# Patient Record
Sex: MALE | Race: WHITE | Employment: OTHER | URBAN - METROPOLITAN AREA
[De-identification: names, ages, dates, MRNs, and addresses within clinical notes are randomized per-mention and may not be internally consistent; named-entity substitution may affect disease eponyms.]

---

## 2019-04-18 ENCOUNTER — APPOINTMENT (OUTPATIENT)
Dept: GENERAL RADIOLOGY | Age: 54
DRG: 310 | End: 2019-04-18
Payer: COMMERCIAL

## 2019-04-18 ENCOUNTER — APPOINTMENT (OUTPATIENT)
Dept: CT IMAGING | Age: 54
DRG: 310 | End: 2019-04-18
Payer: COMMERCIAL

## 2019-04-18 ENCOUNTER — HOSPITAL ENCOUNTER (INPATIENT)
Age: 54
LOS: 6 days | Discharge: HOME | DRG: 310 | End: 2019-04-24
Attending: EMERGENCY MEDICINE | Admitting: INTERNAL MEDICINE
Payer: COMMERCIAL

## 2019-04-18 ENCOUNTER — APPOINTMENT (OUTPATIENT)
Dept: MRI IMAGING | Age: 54
DRG: 310 | End: 2019-04-18
Payer: COMMERCIAL

## 2019-04-18 ENCOUNTER — APPOINTMENT (OUTPATIENT)
Dept: ULTRASOUND IMAGING | Age: 54
DRG: 310 | End: 2019-04-18
Payer: COMMERCIAL

## 2019-04-18 DIAGNOSIS — N17.9 ACUTE KIDNEY INJURY (HCC): ICD-10-CM

## 2019-04-18 DIAGNOSIS — I67.9 CEREBRAL VASCULAR DISEASE: ICD-10-CM

## 2019-04-18 DIAGNOSIS — R26.81 UNSTEADY GAIT: Primary | ICD-10-CM

## 2019-04-18 DIAGNOSIS — M53.9 MULTILEVEL DEGENERATIVE DISC DISEASE: ICD-10-CM

## 2019-04-18 PROBLEM — M51.36 DEGENERATIVE DISC DISEASE, LUMBAR: Status: ACTIVE | Noted: 2019-04-18

## 2019-04-18 LAB
A/G RATIO: 1.1 (ref 1.1–2.2)
ALBUMIN SERPL-MCNC: 4 G/DL (ref 3.4–5)
ALP BLD-CCNC: 113 U/L (ref 40–129)
ALT SERPL-CCNC: 131 U/L (ref 10–40)
AMPHETAMINE SCREEN, URINE: ABNORMAL
ANION GAP SERPL CALCULATED.3IONS-SCNC: 17 MMOL/L (ref 3–16)
APTT: 32.7 SEC (ref 26–36)
AST SERPL-CCNC: 239 U/L (ref 15–37)
BARBITURATE SCREEN URINE: ABNORMAL
BASOPHILS ABSOLUTE: 0.1 K/UL (ref 0–0.2)
BASOPHILS RELATIVE PERCENT: 0.6 %
BENZODIAZEPINE SCREEN, URINE: ABNORMAL
BILIRUB SERPL-MCNC: 0.7 MG/DL (ref 0–1)
BILIRUBIN URINE: NEGATIVE
BLOOD, URINE: ABNORMAL
BUN BLDV-MCNC: 26 MG/DL (ref 7–20)
CALCIUM SERPL-MCNC: 9.3 MG/DL (ref 8.3–10.6)
CANNABINOID SCREEN URINE: ABNORMAL
CASTS: ABNORMAL /LPF
CHLORIDE BLD-SCNC: 98 MMOL/L (ref 99–110)
CLARITY: ABNORMAL
CO2: 23 MMOL/L (ref 21–32)
COCAINE METABOLITE SCREEN URINE: ABNORMAL
COLOR: YELLOW
CREAT SERPL-MCNC: 2.8 MG/DL (ref 0.9–1.3)
EOSINOPHILS ABSOLUTE: 0 K/UL (ref 0–0.6)
EOSINOPHILS RELATIVE PERCENT: 0.2 %
EPITHELIAL CELLS, UA: 4 /HPF (ref 0–5)
GFR AFRICAN AMERICAN: 29
GFR NON-AFRICAN AMERICAN: 24
GLOBULIN: 3.6 G/DL
GLUCOSE BLD-MCNC: 115 MG/DL (ref 70–99)
GLUCOSE BLD-MCNC: 91 MG/DL (ref 70–99)
GLUCOSE URINE: NEGATIVE MG/DL
HCT VFR BLD CALC: 34.3 % (ref 40.5–52.5)
HEMOGLOBIN: 12.1 G/DL (ref 13.5–17.5)
INR BLD: 0.97 (ref 0.86–1.14)
KETONES, URINE: ABNORMAL MG/DL
LEUKOCYTE ESTERASE, URINE: NEGATIVE
LYMPHOCYTES ABSOLUTE: 1.4 K/UL (ref 1–5.1)
LYMPHOCYTES RELATIVE PERCENT: 13.4 %
Lab: ABNORMAL
MCH RBC QN AUTO: 33.9 PG (ref 26–34)
MCHC RBC AUTO-ENTMCNC: 35.2 G/DL (ref 31–36)
MCV RBC AUTO: 96.4 FL (ref 80–100)
METHADONE SCREEN, URINE: ABNORMAL
MICROSCOPIC EXAMINATION: YES
MONOCYTES ABSOLUTE: 0.8 K/UL (ref 0–1.3)
MONOCYTES RELATIVE PERCENT: 7.8 %
NEUTROPHILS ABSOLUTE: 8.3 K/UL (ref 1.7–7.7)
NEUTROPHILS RELATIVE PERCENT: 78 %
NITRITE, URINE: NEGATIVE
OPIATE SCREEN URINE: POSITIVE
OXYCODONE URINE: ABNORMAL
PDW BLD-RTO: 16.5 % (ref 12.4–15.4)
PERFORMED ON: ABNORMAL
PH UA: 7
PH UA: 7 (ref 5–8)
PHENCYCLIDINE SCREEN URINE: ABNORMAL
PLATELET # BLD: 217 K/UL (ref 135–450)
PMV BLD AUTO: 6.6 FL (ref 5–10.5)
POTASSIUM REFLEX MAGNESIUM: 3.9 MMOL/L (ref 3.5–5.1)
PROPOXYPHENE SCREEN: ABNORMAL
PROTEIN UA: 100 MG/DL
PROTHROMBIN TIME: 11.1 SEC (ref 9.8–13)
RBC # BLD: 3.56 M/UL (ref 4.2–5.9)
RBC UA: 3 /HPF (ref 0–4)
SODIUM BLD-SCNC: 138 MMOL/L (ref 136–145)
SPECIFIC GRAVITY UA: >1.03 (ref 1–1.03)
TOTAL CK: 1386 U/L (ref 39–308)
TOTAL PROTEIN: 7.6 G/DL (ref 6.4–8.2)
TROPONIN: 0.04 NG/ML
URIC ACID, SERUM: <0.2 MG/DL (ref 3.5–7.2)
URINE TYPE: ABNORMAL
UROBILINOGEN, URINE: 1 E.U./DL
WBC # BLD: 10.6 K/UL (ref 4–11)
WBC UA: 11 /HPF (ref 0–5)

## 2019-04-18 PROCEDURE — 84540 ASSAY OF URINE/UREA-N: CPT

## 2019-04-18 PROCEDURE — 96361 HYDRATE IV INFUSION ADD-ON: CPT

## 2019-04-18 PROCEDURE — 85730 THROMBOPLASTIN TIME PARTIAL: CPT

## 2019-04-18 PROCEDURE — 80307 DRUG TEST PRSMV CHEM ANLYZR: CPT

## 2019-04-18 PROCEDURE — 96375 TX/PRO/DX INJ NEW DRUG ADDON: CPT

## 2019-04-18 PROCEDURE — 84300 ASSAY OF URINE SODIUM: CPT

## 2019-04-18 PROCEDURE — 96374 THER/PROPH/DIAG INJ IV PUSH: CPT

## 2019-04-18 PROCEDURE — 6360000004 HC RX CONTRAST MEDICATION: Performed by: EMERGENCY MEDICINE

## 2019-04-18 PROCEDURE — 84484 ASSAY OF TROPONIN QUANT: CPT

## 2019-04-18 PROCEDURE — 83935 ASSAY OF URINE OSMOLALITY: CPT

## 2019-04-18 PROCEDURE — 71045 X-RAY EXAM CHEST 1 VIEW: CPT

## 2019-04-18 PROCEDURE — 72141 MRI NECK SPINE W/O DYE: CPT

## 2019-04-18 PROCEDURE — 82550 ASSAY OF CK (CPK): CPT

## 2019-04-18 PROCEDURE — 6360000002 HC RX W HCPCS: Performed by: EMERGENCY MEDICINE

## 2019-04-18 PROCEDURE — 93005 ELECTROCARDIOGRAM TRACING: CPT | Performed by: EMERGENCY MEDICINE

## 2019-04-18 PROCEDURE — 84443 ASSAY THYROID STIM HORMONE: CPT

## 2019-04-18 PROCEDURE — 70496 CT ANGIOGRAPHY HEAD: CPT

## 2019-04-18 PROCEDURE — 81001 URINALYSIS AUTO W/SCOPE: CPT

## 2019-04-18 PROCEDURE — 36415 COLL VENOUS BLD VENIPUNCTURE: CPT

## 2019-04-18 PROCEDURE — 84133 ASSAY OF URINE POTASSIUM: CPT

## 2019-04-18 PROCEDURE — 85610 PROTHROMBIN TIME: CPT

## 2019-04-18 PROCEDURE — 84550 ASSAY OF BLOOD/URIC ACID: CPT

## 2019-04-18 PROCEDURE — 85025 COMPLETE CBC W/AUTO DIFF WBC: CPT

## 2019-04-18 PROCEDURE — 72148 MRI LUMBAR SPINE W/O DYE: CPT

## 2019-04-18 PROCEDURE — 70498 CT ANGIOGRAPHY NECK: CPT

## 2019-04-18 PROCEDURE — 86160 COMPLEMENT ANTIGEN: CPT

## 2019-04-18 PROCEDURE — 2580000003 HC RX 258: Performed by: INTERNAL MEDICINE

## 2019-04-18 PROCEDURE — 82436 ASSAY OF URINE CHLORIDE: CPT

## 2019-04-18 PROCEDURE — 6370000000 HC RX 637 (ALT 250 FOR IP): Performed by: EMERGENCY MEDICINE

## 2019-04-18 PROCEDURE — 80053 COMPREHEN METABOLIC PANEL: CPT

## 2019-04-18 PROCEDURE — 82570 ASSAY OF URINE CREATININE: CPT

## 2019-04-18 PROCEDURE — 99285 EMERGENCY DEPT VISIT HI MDM: CPT

## 2019-04-18 PROCEDURE — 76770 US EXAM ABDO BACK WALL COMP: CPT

## 2019-04-18 PROCEDURE — 1200000000 HC SEMI PRIVATE

## 2019-04-18 PROCEDURE — 2580000003 HC RX 258: Performed by: EMERGENCY MEDICINE

## 2019-04-18 PROCEDURE — 70450 CT HEAD/BRAIN W/O DYE: CPT

## 2019-04-18 RX ORDER — GABAPENTIN 300 MG/1
300 CAPSULE ORAL 3 TIMES DAILY
COMMUNITY
Start: 2019-04-01 | End: 2019-05-28 | Stop reason: ALTCHOICE

## 2019-04-18 RX ORDER — HEPARIN SODIUM 5000 [USP'U]/ML
5000 INJECTION, SOLUTION INTRAVENOUS; SUBCUTANEOUS EVERY 8 HOURS SCHEDULED
Status: DISPENSED | OUTPATIENT
Start: 2019-04-18 | End: 2019-04-22

## 2019-04-18 RX ORDER — ONDANSETRON 2 MG/ML
4 INJECTION INTRAMUSCULAR; INTRAVENOUS ONCE
Status: COMPLETED | OUTPATIENT
Start: 2019-04-18 | End: 2019-04-18

## 2019-04-18 RX ORDER — OXYCODONE HYDROCHLORIDE AND ACETAMINOPHEN 5; 325 MG/1; MG/1
2 TABLET ORAL EVERY 4 HOURS PRN
Status: DISCONTINUED | OUTPATIENT
Start: 2019-04-18 | End: 2019-04-18

## 2019-04-18 RX ORDER — SODIUM CHLORIDE 9 MG/ML
INJECTION, SOLUTION INTRAVENOUS CONTINUOUS
Status: DISCONTINUED | OUTPATIENT
Start: 2019-04-18 | End: 2019-04-19

## 2019-04-18 RX ORDER — SODIUM CHLORIDE 0.9 % (FLUSH) 0.9 %
10 SYRINGE (ML) INJECTION PRN
Status: DISCONTINUED | OUTPATIENT
Start: 2019-04-18 | End: 2019-04-24 | Stop reason: HOSPADM

## 2019-04-18 RX ORDER — OXYCODONE HYDROCHLORIDE AND ACETAMINOPHEN 5; 325 MG/1; MG/1
2 TABLET ORAL ONCE
Status: COMPLETED | OUTPATIENT
Start: 2019-04-18 | End: 2019-04-18

## 2019-04-18 RX ORDER — SODIUM CHLORIDE, SODIUM LACTATE, POTASSIUM CHLORIDE, AND CALCIUM CHLORIDE .6; .31; .03; .02 G/100ML; G/100ML; G/100ML; G/100ML
2000 INJECTION, SOLUTION INTRAVENOUS ONCE
Status: COMPLETED | OUTPATIENT
Start: 2019-04-18 | End: 2019-04-18

## 2019-04-18 RX ORDER — ACETAMINOPHEN 325 MG/1
650 TABLET ORAL EVERY 4 HOURS PRN
Status: DISCONTINUED | OUTPATIENT
Start: 2019-04-18 | End: 2019-04-24 | Stop reason: HOSPADM

## 2019-04-18 RX ORDER — OXYCODONE HYDROCHLORIDE AND ACETAMINOPHEN 5; 325 MG/1; MG/1
1 TABLET ORAL EVERY 4 HOURS PRN
Status: DISCONTINUED | OUTPATIENT
Start: 2019-04-18 | End: 2019-04-18

## 2019-04-18 RX ORDER — SODIUM CHLORIDE 9 MG/ML
INJECTION, SOLUTION INTRAVENOUS CONTINUOUS
Status: DISCONTINUED | OUTPATIENT
Start: 2019-04-18 | End: 2019-04-20

## 2019-04-18 RX ORDER — 0.9 % SODIUM CHLORIDE 0.9 %
1000 INTRAVENOUS SOLUTION INTRAVENOUS ONCE
Status: COMPLETED | OUTPATIENT
Start: 2019-04-18 | End: 2019-04-18

## 2019-04-18 RX ORDER — DOCUSATE SODIUM 100 MG/1
100 CAPSULE, LIQUID FILLED ORAL 2 TIMES DAILY
Status: DISCONTINUED | OUTPATIENT
Start: 2019-04-18 | End: 2019-04-24 | Stop reason: HOSPADM

## 2019-04-18 RX ORDER — SILDENAFIL 100 MG/1
TABLET, FILM COATED ORAL
Refills: 0 | COMMUNITY
Start: 2019-04-11 | End: 2019-05-28 | Stop reason: ALTCHOICE

## 2019-04-18 RX ORDER — ONDANSETRON 2 MG/ML
4 INJECTION INTRAMUSCULAR; INTRAVENOUS EVERY 6 HOURS PRN
Status: DISCONTINUED | OUTPATIENT
Start: 2019-04-18 | End: 2019-04-24 | Stop reason: HOSPADM

## 2019-04-18 RX ORDER — SODIUM CHLORIDE 0.9 % (FLUSH) 0.9 %
10 SYRINGE (ML) INJECTION EVERY 12 HOURS SCHEDULED
Status: DISCONTINUED | OUTPATIENT
Start: 2019-04-18 | End: 2019-04-24 | Stop reason: HOSPADM

## 2019-04-18 RX ORDER — GABAPENTIN 100 MG/1
200 CAPSULE ORAL 3 TIMES DAILY
Status: DISCONTINUED | OUTPATIENT
Start: 2019-04-18 | End: 2019-04-18

## 2019-04-18 RX ORDER — MORPHINE SULFATE 4 MG/ML
4 INJECTION, SOLUTION INTRAMUSCULAR; INTRAVENOUS ONCE
Status: COMPLETED | OUTPATIENT
Start: 2019-04-18 | End: 2019-04-18

## 2019-04-18 RX ORDER — LISINOPRIL 20 MG/1
TABLET ORAL
Refills: 5 | COMMUNITY
Start: 2019-04-11 | End: 2019-05-28 | Stop reason: ALTCHOICE

## 2019-04-18 RX ADMIN — MORPHINE SULFATE 4 MG: 4 INJECTION INTRAVENOUS at 16:50

## 2019-04-18 RX ADMIN — SODIUM CHLORIDE 1000 ML: 9 INJECTION, SOLUTION INTRAVENOUS at 16:30

## 2019-04-18 RX ADMIN — SODIUM CHLORIDE: 9 INJECTION, SOLUTION INTRAVENOUS at 19:00

## 2019-04-18 RX ADMIN — OXYCODONE AND ACETAMINOPHEN 2 TABLET: 5; 325 TABLET ORAL at 18:53

## 2019-04-18 RX ADMIN — SODIUM CHLORIDE, POTASSIUM CHLORIDE, SODIUM LACTATE AND CALCIUM CHLORIDE 2000 ML: 600; 310; 30; 20 INJECTION, SOLUTION INTRAVENOUS at 22:21

## 2019-04-18 RX ADMIN — IOPAMIDOL 75 ML: 755 INJECTION, SOLUTION INTRAVENOUS at 15:03

## 2019-04-18 RX ADMIN — ONDANSETRON 4 MG: 2 INJECTION INTRAMUSCULAR; INTRAVENOUS at 16:50

## 2019-04-18 ASSESSMENT — PAIN SCALES - GENERAL
PAINLEVEL_OUTOF10: 10
PAINLEVEL_OUTOF10: 9

## 2019-04-18 ASSESSMENT — PAIN DESCRIPTION - FREQUENCY: FREQUENCY: CONTINUOUS

## 2019-04-18 ASSESSMENT — PAIN DESCRIPTION - PAIN TYPE: TYPE: CHRONIC PAIN

## 2019-04-18 ASSESSMENT — PAIN DESCRIPTION - ONSET: ONSET: ON-GOING

## 2019-04-18 ASSESSMENT — PAIN DESCRIPTION - ORIENTATION: ORIENTATION: RIGHT;LEFT

## 2019-04-18 ASSESSMENT — PAIN DESCRIPTION - LOCATION: LOCATION: BACK;LEG

## 2019-04-18 NOTE — CONSULTS
Nilsa Riedel, MD Samella Riis, MD Judith Nottingham, MD               Office: [phone]                      Fax: [phone]            NEPHROLOGY INITIAL CONSULT NOTE:       PATIENT NAME: Shantanu Alford  : 1965  MRN: [id number]  SERVICE DATE: 2019   SERVICE TIME: 4:42 PM    REASON FOR CONSULT: I am asked to see this patient in consultation for my opinion  regarding management of Acute Kidney Injury. My recommendations will be communicated by way of shared medical record. REQUESTING PHYSICIAN: Dr Bangura He: Lana Sandoval DO    CHIEF COMPLAINT:   Chief Complaint   Patient presents with    Other     Pt here for multiple complaints, pt c/o pain in his back and lower legs, difficulty walking, dizziness, blurred vision, Dr Lisa Thomas present during triage. History Obtained From:  patient, electronic medical record, treatment team    HPI: Mr. Mondragon is a 48 y.o. male with significant past medical history of  BL SCR ~ 0.8 (till 3/2019) , HTN   Past Medical History:   Diagnosis Date    [facility]     Chronic bronchitis ([facility]     COPD (chronic obstructive pulmonary disease) ([facility]      presents with multiple complaints, pt c/o pain in his back and lower legs, difficulty walking, dizziness, blurred vision, - underwent urgent CTA with IVC-- showing no acute abnormality or flow-limiting stenosis of the major arteries of the head, but 70% focal stenosis at the proximal left internal carotid artery and 30% focal stenosis at the proximal right internal carotid. Found to have PATRICIO needing IV contrast so we are called. Regarding:  PATRICIO  · Duration (when): acute   · Location (where): kidneys  · Severity: BL SCR ~ 0.8 (till3/2019) ---> 2.8  · Context (ex: activity at onset or related to condition): diarrhea, incontinence,  + Lisinopril   · Timing (ex: continuous, intermittent): continous  · Modifying factors (ex: medications, interventions): IVF  · Associated signs & symptoms (ex: edema, SOB): As mentioned above in CC and HPI     Below mentioned Past medical / Surgical, Social, Family medical history reviewed by me. PAST MEDICAL HISTORY:   Past Medical History:   Diagnosis Date    [facility] ([facility]     Chronic bronchitis ([facility])     COPD (chronic obstructive pulmonary disease) ([facility]      PAST SURGICAL HISTORY: History reviewed. No pertinent surgical history. FAMILY HISTORY: History reviewed. No pertinent family history. of kidney disease  SOCIAL HISTORY:   Social History     Socioeconomic History    Marital status:      Spouse name: None    Number of children: None    Years of education: None    Highest education level: None   Occupational History    None   Social Needs    Financial resource strain: None    Food insecurity:     Worry: None     Inability: None    Transportation needs:     Medical: None     Non-medical: None   Tobacco Use    Smoking status: Current Every Day Smoker     Packs/day: 1.00     Types: Cigarettes    Smokeless tobacco: Never Used   Substance and Sexual Activity    Alcohol use: Yes    Drug use: No    Sexual activity: None   Lifestyle    Physical activity:     Days per week: None     Minutes per session: None    Stress: None   Relationships    Social connections:     Talks on phone: None     Gets together: None     Attends [employer] service: None     Active member of club or organization: None     Attends meetings of clubs or organizations: None     Relationship status: None    Intimate partner violence:     Fear of current or ex partner: None     Emotionally abused: None     Physically abused: None     Forced sexual activity: None   Other Topics Concern    None   Social History Narrative    None        MEDICATIONS: reviewed by me. Prior to Admission Medications:  Medications Prior to Admission: gabapentin (NEURONTIN) 300 MG capsule, Take 300 mg by mouth 3 times daily.    lisinopril (PRINIVIL;ZESTRIL) 20 MG tablet, TAKE 1 TABLET BY MOUTH ONE TIME A DAY  sildenafil (VIAGRA) 100 MG tablet, TAKE 1 TABLET BY MOUTH PRIOR TO SEXUAL INTERCOURSE     Scheduled Meds:   sodium chloride flush  10 mL Intravenous 2 times per day    docusate sodium  100 mg Oral BID    heparin (porcine)  5,000 Units Subcutaneous 3 times per day     Continuous Infusions:   sodium chloride Stopped (04/18/19 2220)    sodium chloride 100 mL/hr at 04/19/19 1132     PRN Meds:.morphine, sodium chloride flush, ondansetron, acetaminophen      Allergies reviewed by me: Patient has no known allergies. REVIEW OF SYSTEMS:  As mentioned in chief complaint and HPI   Positive for having incontinent of bowl and bladder,    All other 10-point review of systems: negative. PHYSICAL EXAM:  Recent vital signs and recent I/Os reviewed by me. Patient Vitals for the past 24 hrs:   BP Temp Temp src Pulse Resp SpO2 Height Weight   04/18/19 1431 -- 97.5 °F (36.4 °C) Oral -- -- -- -- --   04/18/19 1430 117/70 -- -- 99 15 95 % -- --   04/18/19 1425 105/65 -- Oral 97 15 95 % 5' 6\" (1.676 m) 140 lb (63.5 kg)     No intake or output data in the 24 hours ending 04/18/19 1653    Physical Exam   Constitutional: He is oriented to person, place, and time. He appears well-nourished. No distress. HENT:   Head: Normocephalic and atraumatic. Right Ear: External ear normal.   Left Ear: External ear normal.   Nose: Nose normal.   Mouth/Throat: Mucous membranes are not dry. Eyes: Conjunctivae are normal. No scleral icterus. Neck: Normal range of motion. Neck supple. No JVD present. No thyroid mass present. Cardiovascular: Normal rate and regular rhythm. Pulmonary/Chest: Effort normal and breath sounds normal. No respiratory distress. Abdominal: Soft. Bowel sounds are normal.   Musculoskeletal: He exhibits no edema or deformity. Neurological: He is alert and oriented to person, place, and time. Skin: Skin is warm and dry.    Psychiatric: He has a normal mood and affect. His behavior is normal.   Vitals reviewed. DATA:  Diagnostic tests reviewed by me for today's visit:    Recent Labs     04/18/19  1452   WBC 10.6   HCT 34.3*        Iron Saturation:  No components found for: PERCENTFE  FERRITIN:  No results found for: FERRITIN  IRON:  No results found for: IRON  TIBC:  No results found for: TIBC    Recent Labs     04/18/19  1452      K 3.9   CL 98*   CO2 23   BUN 26*   CREATININE 2.8*     Recent Labs     04/18/19  1452   CALCIUM 9.3     No results for input(s): PH, PCO2, PO2 in the last 72 hours.     Invalid input(s): Katlyn Alma Delia    ABG:  No results found for: PH, PCO2, PO2, HCO3, BE, THGB, TCO2, O2SAT  VBG:  No results found for: PHVEN, PBU9EWX, BEVEN, L9KJQWKF    LDH:  No results found for: LDH  Uric Acid:  No results found for: LABURIC, URICACID    PT/INR:    Lab Results   Component Value Date    PROTIME 11.1 04/18/2019    INR 0.97 04/18/2019     Warfarin PT/INR:  No components found for: Carmina Aguirre  PTT:    Lab Results   Component Value Date    APTT 32.7 04/18/2019   [APTT}  Last 3 Troponin:    Lab Results   Component Value Date    TROPONINI 0.04 04/18/2019    TROPONINI <0.01 02/01/2018    TROPONINI <0.01 02/01/2018       U/A:    Lab Results   Component Value Date    COLORU YELLOW 04/18/2019    PROTEINU 100 04/18/2019    PHUR 7.0 04/18/2019    PHUR 7.0 04/18/2019    CLARITYU CLOUDY 04/18/2019    SPECGRAV >1.030 04/18/2019    LEUKOCYTESUR Negative 04/18/2019    UROBILINOGEN 1.0 04/18/2019    BILIRUBINUR Negative 04/18/2019    BLOODU LARGE 04/18/2019    GLUCOSEU Negative 04/18/2019     Microalbumen/Creatinine ratio:  No components found for: RUCREAT  24 Hour Urine for Protein:  No components found for: RAWUPRO, UHRS3, FDIS40AH, UTV3  24 Hour Urine for Creatinine Clearance:  No components found for: CREAT4, UHRS10, UTV10  Urine Toxicology:  No components found for: IAMMENTA, IBARBIT, IBENZO, ICOCAINE, IMARTHC, IOPIATES, [facility]    HgBA1c:  No results found for: LABA1C  RPR:  No results found for: RPR  HIV:  No results found for: HIV  ADRY:  No results found for: ANATITER, ADRY  RF:  No results found for: RF  DSDNA:  No components found for: DNA  AMYLASE:  No results found for: AMYLASE  LIPASE:  No results found for: LIPASE  Fibrinogen Level:  No components found for: FIB       BELOW MENTIONED RADIOLOGY STUDIES REVIEWED BY ME:    Ct Head Wo Contrast    Result Date: 4/18/2019  EXAMINATION: CT OF THE HEAD WITHOUT CONTRAST  4/18/2019 2:57 pm TECHNIQUE: CT of the head was performed without the administration of intravenous contrast. Dose modulation, iterative reconstruction, and/or weight based adjustment of the mA/kV was utilized to reduce the radiation dose to as low as reasonably achievable. COMPARISON: None. HISTORY: ORDERING SYSTEM PROVIDED HISTORY: stroke like symptoms TECHNOLOGIST PROVIDED HISTORY: Has a \"code stroke\" or \"stroke alert\" been called? ->Yes Ordering Physician Provided Reason for Exam: stroke like symptoms Acuity: Acute Type of Exam: Initial FINDINGS: BRAIN/VENTRICLES: There is no acute intracranial hemorrhage, mass effect or midline shift. No abnormal extra-axial fluid collection. The gray-white differentiation is maintained without evidence of an acute infarct. There is no evidence of hydrocephalus. Moderate atherosclerotic calcification is seen along the course of the distal internal carotid artery bilaterally. ORBITS: The visualized portion of the orbits demonstrate no acute abnormality. SINUSES: The visualized paranasal sinuses and mastoid air cells demonstrate no acute abnormality. SOFT TISSUES/SKULL:  No acute abnormality of the visualized skull or soft tissues. No acute intracranial abnormality. Moderate atherosclerotic calcification within the distal internal carotid artery bilaterally. Critical results were called by Dr. Vashti Linda. Monica Barnett MD to [address] on 4/18/2019 at 15:17.      Symone Huizar Chest Portable    Result Date: 4/18/2019  EXAMINATION: SINGLE XRAY VIEW OF THE CHEST 4/18/2019 3:18 pm COMPARISON: February 2018 and December 2006 HISTORY: ORDERING SYSTEM PROVIDED HISTORY: dizzy TECHNOLOGIST PROVIDED HISTORY: Reason for exam:->dizzy Ordering Physician Provided Reason for Exam: Other (Pt here for multiple complaints, pt c/o pain in his back and lower legs, difficulty walking, dizziness, blurred vision, Dr Jacey Hector present during triage. ) Acuity: Unknown Type of Exam: Unknown FINDINGS: Heart and pulmonary vascularity within normal limits. Lungs clear without focal infiltrate or consolidation. No pleural effusion or pneumothorax. Visualized bony skeleton is unremarkable. No acute pulmonary finding. Cta Neck W Contrast    Result Date: 4/18/2019  EXAMINATION: CTA OF THE NECK; CTA OF THE HEAD WITH CONTRAST 4/18/2019 3:00 pm: TECHNIQUE: CTA of the neck was performed with the administration of intravenous contrast. Multiplanar reformatted images are provided for review. MIP images are provided for review. Stenosis of the internal carotid arteries measured using NASCET criteria. Dose modulation, iterative reconstruction, and/or weight based adjustment of the mA/kV was utilized to reduce the radiation dose to as low as reasonably achievable.; CTA of the head/brain was performed with the administration of intravenous contrast. Multiplanar reformatted images are provided for review. MIP images are provided for review. Dose modulation, iterative reconstruction, and/or weight based adjustment of the mA/kV was utilized to reduce the radiation dose to as low as reasonably achievable. COMPARISON: Noncontrast CT head from earlier today HISTORY: ORDERING SYSTEM PROVIDED HISTORY: acute neurologic symptoms TECHNOLOGIST PROVIDED HISTORY: Has a \"code stroke\" or \"stroke alert\" been called? ->Yes Ordering Physician Provided Reason for Exam: stroke like symptoms, acute neurologic symptoms Acuity: Acute Type of Exam: Initial Relevant Medical/Surgical History: htn FINDINGS: CTA NECK: AORTIC ARCH/ARCH VESSELS: There is a normal branch pattern of the aortic arch. No significant stenosis is seen of the innominate artery or subclavian arteries. CAROTID ARTERIES: There is mild atherosclerotic disease of the left common carotid artery, without flow limiting stenosis. There is up to 70% focal stenosis at the proximal aspect of the left internal carotid artery. There is no flow limiting stenosis in the right common carotid artery. There is up to 30% focal stenosis in the proximal right internal carotid artery by NASCET criteria. VERTEBRAL ARTERIES: The vertebral arteries both arise from the subclavian arteries and are normal in caliber without evidence of flow limiting stenosis or dissection. SOFT TISSUES: There is minimal paraseptal emphysema at the lung apices. The lung apices are otherwise clear. There are nonenlarged mediastinal and cervical lymph nodes, likely reactive. The visualized portion of the larynx and pharynx appear unremarkable. The parotid, submandibular and thyroid glands demonstrate no acute abnormality. BONES: The visualized osseous structures appear unremarkable. CTA HEAD: ANTERIOR CIRCULATION: The internal carotid arteries are normal in course and caliber without focal stenosis. The anterior cerebral and middle cerebral arteries demonstrate no focal stenosis. POSTERIOR CIRCULATION: There is fetal origin of the right PCA, a normal variant. The posterior cerebral arteries demonstrate no focal stenosis. The vertebral and basilar arteries appear unremarkable. There is no intracranial aneurysm. BRAIN: No mass effect or midline shift. No abnormal extra-axial fluid collection. The gray-white differentiation appears grossly maintained. No acute abnormality or flow-limiting stenosis of the major arteries of the head.  70% focal stenosis at the proximal left internal carotid artery and 30% focal stenosis at the proximal right internal carotid artery. Cta Head W Contrast    Result Date: 4/18/2019  EXAMINATION: CTA OF THE NECK; CTA OF THE HEAD WITH CONTRAST 4/18/2019 3:00 pm: TECHNIQUE: CTA of the neck was performed with the administration of intravenous contrast. Multiplanar reformatted images are provided for review. MIP images are provided for review. Stenosis of the internal carotid arteries measured using NASCET criteria. Dose modulation, iterative reconstruction, and/or weight based adjustment of the mA/kV was utilized to reduce the radiation dose to as low as reasonably achievable.; CTA of the head/brain was performed with the administration of intravenous contrast. Multiplanar reformatted images are provided for review. MIP images are provided for review. Dose modulation, iterative reconstruction, and/or weight based adjustment of the mA/kV was utilized to reduce the radiation dose to as low as reasonably achievable. COMPARISON: Noncontrast CT head from earlier today HISTORY: ORDERING SYSTEM PROVIDED HISTORY: acute neurologic symptoms TECHNOLOGIST PROVIDED HISTORY: Has a \"code stroke\" or \"stroke alert\" been called? ->Yes Ordering Physician Provided Reason for Exam: stroke like symptoms, acute neurologic symptoms Acuity: Acute Type of Exam: Initial Relevant Medical/Surgical History: htn FINDINGS: CTA NECK: AORTIC ARCH/ARCH VESSELS: There is a normal branch pattern of the aortic arch. No significant stenosis is seen of the innominate artery or subclavian arteries. CAROTID ARTERIES: There is mild atherosclerotic disease of the left common carotid artery, without flow limiting stenosis. There is up to 70% focal stenosis at the proximal aspect of the left internal carotid artery. There is no flow limiting stenosis in the right common carotid artery. There is up to 30% focal stenosis in the proximal right internal carotid artery by NASCET criteria.  VERTEBRAL ARTERIES: The vertebral arteries both arise from the subclavian arteries and are normal in caliber without evidence of flow limiting stenosis or dissection. SOFT TISSUES: There is minimal paraseptal emphysema at the lung apices. The lung apices are otherwise clear. There are nonenlarged mediastinal and cervical lymph nodes, likely reactive. The visualized portion of the larynx and pharynx appear unremarkable. The parotid, submandibular and thyroid glands demonstrate no acute abnormality. BONES: The visualized osseous structures appear unremarkable. CTA HEAD: ANTERIOR CIRCULATION: The internal carotid arteries are normal in course and caliber without focal stenosis. The anterior cerebral and middle cerebral arteries demonstrate no focal stenosis. POSTERIOR CIRCULATION: There is fetal origin of the right PCA, a normal variant. The posterior cerebral arteries demonstrate no focal stenosis. The vertebral and basilar arteries appear unremarkable. There is no intracranial aneurysm. BRAIN: No mass effect or midline shift. No abnormal extra-axial fluid collection. The gray-white differentiation appears grossly maintained. No acute abnormality or flow-limiting stenosis of the major arteries of the head. 70% focal stenosis at the proximal left internal carotid artery and 30% focal stenosis at the proximal right internal carotid artery. IMPRESSION/RECOMMENDATIONS:      PATRICIO (no CKD ):  Severe: Oligouric  - BL SCR ~ 0.8 (till3/2019) ---> 2.8   : Etiology of PATRICIO - pre-renal w/ PATRICIO and Aleve (NSAIDs) induced nephropathy    - UA : trace ketones, large bl, RBC 3, reported WBC cast ? etio for acute interstitial disease - f/up trend for now    - check CK (rhabdo)  - check complement levels, urine lytes  - avoid any more contrast  - IVF - start NS at 100 cc/hr - for 24 hours (As s/p CT with IVC on 4/18)  - hold home lisinopril  - decrease Gabapentin to 600 mg max.    - strict I/Os,     To prevent further progression of renal failure we dicussed in a great details about importance of BP goal < 130s/80s , and avoidance of nephrotoxic like NSAIDs and IV contrast use. Associated problems:   - Azotemia: azotemia  - Electrolytes: K: wnl  Hyperphosphatemia - will check   - Volume status: hypo-volemic  Na: wnl  BP: HTN controlled   - Acid-Base: stable     Other problems:  Patient Active Problem List   Diagnosis    Chest pain     : other supportive care :   - Check daily renal function panel with electrolytes-phosphorus  - Strict monitoring of I/Os, daily weight  - Renal feeds/diet  - Current medications reviewed. - Nephrotoxic medications have been discontinued. - Dose adjusted and appropriate. - Dose meds for eGFR <15 mL/min/1.73m2. durgin PATRICIO   - Avoid heavy opioids due to renal failure - may use very low dose dilaudid / fentanyl with close monitoring of CNS and respiratory depression. Please refer to the orders. High Complexity. Multiple complex problems. Discussed with patient, family - girlfriend and treatment team- referring dr. Meghna Saez you for allowing me to participate in this patient's care. Please do not hesitate to contact me with any questions/concerns. We will follow along with you.      Stewart Raya MD       [facility] [address] [place] [address] Road  Office: [phone]   Fax: [phone]

## 2019-04-18 NOTE — ED NOTES
Pt returned from MRI, complains that morphine didn't work, also complains that he is dizzy. MD made aware. Pt is on a continuous pulse oximetry and telemetry monitoring. Pt continued on cycling blood pressure. Fall risk precautions in place, call light in reach, bed side table within reach, bed alarm on, will continue to monitor.          Norman Stewart, [address] [place] [address]  04/18/19 7070

## 2019-04-18 NOTE — ED NOTES
Pt taken to CT by suly. PT in gown.       Gabbi Linares, [facility]  04/18/19 3206

## 2019-04-18 NOTE — ED NOTES
Pharmacy Medication History Note      List of current medications patient is taking is complete. Source of information: patient    Changes made to medication list:  Medications flagged for removal (include reason, ex. noncompliance):  N/A    Medications removed (include reason, ex. therapy complete or physician discontinued):  N/A    Medications added/doses adjusted:  N/A    Other notes (ex. Recent course of antibiotics, Coumadin dosing):  Denies use of other OTC or herbal medications. Last dose times updated. Lashell Bermudez [facility]    No current facility-administered medications on file prior to encounter. Current Outpatient Medications on File Prior to Encounter   Medication Sig Dispense Refill    gabapentin (NEURONTIN) 300 MG capsule Take 300 mg by mouth 3 times daily.        lisinopril (PRINIVIL;ZESTRIL) 20 MG tablet TAKE 1 TABLET BY MOUTH ONE TIME A DAY  5    sildenafil (VIAGRA) 100 MG tablet TAKE 1 TABLET BY MOUTH PRIOR TO SEXUAL INTERCOURSE  0    [DISCONTINUED] warfarin (COUMADIN) 1 MG tablet Take 1 mg by mouth Pt unsure of his dose of coumadin      [DISCONTINUED] albuterol sulfate  (90 Base) MCG/ACT inhaler Inhale 2 puffs into the lungs every 6 hours as needed for Wheezing

## 2019-04-18 NOTE — ED PROVIDER NOTES
[facility] Emergency Department      Pt Name: Radha Alvarez  MRN: [id number]  [place] 1965  Date of evaluation: 4/18/2019  Provider: Ananya Beckwith MD  CHIEF COMPLAINT  Chief Complaint   Patient presents with    Other     Pt here for multiple complaints, pt c/o pain in his back and lower legs, difficulty walking, dizziness, blurred vision, Dr Tyrone Munguia present during triage. HPI  Radha Alvarez is a 48 y.o. male who presents because of concern for possible stroke. He says he's been getting blurred vision off and on for the past 2 days. He took his blood pressure medicine the other day and his vision got better. He had an episode at work where his coworker said he was slurring his words and was not steady on his feet. That happened just prior to arrival though the patient says he's been dizzy since 8:00 in the morning. He also has chronic back pain and has been evaluated by a back specialist.  He was advised to consider surgery on his back but has not wanted that option so far since it is not guaranteed to help him and it could make him worse. He gets shooting pains into his left leg and numbness of his left leg. He says that he falls down often and his leg \"gives out\". This has been happening for about two months. His coworker had mentioned to the triage nurse to possibility of drugs although the patient denies any recreational drug use and says he occasionally uses alcohol but currently not in excess. He was also concerned he might be having a stroke because his doctor told him he is at high risk for having one. He does admit to frequent use of over-the-counter NSAIDs (Aleve) \"like candy\" for pain of his back and hip. He denies any change in urination. He developed incontinent diarrhea while in the ED but has been able to control his BMs and urine at home. He denies saddle anesthesia.     REVIEW OF SYSTEMS:  No fever, no chest pain, no abdominal pain, back pain, left leg pain, chronic left leg numbness Pertinent positives and negatives as per the HPI. All other review of systems reviewed and negative. Nursing notes reviewed. PAST MEDICAL HISTORY  Past Medical History:   Diagnosis Date    A-fib [facility])     Chronic bronchitis (HCC)     COPD (chronic obstructive pulmonary disease) ([facility] [address]      SURGICAL HISTORY  History reviewed. No pertinent surgical history. MEDICATIONS:  No current facility-administered medications on file prior to encounter. Current Outpatient Medications on File Prior to Encounter   Medication Sig Dispense Refill    gabapentin (NEURONTIN) 300 MG capsule Take 300 mg by mouth 3 times daily.  lisinopril (PRINIVIL;ZESTRIL) 20 MG tablet TAKE 1 TABLET BY MOUTH ONE TIME A DAY  5    sildenafil (VIAGRA) 100 MG tablet TAKE 1 TABLET BY MOUTH PRIOR TO SEXUAL INTERCOURSE  0     ALLERGIES  Patient has no known allergies. FAMILY HISTORY:  History reviewed. No pertinent family history. SOCIAL HISTORY:  Social History     Tobacco Use    Smoking status: Current Every Day Smoker     Packs/day: 1.00     Types: Cigarettes    Smokeless tobacco: Never Used   Substance Use Topics    Alcohol use:  Yes    Drug use: No     IMMUNIZATIONS:  Noncontributory    PHYSICAL EXAM  VITAL SIGNS:  /70   Pulse 99   Temp 97.5 °F (36.4 °C) (Oral)   Resp 15   Ht 5' 6\" (1.676 m)   Wt 140 lb (63.5 kg)   SpO2 95%   BMI 22.60 kg/m²   Constitutional:  48 y.o. male laying down  HENT:  Atraumatic, mucous membranes moist  Eyes:   Conjunctiva clear, no icterus  Neck:  Supple, no adenopathy, no visible JVD  Cardiovascular:  Regular, no rubs, no discernible murmur  Thorax & Lungs:  No accessory muscle usage, clear  Abdomen:  Soft, non distended, bowel sounds present, no tenderness  Back:  No deformity  Genitalia:  Deferred  Rectal:  Deferred  Extremities:  No cyanosis, no venous cords, no edema  Skin:  Warm, dry  Neurologic:  Alert & oriented, GCS 15, no slurred speech, CN function intact, PERRL, see NIH below  Psychiatric:  Affect appropriate    NIH Stroke Scale  NIH Stroke Scale Assessed: Yes  Interval: Baseline  Level of Consciousness (1a. ): Alert  LOC Questions (1b. ): Answers both correctly  LOC Commands (1c. ): Obeys both correctly  Best Gaze (2. ): Normal  Visual (3. ): No visual loss  Facial Palsy (4. ): Normal  Motor Arm, Left (5a. ): No drift  Motor Arm, Right (5b. ): No drift  Motor Leg, Left (6a. ): Drift, but does not hit bed  Motor Leg, Right (6b. ): No drift  Limb Ataxia (7. ): (!) Present in one limb  Sensory (8. ): (!) Partial loss  Best Language (9. ): No aphasia  Dysarthria (10. ): Normal  Extinction and Inattention (11): No neglect  Total: 3    DIAGNOSTIC RESULTS:  Labs resulted at the time of this note reviewed.   Labs Reviewed   CBC WITH AUTO DIFFERENTIAL - Abnormal; Notable for the following components:       Result Value    RBC 3.56 (*)     Hemoglobin 12.1 (*)     Hematocrit 34.3 (*)     RDW 16.5 (*)     Neutrophils # 8.3 (*)     All other components within normal limits    Narrative:     Performed at:  [facility] [address], [place], [address] [place]   Phone [phone]   COMPREHENSIVE METABOLIC PANEL W/ REFLEX TO MG FOR LOW K - Abnormal; Notable for the following components:    Chloride 98 (*)     Anion Gap 17 (*)     BUN 26 (*)     CREATININE 2.8 (*)     GFR Non- 24 (*)     GFR  29 (*)      (*)      (*)     All other components within normal limits    Narrative:     Performed at:  [facility] [address], [place], [facility] [place]   Phone [phone]   TROPONIN - Abnormal; Notable for the following components:    Troponin 0.04 (*)     All other components within normal limits    Narrative:     Performed at:  [facility] [address], [place], [facility] [place]   Phone [phone]   URINALYSIS - Abnormal; Notable for the following absence of a cardiologist shows:  Sinus rhythm, normal rate, normal conduction intervals, normal axis, no acute injury pattern, no major change from prior study      RADIOLOGY:  Plain x-rays were viewed by me: Ct Head Wo Contrast    Result Date: 4/18/2019  EXAMINATION: CT OF THE HEAD WITHOUT CONTRAST  4/18/2019 2:57 pm TECHNIQUE: CT of the head was performed without the administration of intravenous contrast. Dose modulation, iterative reconstruction, and/or weight based adjustment of the mA/kV was utilized to reduce the radiation dose to as low as reasonably achievable. COMPARISON: None. HISTORY: ORDERING SYSTEM PROVIDED HISTORY: stroke like symptoms TECHNOLOGIST PROVIDED HISTORY: Has a \"code stroke\" or \"stroke alert\" been called? ->Yes Ordering Physician Provided Reason for Exam: stroke like symptoms Acuity: Acute Type of Exam: Initial FINDINGS: BRAIN/VENTRICLES: There is no acute intracranial hemorrhage, mass effect or midline shift. No abnormal extra-axial fluid collection. The gray-white differentiation is maintained without evidence of an acute infarct. There is no evidence of hydrocephalus. Moderate atherosclerotic calcification is seen along the course of the distal internal carotid artery bilaterally. ORBITS: The visualized portion of the orbits demonstrate no acute abnormality. SINUSES: The visualized paranasal sinuses and mastoid air cells demonstrate no acute abnormality. SOFT TISSUES/SKULL:  No acute abnormality of the visualized skull or soft tissues. No acute intracranial abnormality. Moderate atherosclerotic calcification within the distal internal carotid artery bilaterally. Critical results were called by Dr. Kacey Diaz. Shelly Chapa MD to [facility] on 4/18/2019 at 15:17.      Mri Cervical Spine Wo Contrast    Result Date: 4/18/2019  EXAMINATION: MRI OF THE CERVICAL SPINE WITHOUT CONTRAST; MRI OF THE LUMBAR SPINE WITHOUT CONTRAST 4/18/2019 4:58 pm TECHNIQUE: Multiplanar multisequence MRI of the cervical spine was performed without the administration of intravenous contrast.; Multiplanar multisequence MRI of the lumbar spine was performed without the administration of intravenous contrast. COMPARISON: CT cervical spine 12/23/2006 HISTORY: ORDERING SYSTEM PROVIDED HISTORY: weakness left arm TECHNOLOGIST PROVIDED HISTORY: Ordering Physician Provided Reason for Exam: Pt coached on motion, scans repeated, used propeller sequences if possible. Pt states numbness right arm and leg. Incontinence of bowel and bladder. chronic back pain// gfr= 24, kidney disease Acuity: Acute Type of Exam: Initial FINDINGS: MRI cervical spine: Mild motion artifact challenge evaluation degrading image quality. Craniocervical junction maintained the cervical cord demonstrates normal signal morphology. Several heights, alignment bone marrow signal is unremarkable. C2-C3: Mild disc space disease. Mild vertebral joint hypertrophy. Mild neural foramina. No significant central canal stenosis. No focal disc protrusion. C3-C4: Mild disc space diseases identified with mild circumferential disc bulge. No focal disc protrusion identified. Mild degenerate cord vertebral joint hypertrophy. This welts and moderate right and mild left neural foramina. Mild degenerate facet arthropathy. C4-C5: Minimal degenerative loss of disc space height and signal.  No significant disc protrusion or disc bulge identified. Mild-to-moderate facet arthropathy greatest on the left. No central canal stenosis or neural foramina identified. C5-6: Minimal loss of disc space signal without loss of disc space height. No focal disc protrusion. Mild facet arthropathy. No central canal stenosis or neural foraminal narrowing identified. C7-T1: Minimal anterior loss of disc space height and signal with minimal cervical disc bulge. No central canal stenosis or neural foramina identified. Mild-to-moderate facet arthropathy.  MRI lumbar spine: Lumbar versus disc extrusion along the posterior margin of the L4 vertebral body narrowing the left lateral recess. Additionally, there is far lateral disc protrusion L4-5. Please correlate with possible left L4 radiculopathy. Moderate severe degenerate changes L5-S1 with moderate severe bilateral neural foraminal narrowing greatest on the left. MRI cervical spine: Mild degenerate changes upper cervical spine. No focal disc protrusion. No central canal stenosis or foraminal narrowing identified. Mri Lumbar Spine Wo Contrast    Result Date: 4/18/2019  EXAMINATION: MRI OF THE CERVICAL SPINE WITHOUT CONTRAST; MRI OF THE LUMBAR SPINE WITHOUT CONTRAST 4/18/2019 4:58 pm TECHNIQUE: Multiplanar multisequence MRI of the cervical spine was performed without the administration of intravenous contrast.; Multiplanar multisequence MRI of the lumbar spine was performed without the administration of intravenous contrast. COMPARISON: CT cervical spine 12/23/2006 HISTORY: ORDERING SYSTEM PROVIDED HISTORY: weakness left arm TECHNOLOGIST PROVIDED HISTORY: Ordering Physician Provided Reason for Exam: Pt coached on motion, scans repeated, used propeller sequences if possible. Pt states numbness right arm and leg. Incontinence of bowel and bladder. chronic back pain// gfr= 24, kidney disease Acuity: Acute Type of Exam: Initial FINDINGS: MRI cervical spine: Mild motion artifact challenge evaluation degrading image quality. Craniocervical junction maintained the cervical cord demonstrates normal signal morphology. Several heights, alignment bone marrow signal is unremarkable. C2-C3: Mild disc space disease. Mild vertebral joint hypertrophy. Mild neural foramina. No significant central canal stenosis. No focal disc protrusion. C3-C4: Mild disc space diseases identified with mild circumferential disc bulge. No focal disc protrusion identified. Mild degenerate cord vertebral joint hypertrophy.   This welts and moderate right and mild this appears very similar in signal characteristics to the L4-5 disc. This suggestive of free disc fragment or sequestered fragment. Disc extrusion would be in the differential.  This narrows the left lateral recess affecting the traversing L4 nerve root. Otherwise moderate degenerate facet arthropathy. Mild-to-moderate right and moderate severe left neural foraminal narrowing. L5-S1: Moderate degenerate disc spaces identified with moderate circumferential disc bulge. Moderate severe left greater than right neural foraminal narrowing. Moderate severe bilateral facet arthropathy. [facility] MRI lumbar spine: The moderate degenerative changes L4 through S1 with findings suspicious for free disc fragment versus disc extrusion along the posterior margin of the L4 vertebral body narrowing the left lateral recess. Additionally, there is far lateral disc protrusion L4-5. Please correlate with possible left L4 radiculopathy. Moderate severe degenerate changes L5-S1 with moderate severe bilateral neural foraminal narrowing greatest on the left. MRI cervical spine: Mild degenerate changes upper cervical spine. No focal disc protrusion. No central canal stenosis or foraminal narrowing identified. Us Renal Complete    Result Date: 4/18/2019  EXAMINATION: RETROPERITONEAL ULTRASOUND OF THE KIDNEYS AND URINARY BLADDER 4/18/2019 COMPARISON: None HISTORY: ORDERING SYSTEM PROVIDED HISTORY: RENAL FAILURE, ACUTE (KIDNEY INJURY) TECHNOLOGIST PROVIDED HISTORY: Ordering Physician Provided Reason for Exam: Renal Failure Acuity: Unknown Type of Exam: Unknown FINDINGS: Incidentally noted is increased echogenicity of the visualized liver parenchyma suggesting fatty infiltration. Kidneys: The right kidney measures 10.3 cm in length and the left kidney measures 10.0 cm in length. The kidneys are normal in size, contour, and parenchymal echogenicity. No focal renal lesion is identified.   No hydronephrosis nor shadowing renal pelvic stones. Bladder: Images the urine area bladder are unremarkable. Bilateral ureteral jets are noted. Unremarkable ultrasound of the kidneys and urinary bladder. Incidental fatty infiltration of the liver. Xr Chest Portable    Result Date: 4/18/2019  EXAMINATION: SINGLE XRAY VIEW OF THE CHEST 4/18/2019 3:18 pm COMPARISON: February 2018 and December 2006 HISTORY: ORDERING SYSTEM PROVIDED HISTORY: dizzy TECHNOLOGIST PROVIDED HISTORY: Reason for exam:->dizzy Ordering Physician Provided Reason for Exam: Other (Pt here for multiple complaints, pt c/o pain in his back and lower legs, difficulty walking, dizziness, blurred vision, Dr Shila Meneses present during triage. ) Acuity: Unknown Type of Exam: Unknown FINDINGS: Heart and pulmonary vascularity within normal limits. Lungs clear without focal infiltrate or consolidation. No pleural effusion or pneumothorax. Visualized bony skeleton is unremarkable. No acute pulmonary finding. Cta Neck W Contrast    Result Date: 4/18/2019  EXAMINATION: CTA OF THE NECK; CTA OF THE HEAD WITH CONTRAST 4/18/2019 3:00 pm: TECHNIQUE: CTA of the neck was performed with the administration of intravenous contrast. Multiplanar reformatted images are provided for review. MIP images are provided for review. Stenosis of the internal carotid arteries measured using NASCET criteria. Dose modulation, iterative reconstruction, and/or weight based adjustment of the mA/kV was utilized to reduce the radiation dose to as low as reasonably achievable.; CTA of the head/brain was performed with the administration of intravenous contrast. Multiplanar reformatted images are provided for review. MIP images are provided for review. Dose modulation, iterative reconstruction, and/or weight based adjustment of the mA/kV was utilized to reduce the radiation dose to as low as reasonably achievable.  COMPARISON: Noncontrast CT head from earlier today HISTORY: ORDERING SYSTEM PROVIDED HISTORY: acute neurologic symptoms TECHNOLOGIST PROVIDED HISTORY: Has a \"code stroke\" or \"stroke alert\" been called? ->Yes Ordering Physician Provided Reason for Exam: stroke like symptoms, acute neurologic symptoms Acuity: Acute Type of Exam: Initial Relevant Medical/Surgical History: htn FINDINGS: CTA NECK: AORTIC ARCH/ARCH VESSELS: There is a normal branch pattern of the aortic arch. No significant stenosis is seen of the innominate artery or subclavian arteries. CAROTID ARTERIES: There is mild atherosclerotic disease of the left common carotid artery, without flow limiting stenosis. There is up to 70% focal stenosis at the proximal aspect of the left internal carotid artery. There is no flow limiting stenosis in the right common carotid artery. There is up to 30% focal stenosis in the proximal right internal carotid artery by NASCET criteria. VERTEBRAL ARTERIES: The vertebral arteries both arise from the subclavian arteries and are normal in caliber without evidence of flow limiting stenosis or dissection. SOFT TISSUES: There is minimal paraseptal emphysema at the lung apices. The lung apices are otherwise clear. There are nonenlarged mediastinal and cervical lymph nodes, likely reactive. The visualized portion of the larynx and pharynx appear unremarkable. The parotid, submandibular and thyroid glands demonstrate no acute abnormality. BONES: The visualized osseous structures appear unremarkable. CTA HEAD: ANTERIOR CIRCULATION: The internal carotid arteries are normal in course and caliber without focal stenosis. The anterior cerebral and middle cerebral arteries demonstrate no focal stenosis. POSTERIOR CIRCULATION: There is fetal origin of the right PCA, a normal variant. The posterior cerebral arteries demonstrate no focal stenosis. The vertebral and basilar arteries appear unremarkable. There is no intracranial aneurysm. BRAIN: No mass effect or midline shift. No abnormal extra-axial fluid collection.  The gray-white differentiation appears grossly maintained. No acute abnormality or flow-limiting stenosis of the major arteries of the head. 70% focal stenosis at the proximal left internal carotid artery and 30% focal stenosis at the proximal right internal carotid artery. Cta Head W Contrast    Result Date: 4/18/2019  EXAMINATION: CTA OF THE NECK; CTA OF THE HEAD WITH CONTRAST 4/18/2019 3:00 pm: TECHNIQUE: CTA of the neck was performed with the administration of intravenous contrast. Multiplanar reformatted images are provided for review. MIP images are provided for review. Stenosis of the internal carotid arteries measured using NASCET criteria. Dose modulation, iterative reconstruction, and/or weight based adjustment of the mA/kV was utilized to reduce the radiation dose to as low as reasonably achievable.; CTA of the head/brain was performed with the administration of intravenous contrast. Multiplanar reformatted images are provided for review. MIP images are provided for review. Dose modulation, iterative reconstruction, and/or weight based adjustment of the mA/kV was utilized to reduce the radiation dose to as low as reasonably achievable. COMPARISON: Noncontrast CT head from earlier today HISTORY: ORDERING SYSTEM PROVIDED HISTORY: acute neurologic symptoms TECHNOLOGIST PROVIDED HISTORY: Has a \"code stroke\" or \"stroke alert\" been called? ->Yes Ordering Physician Provided Reason for Exam: stroke like symptoms, acute neurologic symptoms Acuity: Acute Type of Exam: Initial Relevant Medical/Surgical History: htn FINDINGS: CTA NECK: AORTIC ARCH/ARCH VESSELS: There is a normal branch pattern of the aortic arch. No significant stenosis is seen of the innominate artery or subclavian arteries. CAROTID ARTERIES: There is mild atherosclerotic disease of the left common carotid artery, without flow limiting stenosis. There is up to 70% focal stenosis at the proximal aspect of the left internal carotid artery.   There is no flow limiting stenosis in the right common carotid artery. There is up to 30% focal stenosis in the proximal right internal carotid artery by NASCET criteria. VERTEBRAL ARTERIES: The vertebral arteries both arise from the subclavian arteries and are normal in caliber without evidence of flow limiting stenosis or dissection. SOFT TISSUES: There is minimal paraseptal emphysema at the lung apices. The lung apices are otherwise clear. There are nonenlarged mediastinal and cervical lymph nodes, likely reactive. The visualized portion of the larynx and pharynx appear unremarkable. The parotid, submandibular and thyroid glands demonstrate no acute abnormality. BONES: The visualized osseous structures appear unremarkable. CTA HEAD: ANTERIOR CIRCULATION: The internal carotid arteries are normal in course and caliber without focal stenosis. The anterior cerebral and middle cerebral arteries demonstrate no focal stenosis. POSTERIOR CIRCULATION: There is fetal origin of the right PCA, a normal variant. The posterior cerebral arteries demonstrate no focal stenosis. The vertebral and basilar arteries appear unremarkable. There is no intracranial aneurysm. BRAIN: No mass effect or midline shift. No abnormal extra-axial fluid collection. The gray-white differentiation appears grossly maintained. No acute abnormality or flow-limiting stenosis of the major arteries of the head. 70% focal stenosis at the proximal left internal carotid artery and 30% focal stenosis at the proximal right internal carotid artery.       ED COURSE:  Perianal sensation noted to be present after fecal soiling/diarrhea episode  Medications administered:  Medications   0.9 % sodium chloride infusion ( Intravenous New Bag 4/18/19 1900)   iopamidol (ISOVUE-370) 76 % injection 75 mL (75 mLs Intravenous Given 4/18/19 1503)   0.9 % sodium chloride bolus (1,000 mLs Intravenous New Bag 4/18/19 1630)   morphine injection 4 mg (4 mg Intravenous Given 4/18/19

## 2019-04-18 NOTE — ED NOTES
Pt. Arrived via coworker transport. Coworker to  area requesting assistance to get pt. Into wheelchair from vehicle. Coworker reporting hx of HTN and concerned for stroke reporting pt. Having difficulty walking and states his vision is blurry. Coworker reports concerns for possible drug use as well and states pt. Wife en route to ED. Claire RN to vehicle and assisted pt. Into wheelchair. Pt. Having difficulty moving lower extremities bilaterally. Pt. Assisted directly to room. Provider notified immediately of stroke like sx. Ramila RN at bedside upon arrival to room.       Alma Patel RN  04/18/19 0620

## 2019-04-18 NOTE — ED NOTES
Pt having incontinent of bowl and bladder. Pt states he can not feel himself moving his bowels. Pt is on a continuous pulse oximetry and telemetry monitoring. Pt continued on cycling blood pressure. Fall risk precautions in place, call light in reach, bed side table within reach, bed alarm on, will continue to monitor.          Lisa Dodge RN  04/18/19 [id number]

## 2019-04-18 NOTE — PLAN OF CARE
04/18/2019    INR 0.97 04/18/2019     PTT:    Lab Results   Component Value Date    APTT 32.7 04/18/2019     ADRY:  No results found for: ANATITER, ADRY            RADIOLOGY:    Ct Head Wo Contrast    Result Date: 4/18/2019  EXAMINATION: CT OF THE HEAD WITHOUT CONTRAST  4/18/2019 2:57 pm TECHNIQUE: CT of the head was performed without the administration of intravenous contrast. Dose modulation, iterative reconstruction, and/or weight based adjustment of the mA/kV was utilized to reduce the radiation dose to as low as reasonably achievable. COMPARISON: None. HISTORY: ORDERING SYSTEM PROVIDED HISTORY: stroke like symptoms TECHNOLOGIST PROVIDED HISTORY: Has a \"code stroke\" or \"stroke alert\" been called? ->Yes Ordering Physician Provided Reason for Exam: stroke like symptoms Acuity: Acute Type of Exam: Initial FINDINGS: BRAIN/VENTRICLES: There is no acute intracranial hemorrhage, mass effect or midline shift. No abnormal extra-axial fluid collection. The gray-white differentiation is maintained without evidence of an acute infarct. There is no evidence of hydrocephalus. Moderate atherosclerotic calcification is seen along the course of the distal internal carotid artery bilaterally. ORBITS: The visualized portion of the orbits demonstrate no acute abnormality. SINUSES: The visualized paranasal sinuses and mastoid air cells demonstrate no acute abnormality. SOFT TISSUES/SKULL:  No acute abnormality of the visualized skull or soft tissues. No acute intracranial abnormality. Moderate atherosclerotic calcification within the distal internal carotid artery bilaterally. Critical results were called by Dr. Carleen Goodwin. Renee Ponce MD to [facility] 61 on 4/18/2019 at 15:17.      Xr Chest Portable    Result Date: 4/18/2019  EXAMINATION: SINGLE XRAY VIEW OF THE CHEST 4/18/2019 3:18 pm COMPARISON: February 2018 and December 2006 HISTORY: ORDERING SYSTEM PROVIDED HISTORY: dizzy TECHNOLOGIST PROVIDED HISTORY: Reason for intravenous contrast. Multiplanar reformatted images are provided for review. MIP images are provided for review. Stenosis of the internal carotid arteries measured using NASCET criteria. Dose modulation, iterative reconstruction, and/or weight based adjustment of the mA/kV was utilized to reduce the radiation dose to as low as reasonably achievable.; CTA of the head/brain was performed with the administration of intravenous contrast. Multiplanar reformatted images are provided for review. MIP images are provided for review. Dose modulation, iterative reconstruction, and/or weight based adjustment of the mA/kV was utilized to reduce the radiation dose to as low as reasonably achievable. COMPARISON: Noncontrast CT head from earlier today HISTORY: ORDERING SYSTEM PROVIDED HISTORY: acute neurologic symptoms TECHNOLOGIST PROVIDED HISTORY: Has a \"code stroke\" or \"stroke alert\" been called? ->Yes Ordering Physician Provided Reason for Exam: stroke like symptoms, acute neurologic symptoms Acuity: Acute Type of Exam: Initial Relevant Medical/Surgical History: htn FINDINGS: CTA NECK: AORTIC ARCH/ARCH VESSELS: There is a normal branch pattern of the aortic arch. No significant stenosis is seen of the innominate artery or subclavian arteries. CAROTID ARTERIES: There is mild atherosclerotic disease of the left common carotid artery, without flow limiting stenosis. There is up to 70% focal stenosis at the proximal aspect of the left internal carotid artery. There is no flow limiting stenosis in the right common carotid artery. There is up to 30% focal stenosis in the proximal right internal carotid artery by NASCET criteria. VERTEBRAL ARTERIES: The vertebral arteries both arise from the subclavian arteries and are normal in caliber without evidence of flow limiting stenosis or dissection. SOFT TISSUES: There is minimal paraseptal emphysema at the lung apices. The lung apices are otherwise clear.   There are nonenlarged mediastinal and cervical lymph nodes, likely reactive. The visualized portion of the larynx and pharynx appear unremarkable. The parotid, submandibular and thyroid glands demonstrate no acute abnormality. BONES: The visualized osseous structures appear unremarkable. CTA HEAD: ANTERIOR CIRCULATION: The internal carotid arteries are normal in course and caliber without focal stenosis. The anterior cerebral and middle cerebral arteries demonstrate no focal stenosis. POSTERIOR CIRCULATION: There is fetal origin of the right PCA, a normal variant. The posterior cerebral arteries demonstrate no focal stenosis. The vertebral and basilar arteries appear unremarkable. There is no intracranial aneurysm. BRAIN: No mass effect or midline shift. No abnormal extra-axial fluid collection. The gray-white differentiation appears grossly maintained. No acute abnormality or flow-limiting stenosis of the major arteries of the head. 70% focal stenosis at the proximal left internal carotid artery and 30% focal stenosis at the proximal right internal carotid artery. Imaging Results.   Chest X Ray reviwed by me          Electronically Signed: Hilma Hammans, MD 4/18/2019 5:35 PM

## 2019-04-19 LAB
A/G RATIO: 1.1 (ref 1.1–2.2)
ALBUMIN SERPL-MCNC: 3.1 G/DL (ref 3.4–5)
ALP BLD-CCNC: 112 U/L (ref 40–129)
ALT SERPL-CCNC: 94 U/L (ref 10–40)
ANION GAP SERPL CALCULATED.3IONS-SCNC: 8 MMOL/L (ref 3–16)
AST SERPL-CCNC: 149 U/L (ref 15–37)
BASOPHILS ABSOLUTE: 0.1 K/UL (ref 0–0.2)
BASOPHILS RELATIVE PERCENT: 0.7 %
BILIRUB SERPL-MCNC: <0.2 MG/DL (ref 0–1)
BUN BLDV-MCNC: 18 MG/DL (ref 7–20)
C3 COMPLEMENT: 115.7 MG/DL (ref 90–180)
C4 COMPLEMENT: 17.8 MG/DL (ref 10–40)
CALCIUM SERPL-MCNC: 8.2 MG/DL (ref 8.3–10.6)
CHLORIDE BLD-SCNC: 106 MMOL/L (ref 99–110)
CHLORIDE URINE RANDOM: 37 MMOL/L
CO2: 22 MMOL/L (ref 21–32)
CREAT SERPL-MCNC: 1.2 MG/DL (ref 0.9–1.3)
CREATININE URINE: 103.1 MG/DL (ref 39–259)
EKG ATRIAL RATE: 89 BPM
EKG DIAGNOSIS: NORMAL
EKG P AXIS: 46 DEGREES
EKG P-R INTERVAL: 120 MS
EKG Q-T INTERVAL: 402 MS
EKG QRS DURATION: 70 MS
EKG QTC CALCULATION (BAZETT): 489 MS
EKG R AXIS: -39 DEGREES
EKG T AXIS: -16 DEGREES
EKG VENTRICULAR RATE: 89 BPM
EOSINOPHILS ABSOLUTE: 0 K/UL (ref 0–0.6)
EOSINOPHILS RELATIVE PERCENT: 0.4 %
GFR AFRICAN AMERICAN: >60
GFR NON-AFRICAN AMERICAN: >60
GLOBULIN: 2.9 G/DL
GLUCOSE BLD-MCNC: 113 MG/DL (ref 70–99)
HCT VFR BLD CALC: 31.3 % (ref 40.5–52.5)
HEMOGLOBIN: 10.9 G/DL (ref 13.5–17.5)
LYMPHOCYTES ABSOLUTE: 1.3 K/UL (ref 1–5.1)
LYMPHOCYTES RELATIVE PERCENT: 13.6 %
MCH RBC QN AUTO: 33.3 PG (ref 26–34)
MCHC RBC AUTO-ENTMCNC: 34.7 G/DL (ref 31–36)
MCV RBC AUTO: 95.9 FL (ref 80–100)
MONOCYTES ABSOLUTE: 0.4 K/UL (ref 0–1.3)
MONOCYTES RELATIVE PERCENT: 4.6 %
NEUTROPHILS ABSOLUTE: 7.4 K/UL (ref 1.7–7.7)
NEUTROPHILS RELATIVE PERCENT: 80.7 %
PDW BLD-RTO: 17.2 % (ref 12.4–15.4)
PHOSPHORUS: 1.9 MG/DL (ref 2.5–4.9)
PLATELET # BLD: 149 K/UL (ref 135–450)
PMV BLD AUTO: 6.5 FL (ref 5–10.5)
POTASSIUM REFLEX MAGNESIUM: 3.8 MMOL/L (ref 3.5–5.1)
POTASSIUM SERPL-SCNC: 3.8 MMOL/L (ref 3.5–5.1)
POTASSIUM, UR: 45.2 MMOL/L
RBC # BLD: 3.26 M/UL (ref 4.2–5.9)
REASON FOR REJECTION: NORMAL
REJECTED TEST: NORMAL
REPORT: NORMAL
RESPIRATORY PANEL PCR: NORMAL
SODIUM BLD-SCNC: 136 MMOL/L (ref 136–145)
SODIUM URINE: 68 MMOL/L
SODIUM URINE: 71 MMOL/L
TOTAL CK: 735 U/L (ref 39–308)
TOTAL PROTEIN: 6 G/DL (ref 6.4–8.2)
TSH REFLEX: 2.41 UIU/ML (ref 0.27–4.2)
UREA NITROGEN, UR: 214.4 MG/DL (ref 800–1666)
WBC # BLD: 9.2 K/UL (ref 4–11)

## 2019-04-19 PROCEDURE — 87798 DETECT AGENT NOS DNA AMP: CPT

## 2019-04-19 PROCEDURE — 87581 M.PNEUMON DNA AMP PROBE: CPT

## 2019-04-19 PROCEDURE — 97530 THERAPEUTIC ACTIVITIES: CPT

## 2019-04-19 PROCEDURE — 85025 COMPLETE CBC W/AUTO DIFF WBC: CPT

## 2019-04-19 PROCEDURE — 87040 BLOOD CULTURE FOR BACTERIA: CPT

## 2019-04-19 PROCEDURE — 2580000003 HC RX 258: Performed by: INTERNAL MEDICINE

## 2019-04-19 PROCEDURE — 87633 RESP VIRUS 12-25 TARGETS: CPT

## 2019-04-19 PROCEDURE — 97161 PT EVAL LOW COMPLEX 20 MIN: CPT

## 2019-04-19 PROCEDURE — 6360000002 HC RX W HCPCS: Performed by: HOSPITALIST

## 2019-04-19 PROCEDURE — 6370000000 HC RX 637 (ALT 250 FOR IP): Performed by: NURSE PRACTITIONER

## 2019-04-19 PROCEDURE — 87486 CHLMYD PNEUM DNA AMP PROBE: CPT

## 2019-04-19 PROCEDURE — 2580000003 HC RX 258: Performed by: NURSE PRACTITIONER

## 2019-04-19 PROCEDURE — 51798 US URINE CAPACITY MEASURE: CPT

## 2019-04-19 PROCEDURE — 97116 GAIT TRAINING THERAPY: CPT

## 2019-04-19 PROCEDURE — 82550 ASSAY OF CK (CPK): CPT

## 2019-04-19 PROCEDURE — 1200000000 HC SEMI PRIVATE

## 2019-04-19 PROCEDURE — 6360000002 HC RX W HCPCS: Performed by: NURSE PRACTITIONER

## 2019-04-19 PROCEDURE — 93010 ELECTROCARDIOGRAM REPORT: CPT | Performed by: INTERNAL MEDICINE

## 2019-04-19 PROCEDURE — 80053 COMPREHEN METABOLIC PANEL: CPT

## 2019-04-19 PROCEDURE — 36415 COLL VENOUS BLD VENIPUNCTURE: CPT

## 2019-04-19 RX ORDER — MORPHINE SULFATE 2 MG/ML
2 INJECTION, SOLUTION INTRAMUSCULAR; INTRAVENOUS EVERY 4 HOURS PRN
Status: DISCONTINUED | OUTPATIENT
Start: 2019-04-19 | End: 2019-04-24 | Stop reason: HOSPADM

## 2019-04-19 RX ORDER — KETOROLAC TROMETHAMINE 30 MG/ML
30 INJECTION, SOLUTION INTRAMUSCULAR; INTRAVENOUS ONCE
Status: COMPLETED | OUTPATIENT
Start: 2019-04-19 | End: 2019-04-19

## 2019-04-19 RX ADMIN — HEPARIN SODIUM 5000 UNITS: 5000 INJECTION INTRAVENOUS; SUBCUTANEOUS at 07:31

## 2019-04-19 RX ADMIN — HEPARIN SODIUM 5000 UNITS: 5000 INJECTION INTRAVENOUS; SUBCUTANEOUS at 22:38

## 2019-04-19 RX ADMIN — SODIUM CHLORIDE: 9 INJECTION, SOLUTION INTRAVENOUS at 11:32

## 2019-04-19 RX ADMIN — MORPHINE SULFATE 2 MG: 2 INJECTION, SOLUTION INTRAMUSCULAR; INTRAVENOUS at 18:34

## 2019-04-19 RX ADMIN — HEPARIN SODIUM 5000 UNITS: 5000 INJECTION INTRAVENOUS; SUBCUTANEOUS at 00:04

## 2019-04-19 RX ADMIN — ACETAMINOPHEN 650 MG: 325 TABLET, FILM COATED ORAL at 22:43

## 2019-04-19 RX ADMIN — MORPHINE SULFATE 2 MG: 2 INJECTION, SOLUTION INTRAMUSCULAR; INTRAVENOUS at 14:47

## 2019-04-19 RX ADMIN — Medication 10 ML: at 08:40

## 2019-04-19 RX ADMIN — ACETAMINOPHEN 650 MG: 325 TABLET, FILM COATED ORAL at 11:11

## 2019-04-19 RX ADMIN — SODIUM CHLORIDE: 9 INJECTION, SOLUTION INTRAVENOUS at 00:04

## 2019-04-19 RX ADMIN — SODIUM CHLORIDE: 9 INJECTION, SOLUTION INTRAVENOUS at 22:04

## 2019-04-19 RX ADMIN — MORPHINE SULFATE 2 MG: 2 INJECTION, SOLUTION INTRAMUSCULAR; INTRAVENOUS at 22:38

## 2019-04-19 RX ADMIN — Medication 10 ML: at 22:48

## 2019-04-19 RX ADMIN — DOCUSATE SODIUM 100 MG: 100 CAPSULE, LIQUID FILLED ORAL at 08:38

## 2019-04-19 RX ADMIN — KETOROLAC TROMETHAMINE 30 MG: 30 INJECTION, SOLUTION INTRAMUSCULAR at 08:39

## 2019-04-19 RX ADMIN — HEPARIN SODIUM 5000 UNITS: 5000 INJECTION INTRAVENOUS; SUBCUTANEOUS at 14:47

## 2019-04-19 RX ADMIN — DOCUSATE SODIUM 100 MG: 100 CAPSULE, LIQUID FILLED ORAL at 22:42

## 2019-04-19 RX ADMIN — MORPHINE SULFATE 2 MG: 2 INJECTION, SOLUTION INTRAMUSCULAR; INTRAVENOUS at 10:51

## 2019-04-19 ASSESSMENT — PAIN SCALES - GENERAL
PAINLEVEL_OUTOF10: 6
PAINLEVEL_OUTOF10: 7
PAINLEVEL_OUTOF10: 10
PAINLEVEL_OUTOF10: 9
PAINLEVEL_OUTOF10: 8
PAINLEVEL_OUTOF10: 9
PAINLEVEL_OUTOF10: 10
PAINLEVEL_OUTOF10: 10

## 2019-04-19 ASSESSMENT — PAIN DESCRIPTION - DESCRIPTORS
DESCRIPTORS: SHARP;CONSTANT
DESCRIPTORS: CONSTANT;SHARP
DESCRIPTORS: SHARP;CONSTANT
DESCRIPTORS_2: HEADACHE

## 2019-04-19 ASSESSMENT — PAIN DESCRIPTION - PROGRESSION
CLINICAL_PROGRESSION: GRADUALLY WORSENING
CLINICAL_PROGRESSION_2: GRADUALLY WORSENING

## 2019-04-19 ASSESSMENT — PAIN DESCRIPTION - LOCATION
LOCATION: BACK;LEG
LOCATION_2: HEAD
LOCATION: BACK;HIP
LOCATION: LEG;BACK
LOCATION: LEG;BACK

## 2019-04-19 ASSESSMENT — PAIN DESCRIPTION - ONSET
ONSET_2: GRADUAL
ONSET: ON-GOING

## 2019-04-19 ASSESSMENT — PAIN DESCRIPTION - DIRECTION: RADIATING_TOWARDS: GENERALLY

## 2019-04-19 ASSESSMENT — PAIN DESCRIPTION - PAIN TYPE
TYPE: CHRONIC PAIN
TYPE_2: ACUTE PAIN
TYPE: CHRONIC PAIN

## 2019-04-19 ASSESSMENT — PAIN DESCRIPTION - FREQUENCY
FREQUENCY: CONTINUOUS

## 2019-04-19 ASSESSMENT — PAIN DESCRIPTION - ORIENTATION
ORIENTATION: LEFT;RIGHT;LOWER
ORIENTATION: LEFT

## 2019-04-19 ASSESSMENT — PAIN DESCRIPTION - INTENSITY: RATING_2: 5

## 2019-04-19 ASSESSMENT — PAIN DESCRIPTION - DURATION: DURATION_2: CONTINUOUS

## 2019-04-19 ASSESSMENT — PAIN - FUNCTIONAL ASSESSMENT: PAIN_FUNCTIONAL_ASSESSMENT: ACTIVITIES ARE NOT PREVENTED

## 2019-04-19 NOTE — PROGRESS NOTES
This writer has asked for something for pain and anxiety at the pt's multiple multiple request. At this time due to his low b/p, no meds will be given. Pt was advised and verbalizes that he is not happy about this and is requesting that his girlfriend at the bedside go to their home and get his meds so he can take them. I advised the pt that would be against hospital policy.  Camera and bed alarm in place

## 2019-04-19 NOTE — PLAN OF CARE
47 yo male admitted for dizziness, blurred vision,LBP, difficulty walking and concern for CVA. AVSS  Exam per ED MD  CT of brain w/o any evidence of intracranial abnormality  MRI of C spine- mild DDD and spondylosis  MRI  L spine- Left HNP L45  A/P:1. R/O CVA- per medicine and neurology         2. Acute renal insufficiency-per medicine         3. HNP L45 left- agree with previous evaluation. Recommend PT, NIGEL at L45 and if no            improvement consider elective discectomy for left leg pain.

## 2019-04-19 NOTE — PROGRESS NOTES
Neuro check complete, see flowsheet. Left side weakness upper and lower, but pt is seen using his left side to grab a cup of milk and pulling himself up in bed. When questioned about his use of his extremities, he stated \"use of his extemities comes and goes and that he has been falling\".

## 2019-04-19 NOTE — CARE COORDINATION
Discharge Planning Assessment  SW discharge planner met with patient to discuss reason for admission, current living situation, and potential needs at the time of discharge    Demographics/Insurance verified Yes/yes    Current type of dwelling: apartment, 2nd floor, 12 stairs    Patient from ECF/SW confirmed with: n/a    Living arrangements: Lives with girlfriend Aparna Morales    Level of function/Support: independent, was working prior to admission    PCP: Dr. Roberto Ridley    Last Visit to PCP: 4/2019    DME: Leandra Jacob with any community resources/agencies/skilled home care: no    Medication compliance issues: no    Financial issues that could impact healthcare: no    Transportation at the time of discharge: yes    Tentative discharge plan: PT/OT recs for ARU. RN aware that consult is needed. SW follow.     Sol ROLAND, [address]

## 2019-04-19 NOTE — ED NOTES
Pt alert at this time, BP steadily increasing. Report called to 3A RN.      Blas Delaney, BETH  04/18/19 1211

## 2019-04-19 NOTE — PROGRESS NOTES
[facility] PROGRESS NOTE    4/19/2019 9:09 AM        Name: Radha Alvarez . Admitted: 4/18/2019  Primary Care Provider: Eugenia Benjamin DO (Tel: [phone])                        Subjective:  . No acute events overnight. Resting well. Pain control. Diet ok. Labs reviewed  Denies any chest pain sob. Reviewed interval ancillary notes    Current Medications    0.9 % sodium chloride infusion Continuous   sodium chloride flush 0.9 % injection 10 mL 2 times per day   sodium chloride flush 0.9 % injection 10 mL PRN   docusate sodium (COLACE) capsule 100 mg BID   ondansetron (ZOFRAN) injection 4 mg Q6H PRN   heparin (porcine) injection 5,000 Units 3 times per day   0.9 % sodium chloride infusion Continuous   acetaminophen (TYLENOL) tablet 650 mg Q4H PRN       Objective:  /65   Pulse 118   Temp 102.6 °F (39.2 °C)   Resp 18   Ht 5' 6\" (1.676 m)   Wt 132 lb 1.6 oz (59.9 kg)   SpO2 92%   BMI 21.32 kg/m²     Intake/Output Summary (Last 24 hours) at 4/19/2019 0909  Last data filed at 4/19/2019 0840  Gross per 24 hour   Intake 500 ml   Output 700 ml   Net -200 ml    Wt Readings from Last 3 Encounters:   04/19/19 132 lb 1.6 oz (59.9 kg)   02/01/18 135 lb (61.2 kg)       General appearance:  Appears comfortable  Eyes: Sclera clear. Pupils equal.  ENT: Moist oral mucosa. Trachea midline, no adenopathy. Cardiovascular: Regular rhythm, normal S1, S2. No murmur. No edema in lower extremities  Respiratory: Not using accessory muscles. Good inspiratory effort. Clear to auscultation bilaterally, no wheeze or crackles. GI: Abdomen soft, no tenderness, not distended, normal bowel sounds  Musculoskeletal: No cyanosis in digits, neck supple  Neurology: CN 2-12 grossly intact. No speech or motor deficits  Psych: Normal affect.  Alert and oriented in time, place and

## 2019-04-19 NOTE — ED NOTES
Verbal orders obtained from Chana WATERS for 2L bolus of lactated ringers. Gabapentin and percocet held and order canceled.      Amanuel Post RN  04/18/19 7524

## 2019-04-19 NOTE — ED NOTES
Pt alert and oriented and shows no signs of distress at time of transfer to [facility]. Pt taken to room by Esme CAMPOS and [facility] in bed.         Kristofer Mccurdy RN  04/18/19 3187

## 2019-04-19 NOTE — PROGRESS NOTES
Physical Therapy    Facility/Department: [address] NURSING  Initial Assessment    NAME: Anne Shaw  : 1965  MRN: [id number]    Date of Service: 2019    Discharge Recommendations: Anne Shaw scored a 16/24 on the AM-PAC short mobility form. Current research shows that an AM-PAC score of 17 or less is typically not associated with a discharge to the patient's home setting. Based on the patients AM-PAC score and their current functional mobility deficits, it is recommended that the patient have 5-7 sessions per week of Physical Therapy at d/c to increase the patients independence. PT Equipment Recommendations  Equipment Needed: Yes(should pt d/c home)  Mobility Devices: Melda Slack: Rolling    Assessment   Body structures, Functions, Activity limitations: Decreased functional mobility ; Decreased strength;Decreased safe awareness;Decreased endurance;Decreased sensation;Decreased balance; Increased Pain  Assessment: Pt presents with impaired strength of LLE, decreased functional endurance, decreased sensation to LLE, impaired standing balance, and decreased safety awareness impairing his ability to perform functional mobility safely and independently. Pt would benefit from acute PT to address deficits. Treatment Diagnosis: impaired gait, transfers, and balance  Prognosis: Good  Decision Making: Low Complexity  Clinical Presentation: stable  Patient Education: PT POC and eval, d/c recommendations, safe use of RW for transfers and ambulation   Barriers to Learning: none  REQUIRES PT FOLLOW UP: Yes  Activity Tolerance  Activity Tolerance: Patient limited by endurance; Patient limited by fatigue       Patient Diagnosis(es): The primary encounter diagnosis was Unsteady gait. Diagnoses of Multilevel degenerative disc disease, Acute kidney injury ([facility] [address] and Cerebral vascular disease were also pertinent to this visit.      has a past medical history of A-fib ([address] Chronic bronchitis ([address] and COPD (chronic obstructive pulmonary disease) ([facility] [address] has no past surgical history on file. Restrictions  Restrictions/Precautions  Restrictions/Precautions: Fall Risk(HIGH FALL RISK)  Position Activity Restriction  Other position/activity restrictions: Kaylee Iverson is a 48 y.o. male who presents because of concern for possible stroke. He says he's been getting blurred vision off and on for the past 2 days. He took his blood pressure medicine the other day and his vision got better. He had an episode at work where his coworker said he was slurring his words and was not steady on his feet. That happened just prior to arrival though the patient says he's been dizzy since 8:00 in the morning. He also has chronic back pain and has been evaluated by a back specialist.  He was advised to consider surgery on his back but has not wanted that option so far since it is not guaranteed to help him and it could make him worse. He gets shooting pains into his left leg and numbness of his left leg. He says that he falls down often and his leg \"gives out\". This has been happening for about two months. His coworker had mentioned to the triage nurse to possibility of drugs although the patient denies any recreational drug use and says he occasionally uses alcohol but currently not in excess. He was also concerned he might be having a stroke because his doctor told him he is at high risk for having one. He does admit to frequent use of over-the-counter NSAIDs (Aleve) \"like candy\" for pain of his back and hip. He denies any change in urination. He developed incontinent diarrhea while in the ED but has been able to control his BMs and urine at home.       Vision/Hearing  Vision: Impaired  Vision Exceptions: Wears glasses for reading  Hearing: Within functional limits       Subjective  General  Chart Reviewed: Yes  Response To Previous Treatment: Not applicable  Family / Caregiver Present: Yes(girlfriend)  Diagnosis: PATRICIO  Follows Commands: Within Functional Limits  General Comment  Comments: Pt supine in bed upon arrival. Agreeable to PT eval with min encouragement. Subjective  Subjective: Pt reporting 10/10 pain in lower back and B hips. RN notified. Pain Screening  Patient Currently in Pain: Yes  Pain Assessment  Pain Assessment: 0-10  Pain Level: 10  Pain Type: Chronic pain  Pain Location: Back; Hip  Pain Orientation: Left;Right; Lower  Vital Signs  Patient Currently in Pain: Yes       Orientation  Orientation  Overall Orientation Status: Within Functional Limits     Social/Functional History  Social/Functional History  Lives With: Significant other  Type of Home: Apartment  Home Layout: One level  Home Access: Stairs to enter with rails  Entrance Stairs - Number of Steps: 12-13 ESAU  Entrance Stairs - Rails: Right  Bathroom Shower/Tub: Tub/Shower unit  Bathroom Toilet: Standard  Home Equipment: [employer], Crutches  ADL Assistance: Independent  Homemaking Assistance: Independent  Ambulation Assistance: Independent(cane)  Transfer Assistance: Independent  Active : No  Occupation: Full time employment  Type of occupation: concrete  Additional Comments: Pt reports back pain has increased in severity over last 6 months. States he has been crawling to negotiate stairs and to get through apartment for ~3 weeks. Pt reports he has been sleeping on floor to manage pain as well. Pt reports ~5-6 falls/week. Pt staes \"my legs just give out. \"       Objective  AROM RLE (degrees)  RLE AROM: WFL  PROM LLE (degrees)  LLE PROM: WFL  AROM LLE (degrees)  LLE AROM : Exceptions  LLE General AROM: pt unable to perform hip flexion and LAQ in sitting however observed pt bringing B knees up to chest during supine<>sit  Strength RLE  Strength RLE: WFL  Comment: grossly 4-/5  Strength LLE  Strength LLE: Exception  Comment: grossly 3/5  Tone RLE  RLE Tone: Normotonic  Tone LLE  LLE Tone: Normotonic  Motor Control  Gross Motor?: WFL  Sensation  Overall Sensation Status: Impaired(decreased sensation in LLE>RLE)  Bed mobility  Supine to Sit: Stand by assistance  Sit to Supine: Stand by assistance  Comment: HOB raised   Transfers  Sit to Stand: Minimal Assistance(x1 EOB, x1 recliner)  Stand to sit: Minimal Assistance(x1 recliner, x1 EOB)  Stand Pivot Transfers: Minimal Assistance(Min A pivot chair>EOB)  Comment: Pt required VC for safe hand placement when transferring with RW. Ambulation  Ambulation?: Yes  Ambulation 1  Surface: level tile  Device: Rolling Walker  Assistance: Minimal assistance  Quality of Gait: decreased flako, decreased L foot clearance and step length, 1-2 buckling instances of LLE, lack of heel strike and toe off of LLE  Distance: 35'  Comments: Pt required increased time to perform with Mod A to correct LOB from LLE buckling. Pt with increased WBing through [address] [place] [address] on RW. Pt required VC for safe walker negotiation, especially with turns. Stairs/Curb  Stairs?: No     Balance  Posture: Good  Sitting - Static: Good  Sitting - Dynamic: Good(SBA seated at EOB )  Standing - Static: Fair(Min A to maintain standing with RW for UE support)  Standing - Dynamic: Fair(Min A for transfers and ambulation with RW)        Plan   Plan  Times per week: 5-7x  Times per day: Daily  Current Treatment Recommendations: Strengthening, Balance Training, Functional Mobility Training, Transfer Training, Endurance Training, Gait Training, Neuromuscular Re-education, Safety Education & Training, Equipment Evaluation, Education, & procurement, Modalities, Positioning  Safety Devices  Type of devices:  All fall risk precautions in place, Call light within reach, Bed alarm in place, Gait belt, Patient at risk for falls, Left in bed, Nurse notified(BETH Tolentino, aware)  Restraints  Initially in place: No    G-Code       OutComes Score                                                  AM-PAC Score  AM-PAC Inpatient Mobility Raw Score : 16  AM-PAC Inpatient T-Scale Score : 40.78  Mobility Inpatient CMS 0-100% Score: 54.16  Mobility Inpatient CMS G-Code Modifier : CK          Goals  Short term goals  Time Frame for Short term goals: upon d/c  Short term goal 1: Pt will perform bed mobility MOD I   Short term goal 2: Pt will perform transfers with LRAD and CGA  Short term goal 3: Pt will ambulate 48' with LRAD and CGA  Patient Goals   Patient goals : pt did not state       Therapy Time   Individual Concurrent Group Co-treatment   Time In 1433         Time Out 1513         Minutes 40              Timed Code Treatment Minutes:   25    Total Treatment Minutes:  [facility] [place], [place] [id number]

## 2019-04-19 NOTE — ED NOTES
Pt tried to get out of bed, pt put back up in bed without incident. Pt c/o increased pain and dizziness. Rupert Petty MD notified.      Asad Liz RN  04/18/19 9470

## 2019-04-19 NOTE — ED NOTES
Pt BP 74/40 manually. Pt feeling dizzy and lightheaded, also c/o sever left lower back pain. Pt lost consciousness while writer in room. Pt responded to painful stimuli and has no memory of passing out.  Hospitalist paged     Lora Dias, BETH  04/18/19 4277

## 2019-04-19 NOTE — H&P
Hospital Medicine History & Physical      PCP: Rolan Lobato DO    Date of Admission: 4/18/2019    Date of Service: Pt seen/examined on 4/18/2019 and Admitted to Inpatient with expected LOS greater than two midnights due to medical therapy. Chief Complaint:  Lower extremity Weakness and dizziness      History Of Present Illness:      48 y.o. male with PMHx of chronic back pain, A-fib, COPD and HTN presented to [address] [place] [address] with progressively worsening lower back pain and lower leg weakness with difficulty walking. Pt developed blurred vision and dizziness today. No unilateral weakness. Pt with shooting pain down left leg and left leg \"gives out\" at times. Pt with incontinence of bowels in ED. Pt reports this is new today. No change in urinary function. No recent fevers. + nausea with occasional vomiting    Past Medical History:          Diagnosis Date    A-fib ([facility] [facility]     Chronic bronchitis ([facility] [facility]     COPD (chronic obstructive pulmonary disease) ([facility] [address] [facility]        Past Surgical History:      History reviewed. No pertinent surgical history. Medications Prior to Admission:      Prior to Admission medications    Medication Sig Start Date End Date Taking? Authorizing Provider   gabapentin (NEURONTIN) 300 MG capsule Take 300 mg by mouth 3 times daily. 4/1/19 3/31/20 Yes Historical Provider, MD   lisinopril (PRINIVIL;ZESTRIL) 20 MG tablet TAKE 1 TABLET BY MOUTH ONE TIME A DAY 4/11/19  Yes Historical Provider, MD   sildenafil (VIAGRA) 100 MG tablet TAKE 1 TABLET BY MOUTH PRIOR TO SEXUAL INTERCOURSE 4/11/19  Yes Historical Provider, MD       Allergies:  Patient has no known allergies. Social History:      The patient currently lives home with spouse    TOBACCO:   reports that he has been smoking cigarettes. He has been smoking about 1.00 pack per day. He has never used smokeless tobacco.  ETOH:   reports that he drinks alcohol.       Family History:      Reviewed in detail positive as follows:    History reviewed. No pertinent family history. REVIEW OF SYSTEMS:   Pertinent positives as noted in the HPI. All other systems reviewed and negative. PHYSICAL EXAM PERFORMED:    /66   Pulse 96   Temp 97.5 °F (36.4 °C) (Oral)   Resp 17   Ht 5' 6\" (1.676 m)   Wt 140 lb (63.5 kg)   SpO2 92%   BMI 22.60 kg/m²     General appearance:  No apparent distress, appears older than stated age and cooperative. HEENT:  Normal cephalic, atraumatic without obvious deformity. Pupils equal, round, and reactive to light. Extra ocular muscles intact. Conjunctivae/corneas clear. Neck: Supple, with full range of motion. No jugular venous distention. Trachea midline. Respiratory:  Normal respiratory effort. Clear to auscultation, bilaterally with scattered wheezes. Cardiovascular:  Regular rate and rhythm without murmurs, rubs or gallops. Abdomen: Soft, non-tender, non-distended, without rebound or guarding. Normal bowel sounds. Musculoskeletal:  No clubbing, cyanosis or edema bilaterally. Decreased ROM to left leg. Decrease sensation left leg. No saddle anesthesia  Skin: Skin color, texture, turgor normal.  No rashes or lesions. Neurologic:  Neurovascularly intact without any focal sensory/motor deficits.  Cranial nerves: II-XII intact, grossly non-focal.  Psychiatric:  Alert and oriented, thought content appropriate, normal insight  Capillary Refill: Brisk,< 3 seconds   Peripheral Pulses: +2 palpable, equal bilaterally       Labs:     Recent Labs     04/18/19  1452   WBC 10.6   HGB 12.1*   HCT 34.3*        Recent Labs     04/18/19  1452      K 3.9   CL 98*   CO2 23   BUN 26*   CREATININE 2.8*   CALCIUM 9.3     Recent Labs     04/18/19  1452   *   *   BILITOT 0.7   ALKPHOS 113     Recent Labs     04/18/19  1452   INR 0.97     Recent Labs     04/18/19  1452 04/18/19  1453   CKTOTAL  --  1,386*   TROPONINI 0.04*  --        Urinalysis:      Lab Results   Component Value Date    NITRU Negative 04/18/2019    WBCUA 11 04/18/2019    RBCUA 3 04/18/2019    BLOODU LARGE 04/18/2019    SPECGRAV >1.030 04/18/2019    GLUCOSEU Negative 04/18/2019       Radiology:     CXR: I have reviewed the CXR with the following interpretation: No acute findings  EKG:  I have reviewed the EKG with the following interpretation: Normal sinus, ventricular rate 89, no acute ST elevation. No acute ischemia    US RENAL COMPLETE   Final Result   Unremarkable ultrasound of the kidneys and urinary bladder. Incidental fatty infiltration of the liver. MRI LUMBAR SPINE WO CONTRAST   Final Result   MRI lumbar spine: The moderate degenerative changes L4 through S1 with   findings suspicious for free disc fragment versus disc extrusion along the   posterior margin of the L4 vertebral body narrowing the left lateral recess. Additionally, there is far lateral disc protrusion L4-5. Please correlate   with possible left L4 radiculopathy. Moderate severe degenerate changes L5-S1 with moderate severe bilateral   neural foraminal narrowing greatest on the left. MRI cervical spine: Mild degenerate changes upper cervical spine. No focal   disc protrusion. No central canal stenosis or foraminal narrowing identified. MRI CERVICAL SPINE WO CONTRAST   Final Result   MRI lumbar spine: The moderate degenerative changes L4 through S1 with   findings suspicious for free disc fragment versus disc extrusion along the   posterior margin of the L4 vertebral body narrowing the left lateral recess. Additionally, there is far lateral disc protrusion L4-5. Please correlate   with possible left L4 radiculopathy. Moderate severe degenerate changes L5-S1 with moderate severe bilateral   neural foraminal narrowing greatest on the left. MRI cervical spine: Mild degenerate changes upper cervical spine. No focal   disc protrusion. No central canal stenosis or foraminal narrowing identified.          XR CHEST PORTABLE   Final Result   No acute pulmonary finding. CTA Neck W Contrast   Final Result   No acute abnormality or flow-limiting stenosis of the major arteries of the   head. 70% focal stenosis at the proximal left internal carotid artery and 30% focal   stenosis at the proximal right internal carotid artery. CTA Head W Contrast   Final Result   No acute abnormality or flow-limiting stenosis of the major arteries of the   head. 70% focal stenosis at the proximal left internal carotid artery and 30% focal   stenosis at the proximal right internal carotid artery. CT Head WO Contrast   Final Result   No acute intracranial abnormality. Moderate atherosclerotic calcification within the distal internal carotid   artery bilaterally. Critical results were called by Dr. Lawyer Mckay. Rebeca Swartz MD to Benjamín Godinez on 4/18/2019 at 15:17. ASSESSMENT:    Active Hospital Problems    Diagnosis Date Noted    PATRICIO (acute kidney injury) ([facility] [address] [N17.9] 04/18/2019         PLAN:    PATRICIO - Recent overuse of NSAIDS, CK 1386  - crt baseline 0.8,  today 2.8  - ? NSAID use/ urine with WBC cast  - IVF  ml/hr  - Avoid nephrotoxins  - check: UACS, Thom/UCrt, uric acid, TSH, U osmol  - follow renal function tests;    - strict I/O's  - Nephrology consulted in ED, will follow    Back Pain/Sciatica with Herniated Lumbar Disc L4-L5  - MRI lumbar spine with left paracentral disc herniation L4-5 with L4 root compression.   - Neurosurgery consulted in ED, will follow once medically managed  - No saddle anesthesia, New bowel incontinence    DVT Prophylaxis: Heparin  Diet: Renal diet  Code Status:Full Code    PT/OT Eval Status: pending    [address] [facility], [facility]    Thank you Jestine Mcburney Scheidler, DO for the opportunity to be involved in this patient's care. If you have any questions or concerns please feel free to contact me at [phone].

## 2019-04-19 NOTE — PLAN OF CARE
49 yo man brought to ED with dizziness, blurry vision and confusion. Also c/o weakness in legs. Stroke w/u negative. Found to be in ARF. MRI lumbar shows a left paracentral disc herniation at L4-5 with L4 root compression. No evidence of central canal stenosis that could account for global LE weakness or B/B problems. A/P  -Patient in ARF and most of symptoms seem to be related to acute metabolic disorder.  -He does have sciatica and a HLD at L4-5.  -Rec'd admit to medicine to workup and resolve acute metabolic disorder  -For the time being pain management is appropriate as his comorbid medical condition permits  -Can provide further input once stabilized medically and workup complete.

## 2019-04-20 ENCOUNTER — APPOINTMENT (OUTPATIENT)
Dept: GENERAL RADIOLOGY | Age: 54
DRG: 310 | End: 2019-04-20
Payer: COMMERCIAL

## 2019-04-20 LAB
ALBUMIN SERPL-MCNC: 2.8 G/DL (ref 3.4–5)
ANION GAP SERPL CALCULATED.3IONS-SCNC: 10 MMOL/L (ref 3–16)
BASOPHILS ABSOLUTE: 0 K/UL (ref 0–0.2)
BASOPHILS RELATIVE PERCENT: 0.3 %
BUN BLDV-MCNC: 11 MG/DL (ref 7–20)
CALCIUM SERPL-MCNC: 7.8 MG/DL (ref 8.3–10.6)
CHLORIDE BLD-SCNC: 103 MMOL/L (ref 99–110)
CO2: 23 MMOL/L (ref 21–32)
CREAT SERPL-MCNC: 0.8 MG/DL (ref 0.9–1.3)
EOSINOPHILS ABSOLUTE: 0 K/UL (ref 0–0.6)
EOSINOPHILS RELATIVE PERCENT: 0.3 %
GFR AFRICAN AMERICAN: >60
GFR NON-AFRICAN AMERICAN: >60
GLUCOSE BLD-MCNC: 90 MG/DL (ref 70–99)
HCT VFR BLD CALC: 30.5 % (ref 40.5–52.5)
HEMOGLOBIN: 10.5 G/DL (ref 13.5–17.5)
LYMPHOCYTES ABSOLUTE: 1.4 K/UL (ref 1–5.1)
LYMPHOCYTES RELATIVE PERCENT: 14.6 %
MCH RBC QN AUTO: 33.3 PG (ref 26–34)
MCHC RBC AUTO-ENTMCNC: 34.2 G/DL (ref 31–36)
MCV RBC AUTO: 97.3 FL (ref 80–100)
MONOCYTES ABSOLUTE: 0.4 K/UL (ref 0–1.3)
MONOCYTES RELATIVE PERCENT: 4.4 %
NEUTROPHILS ABSOLUTE: 7.4 K/UL (ref 1.7–7.7)
NEUTROPHILS RELATIVE PERCENT: 80.4 %
OSMOLALITY URINE: 406 MOSM/KG (ref 390–1070)
PDW BLD-RTO: 17 % (ref 12.4–15.4)
PHOSPHORUS: 1.9 MG/DL (ref 2.5–4.9)
PLATELET # BLD: 133 K/UL (ref 135–450)
PMV BLD AUTO: 6.6 FL (ref 5–10.5)
POTASSIUM SERPL-SCNC: 3.6 MMOL/L (ref 3.5–5.1)
RBC # BLD: 3.14 M/UL (ref 4.2–5.9)
SODIUM BLD-SCNC: 136 MMOL/L (ref 136–145)
WBC # BLD: 9.2 K/UL (ref 4–11)

## 2019-04-20 PROCEDURE — 2580000003 HC RX 258: Performed by: INTERNAL MEDICINE

## 2019-04-20 PROCEDURE — 80069 RENAL FUNCTION PANEL: CPT

## 2019-04-20 PROCEDURE — 36415 COLL VENOUS BLD VENIPUNCTURE: CPT

## 2019-04-20 PROCEDURE — 84145 PROCALCITONIN (PCT): CPT

## 2019-04-20 PROCEDURE — 6360000002 HC RX W HCPCS: Performed by: NURSE PRACTITIONER

## 2019-04-20 PROCEDURE — 2580000003 HC RX 258: Performed by: HOSPITALIST

## 2019-04-20 PROCEDURE — 85025 COMPLETE CBC W/AUTO DIFF WBC: CPT

## 2019-04-20 PROCEDURE — 1200000000 HC SEMI PRIVATE

## 2019-04-20 PROCEDURE — 94760 N-INVAS EAR/PLS OXIMETRY 1: CPT

## 2019-04-20 PROCEDURE — 6370000000 HC RX 637 (ALT 250 FOR IP): Performed by: HOSPITALIST

## 2019-04-20 PROCEDURE — 6360000002 HC RX W HCPCS: Performed by: HOSPITALIST

## 2019-04-20 PROCEDURE — 2580000003 HC RX 258: Performed by: NURSE PRACTITIONER

## 2019-04-20 PROCEDURE — 71046 X-RAY EXAM CHEST 2 VIEWS: CPT

## 2019-04-20 PROCEDURE — 6370000000 HC RX 637 (ALT 250 FOR IP): Performed by: NURSE PRACTITIONER

## 2019-04-20 RX ORDER — SODIUM CHLORIDE 0.9 % (FLUSH) 0.9 %
10 SYRINGE (ML) INJECTION PRN
Status: DISCONTINUED | OUTPATIENT
Start: 2019-04-20 | End: 2019-04-20

## 2019-04-20 RX ORDER — CHLORDIAZEPOXIDE HYDROCHLORIDE 25 MG/1
25 CAPSULE, GELATIN COATED ORAL 4 TIMES DAILY PRN
Status: DISCONTINUED | OUTPATIENT
Start: 2019-04-20 | End: 2019-04-24 | Stop reason: HOSPADM

## 2019-04-20 RX ORDER — OXYCODONE HCL 10 MG/1
10 TABLET, FILM COATED, EXTENDED RELEASE ORAL EVERY 12 HOURS SCHEDULED
Status: DISCONTINUED | OUTPATIENT
Start: 2019-04-20 | End: 2019-04-24

## 2019-04-20 RX ORDER — KETOROLAC TROMETHAMINE 30 MG/ML
30 INJECTION, SOLUTION INTRAMUSCULAR; INTRAVENOUS EVERY 6 HOURS PRN
Status: DISCONTINUED | OUTPATIENT
Start: 2019-04-20 | End: 2019-04-22

## 2019-04-20 RX ORDER — LIDOCAINE HYDROCHLORIDE 10 MG/ML
5 INJECTION, SOLUTION EPIDURAL; INFILTRATION; INTRACAUDAL; PERINEURAL ONCE
Status: DISCONTINUED | OUTPATIENT
Start: 2019-04-20 | End: 2019-04-20

## 2019-04-20 RX ORDER — SODIUM CHLORIDE 0.9 % (FLUSH) 0.9 %
10 SYRINGE (ML) INJECTION EVERY 12 HOURS SCHEDULED
Status: DISCONTINUED | OUTPATIENT
Start: 2019-04-20 | End: 2019-04-24

## 2019-04-20 RX ORDER — SODIUM CHLORIDE 0.9 % (FLUSH) 0.9 %
10 SYRINGE (ML) INJECTION PRN
Status: DISCONTINUED | OUTPATIENT
Start: 2019-04-20 | End: 2019-04-24

## 2019-04-20 RX ORDER — SODIUM CHLORIDE 0.9 % (FLUSH) 0.9 %
10 SYRINGE (ML) INJECTION EVERY 12 HOURS SCHEDULED
Status: DISCONTINUED | OUTPATIENT
Start: 2019-04-20 | End: 2019-04-20

## 2019-04-20 RX ADMIN — CHLORDIAZEPOXIDE HYDROCHLORIDE 25 MG: 25 CAPSULE ORAL at 21:31

## 2019-04-20 RX ADMIN — AMPICILLIN AND SULBACTAM 1.5 G: 1; .5 INJECTION, POWDER, FOR SOLUTION INTRAMUSCULAR; INTRAVENOUS at 21:22

## 2019-04-20 RX ADMIN — KETOROLAC TROMETHAMINE 30 MG: 30 INJECTION, SOLUTION INTRAMUSCULAR at 15:21

## 2019-04-20 RX ADMIN — HEPARIN SODIUM 5000 UNITS: 5000 INJECTION INTRAVENOUS; SUBCUTANEOUS at 15:02

## 2019-04-20 RX ADMIN — OXYCODONE HYDROCHLORIDE 10 MG: 10 TABLET, FILM COATED, EXTENDED RELEASE ORAL at 11:40

## 2019-04-20 RX ADMIN — DOCUSATE SODIUM 100 MG: 100 CAPSULE, LIQUID FILLED ORAL at 20:48

## 2019-04-20 RX ADMIN — DOCUSATE SODIUM 100 MG: 100 CAPSULE, LIQUID FILLED ORAL at 09:44

## 2019-04-20 RX ADMIN — Medication 10 ML: at 21:22

## 2019-04-20 RX ADMIN — AMPICILLIN AND SULBACTAM 1.5 G: 1; .5 INJECTION, POWDER, FOR SOLUTION INTRAMUSCULAR; INTRAVENOUS at 15:21

## 2019-04-20 RX ADMIN — SODIUM CHLORIDE: 9 INJECTION, SOLUTION INTRAVENOUS at 09:45

## 2019-04-20 RX ADMIN — Medication 10 ML: at 10:47

## 2019-04-20 RX ADMIN — MORPHINE SULFATE 2 MG: 2 INJECTION, SOLUTION INTRAMUSCULAR; INTRAVENOUS at 17:07

## 2019-04-20 RX ADMIN — MORPHINE SULFATE 2 MG: 2 INJECTION, SOLUTION INTRAMUSCULAR; INTRAVENOUS at 09:44

## 2019-04-20 RX ADMIN — Medication 10 ML: at 20:48

## 2019-04-20 RX ADMIN — OXYCODONE HYDROCHLORIDE 10 MG: 10 TABLET, FILM COATED, EXTENDED RELEASE ORAL at 20:48

## 2019-04-20 RX ADMIN — Medication 10 ML: at 09:47

## 2019-04-20 RX ADMIN — HEPARIN SODIUM 5000 UNITS: 5000 INJECTION INTRAVENOUS; SUBCUTANEOUS at 06:21

## 2019-04-20 RX ADMIN — MORPHINE SULFATE 2 MG: 2 INJECTION, SOLUTION INTRAMUSCULAR; INTRAVENOUS at 04:02

## 2019-04-20 RX ADMIN — HEPARIN SODIUM 5000 UNITS: 5000 INJECTION INTRAVENOUS; SUBCUTANEOUS at 21:31

## 2019-04-20 ASSESSMENT — PAIN DESCRIPTION - ORIENTATION
ORIENTATION: RIGHT;LEFT
ORIENTATION: LEFT

## 2019-04-20 ASSESSMENT — PAIN DESCRIPTION - LOCATION
LOCATION: LEG;BACK
LOCATION: BACK;LEG

## 2019-04-20 ASSESSMENT — PAIN DESCRIPTION - PAIN TYPE
TYPE: CHRONIC PAIN
TYPE: CHRONIC PAIN

## 2019-04-20 ASSESSMENT — PAIN SCALES - GENERAL
PAINLEVEL_OUTOF10: 10
PAINLEVEL_OUTOF10: 8
PAINLEVEL_OUTOF10: 10
PAINLEVEL_OUTOF10: 10
PAINLEVEL_OUTOF10: 7
PAINLEVEL_OUTOF10: 8
PAINLEVEL_OUTOF10: 10
PAINLEVEL_OUTOF10: 0
PAINLEVEL_OUTOF10: 8
PAINLEVEL_OUTOF10: 8

## 2019-04-20 ASSESSMENT — PAIN DESCRIPTION - DESCRIPTORS: DESCRIPTORS: STABBING;OTHER (COMMENT)

## 2019-04-20 NOTE — CONSULTS
Neurosurgery Consult Note    Reason for Consult:  Requesting Physician:    Subjective:  CHIEF COMPLAINT / HPI:  Kaylin Holland is a 48 y.o. male patient being seen for complaints of dizziness, LBP, and left leg pain for 6-8 weeks. Self medicating with NSAIDS Found to have LAURI while being evaluated. Had 1 NIGEL by [facility] without relief. Past Medical History:   Diagnosis Date    A-fib [facility])     Arthritis     hands and back    CHF (congestive heart failure) (HCC)     Chronic bronchitis (HCC)     COPD (chronic obstructive pulmonary disease) (HCC)     bronchitis    Hyperlipidemia     Hypertension     MDRO (multiple drug resistant organisms) resistance     MRSA in right arm [id number]     Past Surgical History:   Procedure Laterality Date    FRACTURE SURGERY      right lower arm in 30's     Patient has no known allergies.     Current Facility-Administered Medications:     oxyCODONE (OXYCONTIN) extended release tablet 10 mg, 10 mg, Oral, 2 times per day, Joyce Babcock MD    ketorolac (TORADOL) injection 30 mg, 30 mg, Intravenous, Q6H PRN, Joyce Babcock MD    sodium chloride flush 0.9 % injection 10 mL, 10 mL, Intravenous, 2 times per day, Joyce Babcock MD, 10 mL at 04/20/19 1047    sodium chloride flush 0.9 % injection 10 mL, 10 mL, Intravenous, PRN, Joyce Babcock MD    chlordiazePOXIDE (LIBRIUM) capsule 25 mg, 25 mg, Oral, 4x Daily PRN, Joyce Babcock MD    morphine (PF) injection 2 mg, 2 mg, Intravenous, Q4H PRN, Joyce Babcock MD, 2 mg at 04/20/19 0944    sodium chloride flush 0.9 % injection 10 mL, 10 mL, Intravenous, 2 times per day, Neftali Byers, APRN - CNP, 10 mL at 04/20/19 0947    sodium chloride flush 0.9 % injection 10 mL, 10 mL, Intravenous, PRN, Neftali Byers APRN - CNP    docusate sodium (COLACE) capsule 100 mg, 100 mg, Oral, BID, Neftali Drivers APRN - CNP, 100 mg at 04/20/19 0944    ondansetron (ZOFRAN) injection 4 mg, 4 mg, Intravenous, Q6H PRN, Berta Squires SHEILA Leonard - CNP    heparin (porcine) injection 5,000 Units, 5,000 Units, Subcutaneous, 3 times per day, Nehemias RainSHEILA henao - CNP, 5,000 Units at 04/20/19 2097    acetaminophen (TYLENOL) tablet 650 mg, 650 mg, Oral, Q4H PRN, Nehemias Rodrigez APRN - CNP, 650 mg at 04/19/19 2243  Social History     Socioeconomic History    Marital status:      Spouse name: Not on file    Number of children: Not on file    Years of education: Not on file    Highest education level: Not on file   Occupational History    Not on file   Social Needs    Financial resource strain: Not on file    Food insecurity:     Worry: Not on file     Inability: Not on file    Transportation needs:     Medical: Not on file     Non-medical: Not on file   Tobacco Use    Smoking status: Current Every Day Smoker     Packs/day: 1.00     Types: Cigarettes    Smokeless tobacco: Never Used   Substance and Sexual Activity    Alcohol use: Yes     Comment: 5th of whisky a day for 6 weeks    Drug use: No    Sexual activity: Not on file   Lifestyle    Physical activity:     Days per week: Not on file     Minutes per session: Not on file    Stress: Not on file   Relationships    Social connections:     Talks on phone: Not on file     Gets together: Not on file     Attends Alevism service: Not on file     Active member of club or organization: Not on file     Attends meetings of clubs or organizations: Not on file     Relationship status: Not on file    Intimate partner violence:     Fear of current or ex partner: Not on file     Emotionally abused: Not on file     Physically abused: Not on file     Forced sexual activity: Not on file   Other Topics Concern    Not on file   Social History Narrative    Not on file     History reviewed. No pertinent family history. ROS: Complete 10 point ROS was obtained with positives in HPI, otherwise negative.       PHYSICAL EXAMINATION:  BP (!) 156/88   Pulse 79   Temp 99.3 °F (37.4 °C)

## 2019-04-20 NOTE — PROGRESS NOTES
Vitals:    04/20/19 0407   BP: (!) 163/89   Pulse: 90   Resp: 16   Temp: 99 °F (37.2 °C)   SpO2: 93%     AM assessment completed, pt is A&Ox4, requires SBA with ambulation via walker, due to back pain. Uses call light approprietly for assistance. Pt denies chest pain or SOA. sats in low 90's on RA. Lung sounds are noted with crackles in bases. NAusea and vomiting/cough with frothy sputum. POC reviewed and pt is agreeable to dc IVF start IV ATB, followed by  PT/OT. Fall precautions in place for safety, bed is in low position and wheels are locked. Pt is wearing nonskid sock for safety, refuses bed alarm. Call light in reach for wants and needs, will monitor.

## 2019-04-20 NOTE — PROGRESS NOTES
[facility]    4/20/2019 9:49 AM        Name: Ivory Lucero . Admitted: 4/18/2019  Primary Care Provider: Francy Salazar DO (Tel: [phone])                        Subjective: Wife at the bedside, patient complaining about lower back pain once stronger medication    No acute events overnight. Resting well. Pain control. Diet ok. Labs reviewed  Denies any chest pain sob. Reviewed interval ancillary notes    Current Medications    morphine (PF) injection 2 mg Q4H PRN   0.9 % sodium chloride infusion Continuous   sodium chloride flush 0.9 % injection 10 mL 2 times per day   sodium chloride flush 0.9 % injection 10 mL PRN   docusate sodium (COLACE) capsule 100 mg BID   ondansetron (ZOFRAN) injection 4 mg Q6H PRN   heparin (porcine) injection 5,000 Units 3 times per day   acetaminophen (TYLENOL) tablet 650 mg Q4H PRN       Objective:  BP (!) 156/88   Pulse 79   Temp 99.3 °F (37.4 °C)   Resp 18   Ht 5' 6\" (1.676 m)   Wt 132 lb (59.9 kg)   SpO2 92%   BMI 21.31 kg/m²     Intake/Output Summary (Last 24 hours) at 4/20/2019 0949  Last data filed at 4/20/2019 0947  Gross per 24 hour   Intake 935 ml   Output 350 ml   Net 585 ml    Wt Readings from Last 3 Encounters:   04/20/19 132 lb (59.9 kg)   02/01/18 135 lb (61.2 kg)       General appearance:  Appears comfortable  Eyes: Sclera clear. Pupils equal.  ENT: Moist oral mucosa. Trachea midline, no adenopathy. Cardiovascular: Regular rhythm, normal S1, S2. No murmur. No edema in lower extremities  Respiratory: Not using accessory muscles. Good inspiratory effort. Coarse crackles bilaterally posterior lung fields . GI: Abdomen soft, no tenderness, not distended, normal bowel sounds  Musculoskeletal: No cyanosis in digits, neck supple  Neurology: CN 2-12 grossly intact.  No speech or motor deficits  Psych: Normal affect. Alert and oriented in time, place and person  Skin: Warm, dry, normal turgor    Labs and Tests:  CBC:   Recent Labs     04/18/19  1452 04/19/19  0532 04/20/19  0606   WBC 10.6 9.2 9.2   HGB 12.1* 10.9* 10.5*    149 133*     BMP:  Recent Labs     04/18/19  1452 04/19/19  0532 04/20/19  0606    136 136   K 3.9 3.8  3.8 3.6   CL 98* 106 103   CO2 23 22 23   BUN 26* 18 11   CREATININE 2.8* 1.2 0.8*   GLUCOSE 91 113* 90     Hepatic: Recent Labs     04/18/19  1452 04/19/19  0532   * 149*   * 94*   BILITOT 0.7 <0.2   ALKPHOS 113 112         Problem List  Active Problems:    PATRICIO (acute kidney injury) ([facility] [address]    Degenerative disc disease, lumbar  Resolved Problems:    * No resolved hospital problems. *       Assessment & Plan:   1. Fever : Low-grade fever continued, blood cultures obtained, results pending, chest x-ray showed bilateral asymmetric perihilar airspace disease we will start empiric antibiotic treatment with Unasyn, we will check pro-calcitonin levels Resp pathogen panel negativel   2. PATRICIO: Bilateral fluids acute kidney injury resolved, IV fluids will be stopped   3. Back Pain/Sciatica with Herniated Lumbar Disc L4-L5 - MRI lumbar spine with left paracentral disc herniation L4-5 with L4 root compression. Neurosurgery consulted in ED. Will start OxyContin 10 mg b.i.d., continue morphine 2 mg q.4 hours, we will add tramadol   4.  Alcoholism: mild to moderate withdrawal potential, start Librium 25 mg q.6 hours as needed          Diet: DIET RENAL;  Code:Full Code  DVT PPX lovenox       Alexandra Torres MD   4/20/2019 9:49 AM

## 2019-04-20 NOTE — PLAN OF CARE
Problem: Pain:  Goal: Pain level will decrease  Description  Pain level will decrease  4/20/2019 0000 by Yasmeen Tristan RN  Outcome: Ongoing  Note:   Pt pain is managed with pain medication when needed. 4/19/2019 2032 by Renee Way RN  Outcome: Ongoing  Goal: Control of acute pain  Description  Control of acute pain  4/20/2019 0000 by Yasmeen Tristan RN  Outcome: Ongoing  4/19/2019 2032 by Renee Way RN  Outcome: Ongoing  Goal: Control of chronic pain  Description  Control of chronic pain  4/20/2019 0000 by Yasmeen Tristan RN  Outcome: Ongoing  4/19/2019 2032 by Renee Way RN  Outcome: Ongoing     Problem: Falls - Risk of:  Goal: Will remain free from falls  Description  Will remain free from falls  4/20/2019 0000 by Yasmeen Tristan RN  Outcome: Ongoing  Note:   No falls, appropriate fall precautions in place.      4/19/2019 2032 by Renee Way RN  Outcome: Ongoing  Goal: Absence of physical injury  Description  Absence of physical injury  4/20/2019 0000 by Yasmeen Tristan RN  Outcome: Ongoing  4/19/2019 2032 by Renee Way RN  Outcome: Ongoing     Problem: SAFETY  Goal: Free from accidental physical injury  4/20/2019 0000 by Yasmeen Tristan RN  Outcome: Ongoing  4/19/2019 2032 by Renee Way RN  Outcome: Ongoing  Goal: Free from intentional harm  4/20/2019 0000 by Yasmeen Tristan RN  Outcome: Ongoing  4/19/2019 2032 by Renee Way RN  Outcome: Ongoing     Problem: DAILY CARE  Goal: Daily care needs are met  4/20/2019 0000 by Yasmeen Tristan RN  Outcome: Ongoing  4/19/2019 2032 by Renee Way RN  Outcome: Ongoing     Problem: PAIN  Goal: Patient's pain/discomfort is manageable  4/20/2019 0000 by Yasmeen Tristan RN  Outcome: Ongoing  4/19/2019 2032 by Renee Way RN  Outcome: Ongoing     Problem: SKIN INTEGRITY  Goal: Skin integrity is maintained or improved  4/20/2019 0000 by Yasmeen Tristan RN  Outcome: Ongoing  Note:   Skin is intact with no signs of breakdown. 4/19/2019 2032 by Renee Way RN  Outcome: Ongoing     Problem: KNOWLEDGE DEFICIT  Goal: Patient/S.O. demonstrates understanding of disease process, treatment plan, medications, and discharge instructions. 4/20/2019 0000 by Yasmeen Tristan RN  Outcome: Ongoing  4/19/2019 2032 by Renee Way RN  Outcome: Ongoing     Problem: DISCHARGE BARRIERS  Goal: Patient's continuum of care needs are met  4/20/2019 0000 by Yasmeen Tristan RN  Outcome: Ongoing  4/19/2019 2032 by Renee Way RN  Outcome: Ongoing     Problem: Tissue Perfusion - Renal, Altered:  Goal: Electrolytes within specified parameters  Description  Electrolytes within specified parameters  4/20/2019 0000 by Yasmeen Tristan RN  Outcome: Ongoing  4/19/2019 2032 by Renee Way RN  Outcome: Ongoing  Goal: Urine creatinine clearance will be within specified parameters  Description  Urine creatinine clearance will be within specified parameters  4/20/2019 0000 by Yasmeen Tristan RN  Outcome: Ongoing  4/19/2019 2032 by Renee Way RN  Outcome: Ongoing  Goal: Serum creatinine will be within specified parameters  Description  Serum creatinine will be within specified parameters  4/20/2019 0000 by Yasmeen Tristan RN  Outcome: Ongoing  4/19/2019 2032 by Renee Way RN  Outcome: Ongoing  Goal: Ability to achieve a balanced intake and output will improve  Description  Ability to achieve a balanced intake and output will improve  4/20/2019 0000 by Yasmeen Tristan RN  Outcome: Ongoing  4/19/2019 2032 by Renee Way RN  Outcome: Ongoing     Problem: OXYGENATION/RESPIRATORY FUNCTION  Goal: Patient will maintain patent airway  4/20/2019 0000 by Yasmeen Tristan RN  Outcome: Ongoing  Note:   Oxygen saturations WNL, RR easy and even.     4/19/2019 2032 by Renee Way RN  Outcome: Ongoing  Goal: Patient will achieve/maintain normal respiratory rate/effort  Description  Respiratory rate and effort will be within normal limits for the patient  4/20/2019 0000 by Tiffany Francis RN  Outcome: Ongoing  4/19/2019 2032 by Mike Naranjo RN  Outcome: Ongoing

## 2019-04-20 NOTE — PROGRESS NOTES
AM assessment completed, pt is A&Ox4, requires SBA with ambulation due to back pain. Uses call light approprietly for assistance. Pt denies chest pain or SOA. sats in low 90's on RA. Lung sounds are noted with crackles in bases. Coarse cough with yellow sputum. Temp 102.6 noted MD aware, note new orders. POC reviewed and pt is agreeable to IVF, obtain blood culture respiratory panel and sputum culture, PT to eval. Fall precautions in place for safety, bed is in low position and wheels are locked. Pt is wearing nonskid sock for safety, refuses bed alarm. Call light in reach for wants and needs, will monitor.

## 2019-04-20 NOTE — PROGRESS NOTES
MD Amelia Correia MD Maxie Moore, MD                                  Office: [phone]                 Fax: [phone]          [place]                     NEPHROLOGY IN PATIENT PROGRESS NOTE:     PATIENT NAME: Meghna Leija  : 1965  MRN: [id number]            Subjective:       Doing much better. No hypotension. Less shortness of breath. Good urine output. Assessment and Plan    Assessment:     Acute kidney injury-improved. History of excessive nonsteroidal use. Hypertension improving. Acute on chronic back pain        Plan:          Good improvement from acute kidney injury. Repeat creatinine is 1.3. History of excessive nonsteroidal use. Hypotension is improved. Blood pressures elevated. Continue to monitor. May requireAmlodipine 5 mg once daily. Volume status stable. The IV fluids can be discontinued. Electrolytes are stable. Avoid ACE inhibitors to renal recovery is complete. Treatment plan discussed with patient and family at bedside. Hopefully will require another 24 hours of hospital admission            EXAM  Vitals:    19 0935   BP: (!) 156/88   Pulse: 79   Resp: 18   Temp: 99.3 °F (37.4 °C)   SpO2: 92%       Intake/Output Summary (Last 24 hours) at 2019 1121  Last data filed at 2019 0947  Gross per 24 hour   Intake 455 ml   Output 350 ml   Net 105 ml       Not ill appearing. No respiratory distress  Awake alert   no hypotension  External exam of the ears and nose are normal  HENT: exam is normal  Eyes: Pupils are equal, round, and reactive to light. Lymph Nodes. No axillary or cervical lymph nodes are palpable. Neck. JVD not visible. No lymph nodes palpable. CVS.  Heart sounds are normal.Palpation of the heart is normal. No murmurs. No pericardial rub.  RS.dullness on percussion of the lower chest wall.   Lung fields are clear   PA soft , bowel sounds are normal no distension and no tenderness to palpation. Skin No rash , No palpable nodules  Musculoskeletal: Normal range of motion. 1+ edema and no tenderness. CNS  No focal    Edematrace  Awake and alert  All pulses are well felt      Active Problems:    PATRICIO (acute kidney injury) ([address]    Degenerative disc disease, lumbar  Resolved Problems:    * No resolved hospital problems. *        Medications Reviewed by me  [place] oxyCODONE  10 mg Oral 2 times per day    sodium chloride flush  10 mL Intravenous 2 times per day    sodium chloride flush  10 mL Intravenous 2 times per day    docusate sodium  100 mg Oral BID    heparin (porcine)  5,000 Units Subcutaneous 3 times per day         Data Review. Labs reviewed by me       CBC:   Recent Labs     04/18/19  1452 04/19/19  0532 04/20/19  0606   WBC 10.6 9.2 9.2   HGB 12.1* 10.9* 10.5*   HCT 34.3* 31.3* 30.5*   MCV 96.4 95.9 97.3    149 133*     BMP:   Recent Labs     04/18/19  1452 04/19/19  0532 04/20/19  0606    136 136   K 3.9 3.8  3.8 3.6   CL 98* 106 103   CO2 23 22 23   PHOS  --  1.9* 1.9*   BUN 26* 18 11   CREATININE 2.8* 1.2 0.8*     Magnesium: No results found for: MG  Lab Results   Component Value Date    CREATININE 0.8 04/20/2019       Arterial Blood Gasses  No results for input(s): PH, PCO2, PO2 in the last 72 hours.     Invalid input(s): [id number], INSPIREDO2    UA:  Recent Labs     04/18/19  1617   COLORU YELLOW   PHUR 7.0  7.0   LABCAST 0-1 WBC*   WBCUA 11*   RBCUA 3   CLARITYU CLOUDY*   SPECGRAV >1.030   LEUKOCYTESUR Negative   UROBILINOGEN 1.0   BILIRUBINUR Negative   BLOODU LARGE*   GLUCOSEU Negative       LIVER PROFILE:   Recent Labs     04/18/19  1452 04/19/19  0532   * 149*   * 94*   BILITOT 0.7 <0.2   ALKPHOS 113 112     PT/INR:    Lab Results   Component Value Date    PROTIME 11.1 04/18/2019    INR 0.97 04/18/2019     PTT:    Lab Results   Component Value Date    APTT 32.7 04/18/2019     ADRY:  No results found for: ANATITER, ADRY  CHEMISTRY COMMON GROUP :

## 2019-04-21 LAB
ALBUMIN SERPL-MCNC: 3 G/DL (ref 3.4–5)
ANION GAP SERPL CALCULATED.3IONS-SCNC: 11 MMOL/L (ref 3–16)
BASOPHILS ABSOLUTE: 0 K/UL (ref 0–0.2)
BASOPHILS RELATIVE PERCENT: 0.2 %
BUN BLDV-MCNC: 9 MG/DL (ref 7–20)
CALCIUM SERPL-MCNC: 8 MG/DL (ref 8.3–10.6)
CHLORIDE BLD-SCNC: 95 MMOL/L (ref 99–110)
CO2: 24 MMOL/L (ref 21–32)
CREAT SERPL-MCNC: 0.7 MG/DL (ref 0.9–1.3)
EOSINOPHILS ABSOLUTE: 0.1 K/UL (ref 0–0.6)
EOSINOPHILS RELATIVE PERCENT: 0.6 %
GFR AFRICAN AMERICAN: >60
GFR NON-AFRICAN AMERICAN: >60
GLUCOSE BLD-MCNC: 84 MG/DL (ref 70–99)
HCT VFR BLD CALC: 32.2 % (ref 40.5–52.5)
HEMOGLOBIN: 11.3 G/DL (ref 13.5–17.5)
LYMPHOCYTES ABSOLUTE: 1.2 K/UL (ref 1–5.1)
LYMPHOCYTES RELATIVE PERCENT: 14 %
MCH RBC QN AUTO: 33.8 PG (ref 26–34)
MCHC RBC AUTO-ENTMCNC: 35.1 G/DL (ref 31–36)
MCV RBC AUTO: 96.5 FL (ref 80–100)
MONOCYTES ABSOLUTE: 0.5 K/UL (ref 0–1.3)
MONOCYTES RELATIVE PERCENT: 5.9 %
NEUTROPHILS ABSOLUTE: 6.6 K/UL (ref 1.7–7.7)
NEUTROPHILS RELATIVE PERCENT: 79.3 %
PDW BLD-RTO: 16.5 % (ref 12.4–15.4)
PHOSPHORUS: 2.6 MG/DL (ref 2.5–4.9)
PLATELET # BLD: 136 K/UL (ref 135–450)
PMV BLD AUTO: 6.7 FL (ref 5–10.5)
POTASSIUM SERPL-SCNC: 3.6 MMOL/L (ref 3.5–5.1)
PROCALCITONIN: 0.23 NG/ML (ref 0–0.15)
RBC # BLD: 3.34 M/UL (ref 4.2–5.9)
SODIUM BLD-SCNC: 130 MMOL/L (ref 136–145)
WBC # BLD: 8.3 K/UL (ref 4–11)

## 2019-04-21 PROCEDURE — 80069 RENAL FUNCTION PANEL: CPT

## 2019-04-21 PROCEDURE — 6370000000 HC RX 637 (ALT 250 FOR IP): Performed by: NURSE PRACTITIONER

## 2019-04-21 PROCEDURE — 6360000002 HC RX W HCPCS: Performed by: HOSPITALIST

## 2019-04-21 PROCEDURE — 6370000000 HC RX 637 (ALT 250 FOR IP): Performed by: HOSPITALIST

## 2019-04-21 PROCEDURE — 97530 THERAPEUTIC ACTIVITIES: CPT

## 2019-04-21 PROCEDURE — 97165 OT EVAL LOW COMPLEX 30 MIN: CPT

## 2019-04-21 PROCEDURE — 85025 COMPLETE CBC W/AUTO DIFF WBC: CPT

## 2019-04-21 PROCEDURE — 36415 COLL VENOUS BLD VENIPUNCTURE: CPT

## 2019-04-21 PROCEDURE — 1200000000 HC SEMI PRIVATE

## 2019-04-21 PROCEDURE — 6360000002 HC RX W HCPCS: Performed by: NURSE PRACTITIONER

## 2019-04-21 PROCEDURE — 2580000003 HC RX 258: Performed by: HOSPITALIST

## 2019-04-21 PROCEDURE — 2580000003 HC RX 258: Performed by: NURSE PRACTITIONER

## 2019-04-21 RX ADMIN — Medication 10 ML: at 08:12

## 2019-04-21 RX ADMIN — AMPICILLIN AND SULBACTAM 1.5 G: 1; .5 INJECTION, POWDER, FOR SOLUTION INTRAMUSCULAR; INTRAVENOUS at 03:12

## 2019-04-21 RX ADMIN — Medication 10 ML: at 06:26

## 2019-04-21 RX ADMIN — Medication 10 ML: at 05:43

## 2019-04-21 RX ADMIN — Medication 10 ML: at 03:12

## 2019-04-21 RX ADMIN — CHLORDIAZEPOXIDE HYDROCHLORIDE 25 MG: 25 CAPSULE ORAL at 09:34

## 2019-04-21 RX ADMIN — AMPICILLIN AND SULBACTAM 1.5 G: 1; .5 INJECTION, POWDER, FOR SOLUTION INTRAMUSCULAR; INTRAVENOUS at 14:16

## 2019-04-21 RX ADMIN — HEPARIN SODIUM 5000 UNITS: 5000 INJECTION INTRAVENOUS; SUBCUTANEOUS at 14:16

## 2019-04-21 RX ADMIN — KETOROLAC TROMETHAMINE 30 MG: 30 INJECTION, SOLUTION INTRAMUSCULAR at 06:26

## 2019-04-21 RX ADMIN — OXYCODONE HYDROCHLORIDE 10 MG: 10 TABLET, FILM COATED, EXTENDED RELEASE ORAL at 20:26

## 2019-04-21 RX ADMIN — KETOROLAC TROMETHAMINE 30 MG: 30 INJECTION, SOLUTION INTRAMUSCULAR at 16:55

## 2019-04-21 RX ADMIN — CHLORDIAZEPOXIDE HYDROCHLORIDE 25 MG: 25 CAPSULE ORAL at 16:54

## 2019-04-21 RX ADMIN — MORPHINE SULFATE 2 MG: 2 INJECTION, SOLUTION INTRAMUSCULAR; INTRAVENOUS at 09:34

## 2019-04-21 RX ADMIN — HEPARIN SODIUM 5000 UNITS: 5000 INJECTION INTRAVENOUS; SUBCUTANEOUS at 05:43

## 2019-04-21 RX ADMIN — AMPICILLIN AND SULBACTAM 1.5 G: 1; .5 INJECTION, POWDER, FOR SOLUTION INTRAMUSCULAR; INTRAVENOUS at 08:11

## 2019-04-21 RX ADMIN — MORPHINE SULFATE 2 MG: 2 INJECTION, SOLUTION INTRAMUSCULAR; INTRAVENOUS at 03:19

## 2019-04-21 RX ADMIN — OXYCODONE HYDROCHLORIDE 10 MG: 10 TABLET, FILM COATED, EXTENDED RELEASE ORAL at 08:11

## 2019-04-21 RX ADMIN — Medication 10 ML: at 20:27

## 2019-04-21 RX ADMIN — CHLORDIAZEPOXIDE HYDROCHLORIDE 25 MG: 25 CAPSULE ORAL at 03:19

## 2019-04-21 RX ADMIN — KETOROLAC TROMETHAMINE 30 MG: 30 INJECTION, SOLUTION INTRAMUSCULAR at 00:14

## 2019-04-21 RX ADMIN — HEPARIN SODIUM 5000 UNITS: 5000 INJECTION INTRAVENOUS; SUBCUTANEOUS at 20:27

## 2019-04-21 RX ADMIN — Medication 10 ML: at 20:26

## 2019-04-21 RX ADMIN — MORPHINE SULFATE 2 MG: 2 INJECTION, SOLUTION INTRAMUSCULAR; INTRAVENOUS at 14:17

## 2019-04-21 RX ADMIN — DOCUSATE SODIUM 100 MG: 100 CAPSULE, LIQUID FILLED ORAL at 08:11

## 2019-04-21 RX ADMIN — AMPICILLIN AND SULBACTAM 1.5 G: 1; .5 INJECTION, POWDER, FOR SOLUTION INTRAMUSCULAR; INTRAVENOUS at 20:34

## 2019-04-21 RX ADMIN — MORPHINE SULFATE 2 MG: 2 INJECTION, SOLUTION INTRAMUSCULAR; INTRAVENOUS at 18:30

## 2019-04-21 RX ADMIN — DOCUSATE SODIUM 100 MG: 100 CAPSULE, LIQUID FILLED ORAL at 20:26

## 2019-04-21 RX ADMIN — Medication 10 ML: at 00:14

## 2019-04-21 ASSESSMENT — PAIN SCALES - GENERAL
PAINLEVEL_OUTOF10: 6
PAINLEVEL_OUTOF10: 8
PAINLEVEL_OUTOF10: 10
PAINLEVEL_OUTOF10: 10
PAINLEVEL_OUTOF10: 0
PAINLEVEL_OUTOF10: 8
PAINLEVEL_OUTOF10: 10
PAINLEVEL_OUTOF10: 10
PAINLEVEL_OUTOF10: 9
PAINLEVEL_OUTOF10: 10

## 2019-04-21 ASSESSMENT — PAIN DESCRIPTION - ORIENTATION
ORIENTATION: LEFT
ORIENTATION: RIGHT;LEFT

## 2019-04-21 ASSESSMENT — PAIN DESCRIPTION - DESCRIPTORS
DESCRIPTORS: SHARP;STABBING;CONSTANT
DESCRIPTORS: STABBING;OTHER (COMMENT)

## 2019-04-21 ASSESSMENT — PAIN DESCRIPTION - PROGRESSION: CLINICAL_PROGRESSION: GRADUALLY WORSENING

## 2019-04-21 ASSESSMENT — PAIN - FUNCTIONAL ASSESSMENT: PAIN_FUNCTIONAL_ASSESSMENT: ACTIVITIES ARE NOT PREVENTED

## 2019-04-21 ASSESSMENT — PAIN DESCRIPTION - ONSET: ONSET: ON-GOING

## 2019-04-21 ASSESSMENT — PAIN DESCRIPTION - LOCATION
LOCATION: BACK;LEG

## 2019-04-21 ASSESSMENT — PAIN DESCRIPTION - PAIN TYPE
TYPE: CHRONIC PAIN

## 2019-04-21 ASSESSMENT — PAIN DESCRIPTION - FREQUENCY: FREQUENCY: CONTINUOUS

## 2019-04-21 NOTE — PROGRESS NOTES
Vitals:    04/21/19 0727   BP: 128/77   Pulse: 77   Resp: 16   Temp: 98.4 °F (36.9 °C)   SpO2: 95%     Morning shift assessment completed, A&Ox4, independent with ambulation. Uses call light approprietly for assistance. Pt denies pain or SOA at this time. O2 sats are stable on RA. Lung sounds are clear through out. Abdomen is soft nondistended bowel sounds are active. BM's are regular. No edema noted to BLE. Fall precautions in place for safety, bed is in low position and wheels are locked. Pt is wearing nonskid sock for safety. Call light in reach for wants and needs, will monitor. POC reviewed and pt is agreeable to continue with IV ATB, pain control with OxyContin every 12 hours and PRN morphine. PRN librium for [address] [place] withdrawal is helping. Followed by PT/OT, mobility is improved with pain control.

## 2019-04-21 NOTE — PROGRESS NOTES
No acute changes noted, VSS see flow sheet. Afebrile & O2 sats stable. Pain well controled today, ambulating much better. Denies additional needs. In bed resting, up ad deb with walker, call light in reach.

## 2019-04-21 NOTE — PROGRESS NOTES
[facility] PROGRESS NOTE    4/21/2019 1:41 PM        Name: Jenni Stnoe . Admitted: 4/18/2019  Primary Care Provider: Loreta Pepper DO (Tel: [phone])                        Subjective:  . No acute events overnight. Resting well. Pain control. Diet ok. Labs reviewed  Denies any chest pain sob. Reviewed interval ancillary notes    Current Medications    oxyCODONE (OXYCONTIN) extended release tablet 10 mg 2 times per day   ketorolac (TORADOL) injection 30 mg Q6H PRN   sodium chloride flush 0.9 % injection 10 mL 2 times per day   sodium chloride flush 0.9 % injection 10 mL PRN   chlordiazePOXIDE (LIBRIUM) capsule 25 mg 4x Daily PRN   ampicillin-sulbactam (UNASYN) 1.5 g IVPB minibag Q6H   morphine (PF) injection 2 mg Q4H PRN   sodium chloride flush 0.9 % injection 10 mL 2 times per day   sodium chloride flush 0.9 % injection 10 mL PRN   docusate sodium (COLACE) capsule 100 mg BID   ondansetron (ZOFRAN) injection 4 mg Q6H PRN   heparin (porcine) injection 5,000 Units 3 times per day   acetaminophen (TYLENOL) tablet 650 mg Q4H PRN       Objective:  /81   Pulse 81   Temp 98.5 °F (36.9 °C) (Temporal)   Resp 16   Ht 5' 6\" (1.676 m)   Wt 128 lb 6.4 oz (58.2 kg)   SpO2 95%   BMI 20.72 kg/m²     Intake/Output Summary (Last 24 hours) at 4/21/2019 1341  Last data filed at 4/21/2019 1333  Gross per 24 hour   Intake 930 ml   Output 525 ml   Net 405 ml    Wt Readings from Last 3 Encounters:   04/21/19 128 lb 6.4 oz (58.2 kg)   02/01/18 135 lb (61.2 kg)       General appearance:  Appears comfortable  Eyes: Sclera clear. Pupils equal.  ENT: Moist oral mucosa. Trachea midline, no adenopathy. Cardiovascular: Regular rhythm, normal S1, S2. No murmur. No edema in lower extremities  Respiratory: Not using accessory muscles. Good inspiratory effort.  Clear to

## 2019-04-21 NOTE — PROGRESS NOTES
No acute changes noted, VSS see flow sheet. Afebrile & O2 sats stable on RA. Denies additional needs. In up chair, up ad deb via walker call light in reach.

## 2019-04-21 NOTE — PROGRESS NOTES
Occupational Therapy   Occupational Therapy Initial Assessment & Treatment  Date: 2019   Patient Name: Milissa Hamman  MRN: [id number]     : 1965    Date of Service: 2019    Discharge Recommendations: Milissa Hamman scored a  on the AM-PAC ADL Inpatient form. Current research shows that an AM-PAC score of 17 or less is typically not associated with a discharge to the patient's home setting. Based on the patients AM-PAC score and their current ADL deficits, it is recommended that the patient have 5-7 sessions per week of Occupational Therapy at d/c to increase the patients independence. HOME HEALTH CARE: LEVEL 1 STANDARD    - Initial home health evaluation to occur within 24-48 hours, in patient home   - Therapy to evaluate with goal of regaining prior level of functioning   - Therapy to evaluate if patient has [address] needs for personal care    Comment: Pt expected to undergo spinal surgery sometime next week. Pt may require further therapy or placement pending outcome. Re-eval post surgery. OT Equipment Recommendations  Equipment Needed: No  Other: Continue to assess pending surgery-pt may require shower chair d/t LE weakness    Assessment   Performance deficits / Impairments: Decreased functional mobility ; Decreased ADL status; Decreased endurance;Decreased strength  Assessment: 48 y.o. male presents w/ the above deficits which are impacting his occupational performance. At this time pt is expected to undergo spinal surgery sometime next week-pending outcome pt may require continued therapy once discharged from acute stay.    Treatment Diagnosis: Decreased functional mobility and ADL status secondary to chronic pain   Prognosis: Good  Decision Making: Low Complexity  Patient Education: Role of OT, discharge planning, POC   REQUIRES OT FOLLOW UP: Yes  Activity Tolerance  Activity Tolerance: Patient Tolerated treatment well  Safety Devices  Safety Devices in place: Yes  Type of devices: Bed alarm in place; Left in bed;Call light within reach;Nurse notified; Patient at risk for falls;Gait belt           Patient Diagnosis(es): The primary encounter diagnosis was Unsteady gait. Diagnoses of Multilevel degenerative disc disease, Acute kidney injury ([address] and Cerebral vascular disease were also pertinent to this visit. has a past medical history of Alcohol abuse, Arthritis, Chronic bronchitis ([address] COPD (chronic obstructive pulmonary disease) ([facility] [address] Hyperlipidemia, Hypertension, MDRO (multiple drug resistant organisms) resistance, Radiculopathy of lumbosacral region, and Spondylisthesis. has a past surgical history that includes fracture surgery. Treatment Diagnosis: Decreased functional mobility and ADL status secondary to chronic pain       Restrictions  Restrictions/Precautions  Restrictions/Precautions: Fall Risk, General Precautions  Position Activity Restriction  Other position/activity restrictions: Nhung Sharma is a 48 y.o. male who presents because of concern for possible stroke. He says he's been getting blurred vision off and on for the past 2 days. He took his blood pressure medicine the other day and his vision got better. He had an episode at work where his coworker said he was slurring his words and was not steady on his feet. That happened just prior to arrival though the patient says he's been dizzy since 8:00 in the morning. He also has chronic back pain and has been evaluated by a back specialist.  He was advised to consider surgery on his back but has not wanted that option so far since it is not guaranteed to help him and it could make him worse. He gets shooting pains into his left leg and numbness of his left leg. He says that he falls down often and his leg \"gives out\". This has been happening for about two months.   His coworker had mentioned to the triage nurse to possibility of drugs although the patient denies any recreational drug use and says he occasionally uses alcohol but currently not in excess. He was also concerned he might be having a stroke because his doctor told him he is at high risk for having one. He does admit to frequent use of over-the-counter NSAIDs (Aleve) \"like candy\" for pain of his back and hip. He denies any change in urination. He developed incontinent diarrhea while in the ED but has been able to control his BMs and urine at home. Subjective   General  Chart Reviewed: Yes  Patient assessed for rehabilitation services?: Yes  Family / Caregiver Present: Yes(wife )  Diagnosis: PATRICIO   Subjective  Subjective: Pt supine in bed upon entry, reporting 10/10 pain. Agreeable to OT eval   Pain Assessment  Pain Level: 5  Response to Pain Intervention: Asleep with RR greater than 10  Social/Functional History  Social/Functional History  Lives With: Significant other  Type of Home: Apartment  Home Layout: One level  Home Access: Stairs to enter with rails  Entrance Stairs - Number of Steps: 12-13 ESAU  Entrance Stairs - Rails: Right  Bathroom Shower/Tub: Tub/Shower unit  Bathroom Toilet: Standard  Bathroom Accessibility: Accessible  Home Equipment: [employer]  ADL Assistance: Independent  Homemaking Assistance: Independent  Homemaking Responsibilities: Yes  Ambulation Assistance: Independent  Transfer Assistance: Independent  Active : No  Occupation: Full time employment  Type of occupation: concrete  Additional Comments: Pt reports back pain has increased in severity over last 6 months. States he has been crawling to negotiate stairs and to get through apartment for ~3 weeks. Pt reports he has been sleeping on floor to manage pain as well. Pt reports ~5-6 falls/week. Pt staes \"my legs just give out. \"       Objective   Vision: Impaired  Vision Exceptions: Wears glasses for reading  Hearing: Within functional limits    Orientation  Overall Orientation Status: Within Normal Limits     Balance  Sitting Balance: Supervision  Standing Balance: Contact guard assistance  Standing Balance  Time: ~10 min total  Activity: functional mobility/transfers   Functional Mobility  Functional - Mobility Device: Rolling Walker  Activity: Other; To/from bathroom  Assist Level: Contact guard assistance  Functional Mobility Comments: + short functional mobility in halls     Toilet Transfers  Toilet - Technique: Ambulating  Equipment Used: Standard toilet  Toilet Transfer: Contact guard assistance  Toilet Transfers Comments: use of grab bar    ADL  LE Dressing: Stand by assistance(To don/doff socks )  Additional Comments: Pt declines all additional ADLs at this time   Tone RUE  RUE Tone: Normotonic  Tone LUE  LUE Tone: Normotonic  Coordination  Movements Are Fluid And Coordinated: Yes     Bed mobility  Supine to Sit: Supervision  Scooting: Supervision  Transfers  Sit to stand: Contact guard assistance  Stand to sit: Contact guard assistance     Cognition  Overall Cognitive Status: WFL         LUE AROM (degrees)  LUE AROM : WFL  RUE AROM (degrees)  RUE AROM : WFL  LUE Strength  Gross LUE Strength: WFL  RUE Strength  Gross RUE Strength: WFL         Plan   Plan  Times per week: 5-7x  Times per day: Daily  Current Treatment Recommendations: Strengthening, Endurance Training, Balance Training, Functional Mobility Training, Self-Care / ADL, Safety Education & Training, Home Management Training    AM-[facility] Score   AM-[facility] Inpatient Daily Activity Raw Score: 21  AM-PAC Inpatient ADL T-Scale Score : 44.27  ADL Inpatient CMS 0-100% Score: 32.79  ADL Inpatient CMS G-Code Modifier : CJ    Goals  Short term goals  Time Frame for Short term goals: Discharge  Short term goal 1: Functional transfer for ADL completion w/ Mod I  Short term goal 2: LB dressing w/ Mod I  Short term goal 3: Grooming w/ independence  Short term goal 4: Bathing w/ mod I  Short term goal 5: Toileting w/ Mod I  Long term goals  Time Frame for Long term goals : LTG=STG  Patient Goals   Patient goals :  \"To get

## 2019-04-21 NOTE — PROGRESS NOTES
MD Reese Gandhi MD Oralee Bower, MD                                  Office: [phone]                 Fax: [phone]          [employer]                     NEPHROLOGY IN PATIENT PROGRESS NOTE:     PATIENT NAME: Susana Garduno  : 1965  MRN: [id number]            Subjective:       Doing much better. Blood pressure control improved  Less shortness of breath. Good urine output. Assessment and Plan    Assessment:     Acute kidney injury-improved. History of excessive nonsteroidal use. Hematurian eeds repeating  Hypertension improving. Acute on chronic back pain  Acute fatty liver on renal ultrasound scan. Abnormal LFT        Plan:          Good improvement from acute kidney injury. Repeat creatinine is 0.9. Found to have trace hematuria on urine analysis-this needs repeating. Negative proteinuria. Renal ultrasound scan of the kidneys and bladder are unremarkable. On admission patient had a history of excessive nonsteroidal use. Could have suffered kidney injury which has spontaneously improved    Blood pressures much improved. Acute and chronic back pain improved. .    Electrolytes are stable. Avoid ACE inhibitors to renal recovery is complete. Treatment plan discussed with patient and family at bedside. Patient is stable from renal for discharge. Patient will need to repeat urine analysis to monitor for hematuriaIn the outpatient    Patient advised to avoid excessive nonsteroidal use            EXAM  Vitals:    19 0727   BP: 128/77   Pulse: 77   Resp: 16   Temp: 98.4 °F (36.9 °C)   SpO2: 95%       Intake/Output Summary (Last 24 hours) at 2019 1002  Last data filed at 2019 0820  Gross per 24 hour   Intake 450 ml   Output 525 ml   Net -75 ml       Doing better. Less pain.   Blood pressure much improved  External exam of the ears and nose are normal  HENT: exam is normal  Eyes: Pupils are equal, round, and reactive to light.    Lymph Nodes. No axillary or cervical lymph nodes are palpable. Neck. JVD not visible. No lymph nodes palpable. CVS.  Heart sounds are normal.Palpation of the heart is normal. No murmurs. No pericardial rub.  RS.dullness on percussion of the lower chest wall. Lung fields are clear   PA soft , bowel sounds are normal no distension and no tenderness to palpation. Skin No rash , No palpable nodules  Musculoskeletal: Normal range of motion. 1+ edema and no tenderness. Active Problems:    PATRICIO (acute kidney injury) ([address]    Degenerative disc disease, lumbar  Resolved Problems:    * No resolved hospital problems. *        Medications Reviewed by me  [facility] oxyCODONE  10 mg Oral 2 times per day    sodium chloride flush  10 mL Intravenous 2 times per day    ampicillin-sulbactam  1.5 g Intravenous Q6H    sodium chloride flush  10 mL Intravenous 2 times per day    docusate sodium  100 mg Oral BID    heparin (porcine)  5,000 Units Subcutaneous 3 times per day         Data Review. Labs reviewed by me       CBC:   Recent Labs     04/19/19  0532 04/20/19  0606 04/21/19  0557   WBC 9.2 9.2 8.3   HGB 10.9* 10.5* 11.3*   HCT 31.3* 30.5* 32.2*   MCV 95.9 97.3 96.5    133* 136     BMP:   Recent Labs     04/19/19  0532 04/20/19  0606 04/21/19  0557    136 130*   K 3.8  3.8 3.6 3.6    103 95*   CO2 22 23 24   PHOS 1.9* 1.9* 2.6   BUN 18 11 9   CREATININE 1.2 0.8* 0.7*     Magnesium: No results found for: MG  Lab Results   Component Value Date    CREATININE 0.7 04/21/2019       Arterial Blood Gasses  No results for input(s): PH, PCO2, PO2 in the last 72 hours.     Invalid input(s): [id number], INSPIREDO2    UA:  Recent Labs     04/18/19  1617   COLORU YELLOW   PHUR 7.0  7.0   LABCAST 0-1 WBC*   WBCUA 11*   RBCUA 3   CLARITYU CLOUDY*   SPECGRAV >1.030   LEUKOCYTESUR Negative   UROBILINOGEN 1.0   BILIRUBINUR Negative   BLOODU LARGE*   GLUCOSEU Negative       LIVER PROFILE:   Recent Labs

## 2019-04-22 ENCOUNTER — ANESTHESIA EVENT (OUTPATIENT)
Dept: OPERATING ROOM | Age: 54
DRG: 310 | End: 2019-04-22
Payer: COMMERCIAL

## 2019-04-22 LAB
ALBUMIN SERPL-MCNC: 3 G/DL (ref 3.4–5)
ANION GAP SERPL CALCULATED.3IONS-SCNC: 12 MMOL/L (ref 3–16)
BASOPHILS ABSOLUTE: 0.1 K/UL (ref 0–0.2)
BASOPHILS RELATIVE PERCENT: 0.7 %
BUN BLDV-MCNC: 7 MG/DL (ref 7–20)
CALCIUM SERPL-MCNC: 7.9 MG/DL (ref 8.3–10.6)
CHLORIDE BLD-SCNC: 98 MMOL/L (ref 99–110)
CO2: 23 MMOL/L (ref 21–32)
CREAT SERPL-MCNC: 0.7 MG/DL (ref 0.9–1.3)
EOSINOPHILS ABSOLUTE: 0 K/UL (ref 0–0.6)
EOSINOPHILS RELATIVE PERCENT: 0.6 %
GFR AFRICAN AMERICAN: >60
GFR NON-AFRICAN AMERICAN: >60
GLUCOSE BLD-MCNC: 95 MG/DL (ref 70–99)
HCT VFR BLD CALC: 36.2 % (ref 40.5–52.5)
HEMOGLOBIN: 12.5 G/DL (ref 13.5–17.5)
LYMPHOCYTES ABSOLUTE: 1.4 K/UL (ref 1–5.1)
LYMPHOCYTES RELATIVE PERCENT: 18.3 %
MCH RBC QN AUTO: 33.1 PG (ref 26–34)
MCHC RBC AUTO-ENTMCNC: 34.5 G/DL (ref 31–36)
MCV RBC AUTO: 96.2 FL (ref 80–100)
MONOCYTES ABSOLUTE: 0.7 K/UL (ref 0–1.3)
MONOCYTES RELATIVE PERCENT: 8.6 %
NEUTROPHILS ABSOLUTE: 5.5 K/UL (ref 1.7–7.7)
NEUTROPHILS RELATIVE PERCENT: 71.8 %
PDW BLD-RTO: 16.5 % (ref 12.4–15.4)
PHOSPHORUS: 2.3 MG/DL (ref 2.5–4.9)
PLATELET # BLD: 184 K/UL (ref 135–450)
PMV BLD AUTO: 6.4 FL (ref 5–10.5)
POTASSIUM SERPL-SCNC: 3.3 MMOL/L (ref 3.5–5.1)
RBC # BLD: 3.76 M/UL (ref 4.2–5.9)
SODIUM BLD-SCNC: 133 MMOL/L (ref 136–145)
TOTAL CK: 54 U/L (ref 39–308)
WBC # BLD: 7.6 K/UL (ref 4–11)

## 2019-04-22 PROCEDURE — 80069 RENAL FUNCTION PANEL: CPT

## 2019-04-22 PROCEDURE — 97116 GAIT TRAINING THERAPY: CPT

## 2019-04-22 PROCEDURE — 82550 ASSAY OF CK (CPK): CPT

## 2019-04-22 PROCEDURE — 2580000003 HC RX 258: Performed by: NURSE PRACTITIONER

## 2019-04-22 PROCEDURE — 6360000002 HC RX W HCPCS: Performed by: HOSPITALIST

## 2019-04-22 PROCEDURE — 6370000000 HC RX 637 (ALT 250 FOR IP): Performed by: HOSPITALIST

## 2019-04-22 PROCEDURE — 36415 COLL VENOUS BLD VENIPUNCTURE: CPT

## 2019-04-22 PROCEDURE — 85025 COMPLETE CBC W/AUTO DIFF WBC: CPT

## 2019-04-22 PROCEDURE — 6370000000 HC RX 637 (ALT 250 FOR IP): Performed by: INTERNAL MEDICINE

## 2019-04-22 PROCEDURE — 6360000002 HC RX W HCPCS: Performed by: NURSE PRACTITIONER

## 2019-04-22 PROCEDURE — 1200000000 HC SEMI PRIVATE

## 2019-04-22 PROCEDURE — 2580000003 HC RX 258: Performed by: HOSPITALIST

## 2019-04-22 PROCEDURE — 6370000000 HC RX 637 (ALT 250 FOR IP): Performed by: NURSE PRACTITIONER

## 2019-04-22 RX ORDER — POTASSIUM CHLORIDE 20 MEQ/1
40 TABLET, EXTENDED RELEASE ORAL ONCE
Status: COMPLETED | OUTPATIENT
Start: 2019-04-22 | End: 2019-04-22

## 2019-04-22 RX ORDER — POTASSIUM CHLORIDE 20 MEQ/1
40 TABLET, EXTENDED RELEASE ORAL ONCE
Status: DISCONTINUED | OUTPATIENT
Start: 2019-04-22 | End: 2019-04-24 | Stop reason: HOSPADM

## 2019-04-22 RX ADMIN — CHLORDIAZEPOXIDE HYDROCHLORIDE 25 MG: 25 CAPSULE ORAL at 12:18

## 2019-04-22 RX ADMIN — DOCUSATE SODIUM 100 MG: 100 CAPSULE, LIQUID FILLED ORAL at 08:33

## 2019-04-22 RX ADMIN — MORPHINE SULFATE 2 MG: 2 INJECTION, SOLUTION INTRAMUSCULAR; INTRAVENOUS at 13:35

## 2019-04-22 RX ADMIN — Medication 10 ML: at 20:50

## 2019-04-22 RX ADMIN — POTASSIUM CHLORIDE 40 MEQ: 1500 TABLET, EXTENDED RELEASE ORAL at 08:33

## 2019-04-22 RX ADMIN — ACETAMINOPHEN 650 MG: 325 TABLET, FILM COATED ORAL at 05:29

## 2019-04-22 RX ADMIN — CHLORDIAZEPOXIDE HYDROCHLORIDE 25 MG: 25 CAPSULE ORAL at 05:29

## 2019-04-22 RX ADMIN — HEPARIN SODIUM 5000 UNITS: 5000 INJECTION INTRAVENOUS; SUBCUTANEOUS at 04:57

## 2019-04-22 RX ADMIN — DOCUSATE SODIUM 100 MG: 100 CAPSULE, LIQUID FILLED ORAL at 20:50

## 2019-04-22 RX ADMIN — MORPHINE SULFATE 2 MG: 2 INJECTION, SOLUTION INTRAMUSCULAR; INTRAVENOUS at 05:04

## 2019-04-22 RX ADMIN — AMPICILLIN AND SULBACTAM 1.5 G: 1; .5 INJECTION, POWDER, FOR SOLUTION INTRAMUSCULAR; INTRAVENOUS at 20:50

## 2019-04-22 RX ADMIN — MORPHINE SULFATE 2 MG: 2 INJECTION, SOLUTION INTRAMUSCULAR; INTRAVENOUS at 17:41

## 2019-04-22 RX ADMIN — AMPICILLIN AND SULBACTAM 1.5 G: 1; .5 INJECTION, POWDER, FOR SOLUTION INTRAMUSCULAR; INTRAVENOUS at 11:30

## 2019-04-22 RX ADMIN — AMPICILLIN AND SULBACTAM 1.5 G: 1; .5 INJECTION, POWDER, FOR SOLUTION INTRAMUSCULAR; INTRAVENOUS at 04:56

## 2019-04-22 RX ADMIN — OXYCODONE HYDROCHLORIDE 10 MG: 10 TABLET, FILM COATED, EXTENDED RELEASE ORAL at 20:50

## 2019-04-22 RX ADMIN — OXYCODONE HYDROCHLORIDE 10 MG: 10 TABLET, FILM COATED, EXTENDED RELEASE ORAL at 08:33

## 2019-04-22 RX ADMIN — Medication 10 ML: at 08:42

## 2019-04-22 RX ADMIN — ONDANSETRON 4 MG: 2 INJECTION INTRAMUSCULAR; INTRAVENOUS at 05:30

## 2019-04-22 RX ADMIN — MORPHINE SULFATE 2 MG: 2 INJECTION, SOLUTION INTRAMUSCULAR; INTRAVENOUS at 22:07

## 2019-04-22 ASSESSMENT — PAIN SCALES - GENERAL
PAINLEVEL_OUTOF10: 10
PAINLEVEL_OUTOF10: 0
PAINLEVEL_OUTOF10: 10
PAINLEVEL_OUTOF10: 8
PAINLEVEL_OUTOF10: 10
PAINLEVEL_OUTOF10: 10

## 2019-04-22 ASSESSMENT — PAIN DESCRIPTION - PAIN TYPE
TYPE: CHRONIC PAIN

## 2019-04-22 ASSESSMENT — PAIN DESCRIPTION - ORIENTATION: ORIENTATION: LEFT

## 2019-04-22 ASSESSMENT — PAIN DESCRIPTION - LOCATION
LOCATION: BACK
LOCATION: BACK
LOCATION: BACK;LEG

## 2019-04-22 ASSESSMENT — PAIN DESCRIPTION - DESCRIPTORS
DESCRIPTORS: SHARP
DESCRIPTORS: SHARP;THROBBING

## 2019-04-22 ASSESSMENT — PAIN DESCRIPTION - DIRECTION: RADIATING_TOWARDS: GENERALIZED

## 2019-04-22 ASSESSMENT — PAIN DESCRIPTION - PROGRESSION: CLINICAL_PROGRESSION: GRADUALLY WORSENING

## 2019-04-22 ASSESSMENT — PAIN - FUNCTIONAL ASSESSMENT: PAIN_FUNCTIONAL_ASSESSMENT: ACTIVITIES ARE NOT PREVENTED

## 2019-04-22 ASSESSMENT — ENCOUNTER SYMPTOMS: DYSPNEA ACTIVITY LEVEL: AFTER AMBULATING 1 FLIGHT OF STAIRS

## 2019-04-22 ASSESSMENT — PAIN DESCRIPTION - FREQUENCY: FREQUENCY: CONTINUOUS

## 2019-04-22 ASSESSMENT — PAIN DESCRIPTION - ONSET: ONSET: ON-GOING

## 2019-04-22 NOTE — PROGRESS NOTES
MD Ling Duran MD Tania Miyamoto, MD                                  Office: [phone]                 Fax: [phone]          [employer]                     NEPHROLOGY INPATIENT PROGRESS NOTE:     PATIENT NAME: Brendan Peterson  : 1965  MRN: [id number]      Subjective:   No complaints    Assessment and Plan   Acute kidney injury-improved. Crea peaked to 2.8. Probably due to decreased renal perfusion. History of excessive nonsteroidal use. Blood in urine but no significant RBC - probably from Rhabdomyolysis  Hypertension improving. Acute on chronic back pain  Acute fatty liver on renal ultrasound scan. Abnormal LFT  Rhabdomyolysis - CK now down to 54. Hyponatremia- better  Hypokalemia- replace. Should help serum Na also. Hold ACEI for now. Avoid NSAID's. Stable from renal perspective, will sign off, call if needed. Thank you. EXAM  Vitals:    19 0757   BP: 137/81   Pulse: 82   Resp: 18   Temp: 97.1 °F (36.2 °C)   SpO2: 94%       Intake/Output Summary (Last 24 hours) at 2019 1059  Last data filed at 2019 1641  Gross per 24 hour   Intake 770 ml   Output --   Net 770 ml       Doing better. Less pain. Blood pressure much improved  External exam of the ears and nose are normal  HENT: exam is normal  Eyes: Pupils are equal, round, and reactive to light. Lymph Nodes. No axillary or cervical lymph nodes are palpable. Neck. JVD not visible. No lymph nodes palpable. CVS.  Heart sounds are normal.Palpation of the heart is normal. No murmurs. No pericardial rub.  RS.dullness on percussion of the lower chest wall. Lung fields are clear   PA soft , bowel sounds are normal no distension and no tenderness to palpation. Skin No rash , No palpable nodules  Musculoskeletal: Normal range of motion. 1+ edema and no tenderness.          Active Problems:    PATRICIO (acute kidney injury) ([address]    Degenerative disc disease, lumbar  Resolved Problems: * No resolved hospital problems. *        Medications Reviewed by me  Bita Toney ON 4/23/2019] ceFAZolin  1.5 g Intravenous On Call to OR    oxyCODONE  10 mg Oral 2 times per day    sodium chloride flush  10 mL Intravenous 2 times per day    ampicillin-sulbactam  1.5 g Intravenous Q6H    sodium chloride flush  10 mL Intravenous 2 times per day    docusate sodium  100 mg Oral BID    heparin (porcine)  5,000 Units Subcutaneous 3 times per day         Data Review. Labs reviewed by me       CBC:   Recent Labs     04/20/19  0606 04/21/19  0557 04/22/19  0514   WBC 9.2 8.3 7.6   HGB 10.5* 11.3* 12.5*   HCT 30.5* 32.2* 36.2*   MCV 97.3 96.5 96.2   * 136 184     BMP:   Recent Labs     04/20/19  0606 04/21/19  0557 04/22/19  0514    130* 133*   K 3.6 3.6 3.3*    95* 98*   CO2 23 24 23   PHOS 1.9* 2.6 2.3*   BUN 11 9 7   CREATININE 0.8* 0.7* 0.7*     Magnesium: No results found for: MG  Lab Results   Component Value Date    CREATININE 0.7 04/22/2019       Arterial Blood Gasses  No results for input(s): PH, PCO2, PO2 in the last 72 hours. Invalid input(s): Bunny Speed    UA:  No results for input(s): NITRITE, COLORU, PHUR, LABCAST, WBCUA, RBCUA, MUCUS, TRICHOMONAS, YEAST, BACTERIA, CLARITYU, SPECGRAV, LEUKOCYTESUR, UROBILINOGEN, BILIRUBINUR, BLOODU, GLUCOSEU, AMORPHOUS in the last 72 hours. Invalid input(s): Andrae Keller    LIVER PROFILE:   No results for input(s): AST, ALT, LIPASE, BILIDIR, BILITOT, ALKPHOS in the last 72 hours. Invalid input(s):   AMYLASE,  ALB  PT/INR:    Lab Results   Component Value Date    PROTIME 11.1 04/18/2019    INR 0.97 04/18/2019     PTT:    Lab Results   Component Value Date    APTT 32.7 04/18/2019     ADRY:  No results found for: ANATITER ADRY  CHEMISTRY COMMON GROUP :   Lab Results   Component Value Date    GLUCOSE 95 04/22/2019     Recent Labs     04/20/19  0606 04/21/19  0557 04/22/19  0514   GLUCOSE 90 84 95   CALCIUM 7.8* 8.0* 7.9*         RADIOLOGY:

## 2019-04-22 NOTE — PROGRESS NOTES
Progress Note - Neurosurgery    cc: follow-up on leg pain    Subjective:  Claudia Vides is a 48 y.o. male. Pain is controlled with opiate analgesics. Patient complains of back, hip and left leg pain, denies chest pain denies SOB. Pain controlled with analgesics, able to ambulate to BR now. Persistent numbness and weakness. Objective:  Blood pressure (!) 159/91, pulse 75, temperature 97.8 °F (36.6 °C), temperature source Temporal, resp. rate 16, height 5' 6\" (1.676 m), weight 124 lb 12.8 oz (56.6 kg), SpO2 93 %. In: 240 [P.O.:240]  Out: -     Physical Exam:  General: in no apparent distress and well developed and well nourished  HEENT: Head: Normocephalic, no lesions, without obvious abnormality. Neuro: alert & oriented x 3 with fluent speech  Motor: normal 5/5 strength in all tested muscle groups except mild left ADF 4+/5    Labs:  CBC:   Lab Results   Component Value Date    WBC 7.6 04/22/2019    RBC 3.76 04/22/2019     BMP:   Lab Results   Component Value Date    GLUCOSE 95 04/22/2019    CO2 23 04/22/2019    BUN 7 04/22/2019    CREATININE 0.7 04/22/2019    CALCIUM 7.9 04/22/2019       Imaging:  MRI lumbar spine:  MRI lumbar spine: The moderate degenerative changes L4 through S1 with   findings suspicious for free disc fragment versus disc extrusion along the   posterior margin of the L4 vertebral body narrowing the left lateral recess. Additionally, there is far lateral disc protrusion L4-5.  Please correlate   with possible left L4 radiculopathy.       Moderate severe degenerate changes L5-S1 with moderate severe bilateral   neural foraminal narrowing greatest on the left. Assessment and Plan:    Patient Active Hospital Problem List:   PATRICIO (acute kidney injury) ([facility] [address] (4/18/2019)   Resolved     Degenerative disc disease, lumbar (4/18/2019)  HNP L45 left:  Pain better controlled but still with numbness and weakness. Failed NIGEL. Pt wishes to proceed with surgery if medically cleared.   Can add on for tomorrow. Stop Toradol and d/c heparin after this afternoons dose. After an extensive discussion regarding treatment options I have recommended surgical intervention. The planned surgical procedure includes: left L4-5 MHLD. In language easily understood by a layperson, the risks and benefits of surgical intervention were discussed at length with the patient and any family members present. Risks including, but not limited to, infection, bleeding, possible blood transfusion, numbness, weakness, paralysis, loss of bowel or bladder control, or death were explained fully. Any questions were answered to the patient's satisfaction and signed consent obtained. Martin consent signed. Pre-op orders added. The patient's symptoms, exam, testing and plan of care have been discussed with Dr. Wilton Interiano.     Marycruz Barnard  4/22/2019

## 2019-04-22 NOTE — PROGRESS NOTES
No acute changes noted, VSS see flow sheet. Afebrile & O2 sats stable. Denies additional needs. In chair, up ad deb independently, call light in reach. Resting well, up ambulation facility. Reported he \"went outside\" by PT. Pt educated on staying on unit.

## 2019-04-22 NOTE — PROGRESS NOTES
Physical Therapy  [facility]  Patient declined PT due to fatigue. Pt reporting that he just went back to bed following independent ambulation with RW off the unit and outdoors. Nurse is aware pt is ambulating and pt has refused bed/chair alarm. PT will attempt tx as schedule allows.    Kirill Strauss, SPT   Radha Torre PT, DPT [id number]

## 2019-04-22 NOTE — PROGRESS NOTES
Physical Therapy  Facility/Department: [address] NURSING  Daily Treatment Note/Discharge   NAME: Sujata Olivares  : 1965  MRN: [id number]    Date of Service: 2019    Discharge Recommendations: Sujata Olivares scored a 20/24 on the AM-PAC short mobility form. Current research shows that an AM-PAC score of 18 or greater is typically associated with a discharge to the patient's home setting. Based on the patients AM-PAC score and their current functional mobility deficits, it is recommended that the patient have 2-3 sessions per week of Physical Therapy at d/c to increase the patients independence. Pt has met his PT goals in the acute setting and is scheduled for back surgery tomorrow and may have a subsequent change in functional mobility. Recommend PT re-eval following procedure. Patient Diagnosis(es): The primary encounter diagnosis was Unsteady gait. Diagnoses of Multilevel degenerative disc disease, Acute kidney injury ([address] and Cerebral vascular disease were also pertinent to this visit. has a past medical history of Alcohol abuse, Arthritis, Chronic bronchitis ([address] COPD (chronic obstructive pulmonary disease) ([address] Hyperlipidemia, Hypertension, MDRO (multiple drug resistant organisms) resistance, Radiculopathy of lumbosacral region, and Spondylisthesis. has a past surgical history that includes fracture surgery. Restrictions  Restrictions/Precautions  Restrictions/Precautions: Fall Risk, General Precautions  Position Activity Restriction  Other position/activity restrictions: Sujata Olivares is a 48 y.o. male who presents because of concern for possible stroke. He says he's been getting blurred vision off and on for the past 2 days. He took his blood pressure medicine the other day and his vision got better. He had an episode at work where his coworker said he was slurring his words and was not steady on his feet.   That happened just prior to arrival though the patient says he's been dizzy since 8:00 in the morning. He also has chronic back pain and has been evaluated by a back specialist.  He was advised to consider surgery on his back but has not wanted that option so far since it is not guaranteed to help him and it could make him worse. He gets shooting pains into his left leg and numbness of his left leg. He says that he falls down often and his leg \"gives out\". This has been happening for about two months. His coworker had mentioned to the triage nurse to possibility of drugs although the patient denies any recreational drug use and says he occasionally uses alcohol but currently not in excess. He was also concerned he might be having a stroke because his doctor told him he is at high risk for having one. He does admit to frequent use of over-the-counter NSAIDs (Aleve) \"like candy\" for pain of his back and hip. He denies any change in urination. He developed incontinent diarrhea while in the ED but has been able to control his BMs and urine at home. Subjective   General  Chart Reviewed: Yes  Response To Previous Treatment: Patient with no complaints from previous session. Family / Caregiver Present: No  Subjective  Subjective: Pt reporting 10/10 pain in lower back and B hips. RN notified, patient educated on pain med schedule. General Comment  Comments: Pt supine in bed upon arrival. Agreeable to PT eval with min encouragement.    Pain Screening  Patient Currently in Pain: Yes  Pain Assessment  Pain Assessment: 0-10  Pain Level: 0  Pain Type: Chronic pain  Pain Location: Back  Vital Signs  Patient Currently in Pain: Yes       Orientation  Orientation  Overall Orientation Status: Within Normal Limits       Objective   Bed mobility  Supine to Sit: Independent(HOB lowered. )  Sit to Supine: Independent  Transfers  Sit to Stand: Stand by assistance(EOB x2)  Stand to sit: Stand by assistance  Ambulation  Ambulation?: Yes  Ambulation 1  Surface: level tile  Device: Single point cane  Assistance: Stand by assistance  Quality of Gait: Decreased flako, variable B step length, no LOB, decreased heel strike and toe off LLE   Distance: 150 ft   Comments: Patient resistant to VC stating, \"I will do it how I do it. \"   Stairs/Curb  Stairs?: Yes  Stairs  # Steps : 15  Stairs Height: 6\"  Rails: Left ascending  Device: Single pt cane  Assistance: Contact guard assistance  Comment: Reciprocal pattern, poor control of AD. Balance  Posture: Good  Sitting - Static: Good  Sitting - Dynamic: Good(supervision at EOB )  Standing - Static: Good  Standing - Dynamic: Good;-    Assessment   Body structures, Functions, Activity limitations: Decreased functional mobility ; Decreased strength;Decreased safe awareness;Decreased sensation;Decreased balance; Increased Pain  Assessment: Patient presents this day with improved mobility in comparison with previous treatment sessions. Pt has met his PT goals in the acute setting and is scheduled for back surgery tomorrow and may have a subsequent change in functional mobility. Recommend PT re-eval following procedure.    Prognosis: Good  Patient Education: PT POC, safe use of cane for ambulation and stair climbing   Barriers to Learning: none identified   REQUIRES PT FOLLOW UP: No  Activity Tolerance  Activity Tolerance: Patient Tolerated treatment well  Activity Tolerance: Patient denies increase in pain with PT intervention       AM-PAC Score  AM-PAC Inpatient Mobility Raw Score : 20  AM-PAC Inpatient T-Scale Score : 47.67  Mobility Inpatient CMS 0-100% Score: 35.83  Mobility Inpatient CMS G-Code Modifier : CJ          Goals  Short term goals  Time Frame for Short term goals: upon d/c  Short term goal 1: Pt will perform bed mobility MOD I -MET 4/22  Short term goal 2: Pt will perform transfers with LRAD and CGA - MET 4/22  Short term goal 3: Pt will ambulate 48' with LRAD and CGA - MET 4/22  Patient Goals   Patient goals : pt did not state    Plan    Plan  Times per week: D/C  Times per day: Daily  Current Treatment Recommendations: Strengthening, Balance Training, Functional Mobility Training, Transfer Training, Endurance Training, Gait Training, Neuromuscular Re-education, Safety Education & Training, Equipment Evaluation, Education, & procurement, Modalities, Positioning  Safety Devices  Type of devices: Call light within reach, Gait belt, Nurse notified, Left in chair(Patient has been ambulating independently on the unit and outdoors and has refused chair alarm; nursing aware. )  Restraints  Initially in place: No     Therapy Time   Individual Concurrent Group Co-treatment   Time In 1301         Time Out 1317         Minutes 16         Timed minutes: 16  Total minutes: [address] , [facility], [id number]  PT present/supervised treatment. Agrees with above note.

## 2019-04-22 NOTE — ANESTHESIA PRE PROCEDURE
Department of Anesthesiology  Preprocedure Note       Name:  Ivory Lucero   Age:  48 y.o.  :  1965                                          MRN:  [id number]         Date:  2019      Surgeon: Piyush Kwok):  Wang Neville MD    Procedure: LEFT LUMBAR4-LUMBAR5  MICRODISCECTOMY (Left )    Medications prior to admission:   Prior to Admission medications    Medication Sig Start Date End Date Taking? Authorizing Provider   gabapentin (NEURONTIN) 300 MG capsule Take 300 mg by mouth 3 times daily.   4/1/19 3/31/20 Yes Historical Provider, MD   lisinopril (PRINIVIL;ZESTRIL) 20 MG tablet TAKE 1 TABLET BY MOUTH ONE TIME A DAY 19  Yes Historical Provider, MD   sildenafil (VIAGRA) 100 MG tablet TAKE 1 TABLET BY MOUTH PRIOR TO SEXUAL INTERCOURSE 19  Yes Historical Provider, MD       Current medications:    Current Facility-Administered Medications   Medication Dose Route Frequency Provider Last Rate Last Dose    [START ON 2019] ceFAZolin (ANCEF) 1.5 g in dextrose 5 % 100 mL IVPB  1.5 g Intravenous On Call to [facility] 6, APRN - CNP        potassium chloride (KLOR-CON M) extended release tablet 40 mEq  40 mEq Oral Once Yulissa Dash MD        oxyCODONE (OXYCONTIN) extended release tablet 10 mg  10 mg Oral 2 times per day Claudia Preston MD   10 mg at 19    sodium chloride flush 0.9 % injection 10 mL  10 mL Intravenous 2 times per day Claudia Preston MD   10 mL at 19    sodium chloride flush 0.9 % injection 10 mL  10 mL Intravenous PRN Claudia Preston MD   10 mL at 19 0626    chlordiazePOXIDE (LIBRIUM) capsule 25 mg  25 mg Oral 4x Daily PRN Cluadia Preston MD   25 mg at 19 1218    ampicillin-sulbactam (UNASYN) 1.5 g IVPB minibag  1.5 g Intravenous Leonel Geller MD   Stopped at 19 1200    morphine (PF) injection 2 mg  2 mg Intravenous Q4H PRN Claudia Preston MD   2 mg at 19 1335    sodium chloride flush 0.9 % injection 10 mL  10 mL Intravenous 2 times per day Elfida Edy, APRN - CNP   10 mL at 04/22/19 3707    sodium chloride flush 0.9 % injection 10 mL  10 mL Intravenous PRN Elfida Los, APRN - CNP        docusate sodium (COLACE) capsule 100 mg  100 mg Oral BID Elfida Edy, APRN - CNP   100 mg at 04/22/19 8860    ondansetron (ZOFRAN) injection 4 mg  4 mg Intravenous Q6H PRN Elfida Edy, APRN - CNP   4 mg at 04/22/19 0530    heparin (porcine) injection 5,000 Units  5,000 Units Subcutaneous 3 times per day Mendy Rollins APRN - CNP   5,000 Units at 04/22/19 0457    acetaminophen (TYLENOL) tablet 650 mg  650 mg Oral Q4H PRN Elfida Edy, APRN - CNP   650 mg at 04/22/19 7931       Allergies:  No Known Allergies    Problem List:    Patient Active Problem List   Diagnosis Code    Chest pain R07.9    PATRICIO (acute kidney injury) ([facility] [address] N17.9    Degenerative disc disease, lumbar M51.36       Past Medical History:        Diagnosis Date    Alcohol abuse     Arthritis     hands and back    Chronic bronchitis ([facility] [address]     COPD (chronic obstructive pulmonary disease) ([facility] [address]     bronchitis    Hyperlipidemia     Hypertension     MDRO (multiple drug resistant organisms) resistance     MRSA in right arm [id number]    Radiculopathy of lumbosacral region     Spondylisthesis        Past Surgical History:        Procedure Laterality Date    FRACTURE SURGERY      right lower arm in 29's       Social History:    Social History     Tobacco Use    Smoking status: Current Every Day Smoker     Packs/day: 1.00     Types: Cigarettes    Smokeless tobacco: Never Used   Substance Use Topics    Alcohol use: Yes     Comment: 5th of whisky a day for 6 weeks                                Ready to quit: Not Answered  Counseling given: Not Answered      Vital Signs (Current):   Vitals:    04/21/19 2339 04/22/19 0355 04/22/19 0513 04/22/19 0757   BP: 133/80 (!) 159/91  137/81   Pulse: 76 75  82   Resp: 16 16  18   Temp: 98 °F (36.7 °C) 97.8 °F (36.6 °C)  97.1 °F (36.2 °C)   TempSrc: Temporal Temporal  Temporal   SpO2: 95% 93%  94%   Weight:   124 lb 12.8 oz (56.6 kg)    Height:                                                  BP Readings from Last 3 Encounters:   04/22/19 137/81   02/01/18 (!) 151/66       NPO Status:                                                                                 BMI:   Wt Readings from Last 3 Encounters:   04/22/19 124 lb 12.8 oz (56.6 kg)   02/01/18 135 lb (61.2 kg)     Body mass index is 20.14 kg/m². CBC:   Lab Results   Component Value Date    WBC 7.6 04/22/2019    RBC 3.76 04/22/2019    HGB 12.5 04/22/2019    HCT 36.2 04/22/2019    MCV 96.2 04/22/2019    RDW 16.5 04/22/2019     04/22/2019       CMP:   Lab Results   Component Value Date     04/22/2019    K 3.3 04/22/2019    K 3.8 04/19/2019    CL 98 04/22/2019    CO2 23 04/22/2019    BUN 7 04/22/2019    CREATININE 0.7 04/22/2019    GFRAA >60 04/22/2019    AGRATIO 1.1 04/19/2019    LABGLOM >60 04/22/2019    GLUCOSE 95 04/22/2019    PROT 6.0 04/19/2019    CALCIUM 7.9 04/22/2019    BILITOT <0.2 04/19/2019    ALKPHOS 112 04/19/2019     04/19/2019    ALT 94 04/19/2019       POC Tests: No results for input(s): POCGLU, POCNA, POCK, POCCL, POCBUN, POCHEMO, POCHCT in the last 72 hours.     Coags:   Lab Results   Component Value Date    PROTIME 11.1 04/18/2019    INR 0.97 04/18/2019    APTT 32.7 04/18/2019       HCG (If Applicable): No results found for: PREGTESTUR, PREGSERUM, HCG, HCGQUANT     ABGs: No results found for: PHART, PO2ART, FJJ0QUA, MZR4EPI, BEART, N4EKOPRP     Type & Screen (If Applicable):  No results found for: LABABO, [address]    Anesthesia Evaluation    Airway:         Dental:          Pulmonary:   (+) COPD:                             Cardiovascular:    (+) hypertension:,                   Neuro/Psych:   (+) neuromuscular disease:, psychiatric history:            GI/Hepatic/Renal:             Endo/Other:                     Abdominal:           Vascular: Anesthesia Plan      general     ASA 3     (Patient not in room when CRNA went to see. )                            Serena Jerome MD   4/22/2019

## 2019-04-22 NOTE — ANESTHESIA PRE PROCEDURE
Department of Anesthesiology  Preprocedure Note       Name:  Meghna Leija   Age:  48 y.o.  :  1965                                          MRN:  [id number]         Date:  2019      Surgeon: Lavonne Chavez):  Viraj Arias MD    Procedure: LEFT LUMBAR4-LUMBAR5  MICRODISCECTOMY (Left )    Medications prior to admission:   Prior to Admission medications    Medication Sig Start Date End Date Taking? Authorizing Provider   gabapentin (NEURONTIN) 300 MG capsule Take 300 mg by mouth 3 times daily.   4/1/19 3/31/20 Yes Historical Provider, MD   lisinopril (PRINIVIL;ZESTRIL) 20 MG tablet TAKE 1 TABLET BY MOUTH ONE TIME A DAY 19  Yes Historical Provider, MD   sildenafil (VIAGRA) 100 MG tablet TAKE 1 TABLET BY MOUTH PRIOR TO SEXUAL INTERCOURSE 19  Yes Historical Provider, MD       Current medications:    Current Facility-Administered Medications   Medication Dose Route Frequency Provider Last Rate Last Dose    [START ON 2019] ceFAZolin (ANCEF) 1.5 g in dextrose 5 % 100 mL IVPB  1.5 g Intravenous On Call to [facility] 6, APRN - CNP        potassium chloride (KLOR-CON M) extended release tablet 40 mEq  40 mEq Oral Once Arnaud Prater MD        oxyCODONE (OXYCONTIN) extended release tablet 10 mg  10 mg Oral 2 times per day Ursula Francois MD   10 mg at 19 9191    sodium chloride flush 0.9 % injection 10 mL  10 mL Intravenous 2 times per day Ursula Francois MD   10 mL at 19    sodium chloride flush 0.9 % injection 10 mL  10 mL Intravenous PRN Ursula Francois MD   10 mL at 19 0626    chlordiazePOXIDE (LIBRIUM) capsule 25 mg  25 mg Oral 4x Daily PRN Ursula Francois MD   25 mg at 19 1218    ampicillin-sulbactam (UNASYN) 1.5 g IVPB minibag  1.5 g Intravenous Shaye Quinones MD   Stopped at 19 1200    morphine (PF) injection 2 mg  2 mg Intravenous Q4H PRN Ursula Francois MD   2 mg at 19 1335    sodium chloride flush 0.9 % injection 10 mL  10 mL Intravenous 2 times per day Echo Lat, APRN - CNP   10 mL at 04/22/19 9512    sodium chloride flush 0.9 % injection 10 mL  10 mL Intravenous PRN Echo Lat, APRN - CNP        docusate sodium (COLACE) capsule 100 mg  100 mg Oral BID Echo Lat, APRN - CNP   100 mg at 04/22/19 1468    ondansetron (ZOFRAN) injection 4 mg  4 mg Intravenous Q6H PRN Echo Lat, APRN - CNP   4 mg at 04/22/19 0530    heparin (porcine) injection 5,000 Units  5,000 Units Subcutaneous 3 times per day Yenny Ochoalaci, APRN - CNP   5,000 Units at 04/22/19 0457    acetaminophen (TYLENOL) tablet 650 mg  650 mg Oral Q4H PRN Echo Lat, APRN - CNP   650 mg at 04/22/19 4508       Allergies:  No Known Allergies    Problem List:    Patient Active Problem List   Diagnosis Code    Chest pain R07.9    PATRICIO (acute kidney injury) ([facility] [address] N17.9    Degenerative disc disease, lumbar M51.36       Past Medical History:        Diagnosis Date    Alcohol abuse     Arthritis     hands and back    Chronic bronchitis ([facility] [address]     COPD (chronic obstructive pulmonary disease) ([facility] [address]     bronchitis    Hyperlipidemia     Hypertension     MDRO (multiple drug resistant organisms) resistance     MRSA in right arm [id number]    Radiculopathy of lumbosacral region     Spondylisthesis        Past Surgical History:        Procedure Laterality Date    FRACTURE SURGERY      right lower arm in 31's       Social History:    Social History     Tobacco Use    Smoking status: Current Every Day Smoker     Packs/day: 1.00     Types: Cigarettes    Smokeless tobacco: Never Used   Substance Use Topics    Alcohol use: Yes     Comment: 5th of whisky a day for 6 weeks                                Ready to quit: Not Answered  Counseling given: Not Answered      Vital Signs (Current):   Vitals:    04/21/19 2339 04/22/19 0355 04/22/19 0513 04/22/19 0757   BP: 133/80 (!) 159/91  137/81   Pulse: 76 75  82   Resp: 16 16  18   Temp: 36.7 °C (98 °F) 36.6 °C (97.8 °F)  36.2 °C (97.1 °F)   TempSrc: Temporal Temporal  Temporal   SpO2: 95% 93%  94%   Weight:   124 lb 12.8 oz (56.6 kg)    Height:                                                  BP Readings from Last 3 Encounters:   04/22/19 137/81   02/01/18 (!) 151/66       NPO Status:                                                                                 BMI:   Wt Readings from Last 3 Encounters:   04/22/19 124 lb 12.8 oz (56.6 kg)   02/01/18 135 lb (61.2 kg)     Body mass index is 20.14 kg/m². CBC:   Lab Results   Component Value Date    WBC 7.6 04/22/2019    RBC 3.76 04/22/2019    HGB 12.5 04/22/2019    HCT 36.2 04/22/2019    MCV 96.2 04/22/2019    RDW 16.5 04/22/2019     04/22/2019       CMP:   Lab Results   Component Value Date     04/22/2019    K 3.3 04/22/2019    K 3.8 04/19/2019    CL 98 04/22/2019    CO2 23 04/22/2019    BUN 7 04/22/2019    CREATININE 0.7 04/22/2019    GFRAA >60 04/22/2019    AGRATIO 1.1 04/19/2019    LABGLOM >60 04/22/2019    GLUCOSE 95 04/22/2019    PROT 6.0 04/19/2019    CALCIUM 7.9 04/22/2019    BILITOT <0.2 04/19/2019    ALKPHOS 112 04/19/2019     04/19/2019    ALT 94 04/19/2019       POC Tests: No results for input(s): POCGLU, POCNA, POCK, POCCL, POCBUN, POCHEMO, POCHCT in the last 72 hours. Coags:   Lab Results   Component Value Date    PROTIME 11.1 04/18/2019    INR 0.97 04/18/2019    APTT 32.7 04/18/2019       HCG (If Applicable): No results found for: PREGTESTUR, PREGSERUM, HCG, HCGQUANT     ABGs: No results found for: PHART, PO2ART, IMW8YIU, COP2WKB, BEART, G7ZNDWLC     Type & Screen (If Applicable):  No results found for: LABABO, [address]    Anesthesia Evaluation  Patient summary reviewed and Nursing notes reviewed  Airway: Mallampati: II  TM distance: >3 FB   Neck ROM: full  Mouth opening: > = 3 FB Dental:    (+) upper dentures and lower dentures      Pulmonary:             Patient did not smoke on day of surgery. ROS comment: Decreased breath sound. Dewane Newness Former smoker   Cardiovascular:    (+) GARZON: after ambulating 1 flight of stairs,         Rhythm: regular                   ROS comment: Echo 11/20/2018  Normal left ventricle size and lsytstoloic function with an estimated EF 55-60%. No regional wall motion abnormalities seen. There is mild concentric left ventricular hypertrophy. Mild-to-moderate tricuspid regurgitation. Neuro/Psych:               GI/Hepatic/Renal: Neg GI/Hepatic/Renal ROS  (+) hepatitis: C, liver disease: esophageal varices, renal disease:,          ROS comment: Chronic Hepatitis C, previous ligation and banding of esophageal varices, ESLD  CKD, stage III. Endo/Other:                     Abdominal:           Vascular:                                        Anesthesia Plan      general     ASA 3       Induction: intravenous. MIPS: Postoperative opioids intended and Prophylactic antiemetics administered. Anesthetic plan and risks discussed with patient and spouse. Use of blood products discussed with patient whom. Plan discussed with attending.                   SHEILA Cadet - CRNA   4/22/2019

## 2019-04-22 NOTE — CONSULTS
36. 2 (L) 04/22/2019    MCV 96.2 04/22/2019     04/22/2019     Lab Results   Component Value Date    INR 0.97 04/18/2019    PROTIME 11.1 04/18/2019     Lab Results   Component Value Date    CREATININE 0.7 (L) 04/22/2019    BUN 7 04/22/2019     (L) 04/22/2019    K 3.3 (L) 04/22/2019    CL 98 (L) 04/22/2019    CO2 23 04/22/2019     Lab Results   Component Value Date    ALT 94 (H) 04/19/2019     (H) 04/19/2019    ALKPHOS 112 04/19/2019    BILITOT <0.2 04/19/2019       Most recent imaging studies revealed   EXAMINATION:   MRI OF THE CERVICAL SPINE WITHOUT CONTRAST; MRI OF THE LUMBAR SPINE WITHOUT   CONTRAST 4/18/2019 4:58 pm       TECHNIQUE:   Multiplanar multisequence MRI of the cervical spine was performed without the   administration of intravenous contrast.; Multiplanar multisequence MRI of the   lumbar spine was performed without the administration of intravenous contrast.       COMPARISON:   CT cervical spine 12/23/2006       HISTORY:   ORDERING SYSTEM PROVIDED HISTORY: weakness left arm   TECHNOLOGIST PROVIDED HISTORY:   Ordering Physician Provided Reason for Exam: Pt coached on motion, scans   repeated, used propeller sequences if possible. Pt states numbness right arm   and leg. Incontinence of bowel and bladder.  chronic back pain// gfr= 24,   kidney disease   Acuity: Acute   Type of Exam: Initial       FINDINGS:   MRI cervical spine: Mild motion artifact challenge evaluation degrading image   quality.  Craniocervical junction maintained the cervical cord demonstrates   normal signal morphology.  Several heights, alignment bone marrow signal is   unremarkable.       C2-C3: Mild disc space disease.  Mild vertebral joint hypertrophy.  Mild   neural foramina.  No significant central canal stenosis.  No focal disc   protrusion.       C3-C4: Mild disc space diseases identified with mild circumferential disc   bulge.  No focal disc protrusion identified.  Mild degenerate cord vertebral   joint hypertrophy.  This welts and moderate right and mild left neural   foramina.  Mild degenerate facet arthropathy.       C4-C5: Minimal degenerative loss of disc space height and signal.  No   significant disc protrusion or disc bulge identified.  Mild-to-moderate facet   arthropathy greatest on the left.  No central canal stenosis or neural   foramina identified.       C5-6: Minimal loss of disc space signal without loss of disc space height.    No focal disc protrusion.  Mild facet arthropathy.  No central canal stenosis   or neural foraminal narrowing identified.       C7-T1: Minimal anterior loss of disc space height and signal with minimal   cervical disc bulge.  No central canal stenosis or neural foramina   identified.  Mild-to-moderate facet arthropathy.       MRI lumbar spine: Lumbar vertebral heights are maintained.  Degenerative   endplate marrow changes identified L5-S1 and L4-5.  Mild loss of height   involving L1 and T12 appears chronic.  Degenerative Schmorl's nodes   identified involving the lower thoracic spine and upper lumbar spine.  Conus   medullaris terminates normally at L1-L2.           L1-L2: Mild degenerative loss of disc space height and signal.  Degenerative   Schmorl's nodes.  No focal disc protrusion.  No central canal stenosis or   neural foraminal narrowing identified.  Mild facet arthropathy.       L2-3: Mild degenerative loss of disc space height and signal with mild   circumferential disc bulge.  Mild degenerative facet arthropathy.  Mild   neural foraminal narrowing.  No significant central canal stenosis.  No focal   disc protrusion.       L3-4: Mild degenerate disc spaces identified with mild circumferential disc   bulge.  Mild-to-moderate bilateral facet arthropathy.  Mild central canal   stenosis and mild neural foraminal narrowing.       L4-5: Moderate disc space disease identified with left extraforaminal disc   protrusion affecting the exiting left L4 nerve root.  Additionally there is a   extradural signal mild identified posterior to L4 vertebral body.  No   definite communication to the disc space identified however this appears very   similar in signal characteristics to the L4-5 disc.  This suggestive of free   disc fragment or sequestered fragment.  Disc extrusion would be in the   differential.  This narrows the left lateral recess affecting the traversing   L4 nerve root.       Otherwise moderate degenerate facet arthropathy.  Mild-to-moderate right and   moderate severe left neural foraminal narrowing.       L5-S1: Moderate degenerate disc spaces identified with moderate   circumferential disc bulge.  Moderate severe left greater than right neural   foraminal narrowing.  Moderate severe bilateral facet arthropathy. .           Impression   MRI lumbar spine: The moderate degenerative changes L4 through S1 with   findings suspicious for free disc fragment versus disc extrusion along the   posterior margin of the L4 vertebral body narrowing the left lateral recess. Additionally, there is far lateral disc protrusion L4-5.  Please correlate   with possible left L4 radiculopathy.       Moderate severe degenerate changes L5-S1 with moderate severe bilateral   neural foraminal narrowing greatest on the left.       MRI cervical spine: Mild degenerate changes upper cervical spine.  No focal   disc protrusion.  No central canal stenosis or foraminal narrowing identified. The above laboratory data have been reviewed. The above imaging data have been reviewed. The above medical testing have been reviewed. Body mass index is 20.14 kg/m². Assessment and Plan:  Lumbar radiculopathy: MLD tomorrow with Dr. Zainab Pham. Pain control  PATRICIO  HTN  COPD  H/O Alcoholism    Dispo: Pending post operative status. Functionally at this time he is ambulating around the hospital independently. Will follow up therapy after surgery. We will continue to follow.       Thank you for the consultation. Linnea De Jesus MD 4/22/2019, 10:54 AM     * This document was created using dictation software. While all precautions were taken to ensure accuracy, errors may have occurred. Please disregard any typographical errors.

## 2019-04-22 NOTE — PROGRESS NOTES
[facility] PROGRESS NOTE    4/22/2019 3:55 PM        Name: Meghna Leija . Admitted: 4/18/2019  Primary Care Provider: Hector Hassan DO (Tel: [phone])                        Subjective:  Still having back pain    No acute events overnight. Resting well. Pain control. Diet ok. Labs reviewed  Denies any chest pain sob. Reviewed interval ancillary notes    Current Medications    [START ON 4/23/2019] ceFAZolin (ANCEF) 1.5 g in dextrose 5 % 100 mL IVPB On Call to OR   potassium chloride (KLOR-CON M) extended release tablet 40 mEq Once   oxyCODONE (OXYCONTIN) extended release tablet 10 mg 2 times per day   sodium chloride flush 0.9 % injection 10 mL 2 times per day   sodium chloride flush 0.9 % injection 10 mL PRN   chlordiazePOXIDE (LIBRIUM) capsule 25 mg 4x Daily PRN   ampicillin-sulbactam (UNASYN) 1.5 g IVPB minibag Q6H   morphine (PF) injection 2 mg Q4H PRN   sodium chloride flush 0.9 % injection 10 mL 2 times per day   sodium chloride flush 0.9 % injection 10 mL PRN   docusate sodium (COLACE) capsule 100 mg BID   ondansetron (ZOFRAN) injection 4 mg Q6H PRN   heparin (porcine) injection 5,000 Units 3 times per day   acetaminophen (TYLENOL) tablet 650 mg Q4H PRN       Objective:  /81   Pulse 82   Temp 97.1 °F (36.2 °C) (Temporal)   Resp 18   Ht 5' 6\" (1.676 m)   Wt 124 lb 12.8 oz (56.6 kg)   SpO2 94%   BMI 20.14 kg/m²     Intake/Output Summary (Last 24 hours) at 4/22/2019 1555  Last data filed at 4/21/2019 1641  Gross per 24 hour   Intake 240 ml   Output --   Net 240 ml      Wt Readings from Last 3 Encounters:   04/22/19 124 lb 12.8 oz (56.6 kg)   02/01/18 135 lb (61.2 kg)       General appearance:  Appears comfortable  Eyes: Sclera clear. Pupils equal.  ENT: Moist oral mucosa. Trachea midline, no adenopathy.   Cardiovascular: Regular rhythm, normal S1, S2. No murmur. No edema in lower extremities  Respiratory: Not using accessory muscles. Good inspiratory effort. Clear to auscultation bilaterally, no wheeze or crackles. GI: Abdomen soft, no tenderness, not distended, normal bowel sounds  Musculoskeletal: No cyanosis in digits, neck supple  Neurology: CN 2-12 grossly intact. No speech or motor deficits  Psych: Normal affect. Alert and oriented in time, place and person  Skin: Warm, dry, normal turgor    Labs and Tests:  CBC:   Recent Labs     04/20/19  0606 04/21/19  0557 04/22/19  0514   WBC 9.2 8.3 7.6   HGB 10.5* 11.3* 12.5*   * 136 184     BMP:    Recent Labs     04/20/19 0606 04/21/19  0557 04/22/19  0514    130* 133*   K 3.6 3.6 3.3*    95* 98*   CO2 23 24 23   BUN 11 9 7   CREATININE 0.8* 0.7* 0.7*   GLUCOSE 90 84 95     Hepatic: No results for input(s): AST, ALT, ALB, BILITOT, ALKPHOS in the last 72 hours. Problem List  Active Problems:    PATRICIO (acute kidney injury) ([facility] [address]    Degenerative disc disease, lumbar  Resolved Problems:    * No resolved hospital problems. *     Assessment & Plan:   1. Fever : Low-grade fever continued, blood cultures obtained, results pending, chest x-ray showed bilateral asymmetric perihilar airspace disease we will start empiric antibiotic treatment with Unasyn, pro-calcitonin 0.23, aneesh pathogen panel negative  2. PATRICIO: Bilateral fluids acute kidney injury resolved  3. Back Pain/Sciatica with Herniated Lumbar Disc L4-L5 - MRI lumbar spine with left paracentral disc herniation L4-5 with L4 root compression. Neurosurgery planning procedure. , c/w OxyContin 10 mg b.i.d., continue morphine 2 mg q.4 hours, tramadol added yesterday, pt happy about pain management  He is stable for surgery from a medical perspective.    4. Alcoholism: mild to moderate withdrawal potential, start Librium 25 mg q.6 hours as needed   - could pose an issue if withdraws following surgery, continue to  As above.         Diet: DIET RENAL;  Diet NPO, After Midnight Exceptions are: Sips with Meds  Code:Full Code  DVT PPX lovenox       Opal Ruelas MD   4/22/2019 3:55 PM

## 2019-04-23 ENCOUNTER — APPOINTMENT (OUTPATIENT)
Dept: GENERAL RADIOLOGY | Age: 54
DRG: 310 | End: 2019-04-23
Payer: COMMERCIAL

## 2019-04-23 ENCOUNTER — ANESTHESIA (OUTPATIENT)
Dept: OPERATING ROOM | Age: 54
DRG: 310 | End: 2019-04-23
Payer: COMMERCIAL

## 2019-04-23 VITALS
TEMPERATURE: 98.2 F | DIASTOLIC BLOOD PRESSURE: 68 MMHG | OXYGEN SATURATION: 100 % | RESPIRATION RATE: 19 BRPM | SYSTOLIC BLOOD PRESSURE: 97 MMHG

## 2019-04-23 LAB
ALBUMIN SERPL-MCNC: 3.1 G/DL (ref 3.4–5)
ANION GAP SERPL CALCULATED.3IONS-SCNC: 12 MMOL/L (ref 3–16)
ANISOCYTOSIS: ABNORMAL
BANDED NEUTROPHILS RELATIVE PERCENT: 2 % (ref 0–7)
BASOPHILS ABSOLUTE: 0 K/UL (ref 0–0.2)
BASOPHILS RELATIVE PERCENT: 0 %
BUN BLDV-MCNC: 5 MG/DL (ref 7–20)
CALCIUM SERPL-MCNC: 7.9 MG/DL (ref 8.3–10.6)
CHLORIDE BLD-SCNC: 100 MMOL/L (ref 99–110)
CO2: 23 MMOL/L (ref 21–32)
CREAT SERPL-MCNC: 0.6 MG/DL (ref 0.9–1.3)
EOSINOPHILS ABSOLUTE: 0.1 K/UL (ref 0–0.6)
EOSINOPHILS RELATIVE PERCENT: 1 %
GFR AFRICAN AMERICAN: >60
GFR NON-AFRICAN AMERICAN: >60
GLUCOSE BLD-MCNC: 85 MG/DL (ref 70–99)
HCT VFR BLD CALC: 32.2 % (ref 40.5–52.5)
HEMOGLOBIN: 11.3 G/DL (ref 13.5–17.5)
LYMPHOCYTES ABSOLUTE: 1.7 K/UL (ref 1–5.1)
LYMPHOCYTES RELATIVE PERCENT: 27 %
MCH RBC QN AUTO: 33.6 PG (ref 26–34)
MCHC RBC AUTO-ENTMCNC: 35 G/DL (ref 31–36)
MCV RBC AUTO: 96.1 FL (ref 80–100)
MONOCYTES ABSOLUTE: 0.6 K/UL (ref 0–1.3)
MONOCYTES RELATIVE PERCENT: 10 %
NEUTROPHILS ABSOLUTE: 3.8 K/UL (ref 1.7–7.7)
NEUTROPHILS RELATIVE PERCENT: 60 %
PDW BLD-RTO: 16.7 % (ref 12.4–15.4)
PHOSPHORUS: 2.2 MG/DL (ref 2.5–4.9)
PLATELET # BLD: 199 K/UL (ref 135–450)
PLATELET SLIDE REVIEW: ADEQUATE
PMV BLD AUTO: 7 FL (ref 5–10.5)
POLYCHROMASIA: ABNORMAL
POTASSIUM SERPL-SCNC: 3.5 MMOL/L (ref 3.5–5.1)
RBC # BLD: 3.35 M/UL (ref 4.2–5.9)
SLIDE REVIEW: ABNORMAL
SODIUM BLD-SCNC: 135 MMOL/L (ref 136–145)
WBC # BLD: 6.2 K/UL (ref 4–11)

## 2019-04-23 PROCEDURE — 2720000010 HC SURG SUPPLY STERILE: Performed by: NEUROLOGICAL SURGERY

## 2019-04-23 PROCEDURE — 6370000000 HC RX 637 (ALT 250 FOR IP): Performed by: NEUROLOGICAL SURGERY

## 2019-04-23 PROCEDURE — 6370000000 HC RX 637 (ALT 250 FOR IP): Performed by: HOSPITALIST

## 2019-04-23 PROCEDURE — 2580000003 HC RX 258: Performed by: HOSPITALIST

## 2019-04-23 PROCEDURE — 7100000000 HC PACU RECOVERY - FIRST 15 MIN: Performed by: NEUROLOGICAL SURGERY

## 2019-04-23 PROCEDURE — 6360000002 HC RX W HCPCS: Performed by: NURSE ANESTHETIST, CERTIFIED REGISTERED

## 2019-04-23 PROCEDURE — 3600000014 HC SURGERY LEVEL 4 ADDTL 15MIN: Performed by: NEUROLOGICAL SURGERY

## 2019-04-23 PROCEDURE — 2580000003 HC RX 258: Performed by: NEUROLOGICAL SURGERY

## 2019-04-23 PROCEDURE — 6360000002 HC RX W HCPCS: Performed by: ANESTHESIOLOGY

## 2019-04-23 PROCEDURE — 36415 COLL VENOUS BLD VENIPUNCTURE: CPT

## 2019-04-23 PROCEDURE — 85025 COMPLETE CBC W/AUTO DIFF WBC: CPT

## 2019-04-23 PROCEDURE — 6360000002 HC RX W HCPCS: Performed by: HOSPITALIST

## 2019-04-23 PROCEDURE — 0SB20ZZ EXCISION OF LUMBAR VERTEBRAL DISC, OPEN APPROACH: ICD-10-PCS | Performed by: NEUROLOGICAL SURGERY

## 2019-04-23 PROCEDURE — 72020 X-RAY EXAM OF SPINE 1 VIEW: CPT

## 2019-04-23 PROCEDURE — 3600000004 HC SURGERY LEVEL 4 BASE: Performed by: NEUROLOGICAL SURGERY

## 2019-04-23 PROCEDURE — 3209999900 FLUORO FOR SURGICAL PROCEDURES

## 2019-04-23 PROCEDURE — 2709999900 HC NON-CHARGEABLE SUPPLY: Performed by: NEUROLOGICAL SURGERY

## 2019-04-23 PROCEDURE — 3700000001 HC ADD 15 MINUTES (ANESTHESIA): Performed by: NEUROLOGICAL SURGERY

## 2019-04-23 PROCEDURE — 94760 N-INVAS EAR/PLS OXIMETRY 1: CPT

## 2019-04-23 PROCEDURE — 3700000000 HC ANESTHESIA ATTENDED CARE: Performed by: NEUROLOGICAL SURGERY

## 2019-04-23 PROCEDURE — 6360000002 HC RX W HCPCS: Performed by: NEUROLOGICAL SURGERY

## 2019-04-23 PROCEDURE — 2500000003 HC RX 250 WO HCPCS: Performed by: ANESTHESIOLOGY

## 2019-04-23 PROCEDURE — 80069 RENAL FUNCTION PANEL: CPT

## 2019-04-23 PROCEDURE — 2580000003 HC RX 258: Performed by: NURSE ANESTHETIST, CERTIFIED REGISTERED

## 2019-04-23 PROCEDURE — 1200000000 HC SEMI PRIVATE

## 2019-04-23 PROCEDURE — 7100000001 HC PACU RECOVERY - ADDTL 15 MIN: Performed by: NEUROLOGICAL SURGERY

## 2019-04-23 PROCEDURE — 2580000003 HC RX 258

## 2019-04-23 PROCEDURE — 2500000003 HC RX 250 WO HCPCS: Performed by: NURSE ANESTHETIST, CERTIFIED REGISTERED

## 2019-04-23 PROCEDURE — 2500000003 HC RX 250 WO HCPCS: Performed by: NEUROLOGICAL SURGERY

## 2019-04-23 RX ORDER — DOCUSATE SODIUM 100 MG/1
100 CAPSULE, LIQUID FILLED ORAL 2 TIMES DAILY
Status: DISCONTINUED | OUTPATIENT
Start: 2019-04-23 | End: 2019-04-24 | Stop reason: HOSPADM

## 2019-04-23 RX ORDER — SUCCINYLCHOLINE CHLORIDE 20 MG/ML
INJECTION INTRAMUSCULAR; INTRAVENOUS PRN
Status: DISCONTINUED | OUTPATIENT
Start: 2019-04-23 | End: 2019-04-23 | Stop reason: SDUPTHER

## 2019-04-23 RX ORDER — SODIUM CHLORIDE 9 MG/ML
INJECTION, SOLUTION INTRAVENOUS CONTINUOUS PRN
Status: DISCONTINUED | OUTPATIENT
Start: 2019-04-23 | End: 2019-04-23 | Stop reason: SDUPTHER

## 2019-04-23 RX ORDER — HYDROMORPHONE HCL 110MG/55ML
0.5 PATIENT CONTROLLED ANALGESIA SYRINGE INTRAVENOUS EVERY 5 MIN PRN
Status: DISCONTINUED | OUTPATIENT
Start: 2019-04-23 | End: 2019-04-23 | Stop reason: HOSPADM

## 2019-04-23 RX ORDER — ONDANSETRON 2 MG/ML
INJECTION INTRAMUSCULAR; INTRAVENOUS PRN
Status: DISCONTINUED | OUTPATIENT
Start: 2019-04-23 | End: 2019-04-23 | Stop reason: SDUPTHER

## 2019-04-23 RX ORDER — LORAZEPAM 1 MG/1
3 TABLET ORAL
Status: DISCONTINUED | OUTPATIENT
Start: 2019-04-23 | End: 2019-04-24 | Stop reason: HOSPADM

## 2019-04-23 RX ORDER — DEXAMETHASONE SODIUM PHOSPHATE 4 MG/ML
INJECTION, SOLUTION INTRA-ARTICULAR; INTRALESIONAL; INTRAMUSCULAR; INTRAVENOUS; SOFT TISSUE PRN
Status: DISCONTINUED | OUTPATIENT
Start: 2019-04-23 | End: 2019-04-23 | Stop reason: SDUPTHER

## 2019-04-23 RX ORDER — LISINOPRIL 20 MG/1
20 TABLET ORAL DAILY
Status: DISCONTINUED | OUTPATIENT
Start: 2019-04-23 | End: 2019-04-24 | Stop reason: HOSPADM

## 2019-04-23 RX ORDER — BUPIVACAINE HYDROCHLORIDE 5 MG/ML
INJECTION, SOLUTION EPIDURAL; INTRACAUDAL
Status: COMPLETED | OUTPATIENT
Start: 2019-04-23 | End: 2019-04-23

## 2019-04-23 RX ORDER — CEFAZOLIN SODIUM 2 G/100ML
2 INJECTION, SOLUTION INTRAVENOUS EVERY 8 HOURS
Status: COMPLETED | OUTPATIENT
Start: 2019-04-23 | End: 2019-04-24

## 2019-04-23 RX ORDER — MAGNESIUM HYDROXIDE 1200 MG/15ML
LIQUID ORAL CONTINUOUS PRN
Status: COMPLETED | OUTPATIENT
Start: 2019-04-23 | End: 2019-04-23

## 2019-04-23 RX ORDER — SODIUM CHLORIDE 9 MG/ML
INJECTION, SOLUTION INTRAVENOUS
Status: COMPLETED
Start: 2019-04-23 | End: 2019-04-23

## 2019-04-23 RX ORDER — SODIUM CHLORIDE 0.9 % (FLUSH) 0.9 %
10 SYRINGE (ML) INJECTION PRN
Status: DISCONTINUED | OUTPATIENT
Start: 2019-04-23 | End: 2019-04-24

## 2019-04-23 RX ORDER — LORAZEPAM 2 MG/ML
4 INJECTION INTRAMUSCULAR
Status: DISCONTINUED | OUTPATIENT
Start: 2019-04-23 | End: 2019-04-24 | Stop reason: HOSPADM

## 2019-04-23 RX ORDER — ONDANSETRON 2 MG/ML
4 INJECTION INTRAMUSCULAR; INTRAVENOUS EVERY 6 HOURS PRN
Status: DISCONTINUED | OUTPATIENT
Start: 2019-04-23 | End: 2019-04-24

## 2019-04-23 RX ORDER — FENTANYL CITRATE 50 UG/ML
INJECTION, SOLUTION INTRAMUSCULAR; INTRAVENOUS PRN
Status: DISCONTINUED | OUTPATIENT
Start: 2019-04-23 | End: 2019-04-23 | Stop reason: SDUPTHER

## 2019-04-23 RX ORDER — LORAZEPAM 2 MG/ML
3 INJECTION INTRAMUSCULAR
Status: DISCONTINUED | OUTPATIENT
Start: 2019-04-23 | End: 2019-04-24 | Stop reason: HOSPADM

## 2019-04-23 RX ORDER — MEPERIDINE HYDROCHLORIDE 25 MG/ML
12.5 INJECTION INTRAMUSCULAR; INTRAVENOUS; SUBCUTANEOUS EVERY 5 MIN PRN
Status: DISCONTINUED | OUTPATIENT
Start: 2019-04-23 | End: 2019-04-23 | Stop reason: HOSPADM

## 2019-04-23 RX ORDER — LORAZEPAM 2 MG/ML
2 INJECTION INTRAMUSCULAR
Status: DISCONTINUED | OUTPATIENT
Start: 2019-04-23 | End: 2019-04-24 | Stop reason: HOSPADM

## 2019-04-23 RX ORDER — MIDAZOLAM HYDROCHLORIDE 1 MG/ML
INJECTION INTRAMUSCULAR; INTRAVENOUS PRN
Status: DISCONTINUED | OUTPATIENT
Start: 2019-04-23 | End: 2019-04-23 | Stop reason: SDUPTHER

## 2019-04-23 RX ORDER — NEOSTIGMINE METHYLSULFATE 5 MG/5 ML
SYRINGE (ML) INTRAVENOUS PRN
Status: DISCONTINUED | OUTPATIENT
Start: 2019-04-23 | End: 2019-04-23 | Stop reason: SDUPTHER

## 2019-04-23 RX ORDER — OXYCODONE HYDROCHLORIDE 5 MG/1
10 TABLET ORAL EVERY 4 HOURS PRN
Status: DISCONTINUED | OUTPATIENT
Start: 2019-04-23 | End: 2019-04-24 | Stop reason: HOSPADM

## 2019-04-23 RX ORDER — CYCLOBENZAPRINE HCL 10 MG
10 TABLET ORAL 3 TIMES DAILY PRN
Status: DISCONTINUED | OUTPATIENT
Start: 2019-04-23 | End: 2019-04-24 | Stop reason: HOSPADM

## 2019-04-23 RX ORDER — GLYCOPYRROLATE 1 MG/5 ML
SYRINGE (ML) INTRAVENOUS PRN
Status: DISCONTINUED | OUTPATIENT
Start: 2019-04-23 | End: 2019-04-23 | Stop reason: SDUPTHER

## 2019-04-23 RX ORDER — OXYCODONE HYDROCHLORIDE 5 MG/1
5 TABLET ORAL EVERY 4 HOURS PRN
Status: DISCONTINUED | OUTPATIENT
Start: 2019-04-23 | End: 2019-04-24 | Stop reason: HOSPADM

## 2019-04-23 RX ORDER — DIPHENHYDRAMINE HYDROCHLORIDE 50 MG/ML
12.5 INJECTION INTRAMUSCULAR; INTRAVENOUS
Status: DISCONTINUED | OUTPATIENT
Start: 2019-04-23 | End: 2019-04-23 | Stop reason: HOSPADM

## 2019-04-23 RX ORDER — SODIUM CHLORIDE 9 MG/ML
INJECTION, SOLUTION INTRAVENOUS CONTINUOUS
Status: DISCONTINUED | OUTPATIENT
Start: 2019-04-23 | End: 2019-04-24

## 2019-04-23 RX ORDER — LABETALOL HYDROCHLORIDE 5 MG/ML
5 INJECTION, SOLUTION INTRAVENOUS EVERY 10 MIN PRN
Status: DISCONTINUED | OUTPATIENT
Start: 2019-04-23 | End: 2019-04-23 | Stop reason: HOSPADM

## 2019-04-23 RX ORDER — SODIUM CHLORIDE 0.9 % (FLUSH) 0.9 %
10 SYRINGE (ML) INJECTION EVERY 12 HOURS SCHEDULED
Status: DISCONTINUED | OUTPATIENT
Start: 2019-04-23 | End: 2019-04-24

## 2019-04-23 RX ORDER — LORAZEPAM 1 MG/1
4 TABLET ORAL
Status: DISCONTINUED | OUTPATIENT
Start: 2019-04-23 | End: 2019-04-24 | Stop reason: HOSPADM

## 2019-04-23 RX ORDER — FENTANYL CITRATE 50 UG/ML
25 INJECTION, SOLUTION INTRAMUSCULAR; INTRAVENOUS EVERY 5 MIN PRN
Status: DISCONTINUED | OUTPATIENT
Start: 2019-04-23 | End: 2019-04-23 | Stop reason: HOSPADM

## 2019-04-23 RX ORDER — ONDANSETRON 2 MG/ML
4 INJECTION INTRAMUSCULAR; INTRAVENOUS
Status: DISCONTINUED | OUTPATIENT
Start: 2019-04-23 | End: 2019-04-23 | Stop reason: HOSPADM

## 2019-04-23 RX ORDER — METHYLPREDNISOLONE ACETATE 40 MG/ML
INJECTION, SUSPENSION INTRA-ARTICULAR; INTRALESIONAL; INTRAMUSCULAR; SOFT TISSUE
Status: COMPLETED | OUTPATIENT
Start: 2019-04-23 | End: 2019-04-23

## 2019-04-23 RX ORDER — LORAZEPAM 2 MG/ML
1 INJECTION INTRAMUSCULAR
Status: DISCONTINUED | OUTPATIENT
Start: 2019-04-23 | End: 2019-04-24 | Stop reason: HOSPADM

## 2019-04-23 RX ORDER — PROPOFOL 10 MG/ML
INJECTION, EMULSION INTRAVENOUS PRN
Status: DISCONTINUED | OUTPATIENT
Start: 2019-04-23 | End: 2019-04-23 | Stop reason: SDUPTHER

## 2019-04-23 RX ORDER — MAGNESIUM SULFATE HEPTAHYDRATE 500 MG/ML
INJECTION, SOLUTION INTRAMUSCULAR; INTRAVENOUS PRN
Status: DISCONTINUED | OUTPATIENT
Start: 2019-04-23 | End: 2019-04-23 | Stop reason: SDUPTHER

## 2019-04-23 RX ORDER — OXYCODONE HYDROCHLORIDE AND ACETAMINOPHEN 5; 325 MG/1; MG/1
1 TABLET ORAL PRN
Status: DISCONTINUED | OUTPATIENT
Start: 2019-04-23 | End: 2019-04-23 | Stop reason: HOSPADM

## 2019-04-23 RX ORDER — LORAZEPAM 0.5 MG/1
2 TABLET ORAL
Status: DISCONTINUED | OUTPATIENT
Start: 2019-04-23 | End: 2019-04-24 | Stop reason: HOSPADM

## 2019-04-23 RX ORDER — OXYCODONE HYDROCHLORIDE AND ACETAMINOPHEN 5; 325 MG/1; MG/1
2 TABLET ORAL PRN
Status: DISCONTINUED | OUTPATIENT
Start: 2019-04-23 | End: 2019-04-23 | Stop reason: HOSPADM

## 2019-04-23 RX ORDER — ROCURONIUM BROMIDE 10 MG/ML
INJECTION, SOLUTION INTRAVENOUS PRN
Status: DISCONTINUED | OUTPATIENT
Start: 2019-04-23 | End: 2019-04-23 | Stop reason: SDUPTHER

## 2019-04-23 RX ORDER — LIDOCAINE HYDROCHLORIDE 20 MG/ML
INJECTION, SOLUTION EPIDURAL; INFILTRATION; INTRACAUDAL; PERINEURAL PRN
Status: DISCONTINUED | OUTPATIENT
Start: 2019-04-23 | End: 2019-04-23 | Stop reason: SDUPTHER

## 2019-04-23 RX ORDER — LORAZEPAM 0.5 MG/1
1 TABLET ORAL
Status: DISCONTINUED | OUTPATIENT
Start: 2019-04-23 | End: 2019-04-24 | Stop reason: HOSPADM

## 2019-04-23 RX ORDER — KETAMINE HYDROCHLORIDE 10 MG/ML
INJECTION, SOLUTION INTRAMUSCULAR; INTRAVENOUS PRN
Status: DISCONTINUED | OUTPATIENT
Start: 2019-04-23 | End: 2019-04-23 | Stop reason: SDUPTHER

## 2019-04-23 RX ORDER — CEFAZOLIN SODIUM 2 G/100ML
2 INJECTION, SOLUTION INTRAVENOUS
Status: COMPLETED | OUTPATIENT
Start: 2019-04-23 | End: 2019-04-23

## 2019-04-23 RX ADMIN — CEFAZOLIN SODIUM 2 G: 2 INJECTION, SOLUTION INTRAVENOUS at 18:09

## 2019-04-23 RX ADMIN — DOCUSATE SODIUM 100 MG: 100 CAPSULE, LIQUID FILLED ORAL at 21:46

## 2019-04-23 RX ADMIN — PHENYLEPHRINE HYDROCHLORIDE 100 MCG: 10 INJECTION INTRAVENOUS at 12:22

## 2019-04-23 RX ADMIN — LABETALOL HYDROCHLORIDE 5 MG: 5 INJECTION INTRAVENOUS at 14:15

## 2019-04-23 RX ADMIN — FENTANYL CITRATE 50 MCG: 50 INJECTION, SOLUTION INTRAMUSCULAR; INTRAVENOUS at 12:37

## 2019-04-23 RX ADMIN — KETAMINE HYDROCHLORIDE 30 MG: 10 INJECTION, SOLUTION INTRAMUSCULAR; INTRAVENOUS at 11:25

## 2019-04-23 RX ADMIN — ONDANSETRON 4 MG: 2 INJECTION INTRAMUSCULAR; INTRAVENOUS at 11:25

## 2019-04-23 RX ADMIN — MORPHINE SULFATE 2 MG: 2 INJECTION, SOLUTION INTRAMUSCULAR; INTRAVENOUS at 06:11

## 2019-04-23 RX ADMIN — OXYCODONE HYDROCHLORIDE 10 MG: 10 TABLET, FILM COATED, EXTENDED RELEASE ORAL at 21:45

## 2019-04-23 RX ADMIN — LISINOPRIL 20 MG: 20 TABLET ORAL at 15:25

## 2019-04-23 RX ADMIN — Medication 0.6 MG: at 12:24

## 2019-04-23 RX ADMIN — MORPHINE SULFATE 2 MG: 2 INJECTION, SOLUTION INTRAMUSCULAR; INTRAVENOUS at 23:32

## 2019-04-23 RX ADMIN — OXYCODONE HYDROCHLORIDE 10 MG: 10 TABLET, FILM COATED, EXTENDED RELEASE ORAL at 08:36

## 2019-04-23 RX ADMIN — AMPICILLIN AND SULBACTAM 1.5 G: 1; .5 INJECTION, POWDER, FOR SOLUTION INTRAMUSCULAR; INTRAVENOUS at 02:09

## 2019-04-23 RX ADMIN — FENTANYL CITRATE 50 MCG: 50 INJECTION, SOLUTION INTRAMUSCULAR; INTRAVENOUS at 11:43

## 2019-04-23 RX ADMIN — PHENYLEPHRINE HYDROCHLORIDE 50 MCG: 10 INJECTION INTRAVENOUS at 11:56

## 2019-04-23 RX ADMIN — DEXAMETHASONE SODIUM PHOSPHATE 8 MG: 4 INJECTION, SOLUTION INTRAMUSCULAR; INTRAVENOUS at 11:25

## 2019-04-23 RX ADMIN — FENTANYL CITRATE 100 MCG: 50 INJECTION, SOLUTION INTRAMUSCULAR; INTRAVENOUS at 11:07

## 2019-04-23 RX ADMIN — LORAZEPAM 1 MG: 2 INJECTION INTRAMUSCULAR; INTRAVENOUS at 13:44

## 2019-04-23 RX ADMIN — MIDAZOLAM HYDROCHLORIDE 2 MG: 1 INJECTION, SOLUTION INTRAMUSCULAR; INTRAVENOUS at 11:00

## 2019-04-23 RX ADMIN — MORPHINE SULFATE 2 MG: 2 INJECTION, SOLUTION INTRAMUSCULAR; INTRAVENOUS at 02:06

## 2019-04-23 RX ADMIN — SUCCINYLCHOLINE CHLORIDE 200 MG: 20 INJECTION, SOLUTION INTRAMUSCULAR; INTRAVENOUS at 11:09

## 2019-04-23 RX ADMIN — SODIUM CHLORIDE: 9 INJECTION, SOLUTION INTRAVENOUS at 13:09

## 2019-04-23 RX ADMIN — Medication 10 ML: at 22:03

## 2019-04-23 RX ADMIN — PROPOFOL 200 MG: 10 INJECTION, EMULSION INTRAVENOUS at 11:08

## 2019-04-23 RX ADMIN — ROCURONIUM BROMIDE 20 MG: 10 INJECTION, SOLUTION INTRAVENOUS at 11:20

## 2019-04-23 RX ADMIN — OXYCODONE HYDROCHLORIDE 10 MG: 5 TABLET ORAL at 18:33

## 2019-04-23 RX ADMIN — AMPICILLIN AND SULBACTAM 1.5 G: 1; .5 INJECTION, POWDER, FOR SOLUTION INTRAMUSCULAR; INTRAVENOUS at 14:58

## 2019-04-23 RX ADMIN — SODIUM CHLORIDE: 9 INJECTION, SOLUTION INTRAVENOUS at 11:03

## 2019-04-23 RX ADMIN — CHLORDIAZEPOXIDE HYDROCHLORIDE 25 MG: 25 CAPSULE ORAL at 22:01

## 2019-04-23 RX ADMIN — Medication 4 MG: at 12:24

## 2019-04-23 RX ADMIN — FENTANYL CITRATE 50 MCG: 50 INJECTION, SOLUTION INTRAMUSCULAR; INTRAVENOUS at 12:01

## 2019-04-23 RX ADMIN — MORPHINE SULFATE 2 MG: 2 INJECTION, SOLUTION INTRAMUSCULAR; INTRAVENOUS at 16:16

## 2019-04-23 RX ADMIN — MAGNESIUM SULFATE HEPTAHYDRATE 2 G: 500 INJECTION, SOLUTION INTRAMUSCULAR; INTRAVENOUS at 11:25

## 2019-04-23 RX ADMIN — CEFAZOLIN SODIUM 2 G: 2 INJECTION, SOLUTION INTRAVENOUS at 11:00

## 2019-04-23 RX ADMIN — LIDOCAINE HYDROCHLORIDE 100 MG: 20 INJECTION, SOLUTION EPIDURAL; INFILTRATION; INTRACAUDAL; PERINEURAL at 11:07

## 2019-04-23 RX ADMIN — Medication 10 ML: at 21:48

## 2019-04-23 RX ADMIN — ROCURONIUM BROMIDE 5 MG: 10 INJECTION, SOLUTION INTRAVENOUS at 11:08

## 2019-04-23 RX ADMIN — AMPICILLIN AND SULBACTAM 1.5 G: 1; .5 INJECTION, POWDER, FOR SOLUTION INTRAMUSCULAR; INTRAVENOUS at 08:49

## 2019-04-23 RX ADMIN — ONDANSETRON 4 MG: 2 INJECTION INTRAMUSCULAR; INTRAVENOUS at 15:43

## 2019-04-23 RX ADMIN — PHENYLEPHRINE HYDROCHLORIDE 50 MCG: 10 INJECTION INTRAVENOUS at 11:30

## 2019-04-23 ASSESSMENT — PULMONARY FUNCTION TESTS
PIF_VALUE: 22
PIF_VALUE: 21
PIF_VALUE: 23
PIF_VALUE: 21
PIF_VALUE: 21
PIF_VALUE: 23
PIF_VALUE: 3
PIF_VALUE: 23
PIF_VALUE: 23
PIF_VALUE: 11
PIF_VALUE: 27
PIF_VALUE: 23
PIF_VALUE: 22
PIF_VALUE: 16
PIF_VALUE: 0
PIF_VALUE: 24
PIF_VALUE: 22
PIF_VALUE: 23
PIF_VALUE: 23
PIF_VALUE: 22
PIF_VALUE: 22
PIF_VALUE: 21
PIF_VALUE: 21
PIF_VALUE: 23
PIF_VALUE: 23
PIF_VALUE: 0
PIF_VALUE: 22
PIF_VALUE: 23
PIF_VALUE: 23
PIF_VALUE: 22
PIF_VALUE: 23
PIF_VALUE: 23
PIF_VALUE: 7
PIF_VALUE: 23
PIF_VALUE: 22
PIF_VALUE: 21
PIF_VALUE: 18
PIF_VALUE: 23
PIF_VALUE: 22
PIF_VALUE: 23
PIF_VALUE: 23
PIF_VALUE: 22
PIF_VALUE: 21
PIF_VALUE: 22
PIF_VALUE: 24
PIF_VALUE: 22
PIF_VALUE: 18
PIF_VALUE: 24
PIF_VALUE: 23
PIF_VALUE: 25
PIF_VALUE: 24
PIF_VALUE: 34
PIF_VALUE: 22
PIF_VALUE: 23
PIF_VALUE: 16
PIF_VALUE: 12
PIF_VALUE: 22
PIF_VALUE: 23
PIF_VALUE: 24
PIF_VALUE: 23
PIF_VALUE: 21
PIF_VALUE: 23
PIF_VALUE: 23
PIF_VALUE: 19
PIF_VALUE: 23
PIF_VALUE: 22
PIF_VALUE: 1
PIF_VALUE: 22
PIF_VALUE: 31
PIF_VALUE: 22
PIF_VALUE: 24
PIF_VALUE: 23
PIF_VALUE: 7
PIF_VALUE: 33
PIF_VALUE: 22
PIF_VALUE: 23
PIF_VALUE: 23
PIF_VALUE: 18
PIF_VALUE: 24
PIF_VALUE: 23
PIF_VALUE: 23
PIF_VALUE: 3
PIF_VALUE: 23
PIF_VALUE: 2
PIF_VALUE: 23
PIF_VALUE: 2
PIF_VALUE: 23
PIF_VALUE: 22
PIF_VALUE: 23
PIF_VALUE: 22
PIF_VALUE: 23
PIF_VALUE: 25
PIF_VALUE: 23
PIF_VALUE: 22

## 2019-04-23 ASSESSMENT — PAIN SCALES - GENERAL
PAINLEVEL_OUTOF10: 10

## 2019-04-23 ASSESSMENT — PAIN DESCRIPTION - LOCATION
LOCATION: BACK;HIP
LOCATION: GENERALIZED
LOCATION: BACK
LOCATION: HIP
LOCATION: BACK;HIP
LOCATION: GENERALIZED

## 2019-04-23 ASSESSMENT — PAIN DESCRIPTION - PAIN TYPE
TYPE: CHRONIC PAIN
TYPE: SURGICAL PAIN
TYPE: CHRONIC PAIN
TYPE: CHRONIC PAIN

## 2019-04-23 ASSESSMENT — PAIN DESCRIPTION - ONSET: ONSET: ON-GOING

## 2019-04-23 ASSESSMENT — PAIN DESCRIPTION - DESCRIPTORS: DESCRIPTORS: DISCOMFORT

## 2019-04-23 ASSESSMENT — PAIN DESCRIPTION - PROGRESSION
CLINICAL_PROGRESSION: NOT CHANGED
CLINICAL_PROGRESSION: NOT CHANGED

## 2019-04-23 ASSESSMENT — PAIN DESCRIPTION - ORIENTATION
ORIENTATION: MID
ORIENTATION: LEFT

## 2019-04-23 ASSESSMENT — COPD QUESTIONNAIRES: CAT_SEVERITY: MILD

## 2019-04-23 ASSESSMENT — PAIN DESCRIPTION - FREQUENCY: FREQUENCY: CONTINUOUS

## 2019-04-23 NOTE — PROGRESS NOTES
[facility]) injection 4 mg Q6H PRN   acetaminophen (TYLENOL) tablet 650 mg Q4H PRN       Objective:  BP (!) 149/97   Pulse 87   Temp 98.5 °F (36.9 °C) (Oral)   Resp 16   Ht 5' 6\" (1.676 m)   Wt 124 lb 12.8 oz (56.6 kg)   SpO2 95%   BMI 20.14 kg/m²     Intake/Output Summary (Last 24 hours) at 4/23/2019 1752  Last data filed at 4/23/2019 1554  Gross per 24 hour   Intake 800 ml   Output 700 ml   Net 100 ml      Wt Readings from Last 3 Encounters:   04/23/19 124 lb 12.8 oz (56.6 kg)   02/01/18 135 lb (61.2 kg)       General appearance:  Appears comfortable  Eyes: Sclera clear. Pupils equal.  ENT: Moist oral mucosa. Trachea midline, no adenopathy. Cardiovascular: Regular rhythm, normal S1, S2. No murmur. No edema in lower extremities  Respiratory: Not using accessory muscles. Good inspiratory effort. Clear to auscultation bilaterally, no wheeze or crackles. GI: Abdomen soft, no tenderness, not distended, normal bowel sounds  Musculoskeletal: No cyanosis in digits, neck supple  Neurology: CN 2-12 grossly intact. No speech or motor deficits  Psych: Normal affect. Alert and oriented in time, place and person  Skin: Warm, dry, normal turgor    Labs and Tests:  CBC:   Recent Labs     04/21/19  0557 04/22/19  0514 04/23/19  0509   WBC 8.3 7.6 6.2   HGB 11.3* 12.5* 11.3*    184 199     BMP:    Recent Labs     04/21/19  0557 04/22/19  0514 04/23/19  0509   * 133* 135*   K 3.6 3.3* 3.5   CL 95* 98* 100   CO2 24 23 23   BUN 9 7 5*   CREATININE 0.7* 0.7* 0.6*   GLUCOSE 84 95 85     Hepatic: No results for input(s): AST, ALT, ALB, BILITOT, ALKPHOS in the last 72 hours. Problem List  Active Problems:    PATRICIO (acute kidney injury) ([facility] [address]    Degenerative disc disease, lumbar  Resolved Problems:    * No resolved hospital problems. *     Assessment & Plan:   1.  Fever : Low-grade fever continued, blood cultures obtained, results pending, chest x-ray showed bilateral asymmetric perihilar airspace disease we will start empiric antibiotic treatment with Unasyn, pro-calcitonin 0.23, aneesh pathogen panel negative    2. PATRICIO: Bilateral fluids acute kidney injury resolved    3. Back Pain/Sciatica with Herniated Lumbar Disc L4-L5 - MRI lumbar spine with left paracentral disc herniation L4-5 with L4 root compression. Neurosurgery planning procedure. , c/w OxyContin 10 mg b.i.d., continue morphine 2 mg q.4 hours, tramadol added yesterday, pt happy about pain management  He is stable for surgery from a medical perspective. Had surgery and is doing well.  Pain is improved.     4. Alcoholism: mild to moderate withdrawal potential, start Librium 25 mg q.6 hours as needed   - could pose an issue if withdraws following surgery, continue to  As above.         Diet: DIET GENERAL;  Code:Full Code  DVT PPX germania Powers MD   4/23/2019 5:52 PM

## 2019-04-23 NOTE — PROGRESS NOTES
Pt attempting to get OOB, states \" has headache\" anxious, hallucinating and talking about friend that just passed away. Family at bedside- pt evaluated per [facility] protocol- Dr Mark Lopez notified.  Ativan 1mg IV given per orders

## 2019-04-23 NOTE — PROGRESS NOTES
Neurovascular status intact, vss, pt in and out of sleep, weaned to RA, pt anxious to get OOB when awake- instructed pt that PT will be working with him on the floor, phase 1 discharge criteria met

## 2019-04-23 NOTE — PROGRESS NOTES
Pt up to floor, elevated BP, lisinipril given. Pt refused seizure precautions and SCD's, educated pt on importance of SCD's and their prevention of blood clots and he still requested them to be removed. Pt also refused telemetry monitoring.

## 2019-04-23 NOTE — ANESTHESIA POSTPROCEDURE EVALUATION
Department of Anesthesiology  Postprocedure Note    Patient: Davion Khalil  MRN: [id number]  YOB: 1965  Date of evaluation: 4/23/2019  Time:  1:26 PM     Procedure Summary     Date:  04/23/19 Room / Location:  [facility] OR  / [facility] OR    Anesthesia Start:  1103 Anesthesia Stop:  1250    Procedure:  LEFT LUMBAR4-LUMBAR5  MICRODISCECTOMY (Left ) Diagnosis:  (.)    Surgeon:  Deon Giordano MD Responsible Provider:  Giuliano Morrison MD    Anesthesia Type:  general ASA Status:  3          Anesthesia Type: general    Claudia Phase I: Claudia Score: 8    Claudia Phase II:      Last vitals: Reviewed and per EMR flowsheets.        Anesthesia Post Evaluation    Patient location during evaluation: PACU  Patient participation: complete - patient participated  Level of consciousness: confused and anxious  Airway patency: patent  Nausea & Vomiting: no nausea and no vomiting  Complications: no  Cardiovascular status: hemodynamically stable  Respiratory status: acceptable  Hydration status: stable

## 2019-04-23 NOTE — PROGRESS NOTES
Occupational Therapy    Attempted OT treatment 1:48. Patient SAURABH. Will plan to see patient per POC as patient available.     Kaden Pretty

## 2019-04-23 NOTE — OP NOTE
[facility]  4/23/2019 12:33 PM    PATIENT NAME:          Blanka Ross  YOB: 1965   MEDICAL RECORD#         [id number]  SURGERY DATE:         4/23/2019  SURGEON:                 Carlita Johansen                                                      OPERATIVE REPORT     PREOPERATIVE DIAGNOSIS: herniated lumbar disc L4-5 on the left            POSTOPERATIVE DIAGNOSIS:    same    PROCEDURES PERFORMED:  1. Left L4-5 hemilaminotomy and diskectomy  2. Microdissection using the operating room microscope. ANESTHESIA:  General    ESTIMATED BLOOD LOSS: 50 cc    INDICATIONS FOR SURGERY:   Blanka Ross is a 48 y.o. male with back and leg pain. The symptoms failed to respond to conservative intervention. An MRI scan was performed and this showed evidence of a disk herniation at the L4-5 level on the left. Having failed conservative management and experiencing persistent symptoms, the patient elected to proceed ahead with the surgical option of microdiskectomy at L4-5. DETAILS OF PROCEDURE:  The patient was brought to the operating room and placed under general anesthesia. The patient was then placed prone on a Eduardo frame. All bony prominences were inspected and padded prior to sterile draping. Using a #15 blade knife, the skin was incised in the midline and monopolar cautery was used to dissect through the subcutaneous tissue to open the fascia and reflect the paraspinal muscles laterally exposing the L4-5 interspace on the left side. The microscope was then brought into the field and used to assist with performing a microsurgical diskectomy at this level. Using the Midas Minor drill, I burred down the hemilamina of  L4 and a more significant portion of the inferior L5 lamina. I exposed beyond the pedicle of L5 where the disk herniation was located.   The underlying ligamentum flavum was freed with the micronerve hook from the underlying dura and removed with the 2 mm punch laterally, exposing the lateral dural tube and takeoff of the L5 nerve root. The L4 nerve root was noted to be dorsally displaced. I gently retracted the nerve root  and found a subligamentous disk herniation directly  Underneath  the takeoff of the nerve root extending into the foramina. An incision was made in the ligamentous tissue and the fragment immediately presented itself for removal.  The pituitary forceps were used to further explore the interspace and additional loose fragments were removed. The wound was copiously irrigated with antibiotic solution. Duramorph paste was placed in the epidural space and 0.5% Marcaine in subcutaneous tissues for analgesia. The fascia was then reapproximated using interrupted 0 Vicryl sutures and interrupted 3-0 Vicryl sutures were used to reapproximate the subcuticular layer. A sterile dressing was then applied. The patient was extubated in the operating room and transferred to the recovery room in stable condition. There were no complications. In accordance with CMS guidelines, I attest that I was present for the entire procedure from the creation of the skin incision to the closure.

## 2019-04-23 NOTE — ANESTHESIA POSTPROCEDURE EVALUATION
Department of Anesthesiology  Postprocedure Note    Patient: Carolynn Gavrin  MRN: [id number]  YOB: 1965  Date of evaluation: 4/23/2019  Time:  2:11 PM     Procedure Summary     Date:  04/23/19 Room / Location:  [facility] OR  / [facility] OR    Anesthesia Start:  1103 Anesthesia Stop:  1250    Procedure:  LEFT LUMBAR4-LUMBAR5  MICRODISCECTOMY (Left ) Diagnosis:  (.)    Surgeon:  Bill Graham MD Responsible Provider:  Stacy Vega MD    Anesthesia Type:  general ASA Status:  3          Anesthesia Type: general    Claudia Phase I: Claudia Score: 8    Claudia Phase II:      Last vitals: Reviewed and per EMR flowsheets. Anesthesia Post Evaluation    Patient location during evaluation: PACU  Patient participation: complete - patient participated  Level of consciousness: confused  Airway patency: patent  Nausea & Vomiting: no nausea and no vomiting  Complications: no  Cardiovascular status: hemodynamically stable  Respiratory status: acceptable  Hydration status: stable  Comments: Patient is confused ,in view of his clinical history, he will be followed for delirium precautions.

## 2019-04-23 NOTE — ANESTHESIA PRE PROCEDURE
Department of Anesthesiology  Preprocedure Note       Name:  Susana Garduno   Age:  48 y.o.  :  1965                                          MRN:  [id number]         Date:  2019      Surgeon: Hugo Ceballos):  Jaimie May MD    Procedure: LEFT LUMBAR4-LUMBAR5  MICRODISCECTOMY (Left )    Medications prior to admission:   Prior to Admission medications    Medication Sig Start Date End Date Taking? Authorizing Provider   gabapentin (NEURONTIN) 300 MG capsule Take 300 mg by mouth 3 times daily.   4/1/19 3/31/20 Yes Historical Provider, MD   lisinopril (PRINIVIL;ZESTRIL) 20 MG tablet TAKE 1 TABLET BY MOUTH ONE TIME A DAY 19  Yes Historical Provider, MD   sildenafil (VIAGRA) 100 MG tablet TAKE 1 TABLET BY MOUTH PRIOR TO SEXUAL INTERCOURSE 19  Yes Historical Provider, MD       Current medications:    Current Facility-Administered Medications   Medication Dose Route Frequency Provider Last Rate Last Dose    ceFAZolin (ANCEF) 2 g in dextrose 4 % 100 mL IVPB (premix)  2 g Intravenous On Call to Isaias Owusu MD        potassium chloride (KLOR-CON M) extended release tablet 40 mEq  40 mEq Oral Once Megan Chau MD        oxyCODONE (OXYCONTIN) extended release tablet 10 mg  10 mg Oral 2 times per day Sylvain Veras MD   10 mg at 19 0836    sodium chloride flush 0.9 % injection 10 mL  10 mL Intravenous 2 times per day Sylvain Veras MD   10 mL at 19 2050    sodium chloride flush 0.9 % injection 10 mL  10 mL Intravenous PRN Sylvain Veras MD   10 mL at 19 0626    chlordiazePOXIDE (LIBRIUM) capsule 25 mg  25 mg Oral 4x Daily PRN Sylvain Veras MD   25 mg at 19 1218    ampicillin-sulbactam (UNASYN) 1.5 g IVPB minibag  1.5 g Intravenous Q6H Sylvain Veras  mL/hr at 19 0849 1.5 g at 19 0849    morphine (PF) injection 2 mg  2 mg Intravenous Q4H PRN Sylvain Veras MD   2 mg at 19 0611    sodium chloride flush 0.9 % injection 10 mL  10 mL Intravenous 2 times per day Shantelle Blaze, APRN - CNP   10 mL at 04/22/19 2050    sodium chloride flush 0.9 % injection 10 mL  10 mL Intravenous PRN Shantelle Blaze, APRN - CNP        docusate sodium (COLACE) capsule 100 mg  100 mg Oral BID Shantelle Blaze, APRN - CNP   100 mg at 04/22/19 2050    ondansetron (ZOFRAN) injection 4 mg  4 mg Intravenous Q6H PRN Shantelle Blaze, APRN - CNP   4 mg at 04/22/19 0530    acetaminophen (TYLENOL) tablet 650 mg  650 mg Oral Q4H PRN Shantelle Blaze, APRN - CNP   650 mg at 04/22/19 9504       Allergies:  No Known Allergies    Problem List:    Patient Active Problem List   Diagnosis Code    Chest pain R07.9    PATRICIO (acute kidney injury) ([facility] [address] N17.9    Degenerative disc disease, lumbar M51.36       Past Medical History:        Diagnosis Date    Alcohol abuse     Arthritis     hands and back    Chronic bronchitis ([facility] [address]     COPD (chronic obstructive pulmonary disease) ([facility] [address]     bronchitis    Hyperlipidemia     Hypertension     MDRO (multiple drug resistant organisms) resistance     MRSA in right arm [id number]    Radiculopathy of lumbosacral region     Spondylisthesis        Past Surgical History:        Procedure Laterality Date    FRACTURE SURGERY      right lower arm in 30's       Social History:    Social History     Tobacco Use    Smoking status: Current Every Day Smoker     Packs/day: 1.00     Types: Cigarettes    Smokeless tobacco: Never Used   Substance Use Topics    Alcohol use: Yes     Comment: 5th of whisky a day for 6 weeks                                Ready to quit: Not Answered  Counseling given: Not Answered      Vital Signs (Current):   Vitals:    04/22/19 2341 04/23/19 0215 04/23/19 0353 04/23/19 0824   BP: 114/73  123/76 119/85   Pulse: 89  75 80   Resp: 18  18 18   Temp: 97.3 °F (36.3 °C)  97.7 °F (36.5 °C) 99.3 °F (37.4 °C)   TempSrc: Temporal  Temporal Oral   SpO2: 95%  93% 96%   Weight:  124 lb 12.8 oz (56.6 kg)     Height: BP Readings from Last 3 Encounters:   04/23/19 119/85   02/01/18 (!) 151/66       NPO Status: Time of last liquid consumption: 0836(sip with med)                        Time of last solid consumption: 1800                        Date of last liquid consumption: 04/23/19                        Date of last solid food consumption: 04/22/19    BMI:   Wt Readings from Last 3 Encounters:   04/23/19 124 lb 12.8 oz (56.6 kg)   02/01/18 135 lb (61.2 kg)     Body mass index is 20.14 kg/m². CBC:   Lab Results   Component Value Date    WBC 6.2 04/23/2019    RBC 3.35 04/23/2019    HGB 11.3 04/23/2019    HCT 32.2 04/23/2019    MCV 96.1 04/23/2019    RDW 16.7 04/23/2019     04/23/2019       CMP:   Lab Results   Component Value Date     04/23/2019    K 3.5 04/23/2019    K 3.8 04/19/2019     04/23/2019    CO2 23 04/23/2019    BUN 5 04/23/2019    CREATININE 0.6 04/23/2019    GFRAA >60 04/23/2019    AGRATIO 1.1 04/19/2019    LABGLOM >60 04/23/2019    GLUCOSE 85 04/23/2019    PROT 6.0 04/19/2019    CALCIUM 7.9 04/23/2019    BILITOT <0.2 04/19/2019    ALKPHOS 112 04/19/2019     04/19/2019    ALT 94 04/19/2019       POC Tests: No results for input(s): POCGLU, POCNA, POCK, POCCL, POCBUN, POCHEMO, POCHCT in the last 72 hours.     Coags:   Lab Results   Component Value Date    PROTIME 11.1 04/18/2019    INR 0.97 04/18/2019    APTT 32.7 04/18/2019       HCG (If Applicable): No results found for: PREGTESTUR, PREGSERUM, HCG, HCGQUANT     ABGs: No results found for: PHART, PO2ART, CUI5GUM, IRL3BBL, BEART, N9NPURMW     Type & Screen (If Applicable):  No results found for: [facility]    Anesthesia Evaluation  Patient summary reviewed and Nursing notes reviewed  Airway: Mallampati: III       Mouth opening: > = 3 FB Dental:    (+) caps  Comment: Missing lateral incisors    Pulmonary:   (+) COPD: mild,                             Cardiovascular:  Exercise tolerance: good (>4 METS),

## 2019-04-23 NOTE — PROGRESS NOTES
I have reviewed the history and physical  and find no relevant changes. I have reviewed with the patient and/or family the risks, benefits, and alternatives to the procedure.     Monroe Pham MD  4/23/2019

## 2019-04-23 NOTE — BRIEF OP NOTE
Brief Postoperative Note  ______________________________________________________________    Patient: Shantanu Alford  YOB: 1965  MRN: [id number]  Date of Procedure: 4/23/2019    Pre-Op Diagnosis: .     Post-Op Diagnosis: Same       Procedure(s):  LEFT LUMBAR4-LUMBAR5  MICRODISCECTOMY    Anesthesia: General    Surgeon(s):  Sarah Smith MD    Assistant: Alison Garcia    Estimated Blood Loss (mL): less than 50     Complications: None    Specimens:   * No specimens in log *    Implants:  * No implants in log *      Drains: * No LDAs found *    Findings: HNP L45 left    Sarah Smith MD  Date: 4/23/2019  Time: 12:27 PM

## 2019-04-23 NOTE — PROGRESS NOTES
Pt refusing bed alarm, educated purpose of bed alarm and still encouraged patient to call before getting up. Pt verbalized understanding but is impulsive. Pt sitting on edge of bed at this time. Pt also requested IV fluids to be stopped and disconnected but after talking with pt he agrees to have them hooked up for now.

## 2019-04-23 NOTE — PROGRESS NOTES
Patient admitted to PACU via bed, arouses to stimuli, moves ext spontaneously. Respirations adeq on 3L o2 per NC spo2 100%. Skin warm and dry with good color. abd soft. Will continue to monitor.

## 2019-04-23 NOTE — PLAN OF CARE
Problem: Falls - Risk of:  Goal: Will remain free from falls  Description  Will remain free from falls  Outcome: Met This Shift  Goal: Absence of physical injury  Description  Absence of physical injury  Outcome: Met This Shift     Problem: SAFETY  Goal: Free from accidental physical injury  Outcome: Met This Shift  Goal: Free from intentional harm  Outcome: Met This Shift     Problem: DAILY CARE  Goal: Daily care needs are met  Outcome: Met This Shift     Problem: PAIN  Goal: Patient's pain/discomfort is manageable  Outcome: Met This Shift     Problem: SKIN INTEGRITY  Goal: Skin integrity is maintained or improved  Outcome: Met This Shift     Problem: Pain:  Goal: Pain level will decrease  Description  Pain level will decrease  Outcome: Ongoing  Goal: Control of acute pain  Description  Control of acute pain  Outcome: Ongoing  Goal: Control of chronic pain  Description  Control of chronic pain  Outcome: Ongoing     Problem: KNOWLEDGE DEFICIT  Goal: Patient/S.O. demonstrates understanding of disease process, treatment plan, medications, and discharge instructions.   Outcome: Ongoing     Problem: DISCHARGE BARRIERS  Goal: Patient's continuum of care needs are met  Outcome: Ongoing     Problem: Musculor/Skeletal Functional Status  Goal: Highest potential functional level  Outcome: Ongoing  Goal: Absence of falls  Outcome: Ongoing     Problem: Tissue Perfusion - Renal, Altered:  Goal: Electrolytes within specified parameters  Description  Electrolytes within specified parameters  Outcome: Completed  Goal: Urine creatinine clearance will be within specified parameters  Description  Urine creatinine clearance will be within specified parameters  Outcome: Completed  Goal: Serum creatinine will be within specified parameters  Description  Serum creatinine will be within specified parameters  Outcome: Completed  Goal: Ability to achieve a balanced intake and output will improve  Description  Ability to achieve a balanced

## 2019-04-23 NOTE — PROGRESS NOTES
Shift assessment complete. VSS. Pt given Morphine for back pain. Pt took shower with chlorahexidine wash. Call light within reach, non skid socks on. Pt up independently in room. Pt aware of POC. Will continue to monitor.

## 2019-04-24 VITALS
TEMPERATURE: 98 F | DIASTOLIC BLOOD PRESSURE: 75 MMHG | RESPIRATION RATE: 16 BRPM | OXYGEN SATURATION: 96 % | WEIGHT: 124.8 LBS | HEIGHT: 66 IN | SYSTOLIC BLOOD PRESSURE: 94 MMHG | BODY MASS INDEX: 20.06 KG/M2 | HEART RATE: 100 BPM

## 2019-04-24 LAB
ALBUMIN SERPL-MCNC: 3.4 G/DL (ref 3.4–5)
ANION GAP SERPL CALCULATED.3IONS-SCNC: 14 MMOL/L (ref 3–16)
ANISOCYTOSIS: ABNORMAL
BASOPHILS ABSOLUTE: 0 K/UL (ref 0–0.2)
BASOPHILS RELATIVE PERCENT: 0 %
BLOOD CULTURE, ROUTINE: NORMAL
BUN BLDV-MCNC: 10 MG/DL (ref 7–20)
CALCIUM SERPL-MCNC: 8 MG/DL (ref 8.3–10.6)
CHLORIDE BLD-SCNC: 99 MMOL/L (ref 99–110)
CO2: 20 MMOL/L (ref 21–32)
CREAT SERPL-MCNC: 1 MG/DL (ref 0.9–1.3)
CULTURE, BLOOD 2: NORMAL
EOSINOPHILS ABSOLUTE: 0 K/UL (ref 0–0.6)
EOSINOPHILS RELATIVE PERCENT: 0 %
GFR AFRICAN AMERICAN: >60
GFR NON-AFRICAN AMERICAN: >60
GLUCOSE BLD-MCNC: 133 MG/DL (ref 70–99)
HCT VFR BLD CALC: 38 % (ref 40.5–52.5)
HEMOGLOBIN: 12.8 G/DL (ref 13.5–17.5)
LYMPHOCYTES ABSOLUTE: 1.4 K/UL (ref 1–5.1)
LYMPHOCYTES RELATIVE PERCENT: 12 %
MCH RBC QN AUTO: 32.4 PG (ref 26–34)
MCHC RBC AUTO-ENTMCNC: 33.8 G/DL (ref 31–36)
MCV RBC AUTO: 95.8 FL (ref 80–100)
METAMYELOCYTES RELATIVE PERCENT: 4 %
MONOCYTES ABSOLUTE: 1.4 K/UL (ref 0–1.3)
MONOCYTES RELATIVE PERCENT: 12 %
MYELOCYTE PERCENT: 2 %
NEUTROPHILS ABSOLUTE: 9.1 K/UL (ref 1.7–7.7)
NEUTROPHILS RELATIVE PERCENT: 70 %
PDW BLD-RTO: 16.6 % (ref 12.4–15.4)
PHOSPHORUS: 2.6 MG/DL (ref 2.5–4.9)
PLATELET # BLD: 359 K/UL (ref 135–450)
PLATELET SLIDE REVIEW: ADEQUATE
PMV BLD AUTO: 6.9 FL (ref 5–10.5)
POTASSIUM SERPL-SCNC: 4.1 MMOL/L (ref 3.5–5.1)
RBC # BLD: 3.97 M/UL (ref 4.2–5.9)
SLIDE REVIEW: ABNORMAL
SODIUM BLD-SCNC: 133 MMOL/L (ref 136–145)
WBC # BLD: 12 K/UL (ref 4–11)

## 2019-04-24 PROCEDURE — 6370000000 HC RX 637 (ALT 250 FOR IP): Performed by: NEUROLOGICAL SURGERY

## 2019-04-24 PROCEDURE — 6360000002 HC RX W HCPCS: Performed by: NEUROLOGICAL SURGERY

## 2019-04-24 PROCEDURE — 36415 COLL VENOUS BLD VENIPUNCTURE: CPT

## 2019-04-24 PROCEDURE — 85025 COMPLETE CBC W/AUTO DIFF WBC: CPT

## 2019-04-24 PROCEDURE — 97116 GAIT TRAINING THERAPY: CPT

## 2019-04-24 PROCEDURE — 80069 RENAL FUNCTION PANEL: CPT

## 2019-04-24 PROCEDURE — 97530 THERAPEUTIC ACTIVITIES: CPT

## 2019-04-24 PROCEDURE — 97168 OT RE-EVAL EST PLAN CARE: CPT

## 2019-04-24 PROCEDURE — 97164 PT RE-EVAL EST PLAN CARE: CPT

## 2019-04-24 RX ORDER — OXYCODONE HYDROCHLORIDE 5 MG/1
5 TABLET ORAL EVERY 6 HOURS PRN
Qty: 20 TABLET | Refills: 0 | Status: SHIPPED | OUTPATIENT
Start: 2019-04-24 | End: 2019-04-29

## 2019-04-24 RX ORDER — CYCLOBENZAPRINE HCL 10 MG
10 TABLET ORAL 3 TIMES DAILY PRN
Qty: 20 TABLET | Refills: 0 | Status: SHIPPED | OUTPATIENT
Start: 2019-04-24 | End: 2019-04-30

## 2019-04-24 RX ADMIN — OXYCODONE HYDROCHLORIDE 10 MG: 5 TABLET ORAL at 08:30

## 2019-04-24 RX ADMIN — CHLORDIAZEPOXIDE HYDROCHLORIDE 25 MG: 25 CAPSULE ORAL at 08:32

## 2019-04-24 RX ADMIN — OXYCODONE HYDROCHLORIDE 10 MG: 10 TABLET, FILM COATED, EXTENDED RELEASE ORAL at 08:30

## 2019-04-24 RX ADMIN — OXYCODONE HYDROCHLORIDE 10 MG: 5 TABLET ORAL at 03:23

## 2019-04-24 RX ADMIN — MORPHINE SULFATE 2 MG: 2 INJECTION, SOLUTION INTRAMUSCULAR; INTRAVENOUS at 05:59

## 2019-04-24 RX ADMIN — CEFAZOLIN SODIUM 2 G: 2 INJECTION, SOLUTION INTRAVENOUS at 03:20

## 2019-04-24 RX ADMIN — LISINOPRIL 20 MG: 20 TABLET ORAL at 08:30

## 2019-04-24 RX ADMIN — DOCUSATE SODIUM 100 MG: 100 CAPSULE, LIQUID FILLED ORAL at 08:30

## 2019-04-24 ASSESSMENT — PAIN DESCRIPTION - LOCATION
LOCATION: BACK

## 2019-04-24 ASSESSMENT — PAIN SCALES - GENERAL
PAINLEVEL_OUTOF10: 10
PAINLEVEL_OUTOF10: 8
PAINLEVEL_OUTOF10: 10
PAINLEVEL_OUTOF10: 8

## 2019-04-24 ASSESSMENT — PAIN DESCRIPTION - FREQUENCY
FREQUENCY: CONTINUOUS
FREQUENCY: CONTINUOUS

## 2019-04-24 ASSESSMENT — PAIN DESCRIPTION - PAIN TYPE
TYPE: SURGICAL PAIN

## 2019-04-24 ASSESSMENT — PAIN DESCRIPTION - DESCRIPTORS
DESCRIPTORS: ACHING;BURNING;CONSTANT
DESCRIPTORS: ACHING;CONSTANT
DESCRIPTORS: ACHING;BURNING;CONSTANT

## 2019-04-24 NOTE — PROGRESS NOTES
Post-op Progress Note - Neurosurgery    Subjective:  Shantanu Alford is a 48 y.o. male. Pain is controlled. Doing well. Patient complains of incisional pain, denies leg/arm pain, denies headache, hoarseness of voice, difficulty swallowing, N/V. Objective:  Blood pressure 94/75, pulse 100, temperature 98 °F (36.7 °C), temperature source Temporal, resp. rate 16, height 5' 6\" (1.676 m), weight 124 lb 12.8 oz (56.6 kg), SpO2 96 %. In: -   Out: 500 [Urine:500]    Physical Exam:  Incision:healing well  Motor:Motor exam is symmetrical 5 out of 5 all extremities bilaterally  Activity: Ambulating halls with RW    Labs:  BMP:   Lab Results   Component Value Date    GLUCOSE 133 04/24/2019    CO2 20 04/24/2019    BUN 10 04/24/2019    CREATININE 1.0 04/24/2019    CALCIUM 8.0 04/24/2019     WBC/Hgb/Hct/Plts:  12.0/12.8/38.0/359 (04/24 0731)   Assessment and Plan:  Patient Active Hospital Problem List:     POD # 1 S/P lumbar laminectomy   OT/PT: Continue to advance activity. Disposition: ok for Home today from N/S standpoint.       Benson Starr  4/24/2019

## 2019-04-24 NOTE — PROGRESS NOTES
Patient refuses SCD's, continues IV fluids, seizure pads and bed alarm. Patient doesn't need anything at the moment. Will monitor patient.

## 2019-04-24 NOTE — PROGRESS NOTES
Assessment completed and documented. The care plan and education has been reviewed and mutually agreed upon with the patient. Pt walking around room and curtis. Oxycodone given for pain  Of 10/10/. Dressing is dry and intact. Call light in reach  Will monitor.

## 2019-04-24 NOTE — PROGRESS NOTES
Physical Therapy    Facility/Department: [address] ORTHO/NEURO NURSING  Initial Assessment; Re-evaluation    NAME: Faviola Borjas  : 1965  MRN: [id number]    Date of Service: 2019    Discharge Recommendations: Faviola Borjas scored a 18/24 on the AM-PAC short mobility form. Current research shows that an AM-PAC score of 18 or greater is typically associated with a discharge to the patient's home setting. Based on the patients AM-PAC score and their current functional mobility deficits, it is recommended that the patient have 2-3 sessions per week of Physical Therapy at d/c to increase the patients independence. It is recommended that patient follow up with OP services after d/c as approved by surgeon. PT Equipment Recommendations  Equipment Needed: Yes  Walker: Rolling    Assessment   Body structures, Functions, Activity limitations: Decreased functional mobility ; Decreased strength;Decreased safe awareness;Decreased sensation;Decreased balance; Increased Pain  Assessment: Patient presents this day with the above deficits and decreased balance and endurance in comparison with the previous session. Pt can benefit from ongoing skilled intervention in the acute and OP setting in order to attain highest level of independence and functional mobility. Patient is resistant to safety education but is aware the recommended AD at this time is a RW. Prognosis: Good  Decision Making: Low Complexity  Patient Education: PT/OT eval, PT POC, safe use of RW for mobility. Barriers to Learning: emotional   REQUIRES PT FOLLOW UP: Yes  Activity Tolerance  Activity Tolerance: Patient Tolerated treatment well  Activity Tolerance: Patient denies increase in pain with PT intervention; Decreased balance with fatigue. Patient Diagnosis(es): The primary encounter diagnosis was Unsteady gait.  Diagnoses of Multilevel degenerative disc disease, Acute kidney injury ([facility] [address] and Cerebral vascular disease were also pertinent to this visit. has a past medical history of Alcohol abuse, Arthritis, Chronic bronchitis ([address] COPD (chronic obstructive pulmonary disease) ([address] Hyperlipidemia, Hypertension, MDRO (multiple drug resistant organisms) resistance, Radiculopathy of lumbosacral region, and Spondylisthesis. has a past surgical history that includes fracture surgery and Lumbar spine surgery (Left, 4/23/2019). Restrictions  Restrictions/Precautions  Restrictions/Precautions: Fall Risk, General Precautions  Position Activity Restriction  Spinal Precautions: No Bending, No Lifting, No Twisting  Other position/activity restrictions: Blanka Ross is a 48 y.o. male who presented to the ED on 4/18 because of concern for possible stroke. He says he's been getting blurred vision off and on for the past 2 days. He took his blood pressure medicine the other day and his vision got better. He had an episode at work where his coworker said he was slurring his words and was not steady on his feet. That happened just prior to arrival though the patient says he's been dizzy since 8:00 in the morning. He also has chronic back pain and has been evaluated by a back specialist.  He was advised to consider surgery on his back but has not wanted that option so far since it is not guaranteed to help him and it could make him worse. He gets shooting pains into his left leg and numbness of his left leg. He says that he falls down often and his leg \"gives out\". This has been happening for about two months. His coworker had mentioned to the triage nurse to possibility of drugs although the patient denies any recreational drug use and says he occasionally uses alcohol but currently not in excess. He was also concerned he might be having a stroke because his doctor told him he is at high risk for having one. He does admit to frequent use of over-the-counter NSAIDs (Aleve) \"like candy\" for pain of his back and hip.   He denies any change in urination. He developed incontinent diarrhea while in the ED but has been able to control his BMs and urine at home. Patient was found to have PATRICIO which has now been resolved. On 4/23 patient underwent L L4-L5 microdiscectomy. Vision/Hearing  Vision: Impaired  Vision Exceptions: Wears glasses for reading  Hearing: Within functional limits     Subjective  General  Chart Reviewed: Yes  Response To Previous Treatment: Patient with no complaints from previous session. Family / Caregiver Present: Yes(daughter and 2 young grandchildren; present throughout)  Diagnosis: PATRICIO; microdiscectomy  Follows Commands: Within Functional Limits  General Comment  Comments: Pt upright in chair upon arrival.   Subjective  Subjective: Patient agreeable to PT/OT eval with min encouragement. No c/o pain through eval.   Pain Screening  Patient Currently in Pain: Yes          Orientation  Orientation  Overall Orientation Status: Within Normal Limits  Social/Functional History  Social/Functional History  Lives With: Significant other  Type of Home: Apartment  Home Layout: One level  Home Access: Stairs to enter with rails  Entrance Stairs - Number of Steps: 12-13 ESAU  Entrance Stairs - Rails: Right  Bathroom Shower/Tub: Tub/Shower unit  Bathroom Toilet: Standard  Bathroom Accessibility: Accessible  Home Equipment: [employer]  ADL Assistance: Independent  Homemaking Assistance: Independent  Homemaking Responsibilities: Yes  Ambulation Assistance: Independent  Transfer Assistance: Independent  Active : No  Occupation: Full time employment  Type of occupation: concrete  Additional Comments: Pt reports back pain has increased in severity over last 6 months. States he has been crawling to negotiate stairs and to get through apartment for ~3 weeks. Pt reports he has been sleeping on floor to manage pain as well. Pt reports ~5-6 falls/week. Pt states \"my legs just give out. \"    Objective     Observation/Palpation  Observation: elevated shoulders, tight upper trap musculature     AROM RLE (degrees)  RLE AROM: WFL  PROM LLE (degrees)  LLE PROM: WFL  Strength RLE  Strength RLE: WFL  Comment: grossly at least 3+/5  Strength LLE  Strength LLE: WFL  Comment: grossly at least 3+/5        Bed mobility  Scooting: Independent  Transfers  Sit to Stand: Stand by assistance(1x from recliner. VCs needed for hand placement. )  Stand to sit: Stand by assistance(1x to recliner; VCs needed for hand placement. )  Ambulation  Ambulation?: Yes  More Ambulation?: Yes  Ambulation 1  Surface: level tile  Device: Rolling Walker  Assistance: Contact guard assistance  Quality of Gait: Decreased flako, step through pattern, no LOB, decreased heel strike and toe off LLE. Distance: 80 ft   Comments: Patient resistant to VC stating, \"I will do it how I do it. \" VCs needed to improve forward flexed posture and increased distance between body and RW. Ambulation 2  Surface - 2: level tile  Device 2: Rolling Walker  Assistance 2: Contact guard assistance  Quality of Gait 2: step-to pattern, decreased heel strike, decreased toe-off, decreased flako and stride length. Distance: 80 ft   Comments: Gait pattern shows increased deviations with fatigue. Patient performed stair climbing between each bout of ambulation. Verbal cues needed for hand placement on RW, to maintain appropriate distance between body and walker (pt now maintainging walker too near body, unsteady in posterior direction)   Stairs/Curb  Stairs?: Yes  Stairs  # Steps : 10  Stairs Height: 6\"  Rails: Bilateral  Device: No Device  Assistance: Contact guard assistance  Comment: Reciprocal pattern, refusal to use non-reciprocal pattern. Educated on importance of gradual strengthening and safety. Poor eccentric control in descent, UE support required.       Balance  Posture: Fair  Sitting - Static: Good  Sitting - Dynamic: Good  Standing - Static: Fair(Lois required to correct LOB with use of cane in standing. Pt educated on use of appropriate AD.  )  Standing - Dynamic: Fair      Following the conclusion of the therapy session, pt was found ambulating independently without AD in the hallway. Pt presented with lateral sway, excessive lumbar lordosis, and was utilizing walls for support. Patient was guided back to room, reminded of use of AD to decrease fall risk and risk of readmission. Pt was left in room in chair - pt refuses chair alarm. Nurse was consulted regarding need for d/c with DME ([facility]). Plan   Plan  Times per week: D/C  Times per day: Daily  Current Treatment Recommendations: Strengthening, Balance Training, Functional Mobility Training, Transfer Training, Endurance Training, Gait Training, Neuromuscular Re-education, Safety Education & Training, Equipment Evaluation, Education, & procurement, Modalities, Positioning  Safety Devices  Type of devices: Call light within reach, Gait belt, Nurse notified, Left in chair, Patient at risk for falls(Patient has been ambulating independently on the unit and outdoors and has refused chair alarm; nursing aware. )  Restraints  Initially in place: No    AM-PAC Score  AM-PAC Inpatient Mobility Raw Score : 18  AM-PAC Inpatient T-Scale Score : 43.63  Mobility Inpatient CMS 0-100% Score: 46.58  Mobility Inpatient CMS G-Code Modifier : CK          Goals  Short term goals  Time Frame for Short term goals: upon d/c  Short term goal 1: Pt will walk 150 ft with mod I and LRAD   Short term goal 2: Pt will perform sit<>stand transfer with mod I and LRAD   Short term goal 3: Pt will ascend/descend full flight of stairs with mod I and UE use of railing  Short term goal 4: Pt will perform on curb step with mod I and LRAD   Patient Goals   Patient goals :  To increase strength        Therapy Time   Individual Concurrent Group Co-treatment   Time In 1120         Time Out 1158         Minutes 38         Timed minutes: 23  Total minutes: [address], [facility]   guerrero Saxena This evaluation was observed and supervised by Andi Drew, [facility]

## 2019-04-24 NOTE — PLAN OF CARE
Problem: Pain:  Goal: Control of acute pain  Description  Control of acute pain   Outcome: Ongoing  Problem: Falls - Risk of:  Goal: Will remain free from falls  Description  Will remain free from falls   Outcome: Ongoing     Problem: Musculor/Skeletal Functional Status  Goal: Highest potential functional level  Outcome: Ongoing

## 2019-04-24 NOTE — PROGRESS NOTES
Occupational Therapy   Occupational Therapy Re-Assessment/Discharge summary  Date: 2019   Patient Name: Susana Garduno  MRN: [id number]     : 1965    Date of Service: 2019    Discharge Recommendations:Steven Diaz scored a  on the AM-PAC ADL Inpatient form. Current research shows that an AM-PAC score of 18 or greater is typically associated with a discharge to the patient's home setting. Based on the patients AM-PAC score and their current ADL deficits, it is recommended that the patient have 2-3 sessions per week of Occupational Therapy at d/c to increase the patients independence. HOME HEALTH CARE: LEVEL 3 SAFETY     - Initial home health evaluation to occur within 24-48 hours, in patient home   - Therapy evaluations in home within 24-48 hours of discharge; including DME and home safety   - Frontload therapy 5 days, then 3x a week   - Therapy to evaluate if patient has [address] needs for personal care   -  evaluation within 24-48 hours, includes evaluation of resources and insurance to determine [place], [place], LTC, and Medicaid options        OT Equipment Recommendations  Equipment Needed: Yes  Mobility Devices: ADL Assistive Devices  ADL Assistive Devices: Shower Chair with back  Other: Continue to assess pending surgery-pt may require shower chair d/t LE weakness    Assessment   Performance deficits / Impairments: Decreased functional mobility ; Decreased ADL status; Decreased endurance;Decreased strength  Assessment: 48 y.o. male presents w/ the above deficits which are impacting his occupational performance. At this time pt is expected to undergo spinal surgery sometime next week-pending outcome pt may require continued therapy once discharged from acute stay.    Treatment Diagnosis: Decreased functional mobility and ADL status secondary to chronic pain   Prognosis: Good  Patient Education: Role of OT, discharge planning, POC   REQUIRES OT FOLLOW UP: Yes  Activity Tolerance  Activity Tolerance: Treatment limited secondary to decreased cognition  Safety Devices  Safety Devices in place: Yes  Type of devices: Call light within reach; Left in chair;Nurse notified(Daughter present)           Patient Diagnosis(es): The primary encounter diagnosis was Unsteady gait. Diagnoses of Multilevel degenerative disc disease, Acute kidney injury ([address] and Cerebral vascular disease were also pertinent to this visit. has a past medical history of Alcohol abuse, Arthritis, Chronic bronchitis ([address] COPD (chronic obstructive pulmonary disease) ([address] Hyperlipidemia, Hypertension, MDRO (multiple drug resistant organisms) resistance, Radiculopathy of lumbosacral region, and Spondylisthesis. has a past surgical history that includes fracture surgery and Lumbar spine surgery (Left, 4/23/2019). Treatment Diagnosis: Decreased functional mobility and ADL status secondary to chronic pain       Restrictions  Restrictions/Precautions  Restrictions/Precautions: Fall Risk, General Precautions  Position Activity Restriction  Spinal Precautions: No Bending, No Lifting, No Twisting  Other position/activity restrictions: Davion Khalil is a 48 y.o. male who presented to the ED on 4/18 because of concern for possible stroke. He says he's been getting blurred vision off and on for the past 2 days. He took his blood pressure medicine the other day and his vision got better. He had an episode at work where his coworker said he was slurring his words and was not steady on his feet. That happened just prior to arrival though the patient says he's been dizzy since 8:00 in the morning. He also has chronic back pain and has been evaluated by a back specialist.  He was advised to consider surgery on his back but has not wanted that option so far since it is not guaranteed to help him and it could make him worse. He gets shooting pains into his left leg and numbness of his left leg.   He says that he falls down often and his leg \"gives out\". This has been happening for about two months. His coworker had mentioned to the triage nurse to possibility of drugs although the patient denies any recreational drug use and says he occasionally uses alcohol but currently not in excess. He was also concerned he might be having a stroke because his doctor told him he is at high risk for having one. He does admit to frequent use of over-the-counter NSAIDs (Aleve) \"like candy\" for pain of his back and hip. He denies any change in urination. He developed incontinent diarrhea while in the ED but has been able to control his BMs and urine at home. Patient was found to have PATRICIO which has now been resolved. On 4/23 patient underwent L L4-L5 microdiscectomy.       Subjective   General  Chart Reviewed: Yes  Patient assessed for rehabilitation services?: Yes  Family / Caregiver Present: Yes(Daughter and 3 yo grandsons)  Diagnosis:  s/p lumbar laminectomy  Subjective  Subjective: Patient observed ambulating in halls with RN, no AD, had returned to recliner upon OT arrival  Pain Assessment  Pain Assessment: 0-10  Pain Level: 8  Pain Type: Surgical pain  Pain Location: Back  Pain Descriptors: Aching;Constant  Non-Pharmaceutical Pain Intervention(s): Ambulation/Increased Activity;Repositioned  Social/Functional History  Social/Functional History  Lives With: Significant other  Type of Home: Apartment  Home Layout: One level  Home Access: Stairs to enter with rails  Entrance Stairs - Number of Steps: 12-13 ESAU  Entrance Stairs - Rails: Right  Bathroom Shower/Tub: Tub/Shower unit  Bathroom Toilet: Standard  Bathroom Accessibility: Accessible  Home Equipment: Marjie Leyden  ADL Assistance: Independent  Homemaking Assistance: Independent  Homemaking Responsibilities: Yes  Ambulation Assistance: Independent  Transfer Assistance: Independent  Active : No  Occupation: Full time employment  Type of occupation: concrete  Additional Comments: Pt reports back pain has increased in severity over last 6 months. States he has been crawling to negotiate stairs and to get through apartment for ~3 weeks. Pt reports he has been sleeping on floor to manage pain as well. Pt reports ~5-6 falls/week. Pt states \"my legs just give out. \"       Objective   Vision: Impaired  Vision Exceptions: Wears glasses for reading  Hearing: Within functional limits    Orientation  Overall Orientation Status: Within Normal Limits  Observation/Palpation  Observation: elevated shoulders, tight upper trap musculature   Balance  Sitting Balance: Supervision  Standing Balance: Contact guard assistance  Standing Balance  Time: ~10-12 minutes total  Activity: functional mobility with RW within room and in hallways (~ feet) + ascending/descending stairs (8). Daughter educated regarding proximity to pt while on stairs/best place to stand. Patient extremely impulsive with walker and throughout treatment, attempting to do pushups in stance using RW as support (immediately stopped by this writer and education regarding precautions throughout session)  Wheelchair Bed Transfers  Wheelchair/Bed - Technique: Ambulating  Equipment Used: Other  Level of Asssistance: Contact guard assistance;Stand by assistance  ADL  Feeding: Independent  UE Dressing: Independent  LE Dressing: Minimal assistance(Daughter educated regarding back precautions (due to pt with decreased adherence and questionable compliance following education))  Toileting: None  Tone RUE  RUE Tone: Normotonic  Tone LUE  LUE Tone: Normotonic  Coordination  Movements Are Fluid And Coordinated: Yes     Bed mobility  Comment: Patient OOB beginning/end of session  Transfers  Sit to stand: Contact guard assistance  Stand to sit: Contact guard assistance  Transfer Comments: cues for safety  Vision - Basic Assessment  Patient Visual Report: No visual complaint reported.   Cognition  Overall Cognitive Status: Exceptions  Arousal/Alertness: Appropriate responses to stimuli  Following Commands: Follows one step commands with repetition(due to lack of compliance)  Attention Span: Attends with cues to redirect; Difficulty dividing attention  Memory: Decreased recall of precautions  Safety Judgement: Decreased awareness of need for assistance;Decreased awareness of need for safety  Problem Solving: Decreased awareness of errors  Insights: Not aware of deficits  Initiation: Requires cues for some  Sequencing: Requires cues for some  Perception  Overall Perceptual Status: WFL     Sensation  Overall Sensation Status: Impaired(decreased (but improving per pt) sensation BLEs)        LUE AROM (degrees)  LUE AROM : WFL  RUE AROM (degrees)  RUE AROM : WFL  LUE Strength  L Hand Grasp: 4/5  RUE Strength  R Hand Grasp: 4/5      Plan   Plan  Times per week: 5-7x  Times per day: Daily  Current Treatment Recommendations: Strengthening, Endurance Training, Balance Training, Functional Mobility Training, Self-Care / ADL, Safety Education & Training, Home Management Training      AM-[facility] Score        AM-[facility] Inpatient Daily Activity Raw Score: 21  AM-PAC Inpatient ADL T-Scale Score : 44.27  ADL Inpatient CMS 0-100% Score: 32.79  ADL Inpatient CMS G-Code Modifier : CJ    Goals  Short term goals  Time Frame for Short term goals: Discharge  Short term goal 1: Functional transfer for ADL completion w/ Mod I-CGA  Short term goal 2: LB dressing w/ Mod I-Lois  Short term goal 3: Grooming w/ independence  Short term goal 4: Bathing w/ mod I  Short term goal 5: Toileting w/ Mod I  Long term goals  Time Frame for Long term goals : LTG=STG  Patient Goals   Patient goals :  \"To get out of here\"       Therapy Time   Individual Concurrent Group Co-treatment   Time In 1120         Time Out 1158         Minutes 38              Timed Code Treatment Minutes:   23    Total Treatment Minutes:  [address], OTR/L [id number]

## 2019-04-24 NOTE — PROGRESS NOTES
Discharge papers gone over and copies given. .. Rolling walker delivered. Daughter here. Pt walked out , stopped by pharmacy to  prescription, and waited for car to be pulled around. Pt in car safely. .. Karla Lynch

## 2019-04-29 NOTE — DISCHARGE SUMMARY
b.i.d., continue morphine 2 mg q.4 hours, tramadol added yesterday, pt happy about pain management  He is stable for surgery from a medical perspective. Had surgery and is doing well. Pain is improved.        4. Alcoholism: mild to moderate withdrawal potential, start Librium 25 mg q.6 hours as needed   - could pose an issue if withdraws following surgery, continue to  As above. He was discharged home ins stable condition.             Discharge Medications:   Discharge Medication List as of 4/24/2019 12:09 PM      START taking these medications    Details   oxyCODONE (ROXICODONE) 5 MG immediate release tablet Take 1 tablet by mouth every 6 hours as needed for Pain for up to 5 days. , Disp-20 tablet, R-0Print      cyclobenzaprine (FLEXERIL) 10 MG tablet Take 1 tablet by mouth 3 times daily as needed for Muscle spasms, Disp-20 tablet, R-0Normal           Discharge Medication List as of 4/24/2019 12:09 PM        Discharge Medication List as of 4/24/2019 12:09 PM      CONTINUE these medications which have NOT CHANGED    Details   gabapentin (NEURONTIN) 300 MG capsule Take 300 mg by mouth 3 times daily. Historical Med      lisinopril (PRINIVIL;ZESTRIL) 20 MG tablet TAKE 1 TABLET BY MOUTH ONE TIME A DAY, R-5Historical Med      sildenafil (VIAGRA) 100 MG tablet TAKE 1 TABLET BY MOUTH PRIOR TO SEXUAL INTERCOURSE, R-0Historical Med           Discharge Medication List as of 4/24/2019 12:09 PM      STOP taking these medications       warfarin (COUMADIN) 1 MG tablet Comments:   Reason for Stopping:         albuterol sulfate  (90 Base) MCG/ACT inhaler Comments:   Reason for Stopping:               Discharge ROS:  A complete review of systems was asked and negative except for some back discomfort.     Discharge Exam:    BP 94/75   Pulse 100   Temp 98 °F (36.7 °C) (Temporal)   Resp 16   Ht 5' 6\" (1.676 m)   Wt 124 lb 12.8 oz (56.6 kg)   SpO2 96%   BMI 20.14 kg/m²   General appearance:  NAD  HEENT:   Normal cephalic, atraumatic, moist mucous membranes, no oropharyngeal erythema or exudate  Neck: Supple, trachea midline, no anterior cervical or SC LAD  Heart[de-identified] Normal s1/s2, RRR, no murmurs, gallops, or rubs. no leg edema  Lungs: Clear to auscultation, bilaterally without Rales/Wheezes/Rhonchi. Abdomen: Soft, non-tender, non-distended, bowel sounds present, no masses  Musculoskeletal:  No clubbing, no cyanosis, no edema  Skin: No lesion or masses  Neurologic:  Neurovascularly intact without any focal sensory/motor deficits. Cranial nerves: II-XII intact, grossly non-focal.  Psychiatric:  A & O x3  Neuro: Grossly intact, moves all four extremities     Labs: For convenience and continuity at follow-up the following most recent labs are provided:    Lab Results   Component Value Date    WBC 12.0 04/24/2019    HGB 12.8 04/24/2019    HCT 38.0 04/24/2019    MCV 95.8 04/24/2019     04/24/2019     04/24/2019    K 4.1 04/24/2019    K 3.8 04/19/2019    CL 99 04/24/2019    CO2 20 04/24/2019    BUN 10 04/24/2019    CREATININE 1.0 04/24/2019    CALCIUM 8.0 04/24/2019    PHOS 2.6 04/24/2019    ALKPHOS 112 04/19/2019    ALT 94 04/19/2019     04/19/2019    BILITOT <0.2 04/19/2019    LABALBU 3.4 04/24/2019     Lab Results   Component Value Date    INR 0.97 04/18/2019       Radiology:  Xr Chest Standard (2 Vw)    Result Date: 4/20/2019  EXAMINATION: TWO VIEWS OF THE CHEST 4/20/2019 11:04 am COMPARISON: 04/18/2019, 02/01/2018 CT HISTORY: ORDERING SYSTEM PROVIDED HISTORY: cough up sputum,fever TECHNOLOGIST PROVIDED HISTORY: Reason for exam:->cough up sputum,fever Ordering Physician Provided Reason for Exam: cough up sputum,fever Acuity: Acute Type of Exam: Initial FINDINGS: Blunting the posterior costophrenic angles is compatible with small effusions. There is prominence of the interstitial markings as well as fissural thickening. Asymmetric right perihilar airspace disease is evident. The heart size is stable.   No acute bony abnormality. Small bilateral pleural effusions and prominence of the interstitial markings suggesting inter social edema. Asymmetric right perihilar airspace disease, asymmetric edema or possibly pneumonia. Follow-up to resolution is recommended. Ct Head Wo Contrast    Result Date: 4/18/2019  EXAMINATION: CT OF THE HEAD WITHOUT CONTRAST  4/18/2019 2:57 pm TECHNIQUE: CT of the head was performed without the administration of intravenous contrast. Dose modulation, iterative reconstruction, and/or weight based adjustment of the mA/kV was utilized to reduce the radiation dose to as low as reasonably achievable. COMPARISON: None. HISTORY: ORDERING SYSTEM PROVIDED HISTORY: stroke like symptoms TECHNOLOGIST PROVIDED HISTORY: Has a \"code stroke\" or \"stroke alert\" been called? ->Yes Ordering Physician Provided Reason for Exam: stroke like symptoms Acuity: Acute Type of Exam: Initial FINDINGS: BRAIN/VENTRICLES: There is no acute intracranial hemorrhage, mass effect or midline shift. No abnormal extra-axial fluid collection. The gray-white differentiation is maintained without evidence of an acute infarct. There is no evidence of hydrocephalus. Moderate atherosclerotic calcification is seen along the course of the distal internal carotid artery bilaterally. ORBITS: The visualized portion of the orbits demonstrate no acute abnormality. SINUSES: The visualized paranasal sinuses and mastoid air cells demonstrate no acute abnormality. SOFT TISSUES/SKULL:  No acute abnormality of the visualized skull or soft tissues. No acute intracranial abnormality. Moderate atherosclerotic calcification within the distal internal carotid artery bilaterally. Critical results were called by Dr. Lawyer Mckay. Rebeca Swartz MD to [facility] on 4/18/2019 at 15:17.      Mri Cervical Spine Wo Contrast    Result Date: 4/18/2019  EXAMINATION: MRI OF THE CERVICAL SPINE WITHOUT CONTRAST; MRI OF THE LUMBAR SPINE WITHOUT CONTRAST 4/18/2019 4:58 pm TECHNIQUE: Multiplanar multisequence MRI of the cervical spine was performed without the administration of intravenous contrast.; Multiplanar multisequence MRI of the lumbar spine was performed without the administration of intravenous contrast. COMPARISON: CT cervical spine 12/23/2006 HISTORY: ORDERING SYSTEM PROVIDED HISTORY: weakness left arm TECHNOLOGIST PROVIDED HISTORY: Ordering Physician Provided Reason for Exam: Pt coached on motion, scans repeated, used propeller sequences if possible. Pt states numbness right arm and leg. Incontinence of bowel and bladder. chronic back pain// gfr= 24, kidney disease Acuity: Acute Type of Exam: Initial FINDINGS: MRI cervical spine: Mild motion artifact challenge evaluation degrading image quality. Craniocervical junction maintained the cervical cord demonstrates normal signal morphology. Several heights, alignment bone marrow signal is unremarkable. C2-C3: Mild disc space disease. Mild vertebral joint hypertrophy. Mild neural foramina. No significant central canal stenosis. No focal disc protrusion. C3-C4: Mild disc space diseases identified with mild circumferential disc bulge. No focal disc protrusion identified. Mild degenerate cord vertebral joint hypertrophy. This welts and moderate right and mild left neural foramina. Mild degenerate facet arthropathy. C4-C5: Minimal degenerative loss of disc space height and signal.  No significant disc protrusion or disc bulge identified. Mild-to-moderate facet arthropathy greatest on the left. No central canal stenosis or neural foramina identified. C5-6: Minimal loss of disc space signal without loss of disc space height. No focal disc protrusion. Mild facet arthropathy. No central canal stenosis or neural foraminal narrowing identified. C7-T1: Minimal anterior loss of disc space height and signal with minimal cervical disc bulge. No central canal stenosis or neural foramina identified. Mild-to-moderate facet arthropathy. MRI lumbar spine: Lumbar vertebral heights are maintained. Degenerative endplate marrow changes identified L5-S1 and L4-5. Mild loss of height involving L1 and T12 appears chronic. Degenerative Schmorl's nodes identified involving the lower thoracic spine and upper lumbar spine. Conus medullaris terminates normally at L1-L2. L1-L2: Mild degenerative loss of disc space height and signal.  Degenerative Schmorl's nodes. No focal disc protrusion. No central canal stenosis or neural foraminal narrowing identified. Mild facet arthropathy. L2-3: Mild degenerative loss of disc space height and signal with mild circumferential disc bulge. Mild degenerative facet arthropathy. Mild neural foraminal narrowing. No significant central canal stenosis. No focal disc protrusion. L3-4: Mild degenerate disc spaces identified with mild circumferential disc bulge. Mild-to-moderate bilateral facet arthropathy. Mild central canal stenosis and mild neural foraminal narrowing. L4-5: Moderate disc space disease identified with left extraforaminal disc protrusion affecting the exiting left L4 nerve root. Additionally there is a extradural signal mild identified posterior to L4 vertebral body. No definite communication to the disc space identified however this appears very similar in signal characteristics to the L4-5 disc. This suggestive of free disc fragment or sequestered fragment. Disc extrusion would be in the differential.  This narrows the left lateral recess affecting the traversing L4 nerve root. Otherwise moderate degenerate facet arthropathy. Mild-to-moderate right and moderate severe left neural foraminal narrowing. L5-S1: Moderate degenerate disc spaces identified with moderate circumferential disc bulge. Moderate severe left greater than right neural foraminal narrowing. Moderate severe bilateral facet arthropathy. [facility] MRI lumbar spine:  The moderate degenerative changes L4 through S1 with findings suspicious for free disc fragment versus disc extrusion along the posterior margin of the L4 vertebral body narrowing the left lateral recess. Additionally, there is far lateral disc protrusion L4-5. Please correlate with possible left L4 radiculopathy. Moderate severe degenerate changes L5-S1 with moderate severe bilateral neural foraminal narrowing greatest on the left. MRI cervical spine: Mild degenerate changes upper cervical spine. No focal disc protrusion. No central canal stenosis or foraminal narrowing identified. Mri Lumbar Spine Wo Contrast    Result Date: 4/18/2019  EXAMINATION: MRI OF THE CERVICAL SPINE WITHOUT CONTRAST; MRI OF THE LUMBAR SPINE WITHOUT CONTRAST 4/18/2019 4:58 pm TECHNIQUE: Multiplanar multisequence MRI of the cervical spine was performed without the administration of intravenous contrast.; Multiplanar multisequence MRI of the lumbar spine was performed without the administration of intravenous contrast. COMPARISON: CT cervical spine 12/23/2006 HISTORY: ORDERING SYSTEM PROVIDED HISTORY: weakness left arm TECHNOLOGIST PROVIDED HISTORY: Ordering Physician Provided Reason for Exam: Pt coached on motion, scans repeated, used propeller sequences if possible. Pt states numbness right arm and leg. Incontinence of bowel and bladder. chronic back pain// gfr= 24, kidney disease Acuity: Acute Type of Exam: Initial FINDINGS: MRI cervical spine: Mild motion artifact challenge evaluation degrading image quality. Craniocervical junction maintained the cervical cord demonstrates normal signal morphology. Several heights, alignment bone marrow signal is unremarkable. C2-C3: Mild disc space disease. Mild vertebral joint hypertrophy. Mild neural foramina. No significant central canal stenosis. No focal disc protrusion. C3-C4: Mild disc space diseases identified with mild circumferential disc bulge. No focal disc protrusion identified.   Mild degenerate cord vertebral joint hypertrophy. This welts and moderate right and mild left neural foramina. Mild degenerate facet arthropathy. C4-C5: Minimal degenerative loss of disc space height and signal.  No significant disc protrusion or disc bulge identified. Mild-to-moderate facet arthropathy greatest on the left. No central canal stenosis or neural foramina identified. C5-6: Minimal loss of disc space signal without loss of disc space height. No focal disc protrusion. Mild facet arthropathy. No central canal stenosis or neural foraminal narrowing identified. C7-T1: Minimal anterior loss of disc space height and signal with minimal cervical disc bulge. No central canal stenosis or neural foramina identified. Mild-to-moderate facet arthropathy. MRI lumbar spine: Lumbar vertebral heights are maintained. Degenerative endplate marrow changes identified L5-S1 and L4-5. Mild loss of height involving L1 and T12 appears chronic. Degenerative Schmorl's nodes identified involving the lower thoracic spine and upper lumbar spine. Conus medullaris terminates normally at L1-L2. L1-L2: Mild degenerative loss of disc space height and signal.  Degenerative Schmorl's nodes. No focal disc protrusion. No central canal stenosis or neural foraminal narrowing identified. Mild facet arthropathy. L2-3: Mild degenerative loss of disc space height and signal with mild circumferential disc bulge. Mild degenerative facet arthropathy. Mild neural foraminal narrowing. No significant central canal stenosis. No focal disc protrusion. L3-4: Mild degenerate disc spaces identified with mild circumferential disc bulge. Mild-to-moderate bilateral facet arthropathy. Mild central canal stenosis and mild neural foraminal narrowing. L4-5: Moderate disc space disease identified with left extraforaminal disc protrusion affecting the exiting left L4 nerve root. Additionally there is a extradural signal mild identified posterior to L4 vertebral body.   No definite communication to the disc space identified however this appears very similar in signal characteristics to the L4-5 disc. This suggestive of free disc fragment or sequestered fragment. Disc extrusion would be in the differential.  This narrows the left lateral recess affecting the traversing L4 nerve root. Otherwise moderate degenerate facet arthropathy. Mild-to-moderate right and moderate severe left neural foraminal narrowing. L5-S1: Moderate degenerate disc spaces identified with moderate circumferential disc bulge. Moderate severe left greater than right neural foraminal narrowing. Moderate severe bilateral facet arthropathy. [facility] [place] MRI lumbar spine: The moderate degenerative changes L4 through S1 with findings suspicious for free disc fragment versus disc extrusion along the posterior margin of the L4 vertebral body narrowing the left lateral recess. Additionally, there is far lateral disc protrusion L4-5. Please correlate with possible left L4 radiculopathy. Moderate severe degenerate changes L5-S1 with moderate severe bilateral neural foraminal narrowing greatest on the left. MRI cervical spine: Mild degenerate changes upper cervical spine. No focal disc protrusion. No central canal stenosis or foraminal narrowing identified. Us Renal Complete    Result Date: 4/18/2019  EXAMINATION: RETROPERITONEAL ULTRASOUND OF THE KIDNEYS AND URINARY BLADDER 4/18/2019 COMPARISON: None HISTORY: ORDERING SYSTEM PROVIDED HISTORY: RENAL FAILURE, ACUTE (KIDNEY INJURY) TECHNOLOGIST PROVIDED HISTORY: Ordering Physician Provided Reason for Exam: Renal Failure Acuity: Unknown Type of Exam: Unknown FINDINGS: Incidentally noted is increased echogenicity of the visualized liver parenchyma suggesting fatty infiltration. Kidneys: The right kidney measures 10.3 cm in length and the left kidney measures 10.0 cm in length. The kidneys are normal in size, contour, and parenchymal echogenicity.   No focal renal lesion is identified. No hydronephrosis nor shadowing renal pelvic stones. Bladder: Images the urine area bladder are unremarkable. Bilateral ureteral jets are noted. Unremarkable ultrasound of the kidneys and urinary bladder. Incidental fatty infiltration of the liver. Xr Lumbar Spine 1 Vw    Result Date: 4/23/2019  EXAMINATION: Single SPOT FLUOROSCOPIC IMAGE 4/23/2019 10:36 am TECHNIQUE: Fluoroscopy was provided by the radiology department for procedure. Radiologist was not present during examination. FLUOROSCOPY DOSE AND TYPE OR TIME AND EXPOSURES: 1 second, 1 image COMPARISON: MRI lumbar spine April 18, 2019. HISTORY: Intraprocedural imaging. Pain. FINDINGS: Single spot image lumbar spine is submitted. Image demonstrates placement of a needle over the posterior aspect of the L5 spinous process. Intraprocedural fluoroscopic spot images as above. See separate procedure report for more information. Xr Chest Portable    Result Date: 4/18/2019  EXAMINATION: SINGLE XRAY VIEW OF THE CHEST 4/18/2019 3:18 pm COMPARISON: February 2018 and December 2006 HISTORY: ORDERING SYSTEM PROVIDED HISTORY: dizzy TECHNOLOGIST PROVIDED HISTORY: Reason for exam:->dizzy Ordering Physician Provided Reason for Exam: Other (Pt here for multiple complaints, pt c/o pain in his back and lower legs, difficulty walking, dizziness, blurred vision, Dr Macey Hdez present during triage. ) Acuity: Unknown Type of Exam: Unknown FINDINGS: Heart and pulmonary vascularity within normal limits. Lungs clear without focal infiltrate or consolidation. No pleural effusion or pneumothorax. Visualized bony skeleton is unremarkable. No acute pulmonary finding. Cta Neck W Contrast    Result Date: 4/18/2019  EXAMINATION: CTA OF THE NECK; CTA OF THE HEAD WITH CONTRAST 4/18/2019 3:00 pm: TECHNIQUE: CTA of the neck was performed with the administration of intravenous contrast. Multiplanar reformatted images are provided for review.   MIP images are pharynx appear unremarkable. The parotid, submandibular and thyroid glands demonstrate no acute abnormality. BONES: The visualized osseous structures appear unremarkable. CTA HEAD: ANTERIOR CIRCULATION: The internal carotid arteries are normal in course and caliber without focal stenosis. The anterior cerebral and middle cerebral arteries demonstrate no focal stenosis. POSTERIOR CIRCULATION: There is fetal origin of the right PCA, a normal variant. The posterior cerebral arteries demonstrate no focal stenosis. The vertebral and basilar arteries appear unremarkable. There is no intracranial aneurysm. BRAIN: No mass effect or midline shift. No abnormal extra-axial fluid collection. The gray-white differentiation appears grossly maintained. No acute abnormality or flow-limiting stenosis of the major arteries of the head. 70% focal stenosis at the proximal left internal carotid artery and 30% focal stenosis at the proximal right internal carotid artery. Fluoro For Surgical Procedures    Result Date: 4/23/2019  Radiology exam is complete. No Radiologist dictation. Please follow up with ordering provider. Cta Head W Contrast    Result Date: 4/18/2019  EXAMINATION: CTA OF THE NECK; CTA OF THE HEAD WITH CONTRAST 4/18/2019 3:00 pm: TECHNIQUE: CTA of the neck was performed with the administration of intravenous contrast. Multiplanar reformatted images are provided for review. MIP images are provided for review. Stenosis of the internal carotid arteries measured using NASCET criteria. Dose modulation, iterative reconstruction, and/or weight based adjustment of the mA/kV was utilized to reduce the radiation dose to as low as reasonably achievable.; CTA of the head/brain was performed with the administration of intravenous contrast. Multiplanar reformatted images are provided for review. MIP images are provided for review.  Dose modulation, iterative reconstruction, and/or weight based adjustment of the mA/kV was utilized to reduce the radiation dose to as low as reasonably achievable. COMPARISON: Noncontrast CT head from earlier today HISTORY: ORDERING SYSTEM PROVIDED HISTORY: acute neurologic symptoms TECHNOLOGIST PROVIDED HISTORY: Has a \"code stroke\" or \"stroke alert\" been called? ->Yes Ordering Physician Provided Reason for Exam: stroke like symptoms, acute neurologic symptoms Acuity: Acute Type of Exam: Initial Relevant Medical/Surgical History: htn FINDINGS: CTA NECK: AORTIC ARCH/ARCH VESSELS: There is a normal branch pattern of the aortic arch. No significant stenosis is seen of the innominate artery or subclavian arteries. CAROTID ARTERIES: There is mild atherosclerotic disease of the left common carotid artery, without flow limiting stenosis. There is up to 70% focal stenosis at the proximal aspect of the left internal carotid artery. There is no flow limiting stenosis in the right common carotid artery. There is up to 30% focal stenosis in the proximal right internal carotid artery by NASCET criteria. VERTEBRAL ARTERIES: The vertebral arteries both arise from the subclavian arteries and are normal in caliber without evidence of flow limiting stenosis or dissection. SOFT TISSUES: There is minimal paraseptal emphysema at the lung apices. The lung apices are otherwise clear. There are nonenlarged mediastinal and cervical lymph nodes, likely reactive. The visualized portion of the larynx and pharynx appear unremarkable. The parotid, submandibular and thyroid glands demonstrate no acute abnormality. BONES: The visualized osseous structures appear unremarkable. CTA HEAD: ANTERIOR CIRCULATION: The internal carotid arteries are normal in course and caliber without focal stenosis. The anterior cerebral and middle cerebral arteries demonstrate no focal stenosis. POSTERIOR CIRCULATION: There is fetal origin of the right PCA, a normal variant. The posterior cerebral arteries demonstrate no focal stenosis.  The vertebral and basilar arteries appear unremarkable. There is no intracranial aneurysm. BRAIN: No mass effect or midline shift. No abnormal extra-axial fluid collection. The gray-white differentiation appears grossly maintained. No acute abnormality or flow-limiting stenosis of the major arteries of the head. 70% focal stenosis at the proximal left internal carotid artery and 30% focal stenosis at the proximal right internal carotid artery. The patient was seen and examined on day of discharge and this discharge summary is in conjunction with any daily progress note from day of discharge. Time Spent on discharge is 30 minutes  in the examination, evaluation, counseling and review of medications and discharge plan. Note that greater than 30 minutes was spent in preparing discharge papers, discussing discharge with patient, medication review, etc.       Signed:    Ros oKch MD   4/29/2019      Thank you Radha Pimentel DO for the opportunity to be involved in this patient's care.  If you have any questions or concerns please feel free to contact me at [phone]

## 2019-04-30 ENCOUNTER — HOSPITAL ENCOUNTER (EMERGENCY)
Age: 54
Discharge: LEFT BEFORE DISCHARGE | End: 2019-04-30
Payer: COMMERCIAL

## 2019-04-30 VITALS
DIASTOLIC BLOOD PRESSURE: 93 MMHG | OXYGEN SATURATION: 97 % | SYSTOLIC BLOOD PRESSURE: 129 MMHG | WEIGHT: 135 LBS | BODY MASS INDEX: 22.49 KG/M2 | HEIGHT: 65 IN | TEMPERATURE: 97.9 F | RESPIRATION RATE: 16 BRPM | HEART RATE: 102 BPM

## 2019-04-30 PROCEDURE — 4500000002 HC ER NO CHARGE

## 2019-05-28 ENCOUNTER — HOSPITAL ENCOUNTER (EMERGENCY)
Age: 54
Discharge: HOME | End: 2019-05-28
Attending: EMERGENCY MEDICINE
Payer: COMMERCIAL

## 2019-05-28 ENCOUNTER — APPOINTMENT (OUTPATIENT)
Dept: MRI IMAGING | Age: 54
End: 2019-05-28
Payer: COMMERCIAL

## 2019-05-28 VITALS
SYSTOLIC BLOOD PRESSURE: 156 MMHG | WEIGHT: 130 LBS | TEMPERATURE: 97.7 F | HEIGHT: 65 IN | HEART RATE: 94 BPM | DIASTOLIC BLOOD PRESSURE: 98 MMHG | BODY MASS INDEX: 21.66 KG/M2 | OXYGEN SATURATION: 99 % | RESPIRATION RATE: 14 BRPM

## 2019-05-28 DIAGNOSIS — Z98.890 HISTORY OF BACK SURGERY: ICD-10-CM

## 2019-05-28 DIAGNOSIS — R15.9 BOWEL AND BLADDER INCONTINENCE: ICD-10-CM

## 2019-05-28 DIAGNOSIS — R32 BOWEL AND BLADDER INCONTINENCE: ICD-10-CM

## 2019-05-28 DIAGNOSIS — M54.50 LUMBAR PAIN: Primary | ICD-10-CM

## 2019-05-28 LAB
A/G RATIO: 1.2 (ref 1.1–2.2)
ALBUMIN SERPL-MCNC: 4.2 G/DL (ref 3.4–5)
ALP BLD-CCNC: 129 U/L (ref 40–129)
ALT SERPL-CCNC: 99 U/L (ref 10–40)
ANION GAP SERPL CALCULATED.3IONS-SCNC: 15 MMOL/L (ref 3–16)
AST SERPL-CCNC: 208 U/L (ref 15–37)
BASOPHILS ABSOLUTE: 0 K/UL (ref 0–0.2)
BASOPHILS RELATIVE PERCENT: 0.5 %
BILIRUB SERPL-MCNC: 1.2 MG/DL (ref 0–1)
BUN BLDV-MCNC: 7 MG/DL (ref 7–20)
C-REACTIVE PROTEIN: 10.7 MG/L (ref 0–5.1)
CALCIUM SERPL-MCNC: 9.5 MG/DL (ref 8.3–10.6)
CHLORIDE BLD-SCNC: 94 MMOL/L (ref 99–110)
CO2: 26 MMOL/L (ref 21–32)
CREAT SERPL-MCNC: 0.6 MG/DL (ref 0.9–1.3)
EOSINOPHILS ABSOLUTE: 0.1 K/UL (ref 0–0.6)
EOSINOPHILS RELATIVE PERCENT: 1.9 %
GFR AFRICAN AMERICAN: >60
GFR NON-AFRICAN AMERICAN: >60
GLOBULIN: 3.5 G/DL
GLUCOSE BLD-MCNC: 108 MG/DL (ref 70–99)
HCT VFR BLD CALC: 41.8 % (ref 40.5–52.5)
HEMOGLOBIN: 14.3 G/DL (ref 13.5–17.5)
LACTIC ACID: 1 MMOL/L (ref 0.4–2)
LYMPHOCYTES ABSOLUTE: 1.2 K/UL (ref 1–5.1)
LYMPHOCYTES RELATIVE PERCENT: 15.9 %
MAGNESIUM: 1.6 MG/DL (ref 1.8–2.4)
MCH RBC QN AUTO: 32 PG (ref 26–34)
MCHC RBC AUTO-ENTMCNC: 34.3 G/DL (ref 31–36)
MCV RBC AUTO: 93.4 FL (ref 80–100)
MONOCYTES ABSOLUTE: 0.6 K/UL (ref 0–1.3)
MONOCYTES RELATIVE PERCENT: 8.8 %
NEUTROPHILS ABSOLUTE: 5.4 K/UL (ref 1.7–7.7)
NEUTROPHILS RELATIVE PERCENT: 72.9 %
PDW BLD-RTO: 13.9 % (ref 12.4–15.4)
PLATELET # BLD: 143 K/UL (ref 135–450)
PMV BLD AUTO: 7.8 FL (ref 5–10.5)
POTASSIUM REFLEX MAGNESIUM: 3.3 MMOL/L (ref 3.5–5.1)
RBC # BLD: 4.48 M/UL (ref 4.2–5.9)
SEDIMENTATION RATE, ERYTHROCYTE: 29 MM/HR (ref 0–20)
SODIUM BLD-SCNC: 135 MMOL/L (ref 136–145)
TOTAL PROTEIN: 7.7 G/DL (ref 6.4–8.2)
WBC # BLD: 7.4 K/UL (ref 4–11)

## 2019-05-28 PROCEDURE — 6370000000 HC RX 637 (ALT 250 FOR IP): Performed by: PHYSICIAN ASSISTANT

## 2019-05-28 PROCEDURE — 6360000002 HC RX W HCPCS: Performed by: PHYSICIAN ASSISTANT

## 2019-05-28 PROCEDURE — 87040 BLOOD CULTURE FOR BACTERIA: CPT

## 2019-05-28 PROCEDURE — 80053 COMPREHEN METABOLIC PANEL: CPT

## 2019-05-28 PROCEDURE — 2580000003 HC RX 258: Performed by: EMERGENCY MEDICINE

## 2019-05-28 PROCEDURE — 83735 ASSAY OF MAGNESIUM: CPT

## 2019-05-28 PROCEDURE — 96361 HYDRATE IV INFUSION ADD-ON: CPT

## 2019-05-28 PROCEDURE — A9579 GAD-BASE MR CONTRAST NOS,1ML: HCPCS | Performed by: EMERGENCY MEDICINE

## 2019-05-28 PROCEDURE — 85025 COMPLETE CBC W/AUTO DIFF WBC: CPT

## 2019-05-28 PROCEDURE — 96374 THER/PROPH/DIAG INJ IV PUSH: CPT

## 2019-05-28 PROCEDURE — 96375 TX/PRO/DX INJ NEW DRUG ADDON: CPT

## 2019-05-28 PROCEDURE — 83605 ASSAY OF LACTIC ACID: CPT

## 2019-05-28 PROCEDURE — 2580000003 HC RX 258: Performed by: PHYSICIAN ASSISTANT

## 2019-05-28 PROCEDURE — 6360000004 HC RX CONTRAST MEDICATION: Performed by: EMERGENCY MEDICINE

## 2019-05-28 PROCEDURE — 99284 EMERGENCY DEPT VISIT MOD MDM: CPT

## 2019-05-28 PROCEDURE — 85652 RBC SED RATE AUTOMATED: CPT

## 2019-05-28 PROCEDURE — 86140 C-REACTIVE PROTEIN: CPT

## 2019-05-28 PROCEDURE — 72158 MRI LUMBAR SPINE W/O & W/DYE: CPT

## 2019-05-28 RX ORDER — METHYLPREDNISOLONE SODIUM SUCCINATE 125 MG/2ML
125 INJECTION, POWDER, LYOPHILIZED, FOR SOLUTION INTRAMUSCULAR; INTRAVENOUS ONCE
Status: COMPLETED | OUTPATIENT
Start: 2019-05-28 | End: 2019-05-28

## 2019-05-28 RX ORDER — OXYCODONE HYDROCHLORIDE AND ACETAMINOPHEN 5; 325 MG/1; MG/1
1 TABLET ORAL ONCE
Status: COMPLETED | OUTPATIENT
Start: 2019-05-28 | End: 2019-05-28

## 2019-05-28 RX ORDER — OXYCODONE HYDROCHLORIDE AND ACETAMINOPHEN 5; 325 MG/1; MG/1
1 TABLET ORAL EVERY 8 HOURS PRN
Qty: 9 TABLET | Refills: 0 | Status: SHIPPED | OUTPATIENT
Start: 2019-05-28 | End: 2019-05-31

## 2019-05-28 RX ORDER — METHOCARBAMOL 750 MG/1
TABLET, FILM COATED ORAL
Refills: 0 | COMMUNITY
Start: 2019-05-22 | End: 2020-06-29

## 2019-05-28 RX ORDER — MORPHINE SULFATE 4 MG/ML
4 INJECTION, SOLUTION INTRAMUSCULAR; INTRAVENOUS ONCE
Status: COMPLETED | OUTPATIENT
Start: 2019-05-28 | End: 2019-05-28

## 2019-05-28 RX ORDER — 0.9 % SODIUM CHLORIDE 0.9 %
10 VIAL (ML) INJECTION ONCE
Status: COMPLETED | OUTPATIENT
Start: 2019-05-28 | End: 2019-05-28

## 2019-05-28 RX ORDER — ONDANSETRON 2 MG/ML
4 INJECTION INTRAMUSCULAR; INTRAVENOUS ONCE
Status: COMPLETED | OUTPATIENT
Start: 2019-05-28 | End: 2019-05-28

## 2019-05-28 RX ORDER — 0.9 % SODIUM CHLORIDE 0.9 %
1000 INTRAVENOUS SOLUTION INTRAVENOUS ONCE
Status: COMPLETED | OUTPATIENT
Start: 2019-05-28 | End: 2019-05-28

## 2019-05-28 RX ADMIN — OXYCODONE HYDROCHLORIDE AND ACETAMINOPHEN 1 TABLET: 5; 325 TABLET ORAL at 14:36

## 2019-05-28 RX ADMIN — Medication 10 ML: at 12:59

## 2019-05-28 RX ADMIN — ONDANSETRON 4 MG: 2 INJECTION INTRAMUSCULAR; INTRAVENOUS at 09:47

## 2019-05-28 RX ADMIN — SODIUM CHLORIDE 1000 ML: 9 INJECTION, SOLUTION INTRAVENOUS at 08:56

## 2019-05-28 RX ADMIN — METHYLPREDNISOLONE SODIUM SUCCINATE 125 MG: 125 INJECTION, POWDER, FOR SOLUTION INTRAMUSCULAR; INTRAVENOUS at 08:56

## 2019-05-28 RX ADMIN — MORPHINE SULFATE 4 MG: 4 INJECTION INTRAVENOUS at 09:48

## 2019-05-28 RX ADMIN — GADOTERIDOL 15 ML: 279.3 INJECTION, SOLUTION INTRAVENOUS at 12:59

## 2019-05-28 ASSESSMENT — PAIN DESCRIPTION - PAIN TYPE
TYPE: ACUTE PAIN;CHRONIC PAIN
TYPE: CHRONIC PAIN;ACUTE PAIN
TYPE: ACUTE PAIN;CHRONIC PAIN

## 2019-05-28 ASSESSMENT — PAIN DESCRIPTION - LOCATION
LOCATION: BACK
LOCATION: BACK;LEG
LOCATION: BACK;LEG

## 2019-05-28 ASSESSMENT — ENCOUNTER SYMPTOMS
CONSTIPATION: 0
COUGH: 0
NAUSEA: 0
DIARRHEA: 0
SHORTNESS OF BREATH: 0
BACK PAIN: 1
VOMITING: 0
ABDOMINAL PAIN: 0
COLOR CHANGE: 0

## 2019-05-28 ASSESSMENT — PAIN SCALES - GENERAL
PAINLEVEL_OUTOF10: 9
PAINLEVEL_OUTOF10: 10
PAINLEVEL_OUTOF10: 10
PAINLEVEL_OUTOF10: 8

## 2019-05-28 ASSESSMENT — PAIN DESCRIPTION - ORIENTATION
ORIENTATION: RIGHT;LEFT
ORIENTATION: RIGHT;LEFT

## 2019-05-28 NOTE — ED NOTES
Fall risk precautions in place, call light in reach, bed in lowest position, 2/2 bed rails up, bed wheels locked, bed side table within reach, bed alarm on, will continue to monitor.                  Bismark Loco RN  05/28/19 [id number]

## 2019-05-28 NOTE — ED PROVIDER NOTES
I independently examined and evaluated Danette Gonzalez. In brief their history revealed patient with low back pain and bowel incontinence. He states that happened today. He recently had lower back surgery about 5 weeks ago. It looks like he had a microdiscectomy at that time. He states prior to this surgery he was having bowel incontinence and increasing pain. He states his legs feel weaker than normal but he is able to fully move them. He denies bladder incontinence, fever or chills. There is some swelling to the surgical site but states that is actually improved from what it was a few weeks ago. He states when it was swollen more he was seen by neurosurgery who put him on steroids. . Their exam revealed swelling to the lower lumbar spine. No overlying cellulitis or erythema. Able to move all extremities with normal sensation. . All diagnostic, treatment, and disposition decisions were made by myself in conjunction with the mid-level provider. Before disposition, questions were sought and answered with the patient. They have voiced understanding and agree with the plan. Patient has a history of alcoholism and told me that he drank 12 beers yesterday. He tells me he stopped smoking one week ago. Given his complaints as well as his history we did obtain labs and an MRI. MRI shows severe left neural foraminal narrowing at L4 to L5 secondary to enhancement within the left lateral rectus and left neural foramen consistent with granulation tissue. We did discuss with on call neurosurgery for this patient. They reviewed the MRI, believe this is non emergent and encouraged the patient to follow up. For all further details of the patient's emergency department visit, please see the mid-level practitioner's documentation.         Lucia Rosado MD  05/28/19 2068

## 2019-05-28 NOTE — ED NOTES
Pt resting in bed. No signs of distress. Regular respiratory pattern, normal respiratory depth, unlabored respirations. Bed in lowest position, 2/2 bedrails up, bedside table within reach, bed wheels locked. Denies any needs at this time. Call light in reach. Will continue to monitor.            Morena Tariq RN  05/28/19 3639

## 2019-05-28 NOTE — ED NOTES
\"I am going to [facility] this hospital.  They messed up my back. I am going to an another hospital to get the surgery redone. I feel worse now than before my surgery. I hurt everywhere-all the way up to my shoulders and both of my legs. I have already had steroids they are not going to help me with my pain. I don't need steroids, I only need pain medications. \"  Pt educated on solu medrol that was ordered. Pt verbalized understanding and agreed to have the IV solu medrol per order.   \"This is not going to help, I only need pain medications and a new surgery at another hospital\"     Bravo Victoria RN  05/28/19 9134

## 2019-05-28 NOTE — ED NOTES
Pts bed alarm - alarming. Pt got out of bed to move the privacy curtain open. Pt reeducated not to get out of bed without using the call light. Fall risk precautions in place, call light in reach, bed in lowest position, 2/2 bed rails up, bed wheels locked, bed side table within reach, bed alarm on, will continue to monitor.                Price Hernandez RN  05/28/19 6146

## 2019-05-28 NOTE — ED PROVIDER NOTES
[address] [facility]  eMERGENCY dEPARTMENT eNCOUnter        Pt Name: Zora Myers  MRN: [id number]  [place] 1965  Date of evaluation: 5/28/2019  Provider: CHRISTINA Prieto  PCP: Loretta Vargas DO  ED Attending: Efra Mccall MD    CHIEF COMPLAINT       Chief Complaint   Patient presents with    Back Pain     recent \"lower back surgery\" unsure what exactly they did, surgery about 5 weeks ago, post surgery fall and increased pain. dr put pt. on steroids but no relief in pain. pt. reporting incontinence of stool       HISTORY OF PRESENT ILLNESS   (Location/Symptom, Timing/Onset, Context/Setting, Quality, Duration, Modifying Factors, Severity)  Note limiting factors. Zora Myers is a 48 y.o. male who presents to the emergency department with complaints of worsening of lower back pain, states that he had recent lower back surgery, had a microdiscectomy at the end of last month by Dr. Amarilys Orona. States that 2 weeks ago he had a fall as result of leg gave out. He did follow-up with the office and was started on a steroid and back on his Percocets. Pain did not improve much at all and now he has developed loss of bowel control, states that he has had to clean the stool off of him 3 times. Patient does also report numbness to the posterior aspect of his sacrum. Denies any urinary frequency or urgency or hematuria. No fevers or chills. He states that he is currently out of pain medication. Rates his pain 8 out of 10 in severity without radiation. Nursing Notes were all reviewed and agreed with or any disagreements were addressed  in the HPI. REVIEW OF SYSTEMS    (2-9 systems for level 4, 10 or more for level 5)     Review of Systems   Constitutional: Negative for chills, fatigue and fever. Respiratory: Negative for cough and shortness of breath. Cardiovascular: Negative for chest pain.    Gastrointestinal: Negative for abdominal pain, constipation, diarrhea, nausea and vomiting. Loss of bowel control, loss of urine control   Genitourinary: Negative for dysuria, flank pain, frequency and hematuria. Musculoskeletal: Positive for back pain. Negative for neck pain. Skin: Negative for color change and rash. Neurological: Negative for weakness and numbness. All other systems reviewed and are negative. Positives and pertinent negatives as per HPI. All other systems were reviewed and are negative. PHYSICAL EXAM    (up to 7 for level 4, 8 or more for level 5)     ED Triage Vitals [05/28/19 0813]   BP Temp Temp Source Pulse Resp SpO2 Height Weight   (!) 129/91 97.7 °F (36.5 °C) Infrared 111 14 100 % 5' 5\" (1.651 m) 130 lb (59 kg)     Physical Exam   Constitutional: He is oriented to person, place, and time. He appears well-developed and well-nourished. He is active and cooperative. Non-toxic appearance. HENT:   Head: Normocephalic. Right Ear: External ear normal.   Left Ear: External ear normal.   Nose: Nose normal.   Eyes: Conjunctivae are normal. Right eye exhibits no discharge. Left eye exhibits no discharge. Neck: Normal range of motion. Neck supple. Cardiovascular: Normal rate, regular rhythm and normal heart sounds. Exam reveals no gallop and no friction rub. No murmur heard. Pulmonary/Chest: Effort normal and breath sounds normal. No respiratory distress. He has no wheezes. He has no rales. Musculoskeletal: Normal range of motion. He exhibits tenderness (midline lumbar tenderness, mild swellingto previous surgical scar, no erythema or warmth). Neurological: He is alert and oriented to person, place, and time. Skin: Skin is warm and dry. He is not diaphoretic. No pallor. Psychiatric: He has a normal mood and affect. His behavior is normal.   Nursing note and vitals reviewed.     PAST MEDICAL HISTORY     Past Medical History:   Diagnosis Date    Alcohol abuse     Arthritis     hands and back    Chronic bronchitis ([facility] [address]  COPD (chronic obstructive pulmonary disease) (HCC)     bronchitis    Hyperlipidemia     Hypertension     MDRO (multiple drug resistant organisms) resistance     MRSA in right arm [id number]    Radiculopathy of lumbosacral region     Spondylisthesis        SURGICAL HISTORY       Past Surgical History:   Procedure Laterality Date    FRACTURE SURGERY      right lower arm in 29's    LUMBAR SPINE SURGERY Left 4/23/2019    LEFT LUMBAR4-LUMBAR5  MICRODISCECTOMY performed by Jennifer Alva MD at [facility]       Previous Medications    METHOCARBAMOL (ROBAXIN) 750 MG TABLET    TAKE 1 TABLET BY MOUTH FOUR TIMES A DAY AS NEEDED       ALLERGIES     Patient has no known allergies. FAMILY HISTORY     History reviewed. No pertinent family history.      SOCIAL HISTORY       Social History     Socioeconomic History    Marital status:      Spouse name: None    Number of children: None    Years of education: None    Highest education level: None   Occupational History    None   Social Needs    Financial resource strain: None    Food insecurity:     Worry: None     Inability: None    Transportation needs:     Medical: None     Non-medical: None   Tobacco Use    Smoking status: Current Every Day Smoker     Packs/day: 1.00     Types: Cigarettes    Smokeless tobacco: Never Used   Substance and Sexual Activity    Alcohol use: Yes     Comment: 5th of whisky a day for 6 weeks    Drug use: No    Sexual activity: None   Lifestyle    Physical activity:     Days per week: None     Minutes per session: None    Stress: None   Relationships    Social connections:     Talks on phone: None     Gets together: None     Attends Buddhism service: None     Active member of club or organization: None     Attends meetings of clubs or organizations: None     Relationship status: None    Intimate partner violence:     Fear of current or ex partner: None     Emotionally abused: None     Physically abused: None     Forced sexual activity: None   Other Topics Concern    None   Social History Narrative    None       SCREENINGS           DIAGNOSTIC RESULTS   LABS:    Labs Reviewed   COMPREHENSIVE METABOLIC PANEL W/ REFLEX TO MG FOR LOW K - Abnormal; Notable for the following components:       Result Value    Sodium 135 (*)     Potassium reflex Magnesium 3.3 (*)     Chloride 94 (*)     Glucose 108 (*)     CREATININE 0.6 (*)     Total Bilirubin 1.2 (*)     ALT 99 (*)      (*)     All other components within normal limits    Narrative:     Performed at:  [facility] [address]  [place], [place]   Phone [phone]   SEDIMENTATION RATE - Abnormal; Notable for the following components:    Sed Rate 29 (*)     All other components within normal limits    Narrative:     Performed at:  [facility] [address] [place],  [place], [place]   Phone [phone]   C-REACTIVE PROTEIN - Abnormal; Notable for the following components:    CRP 10.7 (*)     All other components within normal limits    Narrative:     Performed at:  [address] [address]   Phone [phone]   MAGNESIUM - Abnormal; Notable for the following components:    Magnesium 1.60 (*)     All other components within normal limits    Narrative:     Performed at:  [facility] [address] [place] [place],  [place], [place]   Phone [phone]   CULTURE BLOOD #2   CULTURE BLOOD #1   CBC WITH AUTO DIFFERENTIAL    Narrative:     Performed at:  [facility] [address]  [place], [place]   Phone [phone]   LACTIC ACID, PLASMA    Narrative:     Performed at:  [facility] [address],  [place], [place]   Phone [phone]       All other labs were within normal range or not returned as of this dictation. EKG: All EKG's areinterpreted by the Emergency Department Physician who either signs or Co-signs this chart in the absence of a cardiologist.    RADIOLOGY:   Non-plain film images such as CT, Ultrasound and MRI are read by the radiologist. Ana Garzonerd radiographicimages are visualized and preliminarily interpreted by the  ED Provider with the below findings:    Interpretation per the Radiologist below, if available at the time of this note:    MRI [address]   Preliminary Result   1. Interval left hemilaminectomy at L4-5 and resection of a left paracentral   disc extrusion. 2. Severe left neural foraminal narrowing at L4-5 secondary to enhancement   within the left lateral recess and left neural foramen consist with   granulation tissue. There is a broad 4 mm left extraforaminal disc   protrusion, residual or recurrence. 3. Moderate bilateral neural foraminal narrowing at L5-S1 secondary to a disc   bulge and facet arthropathy. No results found.     PROCEDURES   Unless otherwisenoted below, none     Procedures    CRITICAL CARE TIME   N/A    CONSULTS:  IP CONSULT TO NEUROSURGERY    EMERGENCY DEPARTMENT COURSE andDIFFERENTIAL DIAGNOSIS/MDM:   Vitals:    Vitals:    05/28/19 1115 05/28/19 1130 05/28/19 1134 05/28/19 1330   BP:  (!) 154/109  (!) 156/98   Pulse:   94    Resp:   14    Temp:       TempSrc:       SpO2: 99% 99%  99%   Weight:       Height:           Patient wasgiven the following medications:  Medications   oxyCODONE-acetaminophen (PERCOCET) 5-325 MG per tablet 1 tablet (has no administration in time range)   methylPREDNISolone sodium (SOLU-MEDROL) injection 125 mg (125 mg Intravenous Given 5/28/19 0856)   0.9 % sodium chloride bolus (0 mLs Intravenous Stopped 5/28/19 0956)   morphine injection 4 mg (4 mg Intravenous Given 5/28/19 0948)   ondansetron (ZOFRAN) injection 4 mg (4 mg Intravenous Given 5/28/19 0947)   sodium chloride (PF) 0.9 % injection 10 mL (10 mLs Intravenous Given 5/28/19 1259)   gadoteridol (PROHANCE) injection 15 mL (15 mLs Intravenous Given 5/28/19 1259)     Milissa Hamman is a 48 y.o. male who presents to the emergency department with complaints of worsening of lower back pain, states that he had recent lower back surgery, had a microdiscectomy at the end of last month by Dr. Tamra Newell. States that 2 weeks ago he had a fall as result of leg gave out. He did follow-up with the office and was started on a steroid and back on his Percocets. Pain did not improve much at all and now he has developed loss of bowel control, states that he has had to clean the stool off of him 3 times. Patient does also report numbness to the posterior aspect of his sacrum. Denies any urinary frequency or urgency or hematuria. No fevers or chills. He states that he is currently out of pain medication. Rates his pain 8 out of 10 in severity without radiation. On exam patient is well-appearing, he does have some mild tenderness to midline lumbar spine. Normal peripheral pulses and distal sensation. He does report decreased sensation to the sacrum and inguinal region. Normal DTRs. Patient's white count is normal at 7.4. Potassium o lactic acid is normal.  MRI as read as above. f 3.3. Sed rate slightly elevated at 29 and CRP at 10.7. I did speak with Corinna Jin the nurse practitioner with Dr. Landon Modi. in regards to patient ER course and she does not believe anything is acute. He has had these symptoms previously prior to his surgery. Patient is requesting more pain medication will be given one Percocet prior to discharge but did already receive a dose of IV morphine as well as Solu-Medrol. Patient be given 9 tablets of Percocet for home he does have an appointment this week with Dr. Landon Modi. Patient will be discharged home, he is agreeable with this plan of care.     Concern with the loss of bowel or bladder control was for possible cauda equina or hematoma and these

## 2019-06-02 LAB
BLOOD CULTURE, ROUTINE: NORMAL
CULTURE, BLOOD 2: NORMAL

## 2019-06-27 ENCOUNTER — HOSPITAL ENCOUNTER (EMERGENCY)
Age: 54
Discharge: HOME | End: 2019-06-27
Payer: COMMERCIAL

## 2019-06-27 VITALS
SYSTOLIC BLOOD PRESSURE: 135 MMHG | HEIGHT: 65 IN | DIASTOLIC BLOOD PRESSURE: 89 MMHG | HEART RATE: 97 BPM | BODY MASS INDEX: 23.32 KG/M2 | RESPIRATION RATE: 18 BRPM | WEIGHT: 140 LBS | TEMPERATURE: 98.2 F | OXYGEN SATURATION: 98 %

## 2019-06-27 DIAGNOSIS — G89.29 ACUTE EXACERBATION OF CHRONIC LOW BACK PAIN: Primary | ICD-10-CM

## 2019-06-27 DIAGNOSIS — M54.50 ACUTE EXACERBATION OF CHRONIC LOW BACK PAIN: Primary | ICD-10-CM

## 2019-06-27 PROCEDURE — 6370000000 HC RX 637 (ALT 250 FOR IP): Performed by: PHYSICIAN ASSISTANT

## 2019-06-27 PROCEDURE — 6360000002 HC RX W HCPCS: Performed by: PHYSICIAN ASSISTANT

## 2019-06-27 PROCEDURE — 99282 EMERGENCY DEPT VISIT SF MDM: CPT

## 2019-06-27 PROCEDURE — 96372 THER/PROPH/DIAG INJ SC/IM: CPT

## 2019-06-27 RX ORDER — METHYLPREDNISOLONE ACETATE 80 MG/ML
120 INJECTION, SUSPENSION INTRA-ARTICULAR; INTRALESIONAL; INTRAMUSCULAR; SOFT TISSUE ONCE
Status: COMPLETED | OUTPATIENT
Start: 2019-06-27 | End: 2019-06-27

## 2019-06-27 RX ORDER — OXYCODONE HYDROCHLORIDE AND ACETAMINOPHEN 5; 325 MG/1; MG/1
1 TABLET ORAL ONCE
Status: COMPLETED | OUTPATIENT
Start: 2019-06-27 | End: 2019-06-27

## 2019-06-27 RX ADMIN — OXYCODONE HYDROCHLORIDE AND ACETAMINOPHEN 1 TABLET: 5; 325 TABLET ORAL at 18:32

## 2019-06-27 RX ADMIN — METHYLPREDNISOLONE ACETATE 120 MG: 80 INJECTION, SUSPENSION INTRA-ARTICULAR; INTRALESIONAL; INTRAMUSCULAR; SOFT TISSUE at 18:32

## 2019-06-27 ASSESSMENT — PAIN SCALES - GENERAL
PAINLEVEL_OUTOF10: 10
PAINLEVEL_OUTOF10: 10

## 2019-06-27 ASSESSMENT — ENCOUNTER SYMPTOMS
NAUSEA: 0
DIARRHEA: 0
CONSTIPATION: 0
BACK PAIN: 1
ABDOMINAL PAIN: 0
COUGH: 0
COLOR CHANGE: 0
SHORTNESS OF BREATH: 0
VOMITING: 0

## 2019-06-27 ASSESSMENT — PAIN DESCRIPTION - LOCATION: LOCATION: BACK

## 2019-06-27 ASSESSMENT — PAIN DESCRIPTION - PAIN TYPE: TYPE: CHRONIC PAIN

## 2019-06-27 ASSESSMENT — PAIN DESCRIPTION - ORIENTATION: ORIENTATION: LOWER

## 2019-06-27 NOTE — LETTER
[facility] Emergency Department  [address] [facility], [place], [address]             June 27, 2019    Patient: Aida Gutierrez   YOB: 1965   Date of Visit: 6/27/2019       To Whom It May Concern:    Aida Gutierrez was seen and treated in our emergency department on 6/27/2019. He may return to work on 7/1/19. Sincerely,         JAZMYN Waite PA-C

## 2019-06-28 NOTE — ED PROVIDER NOTES
**EVALUATED BY RANDY**    [address] [facility]  eMERGENCY dEPARTMENT eNCOUnter    Pt Name: Vick Canavan  MRN: [id number]  [place] 1965  Date of evaluation: 6/27/2019  Provider: CHRISTINA Mathews    Chief Complaint:    Chief Complaint   Patient presents with    Back Pain     pt states he has chronic low back pain, had surgery approx 2 months ago. Pt seen here before and had steroid injection        Nursing Notes, Past Medical Hx, Past Surgical Hx, Social Hx, Allergies, and Family Hx were all reviewed and agreedwith or any disagreements were addressed in the HPI.    HPI:  (Location, Duration, Timing, Severity, Quality, Assoc Sx, Context, Modifying factors)  This is a  48 y.o. male who presents to the emergency department with complaints of acute exacerbation of his chronic back pain. Patient was actually evaluated here about 1 month ago for similar complaints. He states that he had surgery performed by Dr. Maxime Jiménez actually saw the patient on his last evaluation but he was at that time having loss of bowel or bladder control. We have done an MRI at the time which showed no evidence of acute of his lumbar spine, had a discectomy. Pathology that would be worrisome. He did follow-up with his neurosurgeon. He states that last time he was here he was given a steroid shot which helped tremendously with his pain. He is currently out of any pain medication as well. He is requesting a steroid shot and maybe 1 dose of pain medicine. Patient denies any midline spinal tenderness, radicular symptoms, motor weakness, sensory deficit, loss of bowel or bladder control, paralysis, confusion or fevers. Patient without immunosuppression, indwelling hodges or venous catheter. Patient denies any abdominal pain, nausea vomiting diarrhea or constipation. Denies any urinary symptoms including urgency, frequency, dysuria or hematuria.   Denies any history of IV drug use or cancer or recent spinal injections in the past 1 month. No rash or skin discoloration to the area of discomfort. All other systems were reviewed and are negative. Past Medical/Surgical History:      Diagnosis Date    Alcohol abuse     Arthritis     hands and back    Chronic bronchitis (HCC)     COPD (chronic obstructive pulmonary disease) (HCC)     bronchitis    Hyperlipidemia     Hypertension     MDRO (multiple drug resistant organisms) resistance     MRSA in right arm [id number]    Radiculopathy of lumbosacral region     Spondylisthesis          Procedure Laterality Date    FRACTURE SURGERY      right lower arm in 30's    LUMBAR SPINE SURGERY Left 4/23/2019    LEFT LUMBAR4-LUMBAR5  MICRODISCECTOMY performed by Nuvia Suarez MD at [address] [place] [address]       Medications:  Discharge Medication List as of 6/27/2019  6:48 PM      CONTINUE these medications which have NOT CHANGED    Details   methocarbamol (ROBAXIN) 750 MG tablet TAKE 1 TABLET BY MOUTH FOUR TIMES A DAY AS NEEDED, R-0Historical Med               Review of Systems:  Review of Systems   Constitutional: Negative for chills, fatigue, fever and unexpected weight change. Respiratory: Negative for cough and shortness of breath. Cardiovascular: Negative for chest pain and leg swelling. Gastrointestinal: Negative for abdominal pain, constipation, diarrhea, nausea and vomiting. Genitourinary: Negative for decreased urine volume, difficulty urinating, dysuria, flank pain, frequency, hematuria and urgency. Denies urinary retention or loss of urinary control     Musculoskeletal: Positive for back pain and myalgias. Negative for gait problem and joint swelling. Skin: Negative for color change and rash. Neurological: Negative for weakness and numbness. Denies numbness or tingling to sacrum, rectum, buttocks or groin. Psychiatric/Behavioral: Negative for confusion. All other systems reviewed and are negative. Positives and Pertinent negatives as per HPI.   Except as noted above in the ROS, all other systems were reviewed/completed and are negative. Physical Exam:  Physical Exam   Constitutional: He is oriented to person, place, and time. He appears well-developed and well-nourished. He is active and cooperative. Non-toxic appearance. HENT:   Head: Normocephalic. Right Ear: External ear normal.   Left Ear: External ear normal.   Nose: Nose normal.   Eyes: Conjunctivae are normal. Right eye exhibits no discharge. Left eye exhibits no discharge. Neck: Normal range of motion. Neck supple. Cardiovascular: Normal rate, regular rhythm, normal heart sounds and intact distal pulses. Exam reveals no gallop and no friction rub. No murmur heard. Pulses:       Dorsalis pedis pulses are 2+ on the right side, and 2+ on the left side. Posterior tibial pulses are 2+ on the right side, and 2+ on the left side. Pulmonary/Chest: Effort normal and breath sounds normal. No respiratory distress. He has no wheezes. He has no rales. Abdominal: Soft. Bowel sounds are normal. He exhibits no distension and no mass. There is no tenderness. There is no rebound and no guarding. Musculoskeletal: Normal range of motion. He exhibits no deformity. Neurological: He is alert and oriented to person, place, and time. He has normal strength. No sensory deficit. Gait normal. GCS eye subscore is 4. GCS verbal subscore is 5. GCS motor subscore is 6. Reflex Scores:       Patellar reflexes are 1+ on the right side and 1+ on the left side. Achilles reflexes are 1+ on the right side and 1+ on the left side. 5+ strength equal bilaterally in lower extremities. Normal distal sensation to light touch. No saddle paresthesias. Skin: Skin is warm and dry. No rash noted. He is not diaphoretic. No erythema. No pallor. Psychiatric: He has a normal mood and affect. His behavior is normal.   Nursing note and vitals reviewed.       MEDICAL DECISION MAKING    Vitals:    Vitals:    06/27/19 1751   BP: 135/89   Pulse: 97   Resp: 18   Temp: 98.2 °F (36.8 °C)   TempSrc: Oral   SpO2: 98%   Weight: 140 lb (63.5 kg)   Height: 5' 5\" (1.651 m)       LABS: Labs Reviewed - No data to display     Remainder of labs reviewed and were negative at this time or not returned at the time of this note. RADIOLOGY:   Non-plain film images suchas CT, Ultrasound and MRI are read by the radiologist. ILayla PA have directly visualized the radiologic plain film image(s) with the below findings:  Interpretation per the Radiologist below, if available at the time of this note:    No orders to display     [facility] / ED COURSE:      PROCEDURES:   Procedures    Patient was given:  Medications   methylPREDNISolone acetate (DEPO-MEDROL) injection 120 mg (120 mg Intramuscular Given 6/27/19 1832)   oxyCODONE-acetaminophen (PERCOCET) 5-325 MG per tablet 1 tablet (1 tablet Oral Given 6/27/19 1832)     Patient presents to the emergency department with acute on chronic back pain. Patient afebrile nontoxic in appearance. He states that he is requesting a steroid shot and 1 dose of pain medication. Given Depo-Medrol and Percocet. This patient has back pain which appears to be musculoskeletal.  The symptoms do not appear to be cauda equina syndrome as there is no groin numbness, there is no bowel or bladder incontinence or extremity weakness. This also does not appear to be an epidural abscess as the patient has a normal neuro exam without fever and does not have migratory neurologic symptoms. This also does not appear to be a kidney infection or a kidney stone as there are no urinary symptoms. There also is no sign of AAA as there is no abdominal pain or pulsating masses. The patient tolerated their visit well. I have evaluated the patient with physician available for consultation as needed.  I have discussed the findings of today's workup with the patient and addressed the patient's questions and concerns. Important warning signs as well as new or worsening symptoms which wouldnecessitate immediate return to the ED were discussed. The plan is to discharge from the ED at this time, and the patient is in stable condition. The patient acknowledged understanding is agreeable with this plan. CLINICAL IMPRESSION:  1.  Acute exacerbation of chronic low back pain        DISPOSITION Decision To Discharge 06/27/2019 06:23:25 PM      PATIENT REFERRED TO:  Lucia Cooper MD  [address] [address]  [phone]    Schedule an appointment as soon as possible for a visit         DISCHARGE MEDICATIONS:  Discharge Medication List as of 6/27/2019  6:48 PM                 (Please note the MDM and HPI sections of this note were completed with avoice recognition program.  Efforts were made to edit the dictations but occasionally words are mis-transcribed.)    Electronically signed, CHRISTINA Loya,             CHRISTINA Lara  06/27/19 5566

## 2019-07-19 ENCOUNTER — HOSPITAL ENCOUNTER (OUTPATIENT)
Dept: GENERAL RADIOLOGY | Age: 54
Discharge: HOME | End: 2019-07-19
Payer: COMMERCIAL

## 2019-07-19 ENCOUNTER — HOSPITAL ENCOUNTER (OUTPATIENT)
Age: 54
Discharge: HOME | End: 2019-07-19
Payer: COMMERCIAL

## 2019-07-19 DIAGNOSIS — Z48.811 AFTERCARE FOLLOWING SURGERY OF THE NERVOUS SYSTEM: ICD-10-CM

## 2019-07-19 PROCEDURE — 72100 X-RAY EXAM L-S SPINE 2/3 VWS: CPT

## 2019-07-30 NOTE — PROGRESS NOTES
Pt in bed, assessment as charted. No needs at this time. Rates pain 10/10, visibly uncomfortable at times. The care plan and education has been reviewed and mutually agreed upon with the patient and family at this time. Call light in reach, will continue to monitor. daughter no contact HCP is a friend

## 2019-08-03 ENCOUNTER — HOSPITAL ENCOUNTER (EMERGENCY)
Age: 54
Discharge: HOME | End: 2019-08-03
Attending: EMERGENCY MEDICINE
Payer: COMMERCIAL

## 2019-08-03 VITALS
RESPIRATION RATE: 18 BRPM | OXYGEN SATURATION: 100 % | HEART RATE: 85 BPM | BODY MASS INDEX: 23.32 KG/M2 | HEIGHT: 65 IN | WEIGHT: 140 LBS | SYSTOLIC BLOOD PRESSURE: 134 MMHG | TEMPERATURE: 98.5 F | DIASTOLIC BLOOD PRESSURE: 89 MMHG

## 2019-08-03 DIAGNOSIS — E87.6 HYPOKALEMIA: ICD-10-CM

## 2019-08-03 DIAGNOSIS — M54.50 ACUTE EXACERBATION OF CHRONIC LOW BACK PAIN: Primary | ICD-10-CM

## 2019-08-03 DIAGNOSIS — R74.01 TRANSAMINITIS: ICD-10-CM

## 2019-08-03 DIAGNOSIS — G89.29 ACUTE EXACERBATION OF CHRONIC LOW BACK PAIN: Primary | ICD-10-CM

## 2019-08-03 LAB
A/G RATIO: 1 (ref 1.1–2.2)
ALBUMIN SERPL-MCNC: 4.2 G/DL (ref 3.4–5)
ALP BLD-CCNC: 151 U/L (ref 40–129)
ALT SERPL-CCNC: 166 U/L (ref 10–40)
ANION GAP SERPL CALCULATED.3IONS-SCNC: 14 MMOL/L (ref 3–16)
AST SERPL-CCNC: 236 U/L (ref 15–37)
BASOPHILS ABSOLUTE: 0.1 K/UL (ref 0–0.2)
BASOPHILS RELATIVE PERCENT: 1 %
BILIRUB SERPL-MCNC: 0.5 MG/DL (ref 0–1)
BUN BLDV-MCNC: 5 MG/DL (ref 7–20)
CALCIUM SERPL-MCNC: 9.3 MG/DL (ref 8.3–10.6)
CHLORIDE BLD-SCNC: 102 MMOL/L (ref 99–110)
CO2: 24 MMOL/L (ref 21–32)
CREAT SERPL-MCNC: 0.6 MG/DL (ref 0.9–1.3)
EOSINOPHILS ABSOLUTE: 0.1 K/UL (ref 0–0.6)
EOSINOPHILS RELATIVE PERCENT: 1.1 %
GFR AFRICAN AMERICAN: >60
GFR NON-AFRICAN AMERICAN: >60
GLOBULIN: 4.2 G/DL
GLUCOSE BLD-MCNC: 106 MG/DL (ref 70–99)
HCT VFR BLD CALC: 38.5 % (ref 40.5–52.5)
HEMOGLOBIN: 13.3 G/DL (ref 13.5–17.5)
LYMPHOCYTES ABSOLUTE: 2.4 K/UL (ref 1–5.1)
LYMPHOCYTES RELATIVE PERCENT: 42.2 %
MCH RBC QN AUTO: 30.9 PG (ref 26–34)
MCHC RBC AUTO-ENTMCNC: 34.4 G/DL (ref 31–36)
MCV RBC AUTO: 89.8 FL (ref 80–100)
MONOCYTES ABSOLUTE: 0.7 K/UL (ref 0–1.3)
MONOCYTES RELATIVE PERCENT: 12.1 %
NEUTROPHILS ABSOLUTE: 2.5 K/UL (ref 1.7–7.7)
NEUTROPHILS RELATIVE PERCENT: 43.6 %
PDW BLD-RTO: 16.2 % (ref 12.4–15.4)
PLATELET # BLD: 199 K/UL (ref 135–450)
PMV BLD AUTO: 7.1 FL (ref 5–10.5)
POTASSIUM SERPL-SCNC: 3.4 MMOL/L (ref 3.5–5.1)
RBC # BLD: 4.29 M/UL (ref 4.2–5.9)
SODIUM BLD-SCNC: 140 MMOL/L (ref 136–145)
TOTAL PROTEIN: 8.4 G/DL (ref 6.4–8.2)
WBC # BLD: 5.8 K/UL (ref 4–11)

## 2019-08-03 PROCEDURE — 6370000000 HC RX 637 (ALT 250 FOR IP): Performed by: EMERGENCY MEDICINE

## 2019-08-03 PROCEDURE — 6360000002 HC RX W HCPCS: Performed by: EMERGENCY MEDICINE

## 2019-08-03 PROCEDURE — 96372 THER/PROPH/DIAG INJ SC/IM: CPT

## 2019-08-03 PROCEDURE — 2500000003 HC RX 250 WO HCPCS: Performed by: EMERGENCY MEDICINE

## 2019-08-03 PROCEDURE — 85025 COMPLETE CBC W/AUTO DIFF WBC: CPT

## 2019-08-03 PROCEDURE — 96374 THER/PROPH/DIAG INJ IV PUSH: CPT

## 2019-08-03 PROCEDURE — 36415 COLL VENOUS BLD VENIPUNCTURE: CPT

## 2019-08-03 PROCEDURE — 80053 COMPREHEN METABOLIC PANEL: CPT

## 2019-08-03 PROCEDURE — 99283 EMERGENCY DEPT VISIT LOW MDM: CPT

## 2019-08-03 RX ORDER — LIDOCAINE 50 MG/G
1 PATCH TOPICAL DAILY
Qty: 30 PATCH | Refills: 0 | Status: SHIPPED | OUTPATIENT
Start: 2019-08-03 | End: 2019-09-02

## 2019-08-03 RX ORDER — ORPHENADRINE CITRATE 30 MG/ML
60 INJECTION INTRAMUSCULAR; INTRAVENOUS ONCE
Status: COMPLETED | OUTPATIENT
Start: 2019-08-03 | End: 2019-08-03

## 2019-08-03 RX ORDER — CYCLOBENZAPRINE HCL 10 MG
10 TABLET ORAL 3 TIMES DAILY PRN
Qty: 10 TABLET | Refills: 0 | Status: SHIPPED | OUTPATIENT
Start: 2019-08-03 | End: 2019-08-13

## 2019-08-03 RX ORDER — HYDROMORPHONE HYDROCHLORIDE 1 MG/ML
1 INJECTION, SOLUTION INTRAMUSCULAR; INTRAVENOUS; SUBCUTANEOUS ONCE
Status: COMPLETED | OUTPATIENT
Start: 2019-08-03 | End: 2019-08-03

## 2019-08-03 RX ORDER — POTASSIUM CHLORIDE 20 MEQ/1
20 TABLET, EXTENDED RELEASE ORAL ONCE
Status: COMPLETED | OUTPATIENT
Start: 2019-08-03 | End: 2019-08-03

## 2019-08-03 RX ORDER — NAPROXEN 500 MG/1
500 TABLET ORAL 2 TIMES DAILY PRN
Qty: 20 TABLET | Refills: 0 | Status: SHIPPED | OUTPATIENT
Start: 2019-08-03 | End: 2020-06-29

## 2019-08-03 RX ORDER — KETOROLAC TROMETHAMINE 30 MG/ML
30 INJECTION, SOLUTION INTRAMUSCULAR; INTRAVENOUS ONCE
Status: COMPLETED | OUTPATIENT
Start: 2019-08-03 | End: 2019-08-03

## 2019-08-03 RX ORDER — HYDROCODONE BITARTRATE AND ACETAMINOPHEN 5; 325 MG/1; MG/1
1 TABLET ORAL EVERY 6 HOURS PRN
Qty: 8 TABLET | Refills: 0 | Status: SHIPPED | OUTPATIENT
Start: 2019-08-03 | End: 2019-08-06

## 2019-08-03 RX ORDER — LIDOCAINE 4 G/G
1 PATCH TOPICAL DAILY
Status: DISCONTINUED | OUTPATIENT
Start: 2019-08-03 | End: 2019-08-03 | Stop reason: HOSPADM

## 2019-08-03 RX ADMIN — HYDROMORPHONE HYDROCHLORIDE 1 MG: 1 INJECTION, SOLUTION INTRAMUSCULAR; INTRAVENOUS; SUBCUTANEOUS at 08:46

## 2019-08-03 RX ADMIN — ORPHENADRINE CITRATE 60 MG: 30 INJECTION INTRAMUSCULAR; INTRAVENOUS at 08:48

## 2019-08-03 RX ADMIN — POTASSIUM CHLORIDE 20 MEQ: 1500 TABLET, EXTENDED RELEASE ORAL at 09:50

## 2019-08-03 RX ADMIN — KETOROLAC TROMETHAMINE 30 MG: 30 INJECTION, SOLUTION INTRAMUSCULAR at 08:46

## 2019-08-03 ASSESSMENT — PAIN DESCRIPTION - PROGRESSION: CLINICAL_PROGRESSION: GRADUALLY IMPROVING

## 2019-08-03 ASSESSMENT — PAIN SCALES - GENERAL
PAINLEVEL_OUTOF10: 8
PAINLEVEL_OUTOF10: 9
PAINLEVEL_OUTOF10: 10

## 2019-08-03 NOTE — ED PROVIDER NOTES
eMERGENCY dEPARTMENT eNCOUnter      PtName: Tony Stanley  MRN: [id number]  [place] 1965  Date of evaluation: 8/3/2019  Provider: Kim York DO     CHIEF COMPLAINT       Chief Complaint   Patient presents with    Back Pain     pt c/o \"severe back pain\" pt states he has surgery on back 4 months ago. pt states \"I want my kidneys and liver checked\". HISTORY OF PRESENT ILLNESS   (Location/Symptom, Timing/Onset,Context/Setting, Quality, Duration, Modifying Factors, Severity) Note limiting factors. HPI    Tony Stanley is a 48 y.o. male who presents to the emergency department with chief complaint of low back pain. Intermittent for the past couple of months but worse today. Sharp, right-sided without radiation. No numbness or weakness. 8/10. Worse with movement better with rest.  No fever. No history of IV drug abuse. He reports that he quit smoking and quit alcohol. No saddle anesthesia, bowel or bladder incontinence, numbness or weakness of the extremities. No new injuries. Nursing Notes were reviewed. REVIEW OF SYSTEMS    (2+ forlevel 4; 10+ for level 5)     Review of Systems  See hpi for further details. Complete 10 point review of all systems performed and is otherwise negative unless noted above.     PAST MEDICAL HISTORY     Past Medical History:   Diagnosis Date    Alcohol abuse     Arthritis     hands and back    Chronic bronchitis (HCC)     COPD (chronic obstructive pulmonary disease) (HCC)     bronchitis    Hyperlipidemia     Hypertension     MDRO (multiple drug resistant organisms) resistance     MRSA in right arm [id number]    Radiculopathy of lumbosacral region     Spondylisthesis        SURGICAL HISTORY       Past Surgical History:   Procedure Laterality Date    FRACTURE SURGERY      right lower arm in 29's    LUMBAR SPINE SURGERY Left 4/23/2019    LEFT LUMBAR4-LUMBAR5  MICRODISCECTOMY performed by Aura Walden MD at [address] [place] [address] distress. HENT:   Head: Normocephalic and atraumatic. Right Ear: External ear normal.   Left Ear: External ear normal.   Nose: Nose normal.   Mouth/Throat: Oropharynx is clear and moist. No oropharyngeal exudate. Eyes: Pupils are equal, round, and reactive to light. Conjunctivae and EOM are normal. Right eye exhibits no discharge. Left eye exhibits no discharge. No scleral icterus. Neck: Normal range of motion. Neck supple. No JVD present. No tracheal deviation present. Cardiovascular: Normal rate, regular rhythm, normal heart sounds and intact distal pulses. Exam reveals no gallop and no friction rub. No murmur heard. Pulmonary/Chest: Effort normal and breath sounds normal. No respiratory distress. He has no wheezes. He has no rales. He exhibits no tenderness. Abdominal: Soft. He exhibits no distension. There is no tenderness. Musculoskeletal: Normal range of motion. He exhibits no edema or deformity. Tender to palpation right lumbar paravertebral musculature with associated spasm. No midline tenderness of the L-spine. Normal strength and sensation bilateral lower extremities. Groin region was palpated and there is no saddle anesthesia. Neurological: He is alert and oriented to person, place, and time. No cranial nerve deficit. He exhibits normal muscle tone. Coordination normal.   Skin: Skin is warm and dry. No rash noted. He is not diaphoretic. No erythema. No pallor. Psychiatric: He has a normal mood and affect. His behavior is normal. Judgment and thought content normal.   Nursing note and vitals reviewed. DIAGNOSTIC RESULTS       RADIOLOGY (Per Emergency Physician): Interpretation per the Radiologist below, if available at the time of this note:  No results found.     LABS:  Labs Reviewed   CBC WITH AUTO DIFFERENTIAL - Abnormal; Notable for the following components:       Result Value    Hemoglobin 13.3 (*)     Hematocrit 38.5 (*)     RDW 16.2 (*)     All other components within normal limits    Narrative:     Performed at:  [facility]  [address], [address]   Phone [phone]   COMPREHENSIVE METABOLIC PANEL - Abnormal; Notable for the following components:    Potassium 3.4 (*)     Glucose 106 (*)     BUN 5 (*)     CREATININE 0.6 (*)     Total Protein 8.4 (*)     Albumin/Globulin Ratio 1.0 (*)     Alkaline Phosphatase 151 (*)      (*)      (*)     All other components within normal limits    Narrative:     Performed at:  [facility]  [address] [place],  [place], [address]   Phone [phone]       All other labs were within normal range or not returned as of this dictation. EMERGENCY DEPARTMENT COURSE and DIFFERENTIAL DIAGNOSIS/MDM:   Vitals:    Vitals:    08/03/19 0757 08/03/19 0822 08/03/19 0823 08/03/19 0949   BP: (!) 144/99 (!) 119/93  134/89   Pulse: 93   85   Resp: 20   18   Temp: 98.5 °F (36.9 °C)      TempSrc: Infrared      SpO2: 100%  97% 100%   Weight: 140 lb (63.5 kg)      Height: 5' 5\" (1.651 m)          Medications   lidocaine 4 % external patch 1 patch (1 patch Transdermal Patch Applied 8/3/19 0844)   HYDROmorphone HCl PF (DILAUDID) injection 1 mg (1 mg Intravenous Given 8/3/19 0846)   orphenadrine (NORFLEX) injection 60 mg (60 mg Intramuscular Given 8/3/19 0848)   ketorolac (TORADOL) injection 30 mg (30 mg Intramuscular Given 8/3/19 0846)   potassium chloride (KLOR-CON M) extended release tablet 20 mEq (20 mEq Oral Given 8/3/19 0950)       MDM. No red flags to suggest cauda equina syndrome. Patient has a history of kidney failure and would like his kidneys and liver checked. Toradol, Lidoderm patch, Norflex, Dilaudid ordered. Patient does have a ride home. Kidney function is normal.  Patient notified of chronic elevation of liver enzymes to follow-up with primary care.   Patient felt markedly improved and he will be discharged home with the below medications and strict return precautions. Patient instructed to follow up with their primary care doctor in one-two days and return to the emergency department if worsening of the condition or any other concerns. Please see discharge instructions for further delineation regarding the specific discharge instructions explained and given to the patient. CONSULTS:  None    PROCEDURES:  Unless otherwise noted below, none     Procedures    FINAL IMPRESSION      1. Acute exacerbation of chronic low back pain    2. Hypokalemia    3. Transaminitis          DISPOSITION/PLAN   DISPOSITION Decision To Discharge 08/03/2019 10:21:56 AM      PATIENT REFERRED TO:  Oscar Brock DO  [address] [facility] [address]  [phone]    Schedule an appointment as soon as possible for a visit       [facility] Emergency Department  [address] [place] [address]  [phone]    If symptoms worsen      DISCHARGE MEDICATIONS:  Discharge Medication List as of 8/3/2019  9:44 AM      START taking these medications    Details   HYDROcodone-acetaminophen (NORCO) 5-325 MG per tablet Take 1 tablet by mouth every 6 hours as needed for Pain for up to 3 days. , Disp-8 tablet, R-0Print      cyclobenzaprine (FLEXERIL) 10 MG tablet Take 1 tablet by mouth 3 times daily as needed for Muscle spasms, Disp-10 tablet, R-0Print      naproxen (NAPROSYN) 500 MG tablet Take 1 tablet by mouth 2 times daily as needed for Pain, Disp-20 tablet, R-0Print      lidocaine (LIDODERM) 5 % Place 1 patch onto the skin daily 12 hours on, 12 hours off., Disp-30 patch, R-0Print                (Please note:  Portions of this note were completed with a voice recognition program. Efforts were made to edit the dictations but occasionally words and phrases are mis-transcribed.)    Form v2016. J.5-cn    Gian Sweeney DO (electronically signed)  Emergency Medicine Provider              Roosevelt Tomas DO  08/03/19 1049

## 2019-08-24 ENCOUNTER — HOSPITAL ENCOUNTER (EMERGENCY)
Age: 54
Discharge: HOME | End: 2019-08-25
Attending: EMERGENCY MEDICINE
Payer: COMMERCIAL

## 2019-08-24 DIAGNOSIS — S09.90XA CLOSED HEAD INJURY, INITIAL ENCOUNTER: Primary | ICD-10-CM

## 2019-08-24 DIAGNOSIS — F10.920 ACUTE ALCOHOLIC INTOXICATION WITHOUT COMPLICATION (HCC): ICD-10-CM

## 2019-08-24 DIAGNOSIS — S32.049A CLOSED FRACTURE OF FOURTH LUMBAR VERTEBRA, UNSPECIFIED FRACTURE MORPHOLOGY, INITIAL ENCOUNTER (HCC): ICD-10-CM

## 2019-08-24 DIAGNOSIS — S01.81XA FACIAL LACERATION, INITIAL ENCOUNTER: ICD-10-CM

## 2019-08-24 PROCEDURE — 99284 EMERGENCY DEPT VISIT MOD MDM: CPT

## 2019-08-25 ENCOUNTER — APPOINTMENT (OUTPATIENT)
Dept: CT IMAGING | Age: 54
End: 2019-08-25
Payer: COMMERCIAL

## 2019-08-25 VITALS
RESPIRATION RATE: 18 BRPM | OXYGEN SATURATION: 97 % | HEIGHT: 65 IN | WEIGHT: 140 LBS | HEART RATE: 103 BPM | DIASTOLIC BLOOD PRESSURE: 80 MMHG | TEMPERATURE: 98 F | SYSTOLIC BLOOD PRESSURE: 129 MMHG | BODY MASS INDEX: 23.32 KG/M2

## 2019-08-25 LAB
EKG ATRIAL RATE: 77 BPM
EKG DIAGNOSIS: NORMAL
EKG P AXIS: 66 DEGREES
EKG P-R INTERVAL: 152 MS
EKG Q-T INTERVAL: 480 MS
EKG QRS DURATION: 170 MS
EKG QTC CALCULATION (BAZETT): 543 MS
EKG R AXIS: 244 DEGREES
EKG T AXIS: 36 DEGREES
EKG VENTRICULAR RATE: 77 BPM

## 2019-08-25 PROCEDURE — 6360000002 HC RX W HCPCS

## 2019-08-25 PROCEDURE — 93005 ELECTROCARDIOGRAM TRACING: CPT | Performed by: INTERNAL MEDICINE

## 2019-08-25 PROCEDURE — 72131 CT LUMBAR SPINE W/O DYE: CPT

## 2019-08-25 PROCEDURE — 93010 ELECTROCARDIOGRAM REPORT: CPT | Performed by: INTERNAL MEDICINE

## 2019-08-25 PROCEDURE — 72125 CT NECK SPINE W/O DYE: CPT

## 2019-08-25 PROCEDURE — 70450 CT HEAD/BRAIN W/O DYE: CPT

## 2019-08-25 PROCEDURE — 96374 THER/PROPH/DIAG INJ IV PUSH: CPT

## 2019-08-25 PROCEDURE — 70486 CT MAXILLOFACIAL W/O DYE: CPT

## 2019-08-25 PROCEDURE — 96375 TX/PRO/DX INJ NEW DRUG ADDON: CPT

## 2019-08-25 RX ORDER — DIPHENHYDRAMINE HYDROCHLORIDE 50 MG/ML
50 INJECTION INTRAMUSCULAR; INTRAVENOUS ONCE
Status: COMPLETED | OUTPATIENT
Start: 2019-08-25 | End: 2019-08-25

## 2019-08-25 RX ORDER — DIPHENHYDRAMINE HYDROCHLORIDE 50 MG/ML
INJECTION INTRAMUSCULAR; INTRAVENOUS
Status: COMPLETED
Start: 2019-08-25 | End: 2019-08-25

## 2019-08-25 RX ORDER — HALOPERIDOL 5 MG/ML
5 INJECTION INTRAMUSCULAR ONCE
Status: COMPLETED | OUTPATIENT
Start: 2019-08-25 | End: 2019-08-25

## 2019-08-25 RX ORDER — HALOPERIDOL 5 MG/ML
INJECTION INTRAMUSCULAR
Status: COMPLETED
Start: 2019-08-25 | End: 2019-08-25

## 2019-08-25 RX ADMIN — HALOPERIDOL 5 MG: 5 INJECTION INTRAMUSCULAR at 00:16

## 2019-08-25 RX ADMIN — HALOPERIDOL LACTATE 5 MG: 5 INJECTION INTRAMUSCULAR at 00:16

## 2019-08-25 RX ADMIN — DIPHENHYDRAMINE HYDROCHLORIDE 50 MG: 50 INJECTION INTRAMUSCULAR; INTRAVENOUS at 00:15

## 2019-08-25 RX ADMIN — DIPHENHYDRAMINE HYDROCHLORIDE 50 MG: 50 INJECTION, SOLUTION INTRAMUSCULAR; INTRAVENOUS at 00:15

## 2019-08-25 NOTE — ED NOTES
Bed: 06  Expected date:   Expected time:   Means of arrival: [address] [facility]  Comments:     Hyun Hale RN  08/24/19 9718

## 2019-08-25 NOTE — ED PROVIDER NOTES
[facility]        Pt Name: Ruba Carmichael  MRN: [id number]  [place] 1965  Date of evaluation: 8/24/2019  Provider: Oksana Preston PA-C  PCP: Luciana Mccarthy,     This patient was seen and evaluated by the attending physician Bailee Meza, [address] [facility]       Chief Complaint   Patient presents with    Fall     pt in via [facility] ems reporting fall with mulitple abrasions       HISTORY OF PRESENT ILLNESS   (Location/Symptom, Timing/Onset, Context/Setting, Quality, Duration, Modifying Factors, Severity)  Note limiting factors. Ruba Carmichael is a 48 y.o. male that presents to the emergency department after squad was called after a fall from standing per squad. Patient is intoxicated and states that he was drinking today. Has history of alcohol abuse. Declines any pain. Unable to really obtain any history initially as the patient is intoxicated. He is talking and alert but unable to give any history. Unsure of his last tetanus shots. Has laceration above the right eyebrow. Continues to try to get out of bed and for his own safety and safety of staff initially was given Haldol and Benadryl. Nursing Notes were all reviewed and agreed with or any disagreements were addressed  in the HPI. REVIEW OF SYSTEMS    (2-9 systems for level 4, 10 or more for level 5)     Review of Systems   Unable to perform ROS: Other   Intoxicated    Positives and Pertinent negatives as per HPI. Except as noted abovein the ROS, all other systems were reviewed and negative.        PAST MEDICAL HISTORY     Past Medical History:   Diagnosis Date    Alcohol abuse     Arthritis     hands and back    Chronic bronchitis (HCC)     COPD (chronic obstructive pulmonary disease) ([facility] [address]     bronchitis    Hyperlipidemia     Hypertension     MDRO (multiple drug resistant organisms) resistance     MRSA in right arm [id number]   

## 2019-12-21 ENCOUNTER — HOSPITAL ENCOUNTER (INPATIENT)
Age: 54
LOS: 2 days | Discharge: HOME | DRG: 251 | End: 2019-12-23
Attending: EMERGENCY MEDICINE | Admitting: INTERNAL MEDICINE
Payer: COMMERCIAL

## 2019-12-21 ENCOUNTER — APPOINTMENT (OUTPATIENT)
Dept: CT IMAGING | Age: 54
DRG: 251 | End: 2019-12-21
Payer: COMMERCIAL

## 2019-12-21 ENCOUNTER — APPOINTMENT (OUTPATIENT)
Dept: ULTRASOUND IMAGING | Age: 54
DRG: 251 | End: 2019-12-21
Payer: COMMERCIAL

## 2019-12-21 ENCOUNTER — APPOINTMENT (OUTPATIENT)
Dept: NUCLEAR MEDICINE | Age: 54
DRG: 251 | End: 2019-12-21
Payer: COMMERCIAL

## 2019-12-21 DIAGNOSIS — F10.129 ALCOHOL ABUSE WITH INTOXICATION (HCC): ICD-10-CM

## 2019-12-21 DIAGNOSIS — T14.91XA SUICIDAL BEHAVIOR WITH ATTEMPTED SELF-INJURY (HCC): ICD-10-CM

## 2019-12-21 DIAGNOSIS — R10.11 RIGHT UPPER QUADRANT ABDOMINAL PAIN: Primary | ICD-10-CM

## 2019-12-21 DIAGNOSIS — E83.42 HYPOMAGNESEMIA: ICD-10-CM

## 2019-12-21 DIAGNOSIS — E87.6 HYPOKALEMIA: ICD-10-CM

## 2019-12-21 DIAGNOSIS — K81.0 ACUTE CHOLECYSTITIS: ICD-10-CM

## 2019-12-21 DIAGNOSIS — R79.89 ELEVATED LFTS: ICD-10-CM

## 2019-12-21 LAB
A/G RATIO: 0.8 (ref 1.1–2.2)
ACETAMINOPHEN LEVEL: <5 UG/ML (ref 10–30)
ALBUMIN SERPL-MCNC: 3.7 G/DL (ref 3.4–5)
ALP BLD-CCNC: 137 U/L (ref 40–129)
ALT SERPL-CCNC: 80 U/L (ref 10–40)
AMPHETAMINE SCREEN, URINE: ABNORMAL
ANION GAP SERPL CALCULATED.3IONS-SCNC: 18 MMOL/L (ref 3–16)
AST SERPL-CCNC: 203 U/L (ref 15–37)
BARBITURATE SCREEN URINE: ABNORMAL
BASOPHILS ABSOLUTE: 0.1 K/UL (ref 0–0.2)
BASOPHILS RELATIVE PERCENT: 1.1 %
BENZODIAZEPINE SCREEN, URINE: ABNORMAL
BILIRUB SERPL-MCNC: 1 MG/DL (ref 0–1)
BILIRUBIN URINE: ABNORMAL
BLOOD, URINE: NEGATIVE
BUN BLDV-MCNC: 3 MG/DL (ref 7–20)
CALCIUM SERPL-MCNC: 9 MG/DL (ref 8.3–10.6)
CANNABINOID SCREEN URINE: POSITIVE
CHLORIDE BLD-SCNC: 101 MMOL/L (ref 99–110)
CLARITY: ABNORMAL
CO2: 20 MMOL/L (ref 21–32)
COCAINE METABOLITE SCREEN URINE: ABNORMAL
COLOR: YELLOW
COMMENT UA: ABNORMAL
CREAT SERPL-MCNC: 0.7 MG/DL (ref 0.9–1.3)
EKG ATRIAL RATE: 89 BPM
EKG DIAGNOSIS: NORMAL
EKG P AXIS: 65 DEGREES
EKG P-R INTERVAL: 142 MS
EKG Q-T INTERVAL: 434 MS
EKG QRS DURATION: 80 MS
EKG QTC CALCULATION (BAZETT): 528 MS
EKG R AXIS: -53 DEGREES
EKG T AXIS: 24 DEGREES
EKG VENTRICULAR RATE: 89 BPM
EOSINOPHILS ABSOLUTE: 0.1 K/UL (ref 0–0.6)
EOSINOPHILS RELATIVE PERCENT: 1.7 %
EPITHELIAL CELLS, UA: 3 /HPF (ref 0–5)
ETHANOL: 276 MG/DL (ref 0–0.08)
GFR AFRICAN AMERICAN: >60
GFR NON-AFRICAN AMERICAN: >60
GLOBULIN: 4.4 G/DL
GLUCOSE BLD-MCNC: 146 MG/DL (ref 70–99)
GLUCOSE URINE: NEGATIVE MG/DL
HCT VFR BLD CALC: 38.6 % (ref 40.5–52.5)
HEMOGLOBIN: 13.2 G/DL (ref 13.5–17.5)
HYALINE CASTS: 9 /LPF (ref 0–8)
INR BLD: 1.08 (ref 0.86–1.14)
KETONES, URINE: NEGATIVE MG/DL
LEUKOCYTE ESTERASE, URINE: NEGATIVE
LIPASE: 65 U/L (ref 13–60)
LYMPHOCYTES ABSOLUTE: 2.3 K/UL (ref 1–5.1)
LYMPHOCYTES RELATIVE PERCENT: 42.5 %
Lab: ABNORMAL
MAGNESIUM: 1.6 MG/DL (ref 1.8–2.4)
MCH RBC QN AUTO: 31.8 PG (ref 26–34)
MCHC RBC AUTO-ENTMCNC: 34.3 G/DL (ref 31–36)
MCV RBC AUTO: 92.9 FL (ref 80–100)
METHADONE SCREEN, URINE: ABNORMAL
MICROSCOPIC EXAMINATION: YES
MONOCYTES ABSOLUTE: 0.3 K/UL (ref 0–1.3)
MONOCYTES RELATIVE PERCENT: 6.4 %
NEUTROPHILS ABSOLUTE: 2.6 K/UL (ref 1.7–7.7)
NEUTROPHILS RELATIVE PERCENT: 48.3 %
NITRITE, URINE: NEGATIVE
OPIATE SCREEN URINE: ABNORMAL
OXYCODONE URINE: ABNORMAL
PDW BLD-RTO: 14.1 % (ref 12.4–15.4)
PH UA: 6
PH UA: 6 (ref 5–8)
PHENCYCLIDINE SCREEN URINE: ABNORMAL
PLATELET # BLD: 97 K/UL (ref 135–450)
PLATELET SLIDE REVIEW: ABNORMAL
PMV BLD AUTO: 8.5 FL (ref 5–10.5)
POTASSIUM SERPL-SCNC: 2.8 MMOL/L (ref 3.5–5.1)
PROPOXYPHENE SCREEN: ABNORMAL
PROTEIN UA: 30 MG/DL
PROTHROMBIN TIME: 12.5 SEC (ref 10–13.2)
RBC # BLD: 4.16 M/UL (ref 4.2–5.9)
RBC UA: 3 /HPF (ref 0–4)
SALICYLATE, SERUM: <0.3 MG/DL (ref 15–30)
SLIDE REVIEW: ABNORMAL
SODIUM BLD-SCNC: 139 MMOL/L (ref 136–145)
SPECIFIC GRAVITY UA: 1.01 (ref 1–1.03)
TOTAL PROTEIN: 8.1 G/DL (ref 6.4–8.2)
URINE REFLEX TO CULTURE: YES
URINE TYPE: ABNORMAL
UROBILINOGEN, URINE: 2 E.U./DL
WBC # BLD: 5.3 K/UL (ref 4–11)
WBC UA: 10 /HPF (ref 0–5)

## 2019-12-21 PROCEDURE — 74177 CT ABD & PELVIS W/CONTRAST: CPT

## 2019-12-21 PROCEDURE — G0480 DRUG TEST DEF 1-7 CLASSES: HCPCS

## 2019-12-21 PROCEDURE — 6360000004 HC RX CONTRAST MEDICATION: Performed by: EMERGENCY MEDICINE

## 2019-12-21 PROCEDURE — 70491 CT SOFT TISSUE NECK W/DYE: CPT

## 2019-12-21 PROCEDURE — 83735 ASSAY OF MAGNESIUM: CPT

## 2019-12-21 PROCEDURE — 99284 EMERGENCY DEPT VISIT MOD MDM: CPT

## 2019-12-21 PROCEDURE — A9537 TC99M MEBROFENIN: HCPCS | Performed by: SURGERY

## 2019-12-21 PROCEDURE — 83690 ASSAY OF LIPASE: CPT

## 2019-12-21 PROCEDURE — 6360000002 HC RX W HCPCS: Performed by: HOSPITALIST

## 2019-12-21 PROCEDURE — 76705 ECHO EXAM OF ABDOMEN: CPT

## 2019-12-21 PROCEDURE — 80053 COMPREHEN METABOLIC PANEL: CPT

## 2019-12-21 PROCEDURE — 80307 DRUG TEST PRSMV CHEM ANLYZR: CPT

## 2019-12-21 PROCEDURE — C9113 INJ PANTOPRAZOLE SODIUM, VIA: HCPCS | Performed by: HOSPITALIST

## 2019-12-21 PROCEDURE — 2500000003 HC RX 250 WO HCPCS: Performed by: SURGERY

## 2019-12-21 PROCEDURE — 2580000003 HC RX 258: Performed by: HOSPITALIST

## 2019-12-21 PROCEDURE — 2580000003 HC RX 258: Performed by: EMERGENCY MEDICINE

## 2019-12-21 PROCEDURE — 81001 URINALYSIS AUTO W/SCOPE: CPT

## 2019-12-21 PROCEDURE — 6360000002 HC RX W HCPCS

## 2019-12-21 PROCEDURE — 2580000003 HC RX 258: Performed by: INTERNAL MEDICINE

## 2019-12-21 PROCEDURE — 2500000003 HC RX 250 WO HCPCS: Performed by: EMERGENCY MEDICINE

## 2019-12-21 PROCEDURE — 6360000002 HC RX W HCPCS: Performed by: INTERNAL MEDICINE

## 2019-12-21 PROCEDURE — 3430000000 HC RX DIAGNOSTIC RADIOPHARMACEUTICAL: Performed by: SURGERY

## 2019-12-21 PROCEDURE — 2500000003 HC RX 250 WO HCPCS: Performed by: INTERNAL MEDICINE

## 2019-12-21 PROCEDURE — 1200000000 HC SEMI PRIVATE

## 2019-12-21 PROCEDURE — 93010 ELECTROCARDIOGRAM REPORT: CPT | Performed by: INTERNAL MEDICINE

## 2019-12-21 PROCEDURE — 93005 ELECTROCARDIOGRAM TRACING: CPT | Performed by: EMERGENCY MEDICINE

## 2019-12-21 PROCEDURE — 6360000002 HC RX W HCPCS: Performed by: EMERGENCY MEDICINE

## 2019-12-21 PROCEDURE — 87086 URINE CULTURE/COLONY COUNT: CPT

## 2019-12-21 PROCEDURE — 85610 PROTHROMBIN TIME: CPT

## 2019-12-21 PROCEDURE — 85025 COMPLETE CBC W/AUTO DIFF WBC: CPT

## 2019-12-21 PROCEDURE — 99222 1ST HOSP IP/OBS MODERATE 55: CPT | Performed by: SURGERY

## 2019-12-21 PROCEDURE — 78226 HEPATOBILIARY SYSTEM IMAGING: CPT

## 2019-12-21 RX ORDER — SODIUM CHLORIDE 0.9 % (FLUSH) 0.9 %
10 SYRINGE (ML) INJECTION EVERY 12 HOURS SCHEDULED
Status: DISCONTINUED | OUTPATIENT
Start: 2019-12-21 | End: 2019-12-23 | Stop reason: HOSPADM

## 2019-12-21 RX ORDER — SODIUM CHLORIDE 0.9 % (FLUSH) 0.9 %
10 SYRINGE (ML) INJECTION PRN
Status: DISCONTINUED | OUTPATIENT
Start: 2019-12-21 | End: 2019-12-23 | Stop reason: HOSPADM

## 2019-12-21 RX ORDER — POTASSIUM CHLORIDE 7.45 MG/ML
10 INJECTION INTRAVENOUS PRN
Status: DISCONTINUED | OUTPATIENT
Start: 2019-12-21 | End: 2019-12-23 | Stop reason: HOSPADM

## 2019-12-21 RX ORDER — PANTOPRAZOLE SODIUM 40 MG/10ML
40 INJECTION, POWDER, LYOPHILIZED, FOR SOLUTION INTRAVENOUS DAILY
Status: DISCONTINUED | OUTPATIENT
Start: 2019-12-21 | End: 2019-12-23 | Stop reason: HOSPADM

## 2019-12-21 RX ORDER — MAGNESIUM SULFATE 1 G/100ML
1 INJECTION INTRAVENOUS ONCE
Status: COMPLETED | OUTPATIENT
Start: 2019-12-21 | End: 2019-12-21

## 2019-12-21 RX ORDER — LORAZEPAM 2 MG/ML
1 INJECTION INTRAMUSCULAR
Status: DISCONTINUED | OUTPATIENT
Start: 2019-12-21 | End: 2019-12-23 | Stop reason: HOSPADM

## 2019-12-21 RX ORDER — LORAZEPAM 2 MG/ML
4 INJECTION INTRAMUSCULAR
Status: DISCONTINUED | OUTPATIENT
Start: 2019-12-21 | End: 2019-12-23 | Stop reason: HOSPADM

## 2019-12-21 RX ORDER — ACETAMINOPHEN 650 MG/1
650 SUPPOSITORY RECTAL EVERY 8 HOURS PRN
Status: DISCONTINUED | OUTPATIENT
Start: 2019-12-21 | End: 2019-12-23 | Stop reason: HOSPADM

## 2019-12-21 RX ORDER — ONDANSETRON 2 MG/ML
4 INJECTION INTRAMUSCULAR; INTRAVENOUS
Status: COMPLETED | OUTPATIENT
Start: 2019-12-21 | End: 2019-12-21

## 2019-12-21 RX ORDER — LORAZEPAM 2 MG/ML
2 INJECTION INTRAMUSCULAR
Status: DISCONTINUED | OUTPATIENT
Start: 2019-12-21 | End: 2019-12-23 | Stop reason: HOSPADM

## 2019-12-21 RX ORDER — THIAMINE HYDROCHLORIDE 100 MG/ML
200 INJECTION, SOLUTION INTRAMUSCULAR; INTRAVENOUS ONCE
Status: DISCONTINUED | OUTPATIENT
Start: 2019-12-21 | End: 2019-12-21 | Stop reason: CLARIF

## 2019-12-21 RX ORDER — LORAZEPAM 1 MG/1
3 TABLET ORAL
Status: DISCONTINUED | OUTPATIENT
Start: 2019-12-21 | End: 2019-12-23 | Stop reason: HOSPADM

## 2019-12-21 RX ORDER — SODIUM CHLORIDE 9 MG/ML
INJECTION, SOLUTION INTRAVENOUS CONTINUOUS
Status: DISCONTINUED | OUTPATIENT
Start: 2019-12-21 | End: 2019-12-23 | Stop reason: HOSPADM

## 2019-12-21 RX ORDER — POTASSIUM CHLORIDE 7.45 MG/ML
10 INJECTION INTRAVENOUS ONCE
Status: DISCONTINUED | OUTPATIENT
Start: 2019-12-21 | End: 2019-12-23 | Stop reason: HOSPADM

## 2019-12-21 RX ORDER — LORAZEPAM 1 MG/1
2 TABLET ORAL
Status: DISCONTINUED | OUTPATIENT
Start: 2019-12-21 | End: 2019-12-23 | Stop reason: HOSPADM

## 2019-12-21 RX ORDER — ONDANSETRON 2 MG/ML
4 INJECTION INTRAMUSCULAR; INTRAVENOUS EVERY 6 HOURS PRN
Status: DISCONTINUED | OUTPATIENT
Start: 2019-12-21 | End: 2019-12-23 | Stop reason: HOSPADM

## 2019-12-21 RX ORDER — MORPHINE SULFATE 4 MG/ML
4 INJECTION, SOLUTION INTRAMUSCULAR; INTRAVENOUS ONCE
Status: COMPLETED | OUTPATIENT
Start: 2019-12-21 | End: 2019-12-21

## 2019-12-21 RX ORDER — LORAZEPAM 2 MG/ML
3 INJECTION INTRAMUSCULAR
Status: DISCONTINUED | OUTPATIENT
Start: 2019-12-21 | End: 2019-12-23 | Stop reason: HOSPADM

## 2019-12-21 RX ORDER — HYDROMORPHONE HYDROCHLORIDE 1 MG/ML
1 INJECTION, SOLUTION INTRAMUSCULAR; INTRAVENOUS; SUBCUTANEOUS ONCE
Status: COMPLETED | OUTPATIENT
Start: 2019-12-21 | End: 2019-12-21

## 2019-12-21 RX ORDER — LORAZEPAM 1 MG/1
1 TABLET ORAL
Status: DISCONTINUED | OUTPATIENT
Start: 2019-12-21 | End: 2019-12-23 | Stop reason: HOSPADM

## 2019-12-21 RX ORDER — PROCHLORPERAZINE EDISYLATE 5 MG/ML
10 INJECTION INTRAMUSCULAR; INTRAVENOUS EVERY 6 HOURS PRN
Status: DISCONTINUED | OUTPATIENT
Start: 2019-12-21 | End: 2019-12-23 | Stop reason: HOSPADM

## 2019-12-21 RX ORDER — LORAZEPAM 1 MG/1
4 TABLET ORAL
Status: DISCONTINUED | OUTPATIENT
Start: 2019-12-21 | End: 2019-12-23 | Stop reason: HOSPADM

## 2019-12-21 RX ORDER — MAGNESIUM SULFATE 1 G/100ML
1 INJECTION INTRAVENOUS PRN
Status: DISCONTINUED | OUTPATIENT
Start: 2019-12-21 | End: 2019-12-22 | Stop reason: SDUPTHER

## 2019-12-21 RX ORDER — HYDROMORPHONE HYDROCHLORIDE 1 MG/ML
0.5 INJECTION, SOLUTION INTRAMUSCULAR; INTRAVENOUS; SUBCUTANEOUS EVERY 4 HOURS PRN
Status: DISCONTINUED | OUTPATIENT
Start: 2019-12-21 | End: 2019-12-23 | Stop reason: HOSPADM

## 2019-12-21 RX ORDER — ONDANSETRON 2 MG/ML
INJECTION INTRAMUSCULAR; INTRAVENOUS
Status: COMPLETED
Start: 2019-12-21 | End: 2019-12-21

## 2019-12-21 RX ADMIN — HYDROMORPHONE HYDROCHLORIDE 0.5 MG: 1 INJECTION, SOLUTION INTRAMUSCULAR; INTRAVENOUS; SUBCUTANEOUS at 10:05

## 2019-12-21 RX ADMIN — TAZOBACTAM SODIUM AND PIPERACILLIN SODIUM 3.38 G: 375; 3 INJECTION, SOLUTION INTRAVENOUS at 20:31

## 2019-12-21 RX ADMIN — MORPHINE SULFATE 4 MG: 4 INJECTION INTRAVENOUS at 04:23

## 2019-12-21 RX ADMIN — MAGNESIUM SULFATE HEPTAHYDRATE 1 G: 1 INJECTION, SOLUTION INTRAVENOUS at 08:29

## 2019-12-21 RX ADMIN — THIAMINE HYDROCHLORIDE 200 MG: 100 INJECTION, SOLUTION INTRAMUSCULAR; INTRAVENOUS at 13:40

## 2019-12-21 RX ADMIN — LORAZEPAM 1 MG: 2 INJECTION, SOLUTION INTRAMUSCULAR; INTRAVENOUS at 18:45

## 2019-12-21 RX ADMIN — HYDROMORPHONE HYDROCHLORIDE 0.5 MG: 1 INJECTION, SOLUTION INTRAMUSCULAR; INTRAVENOUS; SUBCUTANEOUS at 14:15

## 2019-12-21 RX ADMIN — ONDANSETRON 4 MG: 2 INJECTION INTRAMUSCULAR; INTRAVENOUS at 15:33

## 2019-12-21 RX ADMIN — TAZOBACTAM SODIUM AND PIPERACILLIN SODIUM 3.38 G: 375; 3 INJECTION, SOLUTION INTRAVENOUS at 15:32

## 2019-12-21 RX ADMIN — IOPAMIDOL 75 ML: 755 INJECTION, SOLUTION INTRAVENOUS at 05:23

## 2019-12-21 RX ADMIN — FOLIC ACID: 5 INJECTION, SOLUTION INTRAMUSCULAR; INTRAVENOUS; SUBCUTANEOUS at 10:49

## 2019-12-21 RX ADMIN — Medication 6 MILLICURIE: at 11:57

## 2019-12-21 RX ADMIN — ONDANSETRON 4 MG: 2 INJECTION INTRAMUSCULAR; INTRAVENOUS at 04:23

## 2019-12-21 RX ADMIN — FOLIC ACID: 5 INJECTION, SOLUTION INTRAMUSCULAR; INTRAVENOUS; SUBCUTANEOUS at 05:10

## 2019-12-21 RX ADMIN — ONDANSETRON 4 MG: 2 INJECTION INTRAMUSCULAR; INTRAVENOUS at 08:27

## 2019-12-21 RX ADMIN — Medication 10 ML: at 10:51

## 2019-12-21 RX ADMIN — PANTOPRAZOLE SODIUM 40 MG: 40 INJECTION, POWDER, FOR SOLUTION INTRAVENOUS at 10:04

## 2019-12-21 RX ADMIN — FAMOTIDINE 20 MG: 10 INJECTION, SOLUTION INTRAVENOUS at 04:23

## 2019-12-21 RX ADMIN — HYDROMORPHONE HYDROCHLORIDE 0.5 MG: 1 INJECTION, SOLUTION INTRAMUSCULAR; INTRAVENOUS; SUBCUTANEOUS at 20:30

## 2019-12-21 RX ADMIN — TAZOBACTAM SODIUM AND PIPERACILLIN SODIUM 4.5 G: 500; 4 INJECTION, SOLUTION INTRAVENOUS at 06:41

## 2019-12-21 RX ADMIN — Medication 10 ML: at 10:06

## 2019-12-21 RX ADMIN — HYDROMORPHONE HYDROCHLORIDE 1 MG: 1 INJECTION, SOLUTION INTRAMUSCULAR; INTRAVENOUS; SUBCUTANEOUS at 06:23

## 2019-12-21 ASSESSMENT — PAIN SCALES - GENERAL
PAINLEVEL_OUTOF10: 10

## 2019-12-22 LAB
A/G RATIO: 1 (ref 1.1–2.2)
ALBUMIN SERPL-MCNC: 3.1 G/DL (ref 3.4–5)
ALP BLD-CCNC: 92 U/L (ref 40–129)
ALT SERPL-CCNC: 54 U/L (ref 10–40)
ANION GAP SERPL CALCULATED.3IONS-SCNC: 13 MMOL/L (ref 3–16)
APTT: 35.9 SEC (ref 24.2–36.2)
AST SERPL-CCNC: 122 U/L (ref 15–37)
BASOPHILS ABSOLUTE: 0 K/UL (ref 0–0.2)
BASOPHILS RELATIVE PERCENT: 1 %
BILIRUB SERPL-MCNC: 1 MG/DL (ref 0–1)
BUN BLDV-MCNC: 5 MG/DL (ref 7–20)
CALCIUM SERPL-MCNC: 7.4 MG/DL (ref 8.3–10.6)
CHLORIDE BLD-SCNC: 104 MMOL/L (ref 99–110)
CO2: 21 MMOL/L (ref 21–32)
CREAT SERPL-MCNC: 0.8 MG/DL (ref 0.9–1.3)
EOSINOPHILS ABSOLUTE: 0.1 K/UL (ref 0–0.6)
EOSINOPHILS RELATIVE PERCENT: 3 %
GFR AFRICAN AMERICAN: >60
GFR NON-AFRICAN AMERICAN: >60
GLOBULIN: 3.2 G/DL
GLUCOSE BLD-MCNC: 86 MG/DL (ref 70–99)
HCT VFR BLD CALC: 31.1 % (ref 40.5–52.5)
HEMOGLOBIN: 10.6 G/DL (ref 13.5–17.5)
INR BLD: 1.1 (ref 0.86–1.14)
LACTIC ACID: 0.7 MMOL/L (ref 0.4–2)
LIPASE: 26 U/L (ref 13–60)
LYMPHOCYTES ABSOLUTE: 1.2 K/UL (ref 1–5.1)
LYMPHOCYTES RELATIVE PERCENT: 34.7 %
MAGNESIUM: 1.4 MG/DL (ref 1.8–2.4)
MCH RBC QN AUTO: 31.8 PG (ref 26–34)
MCHC RBC AUTO-ENTMCNC: 34.2 G/DL (ref 31–36)
MCV RBC AUTO: 93 FL (ref 80–100)
MONOCYTES ABSOLUTE: 0.2 K/UL (ref 0–1.3)
MONOCYTES RELATIVE PERCENT: 7 %
NEUTROPHILS ABSOLUTE: 1.8 K/UL (ref 1.7–7.7)
NEUTROPHILS RELATIVE PERCENT: 54.3 %
PDW BLD-RTO: 13.5 % (ref 12.4–15.4)
PHOSPHORUS: 3 MG/DL (ref 2.5–4.9)
PLATELET # BLD: 56 K/UL (ref 135–450)
PMV BLD AUTO: 8.4 FL (ref 5–10.5)
POTASSIUM SERPL-SCNC: 3.1 MMOL/L (ref 3.5–5.1)
PREALBUMIN: 10.2 MG/DL (ref 20–40)
PROTHROMBIN TIME: 12.8 SEC (ref 10–13.2)
RBC # BLD: 3.34 M/UL (ref 4.2–5.9)
SODIUM BLD-SCNC: 138 MMOL/L (ref 136–145)
TOTAL PROTEIN: 6.3 G/DL (ref 6.4–8.2)
URINE CULTURE, ROUTINE: NORMAL
WBC # BLD: 3.3 K/UL (ref 4–11)

## 2019-12-22 PROCEDURE — 2580000003 HC RX 258: Performed by: INTERNAL MEDICINE

## 2019-12-22 PROCEDURE — 84466 ASSAY OF TRANSFERRIN: CPT

## 2019-12-22 PROCEDURE — 83690 ASSAY OF LIPASE: CPT

## 2019-12-22 PROCEDURE — 84100 ASSAY OF PHOSPHORUS: CPT

## 2019-12-22 PROCEDURE — 6360000002 HC RX W HCPCS: Performed by: INTERNAL MEDICINE

## 2019-12-22 PROCEDURE — 1200000000 HC SEMI PRIVATE

## 2019-12-22 PROCEDURE — 6370000000 HC RX 637 (ALT 250 FOR IP): Performed by: PSYCHIATRY & NEUROLOGY

## 2019-12-22 PROCEDURE — 6370000000 HC RX 637 (ALT 250 FOR IP): Performed by: INTERNAL MEDICINE

## 2019-12-22 PROCEDURE — C9113 INJ PANTOPRAZOLE SODIUM, VIA: HCPCS | Performed by: HOSPITALIST

## 2019-12-22 PROCEDURE — 2500000003 HC RX 250 WO HCPCS: Performed by: SURGERY

## 2019-12-22 PROCEDURE — 85730 THROMBOPLASTIN TIME PARTIAL: CPT

## 2019-12-22 PROCEDURE — 83605 ASSAY OF LACTIC ACID: CPT

## 2019-12-22 PROCEDURE — 6370000000 HC RX 637 (ALT 250 FOR IP): Performed by: HOSPITALIST

## 2019-12-22 PROCEDURE — 2500000003 HC RX 250 WO HCPCS: Performed by: INTERNAL MEDICINE

## 2019-12-22 PROCEDURE — 85025 COMPLETE CBC W/AUTO DIFF WBC: CPT

## 2019-12-22 PROCEDURE — 85610 PROTHROMBIN TIME: CPT

## 2019-12-22 PROCEDURE — 99253 IP/OBS CNSLTJ NEW/EST LOW 45: CPT | Performed by: PSYCHIATRY & NEUROLOGY

## 2019-12-22 PROCEDURE — 2580000003 HC RX 258: Performed by: HOSPITALIST

## 2019-12-22 PROCEDURE — 80053 COMPREHEN METABOLIC PANEL: CPT

## 2019-12-22 PROCEDURE — 6360000002 HC RX W HCPCS: Performed by: HOSPITALIST

## 2019-12-22 PROCEDURE — 84134 ASSAY OF PREALBUMIN: CPT

## 2019-12-22 PROCEDURE — 83735 ASSAY OF MAGNESIUM: CPT

## 2019-12-22 PROCEDURE — 99232 SBSQ HOSP IP/OBS MODERATE 35: CPT | Performed by: SURGERY

## 2019-12-22 RX ORDER — POTASSIUM CHLORIDE 7.45 MG/ML
10 INJECTION INTRAVENOUS
Status: DISPENSED | OUTPATIENT
Start: 2019-12-22 | End: 2019-12-22

## 2019-12-22 RX ORDER — LORAZEPAM 1 MG/1
1 TABLET ORAL ONCE
Status: COMPLETED | OUTPATIENT
Start: 2019-12-22 | End: 2019-12-22

## 2019-12-22 RX ORDER — MAGNESIUM SULFATE 1 G/100ML
1 INJECTION INTRAVENOUS PRN
Status: DISCONTINUED | OUTPATIENT
Start: 2019-12-22 | End: 2019-12-23 | Stop reason: HOSPADM

## 2019-12-22 RX ORDER — SODIUM CHLORIDE 0.9 % (FLUSH) 0.9 %
10 SYRINGE (ML) INJECTION EVERY 12 HOURS SCHEDULED
Status: DISCONTINUED | OUTPATIENT
Start: 2019-12-22 | End: 2019-12-22 | Stop reason: SDUPTHER

## 2019-12-22 RX ORDER — PEG-3350, SODIUM SULFATE, SODIUM CHLORIDE, POTASSIUM CHLORIDE, SODIUM ASCORBATE AND ASCORBIC ACID 7.5-2.691G
100 KIT ORAL ONCE
Status: COMPLETED | OUTPATIENT
Start: 2019-12-22 | End: 2019-12-22

## 2019-12-22 RX ORDER — SODIUM CHLORIDE 0.9 % (FLUSH) 0.9 %
10 SYRINGE (ML) INJECTION EVERY 12 HOURS SCHEDULED
Status: DISCONTINUED | OUTPATIENT
Start: 2019-12-22 | End: 2019-12-23 | Stop reason: HOSPADM

## 2019-12-22 RX ORDER — SODIUM CHLORIDE 0.9 % (FLUSH) 0.9 %
10 SYRINGE (ML) INJECTION PRN
Status: DISCONTINUED | OUTPATIENT
Start: 2019-12-22 | End: 2019-12-22 | Stop reason: SDUPTHER

## 2019-12-22 RX ORDER — MAGNESIUM SULFATE IN WATER 40 MG/ML
2 INJECTION, SOLUTION INTRAVENOUS ONCE
Status: COMPLETED | OUTPATIENT
Start: 2019-12-22 | End: 2019-12-22

## 2019-12-22 RX ORDER — SODIUM CHLORIDE 0.9 % (FLUSH) 0.9 %
10 SYRINGE (ML) INJECTION PRN
Status: DISCONTINUED | OUTPATIENT
Start: 2019-12-22 | End: 2019-12-23 | Stop reason: HOSPADM

## 2019-12-22 RX ADMIN — LORAZEPAM 1 MG: 2 INJECTION, SOLUTION INTRAMUSCULAR; INTRAVENOUS at 01:55

## 2019-12-22 RX ADMIN — TAZOBACTAM SODIUM AND PIPERACILLIN SODIUM 3.38 G: 375; 3 INJECTION, SOLUTION INTRAVENOUS at 06:23

## 2019-12-22 RX ADMIN — TAZOBACTAM SODIUM AND PIPERACILLIN SODIUM 3.38 G: 375; 3 INJECTION, SOLUTION INTRAVENOUS at 21:59

## 2019-12-22 RX ADMIN — PANTOPRAZOLE SODIUM 40 MG: 40 INJECTION, POWDER, FOR SOLUTION INTRAVENOUS at 08:27

## 2019-12-22 RX ADMIN — HYDROMORPHONE HYDROCHLORIDE 0.5 MG: 1 INJECTION, SOLUTION INTRAMUSCULAR; INTRAVENOUS; SUBCUTANEOUS at 00:39

## 2019-12-22 RX ADMIN — MAGNESIUM SULFATE HEPTAHYDRATE 2 G: 40 INJECTION, SOLUTION INTRAVENOUS at 08:28

## 2019-12-22 RX ADMIN — Medication 10 ML: at 08:38

## 2019-12-22 RX ADMIN — POTASSIUM BICARBONATE 40 MEQ: 782 TABLET, EFFERVESCENT ORAL at 08:27

## 2019-12-22 RX ADMIN — Medication 10 ML: at 21:59

## 2019-12-22 RX ADMIN — POTASSIUM CHLORIDE 10 MEQ: 7.46 INJECTION, SOLUTION INTRAVENOUS at 08:28

## 2019-12-22 RX ADMIN — POTASSIUM CHLORIDE 10 MEQ: 7.46 INJECTION, SOLUTION INTRAVENOUS at 11:46

## 2019-12-22 RX ADMIN — Medication 10 ML: at 20:13

## 2019-12-22 RX ADMIN — HYDROMORPHONE HYDROCHLORIDE 0.5 MG: 1 INJECTION, SOLUTION INTRAMUSCULAR; INTRAVENOUS; SUBCUTANEOUS at 06:23

## 2019-12-22 RX ADMIN — LORAZEPAM 1 MG: 1 TABLET ORAL at 20:12

## 2019-12-22 RX ADMIN — HYDROMORPHONE HYDROCHLORIDE 0.5 MG: 1 INJECTION, SOLUTION INTRAMUSCULAR; INTRAVENOUS; SUBCUTANEOUS at 15:15

## 2019-12-22 RX ADMIN — FOLIC ACID: 5 INJECTION, SOLUTION INTRAMUSCULAR; INTRAVENOUS; SUBCUTANEOUS at 10:42

## 2019-12-22 RX ADMIN — Medication 10 ML: at 08:37

## 2019-12-22 RX ADMIN — HYDROMORPHONE HYDROCHLORIDE 0.5 MG: 1 INJECTION, SOLUTION INTRAMUSCULAR; INTRAVENOUS; SUBCUTANEOUS at 10:50

## 2019-12-22 RX ADMIN — SODIUM CHLORIDE: 9 INJECTION, SOLUTION INTRAVENOUS at 21:58

## 2019-12-22 RX ADMIN — POLYETHYLENE GLYCOL 3350, SODIUM SULFATE, SODIUM CHLORIDE, POTASSIUM CHLORIDE, ASCORBIC ACID, SODIUM ASCORBATE 100 G: KIT at 20:12

## 2019-12-22 RX ADMIN — HYDROMORPHONE HYDROCHLORIDE 0.5 MG: 1 INJECTION, SOLUTION INTRAMUSCULAR; INTRAVENOUS; SUBCUTANEOUS at 20:12

## 2019-12-22 RX ADMIN — LORAZEPAM 1 MG: 1 TABLET ORAL at 13:01

## 2019-12-22 RX ADMIN — POLYETHYLENE GLYCOL 3350, SODIUM SULFATE, SODIUM CHLORIDE, POTASSIUM CHLORIDE, ASCORBIC ACID, SODIUM ASCORBATE 100 G: KIT at 15:12

## 2019-12-22 RX ADMIN — TAZOBACTAM SODIUM AND PIPERACILLIN SODIUM 3.38 G: 375; 3 INJECTION, SOLUTION INTRAVENOUS at 13:58

## 2019-12-22 RX ADMIN — Medication 10 ML: at 10:52

## 2019-12-22 ASSESSMENT — PAIN DESCRIPTION - LOCATION
LOCATION: ABDOMEN
LOCATION: ABDOMEN

## 2019-12-22 ASSESSMENT — PAIN SCALES - GENERAL
PAINLEVEL_OUTOF10: 10
PAINLEVEL_OUTOF10: 9
PAINLEVEL_OUTOF10: 7

## 2019-12-22 ASSESSMENT — PAIN DESCRIPTION - PAIN TYPE
TYPE: ACUTE PAIN
TYPE: ACUTE PAIN

## 2019-12-23 ENCOUNTER — ANESTHESIA EVENT (OUTPATIENT)
Dept: ENDOSCOPY | Age: 54
DRG: 251 | End: 2019-12-23
Payer: COMMERCIAL

## 2019-12-23 ENCOUNTER — ANESTHESIA (OUTPATIENT)
Dept: ENDOSCOPY | Age: 54
DRG: 251 | End: 2019-12-23
Payer: COMMERCIAL

## 2019-12-23 VITALS
TEMPERATURE: 97.5 F | BODY MASS INDEX: 23.32 KG/M2 | RESPIRATION RATE: 18 BRPM | SYSTOLIC BLOOD PRESSURE: 169 MMHG | HEART RATE: 66 BPM | OXYGEN SATURATION: 100 % | HEIGHT: 65 IN | WEIGHT: 140 LBS | DIASTOLIC BLOOD PRESSURE: 105 MMHG

## 2019-12-23 VITALS
SYSTOLIC BLOOD PRESSURE: 178 MMHG | OXYGEN SATURATION: 100 % | RESPIRATION RATE: 19 BRPM | DIASTOLIC BLOOD PRESSURE: 112 MMHG

## 2019-12-23 LAB
ANION GAP SERPL CALCULATED.3IONS-SCNC: 12 MMOL/L (ref 3–16)
BASOPHILS ABSOLUTE: 0 K/UL (ref 0–0.2)
BASOPHILS RELATIVE PERCENT: 0.8 %
BUN BLDV-MCNC: 4 MG/DL (ref 7–20)
CALCIUM SERPL-MCNC: 7.6 MG/DL (ref 8.3–10.6)
CHLORIDE BLD-SCNC: 103 MMOL/L (ref 99–110)
CO2: 20 MMOL/L (ref 21–32)
CREAT SERPL-MCNC: 0.7 MG/DL (ref 0.9–1.3)
EOSINOPHILS ABSOLUTE: 0.1 K/UL (ref 0–0.6)
EOSINOPHILS RELATIVE PERCENT: 2.8 %
GFR AFRICAN AMERICAN: >60
GFR NON-AFRICAN AMERICAN: >60
GLUCOSE BLD-MCNC: 75 MG/DL (ref 70–99)
HCT VFR BLD CALC: 31.8 % (ref 40.5–52.5)
HEMOGLOBIN: 10.9 G/DL (ref 13.5–17.5)
LYMPHOCYTES ABSOLUTE: 1.2 K/UL (ref 1–5.1)
LYMPHOCYTES RELATIVE PERCENT: 32.6 %
MAGNESIUM: 1.7 MG/DL (ref 1.8–2.4)
MCH RBC QN AUTO: 31.6 PG (ref 26–34)
MCHC RBC AUTO-ENTMCNC: 34.1 G/DL (ref 31–36)
MCV RBC AUTO: 92.6 FL (ref 80–100)
MONOCYTES ABSOLUTE: 0.3 K/UL (ref 0–1.3)
MONOCYTES RELATIVE PERCENT: 7 %
NEUTROPHILS ABSOLUTE: 2.1 K/UL (ref 1.7–7.7)
NEUTROPHILS RELATIVE PERCENT: 56.8 %
PDW BLD-RTO: 13.9 % (ref 12.4–15.4)
PLATELET # BLD: 56 K/UL (ref 135–450)
PMV BLD AUTO: 8.9 FL (ref 5–10.5)
POTASSIUM REFLEX MAGNESIUM: 3.4 MMOL/L (ref 3.5–5.1)
RBC # BLD: 3.44 M/UL (ref 4.2–5.9)
SODIUM BLD-SCNC: 135 MMOL/L (ref 136–145)
TRANSFERRIN: 275 MG/DL (ref 200–360)
WBC # BLD: 3.6 K/UL (ref 4–11)

## 2019-12-23 PROCEDURE — 2709999900 HC NON-CHARGEABLE SUPPLY: Performed by: INTERNAL MEDICINE

## 2019-12-23 PROCEDURE — 3609017100 HC EGD: Performed by: INTERNAL MEDICINE

## 2019-12-23 PROCEDURE — 80048 BASIC METABOLIC PNL TOTAL CA: CPT

## 2019-12-23 PROCEDURE — 2500000003 HC RX 250 WO HCPCS: Performed by: SURGERY

## 2019-12-23 PROCEDURE — 3609010300 HC COLONOSCOPY W/BIOPSY SINGLE/MULTIPLE: Performed by: INTERNAL MEDICINE

## 2019-12-23 PROCEDURE — 83735 ASSAY OF MAGNESIUM: CPT

## 2019-12-23 PROCEDURE — 7100000000 HC PACU RECOVERY - FIRST 15 MIN: Performed by: INTERNAL MEDICINE

## 2019-12-23 PROCEDURE — 2580000003 HC RX 258: Performed by: NURSE ANESTHETIST, CERTIFIED REGISTERED

## 2019-12-23 PROCEDURE — 6360000002 HC RX W HCPCS: Performed by: NURSE ANESTHETIST, CERTIFIED REGISTERED

## 2019-12-23 PROCEDURE — 0DBM8ZX EXCISION OF DESCENDING COLON, VIA NATURAL OR ARTIFICIAL OPENING ENDOSCOPIC, DIAGNOSTIC: ICD-10-PCS | Performed by: INTERNAL MEDICINE

## 2019-12-23 PROCEDURE — 88305 TISSUE EXAM BY PATHOLOGIST: CPT

## 2019-12-23 PROCEDURE — 85025 COMPLETE CBC W/AUTO DIFF WBC: CPT

## 2019-12-23 PROCEDURE — 7100000001 HC PACU RECOVERY - ADDTL 15 MIN: Performed by: INTERNAL MEDICINE

## 2019-12-23 PROCEDURE — 0DBL8ZX EXCISION OF TRANSVERSE COLON, VIA NATURAL OR ARTIFICIAL OPENING ENDOSCOPIC, DIAGNOSTIC: ICD-10-PCS | Performed by: INTERNAL MEDICINE

## 2019-12-23 PROCEDURE — 94760 N-INVAS EAR/PLS OXIMETRY 1: CPT

## 2019-12-23 PROCEDURE — 2500000003 HC RX 250 WO HCPCS: Performed by: NURSE ANESTHETIST, CERTIFIED REGISTERED

## 2019-12-23 PROCEDURE — 36415 COLL VENOUS BLD VENIPUNCTURE: CPT

## 2019-12-23 PROCEDURE — 2580000003 HC RX 258: Performed by: INTERNAL MEDICINE

## 2019-12-23 PROCEDURE — 6360000002 HC RX W HCPCS: Performed by: HOSPITALIST

## 2019-12-23 PROCEDURE — 3700000001 HC ADD 15 MINUTES (ANESTHESIA): Performed by: INTERNAL MEDICINE

## 2019-12-23 PROCEDURE — 0DJ08ZZ INSPECTION OF UPPER INTESTINAL TRACT, VIA NATURAL OR ARTIFICIAL OPENING ENDOSCOPIC: ICD-10-PCS | Performed by: INTERNAL MEDICINE

## 2019-12-23 PROCEDURE — 3700000000 HC ANESTHESIA ATTENDED CARE: Performed by: INTERNAL MEDICINE

## 2019-12-23 PROCEDURE — 3609010600 HC COLONOSCOPY POLYPECTOMY SNARE/COLD BIOPSY: Performed by: INTERNAL MEDICINE

## 2019-12-23 RX ORDER — HYDROXYZINE HYDROCHLORIDE 25 MG/1
25 TABLET, FILM COATED ORAL EVERY 8 HOURS PRN
Qty: 30 TABLET | Refills: 0 | Status: SHIPPED | OUTPATIENT
Start: 2019-12-23 | End: 2019-12-23 | Stop reason: SDUPTHER

## 2019-12-23 RX ORDER — ONDANSETRON 4 MG/1
4 TABLET, FILM COATED ORAL DAILY PRN
Qty: 30 TABLET | Refills: 0 | Status: SHIPPED | OUTPATIENT
Start: 2019-12-23 | End: 2019-12-23 | Stop reason: SDUPTHER

## 2019-12-23 RX ORDER — TRAMADOL HYDROCHLORIDE 50 MG/1
50 TABLET ORAL 2 TIMES DAILY PRN
Qty: 6 TABLET | Refills: 0 | Status: SHIPPED | OUTPATIENT
Start: 2019-12-23 | End: 2019-12-26

## 2019-12-23 RX ORDER — HYDROXYZINE HYDROCHLORIDE 25 MG/1
25 TABLET, FILM COATED ORAL EVERY 8 HOURS PRN
Qty: 30 TABLET | Refills: 0 | Status: SHIPPED | OUTPATIENT
Start: 2019-12-23 | End: 2020-01-02

## 2019-12-23 RX ORDER — ONDANSETRON 4 MG/1
4 TABLET, FILM COATED ORAL DAILY PRN
Qty: 30 TABLET | Refills: 0 | Status: SHIPPED | OUTPATIENT
Start: 2019-12-23 | End: 2020-06-29

## 2019-12-23 RX ORDER — PANTOPRAZOLE SODIUM 20 MG/1
20 TABLET, DELAYED RELEASE ORAL DAILY
Qty: 30 TABLET | Refills: 3 | Status: SHIPPED | OUTPATIENT
Start: 2019-12-23 | End: 2020-06-29

## 2019-12-23 RX ORDER — PROPOFOL 10 MG/ML
INJECTION, EMULSION INTRAVENOUS PRN
Status: DISCONTINUED | OUTPATIENT
Start: 2019-12-23 | End: 2019-12-23 | Stop reason: SDUPTHER

## 2019-12-23 RX ORDER — PANTOPRAZOLE SODIUM 20 MG/1
20 TABLET, DELAYED RELEASE ORAL DAILY
Qty: 30 TABLET | Refills: 3 | Status: SHIPPED | OUTPATIENT
Start: 2019-12-23 | End: 2019-12-23 | Stop reason: SDUPTHER

## 2019-12-23 RX ORDER — LIDOCAINE HYDROCHLORIDE 20 MG/ML
INJECTION, SOLUTION EPIDURAL; INFILTRATION; INTRACAUDAL; PERINEURAL PRN
Status: DISCONTINUED | OUTPATIENT
Start: 2019-12-23 | End: 2019-12-23 | Stop reason: SDUPTHER

## 2019-12-23 RX ORDER — SODIUM CHLORIDE 9 MG/ML
INJECTION, SOLUTION INTRAVENOUS CONTINUOUS PRN
Status: DISCONTINUED | OUTPATIENT
Start: 2019-12-23 | End: 2019-12-23 | Stop reason: SDUPTHER

## 2019-12-23 RX ADMIN — PROPOFOL 10 MG: 10 INJECTION, EMULSION INTRAVENOUS at 11:08

## 2019-12-23 RX ADMIN — PROPOFOL 30 MG: 10 INJECTION, EMULSION INTRAVENOUS at 10:58

## 2019-12-23 RX ADMIN — PROPOFOL 30 MG: 10 INJECTION, EMULSION INTRAVENOUS at 10:52

## 2019-12-23 RX ADMIN — PROPOFOL 30 MG: 10 INJECTION, EMULSION INTRAVENOUS at 10:49

## 2019-12-23 RX ADMIN — PROPOFOL 140 MG: 10 INJECTION, EMULSION INTRAVENOUS at 10:39

## 2019-12-23 RX ADMIN — PROPOFOL 20 MG: 10 INJECTION, EMULSION INTRAVENOUS at 11:04

## 2019-12-23 RX ADMIN — SODIUM CHLORIDE: 9 INJECTION, SOLUTION INTRAVENOUS at 10:29

## 2019-12-23 RX ADMIN — PROPOFOL 20 MG: 10 INJECTION, EMULSION INTRAVENOUS at 11:00

## 2019-12-23 RX ADMIN — TAZOBACTAM SODIUM AND PIPERACILLIN SODIUM 3.38 G: 375; 3 INJECTION, SOLUTION INTRAVENOUS at 05:47

## 2019-12-23 RX ADMIN — PROPOFOL 20 MG: 10 INJECTION, EMULSION INTRAVENOUS at 11:02

## 2019-12-23 RX ADMIN — Medication 10 ML: at 10:18

## 2019-12-23 RX ADMIN — SODIUM CHLORIDE: 9 INJECTION, SOLUTION INTRAVENOUS at 05:47

## 2019-12-23 RX ADMIN — HYDROMORPHONE HYDROCHLORIDE 0.5 MG: 1 INJECTION, SOLUTION INTRAMUSCULAR; INTRAVENOUS; SUBCUTANEOUS at 05:46

## 2019-12-23 RX ADMIN — PROPOFOL 20 MG: 10 INJECTION, EMULSION INTRAVENOUS at 11:06

## 2019-12-23 RX ADMIN — HYDROMORPHONE HYDROCHLORIDE 0.5 MG: 1 INJECTION, SOLUTION INTRAMUSCULAR; INTRAVENOUS; SUBCUTANEOUS at 00:31

## 2019-12-23 RX ADMIN — LIDOCAINE HYDROCHLORIDE 100 MG: 20 INJECTION, SOLUTION EPIDURAL; INFILTRATION; INTRACAUDAL; PERINEURAL at 10:39

## 2019-12-23 RX ADMIN — PROPOFOL 30 MG: 10 INJECTION, EMULSION INTRAVENOUS at 10:42

## 2019-12-23 RX ADMIN — PROPOFOL 20 MG: 10 INJECTION, EMULSION INTRAVENOUS at 10:46

## 2019-12-23 RX ADMIN — PROPOFOL 30 MG: 10 INJECTION, EMULSION INTRAVENOUS at 10:56

## 2019-12-23 ASSESSMENT — PAIN SCALES - GENERAL
PAINLEVEL_OUTOF10: 10
PAINLEVEL_OUTOF10: 0
PAINLEVEL_OUTOF10: 10

## 2019-12-23 ASSESSMENT — COPD QUESTIONNAIRES: CAT_SEVERITY: MILD

## 2020-02-17 ENCOUNTER — HOSPITAL ENCOUNTER (OUTPATIENT)
Dept: GENERAL RADIOLOGY | Age: 55
Discharge: HOME | End: 2020-02-17
Payer: COMMERCIAL

## 2020-02-17 ENCOUNTER — HOSPITAL ENCOUNTER (OUTPATIENT)
Age: 55
Discharge: HOME | End: 2020-02-17
Payer: COMMERCIAL

## 2020-02-17 PROCEDURE — 72110 X-RAY EXAM L-2 SPINE 4/>VWS: CPT

## 2020-06-29 ENCOUNTER — HOSPITAL ENCOUNTER (INPATIENT)
Age: 55
LOS: 8 days | Discharge: HOME HEALTH | End: 2020-07-07
Attending: EMERGENCY MEDICINE | Admitting: INTERNAL MEDICINE
Payer: COMMERCIAL

## 2020-06-29 PROBLEM — E87.6 HYPOKALEMIA: Status: ACTIVE | Noted: 2020-06-29

## 2020-06-29 PROBLEM — R45.851 SUICIDAL IDEATION: Status: ACTIVE | Noted: 2020-06-29

## 2020-06-29 LAB
ACETAMINOPHEN LEVEL: <5 UG/ML (ref 10–30)
ALBUMIN SERPL-MCNC: 3.4 G/DL (ref 3.4–5)
ALP BLD-CCNC: 150 U/L (ref 40–129)
ALT SERPL-CCNC: 33 U/L (ref 10–40)
AMPHETAMINE SCREEN, URINE: ABNORMAL
ANION GAP SERPL CALCULATED.3IONS-SCNC: 13 MMOL/L (ref 3–16)
APTT: 38.5 SEC (ref 24.2–36.2)
AST SERPL-CCNC: 111 U/L (ref 15–37)
BARBITURATE SCREEN URINE: ABNORMAL
BASOPHILS ABSOLUTE: 0 K/UL (ref 0–0.2)
BASOPHILS RELATIVE PERCENT: 0.6 %
BENZODIAZEPINE SCREEN, URINE: ABNORMAL
BILIRUB SERPL-MCNC: 1 MG/DL (ref 0–1)
BILIRUBIN DIRECT: 0.5 MG/DL (ref 0–0.3)
BILIRUBIN URINE: NEGATIVE
BILIRUBIN, INDIRECT: 0.5 MG/DL (ref 0–1)
BLOOD, URINE: NEGATIVE
BUN BLDV-MCNC: 7 MG/DL (ref 7–20)
CALCIUM SERPL-MCNC: 9.3 MG/DL (ref 8.3–10.6)
CANNABINOID SCREEN URINE: POSITIVE
CHLORIDE BLD-SCNC: 95 MMOL/L (ref 99–110)
CLARITY: CLEAR
CO2: 24 MMOL/L (ref 21–32)
COCAINE METABOLITE SCREEN URINE: ABNORMAL
COLOR: YELLOW
CREAT SERPL-MCNC: 0.8 MG/DL (ref 0.9–1.3)
EOSINOPHILS ABSOLUTE: 0 K/UL (ref 0–0.6)
EOSINOPHILS RELATIVE PERCENT: 1.1 %
ETHANOL: 303 MG/DL (ref 0–0.08)
GFR AFRICAN AMERICAN: >60
GFR NON-AFRICAN AMERICAN: >60
GLUCOSE BLD-MCNC: 91 MG/DL (ref 70–99)
GLUCOSE URINE: NEGATIVE MG/DL
HCT VFR BLD CALC: 35.3 % (ref 40.5–52.5)
HEMOGLOBIN: 11.9 G/DL (ref 13.5–17.5)
INR BLD: 1.09 (ref 0.86–1.14)
KETONES, URINE: NEGATIVE MG/DL
LEUKOCYTE ESTERASE, URINE: NEGATIVE
LIPASE: 94 U/L (ref 13–60)
LYMPHOCYTES ABSOLUTE: 1.2 K/UL (ref 1–5.1)
LYMPHOCYTES RELATIVE PERCENT: 41.6 %
Lab: ABNORMAL
MCH RBC QN AUTO: 31.5 PG (ref 26–34)
MCHC RBC AUTO-ENTMCNC: 33.7 G/DL (ref 31–36)
MCV RBC AUTO: 93.3 FL (ref 80–100)
METHADONE SCREEN, URINE: ABNORMAL
MICROSCOPIC EXAMINATION: NORMAL
MONOCYTES ABSOLUTE: 0.4 K/UL (ref 0–1.3)
MONOCYTES RELATIVE PERCENT: 12.8 %
NEUTROPHILS ABSOLUTE: 1.3 K/UL (ref 1.7–7.7)
NEUTROPHILS RELATIVE PERCENT: 43.9 %
NITRITE, URINE: NEGATIVE
OPIATE SCREEN URINE: ABNORMAL
OXYCODONE URINE: ABNORMAL
PDW BLD-RTO: 16.5 % (ref 12.4–15.4)
PH UA: 7
PH UA: 7 (ref 5–8)
PHENCYCLIDINE SCREEN URINE: ABNORMAL
PLATELET # BLD: 61 K/UL (ref 135–450)
PLATELET SLIDE REVIEW: ABNORMAL
PMV BLD AUTO: 7.8 FL (ref 5–10.5)
POTASSIUM SERPL-SCNC: 2.8 MMOL/L (ref 3.5–5.1)
PROPOXYPHENE SCREEN: ABNORMAL
PROTEIN UA: NEGATIVE MG/DL
PROTHROMBIN TIME: 12.6 SEC (ref 10–13.2)
RBC # BLD: 3.79 M/UL (ref 4.2–5.9)
SALICYLATE, SERUM: <0.3 MG/DL (ref 15–30)
SODIUM BLD-SCNC: 132 MMOL/L (ref 136–145)
SPECIFIC GRAVITY UA: <1.005 (ref 1–1.03)
TOTAL PROTEIN: 8.3 G/DL (ref 6.4–8.2)
TROPONIN: <0.01 NG/ML
URINE REFLEX TO CULTURE: NORMAL
URINE TYPE: NORMAL
UROBILINOGEN, URINE: 1 E.U./DL
WBC # BLD: 3 K/UL (ref 4–11)

## 2020-06-29 PROCEDURE — 85025 COMPLETE CBC W/AUTO DIFF WBC: CPT

## 2020-06-29 PROCEDURE — 93005 ELECTROCARDIOGRAM TRACING: CPT | Performed by: PHYSICIAN ASSISTANT

## 2020-06-29 PROCEDURE — G0480 DRUG TEST DEF 1-7 CLASSES: HCPCS

## 2020-06-29 PROCEDURE — 6360000002 HC RX W HCPCS: Performed by: PHYSICIAN ASSISTANT

## 2020-06-29 PROCEDURE — 80307 DRUG TEST PRSMV CHEM ANLYZR: CPT

## 2020-06-29 PROCEDURE — 2580000003 HC RX 258: Performed by: INTERNAL MEDICINE

## 2020-06-29 PROCEDURE — 85610 PROTHROMBIN TIME: CPT

## 2020-06-29 PROCEDURE — 96374 THER/PROPH/DIAG INJ IV PUSH: CPT

## 2020-06-29 PROCEDURE — 6370000000 HC RX 637 (ALT 250 FOR IP): Performed by: PHYSICIAN ASSISTANT

## 2020-06-29 PROCEDURE — 6360000002 HC RX W HCPCS: Performed by: INTERNAL MEDICINE

## 2020-06-29 PROCEDURE — 85730 THROMBOPLASTIN TIME PARTIAL: CPT

## 2020-06-29 PROCEDURE — 80048 BASIC METABOLIC PNL TOTAL CA: CPT

## 2020-06-29 PROCEDURE — 80076 HEPATIC FUNCTION PANEL: CPT

## 2020-06-29 PROCEDURE — 99285 EMERGENCY DEPT VISIT HI MDM: CPT

## 2020-06-29 PROCEDURE — 81003 URINALYSIS AUTO W/O SCOPE: CPT

## 2020-06-29 PROCEDURE — 84484 ASSAY OF TROPONIN QUANT: CPT

## 2020-06-29 PROCEDURE — 6370000000 HC RX 637 (ALT 250 FOR IP): Performed by: INTERNAL MEDICINE

## 2020-06-29 PROCEDURE — 1200000000 HC SEMI PRIVATE

## 2020-06-29 PROCEDURE — 83690 ASSAY OF LIPASE: CPT

## 2020-06-29 RX ORDER — SODIUM CHLORIDE 0.9 % (FLUSH) 0.9 %
10 SYRINGE (ML) INJECTION EVERY 12 HOURS SCHEDULED
Status: DISCONTINUED | OUTPATIENT
Start: 2020-06-29 | End: 2020-06-29 | Stop reason: SDUPTHER

## 2020-06-29 RX ORDER — ACETAMINOPHEN 650 MG/1
650 SUPPOSITORY RECTAL EVERY 6 HOURS PRN
Status: DISCONTINUED | OUTPATIENT
Start: 2020-06-29 | End: 2020-07-07 | Stop reason: HOSPADM

## 2020-06-29 RX ORDER — LORAZEPAM 2 MG/ML
3 INJECTION INTRAMUSCULAR
Status: DISCONTINUED | OUTPATIENT
Start: 2020-06-29 | End: 2020-07-07 | Stop reason: HOSPADM

## 2020-06-29 RX ORDER — LORAZEPAM 2 MG/ML
1 INJECTION INTRAMUSCULAR
Status: DISCONTINUED | OUTPATIENT
Start: 2020-06-29 | End: 2020-07-07 | Stop reason: HOSPADM

## 2020-06-29 RX ORDER — SODIUM CHLORIDE 0.9 % (FLUSH) 0.9 %
10 SYRINGE (ML) INJECTION PRN
Status: DISCONTINUED | OUTPATIENT
Start: 2020-06-29 | End: 2020-06-29 | Stop reason: SDUPTHER

## 2020-06-29 RX ORDER — LORAZEPAM 1 MG/1
3 TABLET ORAL
Status: DISCONTINUED | OUTPATIENT
Start: 2020-06-29 | End: 2020-07-07 | Stop reason: HOSPADM

## 2020-06-29 RX ORDER — ONDANSETRON 2 MG/ML
4 INJECTION INTRAMUSCULAR; INTRAVENOUS ONCE
Status: DISCONTINUED | OUTPATIENT
Start: 2020-06-30 | End: 2020-06-29

## 2020-06-29 RX ORDER — LORAZEPAM 1 MG/1
2 TABLET ORAL
Status: DISCONTINUED | OUTPATIENT
Start: 2020-06-29 | End: 2020-07-07 | Stop reason: HOSPADM

## 2020-06-29 RX ORDER — LORAZEPAM 2 MG/ML
2 INJECTION INTRAMUSCULAR
Status: DISCONTINUED | OUTPATIENT
Start: 2020-06-29 | End: 2020-07-07 | Stop reason: HOSPADM

## 2020-06-29 RX ORDER — ONDANSETRON 2 MG/ML
4 INJECTION INTRAMUSCULAR; INTRAVENOUS EVERY 6 HOURS PRN
Status: DISCONTINUED | OUTPATIENT
Start: 2020-06-29 | End: 2020-07-07 | Stop reason: HOSPADM

## 2020-06-29 RX ORDER — SODIUM CHLORIDE 0.9 % (FLUSH) 0.9 %
10 SYRINGE (ML) INJECTION PRN
Status: DISCONTINUED | OUTPATIENT
Start: 2020-06-29 | End: 2020-07-07 | Stop reason: HOSPADM

## 2020-06-29 RX ORDER — SODIUM CHLORIDE 0.9 % (FLUSH) 0.9 %
10 SYRINGE (ML) INJECTION EVERY 12 HOURS SCHEDULED
Status: DISCONTINUED | OUTPATIENT
Start: 2020-06-29 | End: 2020-07-07 | Stop reason: HOSPADM

## 2020-06-29 RX ORDER — POTASSIUM CHLORIDE 750 MG/1
40 TABLET, FILM COATED, EXTENDED RELEASE ORAL ONCE
Status: COMPLETED | OUTPATIENT
Start: 2020-06-29 | End: 2020-06-29

## 2020-06-29 RX ORDER — PROMETHAZINE HYDROCHLORIDE 25 MG/1
12.5 TABLET ORAL EVERY 6 HOURS PRN
Status: DISCONTINUED | OUTPATIENT
Start: 2020-06-29 | End: 2020-07-07 | Stop reason: HOSPADM

## 2020-06-29 RX ORDER — ACETAMINOPHEN 325 MG/1
650 TABLET ORAL EVERY 6 HOURS PRN
Status: DISCONTINUED | OUTPATIENT
Start: 2020-06-29 | End: 2020-07-07 | Stop reason: HOSPADM

## 2020-06-29 RX ORDER — LORAZEPAM 1 MG/1
4 TABLET ORAL
Status: DISCONTINUED | OUTPATIENT
Start: 2020-06-29 | End: 2020-07-07 | Stop reason: HOSPADM

## 2020-06-29 RX ORDER — LORAZEPAM 1 MG/1
1 TABLET ORAL
Status: DISCONTINUED | OUTPATIENT
Start: 2020-06-29 | End: 2020-07-07 | Stop reason: HOSPADM

## 2020-06-29 RX ORDER — SODIUM CHLORIDE AND POTASSIUM CHLORIDE .9; .15 G/100ML; G/100ML
SOLUTION INTRAVENOUS CONTINUOUS
Status: DISCONTINUED | OUTPATIENT
Start: 2020-06-29 | End: 2020-06-30

## 2020-06-29 RX ORDER — POTASSIUM CHLORIDE 750 MG/1
20 TABLET, FILM COATED, EXTENDED RELEASE ORAL DAILY
Status: DISCONTINUED | OUTPATIENT
Start: 2020-06-30 | End: 2020-06-30

## 2020-06-29 RX ORDER — POLYETHYLENE GLYCOL 3350 17 G/17G
17 POWDER, FOR SOLUTION ORAL DAILY PRN
Status: DISCONTINUED | OUTPATIENT
Start: 2020-06-29 | End: 2020-07-07 | Stop reason: HOSPADM

## 2020-06-29 RX ORDER — ONDANSETRON 2 MG/ML
4 INJECTION INTRAMUSCULAR; INTRAVENOUS ONCE
Status: COMPLETED | OUTPATIENT
Start: 2020-06-29 | End: 2020-06-29

## 2020-06-29 RX ORDER — MULTIVITAMIN WITH IRON
1 TABLET ORAL DAILY
Status: DISCONTINUED | OUTPATIENT
Start: 2020-06-30 | End: 2020-07-07 | Stop reason: HOSPADM

## 2020-06-29 RX ORDER — LORAZEPAM 2 MG/ML
4 INJECTION INTRAMUSCULAR
Status: DISCONTINUED | OUTPATIENT
Start: 2020-06-29 | End: 2020-07-07 | Stop reason: HOSPADM

## 2020-06-29 RX ADMIN — POTASSIUM CHLORIDE AND SODIUM CHLORIDE: 900; 150 INJECTION, SOLUTION INTRAVENOUS at 22:00

## 2020-06-29 RX ADMIN — LORAZEPAM 2 MG: 2 INJECTION INTRAMUSCULAR; INTRAVENOUS at 23:11

## 2020-06-29 RX ADMIN — ONDANSETRON 4 MG: 2 INJECTION INTRAMUSCULAR; INTRAVENOUS at 20:52

## 2020-06-29 RX ADMIN — POTASSIUM CHLORIDE 40 MEQ: 750 TABLET, FILM COATED, EXTENDED RELEASE ORAL at 22:00

## 2020-06-29 RX ADMIN — ACETAMINOPHEN 650 MG: 325 TABLET, FILM COATED ORAL at 23:16

## 2020-06-29 RX ADMIN — Medication 10 ML: at 23:11

## 2020-06-29 RX ADMIN — POTASSIUM CHLORIDE AND SODIUM CHLORIDE: 900; 150 INJECTION, SOLUTION INTRAVENOUS at 23:00

## 2020-06-29 ASSESSMENT — PAIN SCALES - GENERAL
PAINLEVEL_OUTOF10: 10
PAINLEVEL_OUTOF10: 10

## 2020-06-29 ASSESSMENT — ENCOUNTER SYMPTOMS
WHEEZING: 0
NAUSEA: 1
RHINORRHEA: 0
COUGH: 0
DIARRHEA: 0
VOMITING: 0
ABDOMINAL PAIN: 1
SHORTNESS OF BREATH: 0

## 2020-06-30 PROBLEM — F32.A DEPRESSION: Status: ACTIVE | Noted: 2020-06-30

## 2020-06-30 LAB
ALBUMIN SERPL-MCNC: 2.8 G/DL (ref 3.4–5)
ALP BLD-CCNC: 146 U/L (ref 40–129)
ALT SERPL-CCNC: 27 U/L (ref 10–40)
ANION GAP SERPL CALCULATED.3IONS-SCNC: 10 MMOL/L (ref 3–16)
AST SERPL-CCNC: 95 U/L (ref 15–37)
BILIRUB SERPL-MCNC: 0.8 MG/DL (ref 0–1)
BILIRUBIN DIRECT: 0.4 MG/DL (ref 0–0.3)
BILIRUBIN, INDIRECT: 0.4 MG/DL (ref 0–1)
BUN BLDV-MCNC: 7 MG/DL (ref 7–20)
CALCIUM SERPL-MCNC: 8.5 MG/DL (ref 8.3–10.6)
CHLORIDE BLD-SCNC: 103 MMOL/L (ref 99–110)
CO2: 21 MMOL/L (ref 21–32)
CREAT SERPL-MCNC: 0.7 MG/DL (ref 0.9–1.3)
EKG ATRIAL RATE: 100 BPM
EKG DIAGNOSIS: NORMAL
EKG P AXIS: 75 DEGREES
EKG P-R INTERVAL: 140 MS
EKG Q-T INTERVAL: 418 MS
EKG QRS DURATION: 80 MS
EKG QTC CALCULATION (BAZETT): 539 MS
EKG R AXIS: -64 DEGREES
EKG T AXIS: 12 DEGREES
EKG VENTRICULAR RATE: 100 BPM
GFR AFRICAN AMERICAN: >60
GFR NON-AFRICAN AMERICAN: >60
GLUCOSE BLD-MCNC: 101 MG/DL (ref 70–99)
HCT VFR BLD CALC: 29.4 % (ref 40.5–52.5)
HEMOGLOBIN: 9.9 G/DL (ref 13.5–17.5)
LIPASE: 70 U/L (ref 13–60)
MAGNESIUM: 1.2 MG/DL (ref 1.8–2.4)
MCH RBC QN AUTO: 31.5 PG (ref 26–34)
MCHC RBC AUTO-ENTMCNC: 33.5 G/DL (ref 31–36)
MCV RBC AUTO: 93.9 FL (ref 80–100)
PDW BLD-RTO: 16.6 % (ref 12.4–15.4)
PHOSPHORUS: 2.5 MG/DL (ref 2.5–4.9)
PLATELET # BLD: 61 K/UL (ref 135–450)
PMV BLD AUTO: 8 FL (ref 5–10.5)
POTASSIUM REFLEX MAGNESIUM: 3.3 MMOL/L (ref 3.5–5.1)
RBC # BLD: 3.13 M/UL (ref 4.2–5.9)
SODIUM BLD-SCNC: 134 MMOL/L (ref 136–145)
TOTAL PROTEIN: 6.7 G/DL (ref 6.4–8.2)
WBC # BLD: 2.9 K/UL (ref 4–11)

## 2020-06-30 PROCEDURE — 85027 COMPLETE CBC AUTOMATED: CPT

## 2020-06-30 PROCEDURE — 94760 N-INVAS EAR/PLS OXIMETRY 1: CPT

## 2020-06-30 PROCEDURE — 6360000002 HC RX W HCPCS: Performed by: PHYSICIAN ASSISTANT

## 2020-06-30 PROCEDURE — 6360000002 HC RX W HCPCS: Performed by: INTERNAL MEDICINE

## 2020-06-30 PROCEDURE — 83690 ASSAY OF LIPASE: CPT

## 2020-06-30 PROCEDURE — 2580000003 HC RX 258: Performed by: INTERNAL MEDICINE

## 2020-06-30 PROCEDURE — 99253 IP/OBS CNSLTJ NEW/EST LOW 45: CPT | Performed by: PSYCHIATRY & NEUROLOGY

## 2020-06-30 PROCEDURE — 6370000000 HC RX 637 (ALT 250 FOR IP): Performed by: INTERNAL MEDICINE

## 2020-06-30 PROCEDURE — 83735 ASSAY OF MAGNESIUM: CPT

## 2020-06-30 PROCEDURE — 80076 HEPATIC FUNCTION PANEL: CPT

## 2020-06-30 PROCEDURE — 84100 ASSAY OF PHOSPHORUS: CPT

## 2020-06-30 PROCEDURE — 2500000003 HC RX 250 WO HCPCS: Performed by: INTERNAL MEDICINE

## 2020-06-30 PROCEDURE — 93010 ELECTROCARDIOGRAM REPORT: CPT | Performed by: INTERNAL MEDICINE

## 2020-06-30 PROCEDURE — 1200000000 HC SEMI PRIVATE

## 2020-06-30 PROCEDURE — 36415 COLL VENOUS BLD VENIPUNCTURE: CPT

## 2020-06-30 PROCEDURE — 80048 BASIC METABOLIC PNL TOTAL CA: CPT

## 2020-06-30 RX ORDER — SODIUM CHLORIDE AND POTASSIUM CHLORIDE .9; .15 G/100ML; G/100ML
SOLUTION INTRAVENOUS CONTINUOUS
Status: DISCONTINUED | OUTPATIENT
Start: 2020-06-30 | End: 2020-06-30

## 2020-06-30 RX ORDER — POTASSIUM CHLORIDE 7.45 MG/ML
10 INJECTION INTRAVENOUS PRN
Status: DISCONTINUED | OUTPATIENT
Start: 2020-06-30 | End: 2020-07-07 | Stop reason: HOSPADM

## 2020-06-30 RX ORDER — MAGNESIUM SULFATE 1 G/100ML
1 INJECTION INTRAVENOUS ONCE
Status: COMPLETED | OUTPATIENT
Start: 2020-06-30 | End: 2020-06-30

## 2020-06-30 RX ORDER — POTASSIUM CHLORIDE 20 MEQ/1
40 TABLET, EXTENDED RELEASE ORAL PRN
Status: DISCONTINUED | OUTPATIENT
Start: 2020-06-30 | End: 2020-07-07 | Stop reason: HOSPADM

## 2020-06-30 RX ORDER — LORAZEPAM 2 MG/ML
1 INJECTION INTRAMUSCULAR ONCE
Status: DISCONTINUED | OUTPATIENT
Start: 2020-06-30 | End: 2020-07-06

## 2020-06-30 RX ORDER — MORPHINE SULFATE 4 MG/ML
4 INJECTION, SOLUTION INTRAMUSCULAR; INTRAVENOUS ONCE
Status: COMPLETED | OUTPATIENT
Start: 2020-06-30 | End: 2020-06-30

## 2020-06-30 RX ADMIN — LORAZEPAM 2 MG: 2 INJECTION INTRAMUSCULAR; INTRAVENOUS at 03:58

## 2020-06-30 RX ADMIN — POTASSIUM CHLORIDE AND SODIUM CHLORIDE: 900; 150 INJECTION, SOLUTION INTRAVENOUS at 04:06

## 2020-06-30 RX ADMIN — MAGNESIUM SULFATE HEPTAHYDRATE 1 G: 1 INJECTION, SOLUTION INTRAVENOUS at 11:55

## 2020-06-30 RX ADMIN — FOLIC ACID: 5 INJECTION, SOLUTION INTRAMUSCULAR; INTRAVENOUS; SUBCUTANEOUS at 21:10

## 2020-06-30 RX ADMIN — Medication 10 ML: at 21:10

## 2020-06-30 RX ADMIN — POTASSIUM CHLORIDE 40 MEQ: 1500 TABLET, EXTENDED RELEASE ORAL at 09:30

## 2020-06-30 RX ADMIN — LORAZEPAM 2 MG: 2 INJECTION INTRAMUSCULAR; INTRAVENOUS at 15:09

## 2020-06-30 RX ADMIN — LORAZEPAM 1 MG: 2 INJECTION INTRAMUSCULAR; INTRAVENOUS at 08:37

## 2020-06-30 RX ADMIN — THERA TABS 1 TABLET: TAB at 08:30

## 2020-06-30 RX ADMIN — MORPHINE SULFATE 4 MG: 4 INJECTION INTRAVENOUS at 03:12

## 2020-06-30 RX ADMIN — LORAZEPAM 1 MG: 2 INJECTION INTRAMUSCULAR; INTRAVENOUS at 21:20

## 2020-06-30 ASSESSMENT — PAIN SCALES - GENERAL
PAINLEVEL_OUTOF10: 0
PAINLEVEL_OUTOF10: 10
PAINLEVEL_OUTOF10: 0
PAINLEVEL_OUTOF10: 10
PAINLEVEL_OUTOF10: 0

## 2020-06-30 ASSESSMENT — PAIN DESCRIPTION - ONSET: ONSET: ON-GOING

## 2020-06-30 ASSESSMENT — PAIN DESCRIPTION - LOCATION
LOCATION: LEG;RIB CAGE
LOCATION: GENERALIZED

## 2020-06-30 ASSESSMENT — PAIN - FUNCTIONAL ASSESSMENT: PAIN_FUNCTIONAL_ASSESSMENT: PREVENTS OR INTERFERES SOME ACTIVE ACTIVITIES AND ADLS

## 2020-06-30 ASSESSMENT — PAIN DESCRIPTION - DESCRIPTORS
DESCRIPTORS: ACHING;DISCOMFORT
DESCRIPTORS: ACHING;DISCOMFORT

## 2020-06-30 ASSESSMENT — PAIN DESCRIPTION - PAIN TYPE: TYPE: ACUTE PAIN

## 2020-06-30 ASSESSMENT — PAIN DESCRIPTION - FREQUENCY: FREQUENCY: INTERMITTENT

## 2020-06-30 NOTE — H&P
Hospital Medicine History & Physical      PCP: Mandy Sampson DO    Date of Admission: 6/29/2020    Date of Service: Pt seen/examined on 06/29/20 and Admitted to Inpatient with expected LOS greater than two midnights due to medical therapy. Chief Complaint: suicidal ideation and attempt      History Of Present Illness:  Anaya Lim is 50 y.o. male who presented with complaint of suicidal attempt. Symptom onset was acute for a time period of 1 day. The severity is described as severe. The course of his symptoms over time is worsening. The symptoms improved with none and worsened with depression. The patient's symptom is associated with alcohol abuse. Anaya Lim is 50 y.o. male with history of depression and alcohol abuse. He drinks about 1 liter whiskey per day and hates his life. He says his entire body hurts, unable to keep down and not out of bed for 3 days and questionable seizure. His wife found him today with a gun to his head. In the ED, he continued to confess that he hates his life and wants to end it. At time of my interview, he is resting, appears comfortable but unable to give history because he is sleepy.          Past Medical History:          Diagnosis Date    Alcohol abuse     Arthritis     hands and back    Chronic bronchitis (HCC)     COPD (chronic obstructive pulmonary disease) (HCC)     bronchitis    Hyperlipidemia     Hypertension     MDRO (multiple drug resistant organisms) resistance     MRSA in right arm [id number]    Radiculopathy of lumbosacral region     Spondylisthesis        Past Surgical History:          Procedure Laterality Date    COLONOSCOPY N/A 12/23/2019    COLONOSCOPY WITH BIOPSY performed by Krishna Barclay MD at [address] [facility]  12/23/2019    COLONOSCOPY POLYPECTOMY SNARE/COLD BIOPSY performed by Krishna Barclay MD at [address]      right lower arm in 30's    LUMBAR SPINE SURGERY Left 4/23/2019    LEFT LUMBAR4-LUMBAR5  MICRODISCECTOMY performed by Dave Laguna MD at [facility] N/A 12/23/2019    EGD DIAGNOSTIC ONLY performed by Abhay Painter MD at [address] [place] [address]       Medications Prior to Admission: Per report, he does not take any medications at home       Allergies:  Patient has no known allergies. Social History:      The patient currently lives at home with his wife. TOBACCO:   reports that he has been smoking cigarettes. He has been smoking about 1.00 pack per day. He has never used smokeless tobacco.  ETOH:   reports current alcohol use. E-Cigarettes Vaping or Juuling     Questions Responses    Vaping Use     Start Date     Does device contain nicotine? Quit Date     Vaping Type             Family History:      Reviewed in detail and negative for DM, CAD, Cancer, CVA. REVIEW OF SYSTEMS:   Pertinent positives as noted in the HPI. All other systems reviewed and negative. PHYSICAL EXAM PERFORMED:    /86   Pulse 72   Temp 98.2 °F (36.8 °C) (Oral)   Resp 16   Ht 5' 5\" (1.651 m)   Wt 114 lb 9.6 oz (52 kg)   SpO2 100%   BMI 19.07 kg/m²     General appearance:  No apparent distress, appears stated age and cooperative. HEENT:  Normal cephalic, atraumatic without obvious deformity. Pupils equal, round, and reactive to light. Extra ocular muscles intact. Conjunctivae/corneas clear. Neck: Supple, with full range of motion. No jugular venous distention. Trachea midline. Respiratory:  Normal respiratory effort. Clear to auscultation, bilaterally without Rales/Wheezes/Rhonchi. Cardiovascular:  Regular rate and rhythm with normal S1/S2 without murmurs, rubs or gallops. Abdomen: Soft, non-tender, non-distended with normal bowel sounds. Musculoskeletal:  No clubbing, cyanosis or edema bilaterally. Full range of motion without deformity. Skin: Skin color, texture, turgor normal.  No rashes or lesions.   Neurologic: Neurovascularly intact without any focal sensory/motor deficits. Cranial nerves: II-XII intact, grossly non-focal.  Psychiatric:  Alert and oriented, thought content appropriate, normal insight  Capillary Refill: Brisk,< 3 seconds   Peripheral Pulses: +2 palpable, equal bilaterally       Labs:     Recent Labs     06/29/20 2050   WBC 3.0*   HGB 11.9*   HCT 35.3*   PLT 61*     Recent Labs     06/29/20 2050   *   K 2.8*   CL 95*   CO2 24   BUN 7   CREATININE 0.8*   CALCIUM 9.3     Recent Labs     06/29/20 2050   *   ALT 33   BILIDIR 0.5*   BILITOT 1.0   ALKPHOS 150*     Recent Labs     06/29/20 2049   INR 1.09     Recent Labs     06/29/20 2050   Na Ficks <0.01       Urinalysis:      Lab Results   Component Value Date    NITRU Negative 06/29/2020    WBCUA 10 12/21/2019    RBCUA 3 12/21/2019    BLOODU Negative 06/29/2020    SPECGRAV <1.005 06/29/2020    GLUCOSEU Negative 06/29/2020       Radiology:     EKG:  I have reviewed the EKG with the following interpretation: normal sinus rhythm, no acute ischemia       ASSESSMENT:    Principal Problem:    Suicidal ideation  Active Problems:    Alcohol use disorder, severe, dependence ([address]    Adjustment disorder with mixed anxiety and depressed mood    Hypokalemia  Resolved Problems:    * No resolved hospital problems. *      PLAN:    1. Suicidal ideation and attempt -wife found him with gun to his head. Psych consult. 1:1 sitter with suicidal precautions. 2. Depression - ethology not clear - alcoholism? social stressors ? As above - psych consult. Hold antidepressant for now. 3. Alcohol abuse - currently appears intoxicated. Vitamins and CIWA with prn ativan ordered. 4. Hypokalemia - replace potassium. Check mag and phos as well.        DVT Prophylaxis: Lovenox   Diet: DIET GENERAL;  Code Status: Full Code    PT/OT Eval Status: Not ordered     Dispo - Admit to telemetry        Rasheeda Manley MD    Thank you Julisa Lyons DO for the

## 2020-06-30 NOTE — PROGRESS NOTES
Pt asking for pain medication and ativan. Reports 10/10 pain to BLE and bilateral ribs. Pt falling asleep mid conversation and unable to finish assessment questions. CIWA score 7/10. Pt seems calm but states he is agitated. Mild diaphoresis noted to forehead and back. 1mg given per protocol.  at bedside. Will continue to monitor and reassess.

## 2020-06-30 NOTE — ED NOTES
Pt transported via bed, with seizure pads in place, pt on tele, with saline with KCL infusing. Pt belongings with security.  Pt with mask on      Jennifer Baxter RN  06/30/20 [facility], RN  06/30/20 8567

## 2020-06-30 NOTE — ED NOTES
A safety risk assessment of the patient environment was conducted and the room was modified for safety. The room is free of all removed equipment, medical supplies, and articles that may pose a threat to the patient or others. Cabinet removed for safety as well. The patient call light was left in place due to the medical nature of the unit ad the needs of the patient. The patient was placed in a hospital gown. A search of the patient and patient environment was performed. Two staff members (including ) conducted a witnessed search of all belongings in the presence of the patient. All personal items including sharp objects, belts, ties, and ingestibles were removed and secured. Patient  at bedside. Fall and seizure precautions initiated. Bed locked and in lowest position with both side rails raised. IV started and labs specimens sent to lab and medications given per [facility]. Call light within reach. Will monitor pt. hourly.           Lady Iman RN  06/29/20 8128

## 2020-06-30 NOTE — PLAN OF CARE
Problem: Falls - Risk of:  Goal: Will remain free from falls  Description: Will remain free from falls  Outcome: Ongoing  Note: Pt is wearing the fall bracelet, S.A.F.E. sign is posted outside door, and yellow blanket is on the bed. Pt informed of fall risks, verbalizes understanding, and agrees to ask for help to ambulate. Problem: Pain:  Goal: Pain level will decrease  Description: Pain level will decrease  Outcome: Ongoing  Note: 10/10 pain reported but patient sleepy and calm. Will continue to monitor. Tylenol prn     Problem: Skin Integrity:  Goal: Will show no infection signs and symptoms  Description: Will show no infection signs and symptoms  Outcome: Ongoing     Problem: Suicide risk  Goal: Provide patient with safe environment  Description: Provide patient with safe environment  Outcome: Ongoing  Note:  at bedside. Safety tray orders in place.

## 2020-06-30 NOTE — CONSULTS
drinking current amount for the last 4 yrs. Longest reports sobriety in his adult life has been 6 days   Illicits: denies. UDS +THC    Past Medical History:   Past Medical History:   Diagnosis Date    Alcohol abuse     Arthritis     hands and back    Chronic bronchitis (HCC)     COPD (chronic obstructive pulmonary disease) (HCC)     bronchitis    Hyperlipidemia     Hypertension     MDRO (multiple drug resistant organisms) resistance     MRSA in right arm [id number]    Radiculopathy of lumbosacral region     Spondylisthesis      Past Surgical History:   Procedure Laterality Date    COLONOSCOPY N/A 12/23/2019    COLONOSCOPY WITH BIOPSY performed by Libertad Jewell MD at [address]  12/23/2019    COLONOSCOPY POLYPECTOMY SNARE/COLD BIOPSY performed by Libertad Jewell MD at [address]      right lower arm in 30's    LUMBAR SPINE SURGERY Left 4/23/2019    LEFT LUMBAR4-LUMBAR5  MICRODISCECTOMY performed by Og Donahue MD at [place] [address] N/A 12/23/2019    EGD DIAGNOSTIC ONLY performed by Libertad Jewell MD at [place] [address] History:    Relationship:    Children: none   Supports: wife   Housing: lives with wife   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 No pertinent family history. Psychiatric: 2 sisters with mental illness but he doesn't know what illness    Allergies:  No Known Allergies    Home Medications:   No current facility-administered medications on file prior to encounter. No current outpatient medications on file prior to encounter.        Medications:  Scheduled Meds:   LORazepam  1 mg Intravenous Once    folic acid, thiamine, multi-vitamin with vitamin K infusion   Intravenous Once    sodium chloride flush  10 mL Intravenous 2 times per day    folic acid, thiamine, multi-vitamin with vitamin K infusion   Intravenous Daily    multivitamin  1 tablet Oral Daily     PRN Meds:.potassium chloride **OR** potassium alternative oral replacement **OR** potassium chloride, LORazepam **OR** LORazepam **OR** LORazepam **OR** LORazepam **OR** LORazepam **OR** LORazepam **OR** LORazepam **OR** LORazepam, sodium chloride flush, acetaminophen **OR** acetaminophen, polyethylene glycol, promethazine **OR** ondansetron    OBJECTIVE:  Vitals:    06/30/20 0826 06/30/20 1150 06/30/20 1450 06/30/20 1526   BP: 131/79 (!) 149/89  (!) 168/98   Pulse: 93 89  85   Resp: 20 18 18   Temp: 98.4 °F (36.9 °C) 98.8 °F (37.1 °C)  98.7 °F (37.1 °C)   TempSrc: Oral Oral  Oral   SpO2: 96% 97%  95%   Weight:   119 lb 8 oz (54.2 kg)    Height:   5' 5\" (1.651 m)        MSE:   Appearance    Alert but sleepy, cooperative, fair eye contact  Motor: +fine tremor, No other abnormal movements, tics or mannerisms.   Speech    spontaneous, normal rate and normal volume  Language    0 - no aphasia, normal  Mood/Affect    Depressed / constricted  Thought Process    linear, goal directed and slow  Thought Content    intact , future oriented, no suicidal ideation  Associations    logical connections  Attention/Concentration    intact  Orientation    oriented to person, place, time, and general circumstances  Memory    recent and remote memory intact  Fund of Knowledge    intact  Insight/Judgement    fair / poor    Labs:   Recent Labs     06/29/20 2050 06/30/20  0643   WBC 3.0* 2.9*   HGB 11.9* 9.9*   HCT 35.3* 29.4*   MCV 93.3 93.9   PLT 61* 61*     Recent Labs     06/29/20 2050 06/30/20  0643   * 134*   K 2.8* 3.3*   CL 95* 103   CO2 24 21   BUN 7 7   MG  --  1.20*   PHOS  --  2.5     Recent Labs     06/29/20 2050 06/30/20  0643   * 95*   ALT 33 27      Lab Results   Component Value Date    COLORU YELLOW 06/29/2020    NITRU Negative 06/29/2020    GLUCOSEU Negative 06/29/2020    Jude Juarez Negative 2020    UROBILINOGEN 1.0 2020    BILIRUBINUR Negative 2020     No results found for: LABA1C  No results found for: EAG  No results found for: CHOL  No results found for: TRIG  No results found for: HDL  No results found for: LDLCALC, LDLCHOLESTEROL  No results found for: LABVLDL, VLDL  No results found for: CHOLHDLRATIO  No results found for: TSH, L5ZRHOX, S5INFFJ, THYROIDAB  No results found for: MZKRGQR2K1  No results found for: AUFTZQJR84  No results found for: FOLATE    Last Drug screen: 2020: +THC    Imaging:  MRI Brain 2015:     No evidence of diffusion restriction. No evidence of extra-axial mass or collection. A few scattered punctate foci of T2 hyperintensity are identified within subcortical and periventricular white matter. Gray-white differentiation is well delineated. Ventricles and basal cisterns are normal in size and configuration. The pituitary is normal in size and signal intensity. Orbital contents appear unremarkable. Paranasal sinuses are well pneumatized. No evidence of osteolytic process. IMPRESSION:   1. No evidence of acute intracranial pathology. 2. A few nonspecific punctate T2 hyperintensities are noted. These may represent early small vessel ischemic change.     EK2020: rate 100 bpm, Normal sinus rhythmLeft anterior fascicular block, Nonspecific ST abnormality, Prolonged QT, QTc 539ms        Neel Romero MD   Psychiatrist

## 2020-06-30 NOTE — PROGRESS NOTES
[facility] PROGRESS NOTE    6/30/2020 2:36 PM        Name: Maria L Garcia . Admitted: 6/29/2020  Primary Care Provider: Moises Proctor DO (Tel: [phone])    Brief Course: Mr. Halie Hurd is a 26-year-old  male who presented to ED overnight with suicidal ideation. Patient is admitted to the floor with 1:1 sitter and psychiatric consultation. CC: Suicidal ideation    Subjective: No acute events reported overnight. Sitter at bedside. Patient received Ativan prior to my visit. Currently on the sleep.   Reviewed interval ancillary notes    Current Medications  potassium chloride (KLOR-CON M) extended release tablet 40 mEq, PRN    Or  potassium bicarb-citric acid (EFFER-K) effervescent tablet 40 mEq, PRN    Or  potassium chloride 10 mEq/100 mL IVPB (Peripheral Line), PRN  LORazepam (ATIVAN) injection 1 mg, Once  LORazepam (ATIVAN) tablet 1 mg, Q1H PRN    Or  LORazepam (ATIVAN) injection 1 mg, Q1H PRN    Or  LORazepam (ATIVAN) tablet 2 mg, Q1H PRN    Or  LORazepam (ATIVAN) injection 2 mg, Q1H PRN    Or  LORazepam (ATIVAN) tablet 3 mg, Q1H PRN    Or  LORazepam (ATIVAN) injection 3 mg, Q1H PRN    Or  LORazepam (ATIVAN) tablet 4 mg, Q1H PRN    Or  LORazepam (ATIVAN) injection 4 mg, Q1H PRN  sodium chloride 0.9 % 0,694 mL with folic acid 1 mg, adult multi-vitamin with vitamin k 10 mL, thiamine 300 mg, Once  sodium chloride flush 0.9 % injection 10 mL, 2 times per day  sodium chloride flush 0.9 % injection 10 mL, PRN  acetaminophen (TYLENOL) tablet 650 mg, Q6H PRN    Or  acetaminophen (TYLENOL) suppository 650 mg, Q6H PRN  polyethylene glycol (GLYCOLAX) packet 17 g, Daily PRN  promethazine (PHENERGAN) tablet 12.5 mg, Q6H PRN    Or  ondansetron (ZOFRAN) injection 4 mg, Q6H PRN  sodium chloride 0.9 % 125 mL with folic acid 1 mg, adult multi-vitamin with vitamin k 10 mL, thiamine 300 mg, Daily  multivitamin 1 tablet, Daily        Objective:  BP (!) 149/89   Pulse 89   Temp 98.8 °F (37.1 °C) (Oral)   Resp 18   Ht 5' 5\" (1.651 m)   Wt 114 lb 9.6 oz (52 kg)   SpO2 97%   BMI 19.07 kg/m²     Intake/Output Summary (Last 24 hours) at 6/30/2020 1436  Last data filed at 6/30/2020 0934  Gross per 24 hour   Intake 1446 ml   Output --   Net 1446 ml      Wt Readings from Last 3 Encounters:   06/29/20 114 lb 9.6 oz (52 kg)   12/21/19 140 lb (63.5 kg)   08/24/19 140 lb (63.5 kg)       General appearance:  Appears comfortable  Cardiovascular: Regular rhythm, normal S1, S2. No murmur. No edema in lower extremities  Respiratory: Not using accessory muscles. Good inspiratory effort. Clear to auscultation bilaterally, no wheeze or crackles. GI: Abdomen soft, no tenderness, not distended, normal bowel sounds  Psych: anxious upon waking up, Alert and oriented in time, place and person  Skin: Warm, dry, normal turgor  Extremity exam shows brisk capillary refill. Peripheral pulses are palpable in lower extremities     Labs and Tests:  CBC:   Recent Labs     06/29/20 2050 06/30/20  0643   WBC 3.0* 2.9*   HGB 11.9* 9.9*   PLT 61* 61*     BMP:    Recent Labs     06/29/20 2050 06/30/20  0643   * 134*   K 2.8* 3.3*   CL 95* 103   CO2 24 21   BUN 7 7   CREATININE 0.8* 0.7*   GLUCOSE 91 101*     Hepatic:   Recent Labs     06/29/20 2050 06/30/20  0643   * 95*   ALT 33 27   BILITOT 1.0 0.8   ALKPHOS 150* 146*     No orders to display       Problem List  Principal Problem:    Suicidal ideation  Active Problems:    Alcohol use disorder, severe, dependence (HCC)    Hypokalemia    Depression  Resolved Problems:    * No resolved hospital problems. *       Assessment & Plan:     #1.  Suicidal ideation and attempt:  Psychiatry consult placed. Awaiting recommendations. 1:1 sitter with suicidal precautions. #2.  History of depression:  Noted to have alcohol abuse history. Antidepressants held on admission.       #3. H/o alcohol abuse:  Was intoxicated upon arrival in ED. On CIWA protocol with Ativan as needed. #4. Hypokalemia:  Serum potassium of 3.3. Receiving oral replacement. #5. Hypomagnesemia:  Mg level of 1.2.   Ordered 1 g IV replacement.        DVT Prophylaxis: Lovenox   Diet: DIET GENERAL;  Code Status: Full Code    Jeff Malave MD   6/30/2020 2:36 PM

## 2020-06-30 NOTE — ED PROVIDER NOTES
[facility]        Pt Name: Arpit Anna  MRN: [id number]  [place] 1965  Date of evaluation: 6/29/2020  Provider: Raven Lynch PA-C  PCP: Xiomara Delcid, DO     I have seen and evaluated this patient with my supervising physician Kristi Martinez MD.    CHIEF COMPLAINT       Chief Complaint   Patient presents with    Suicidal     pt. states he is a severe alcohlic and he wants to end his life. he tried to shoot himself in the head today. wife brought him here. pt. states he has not ate, having sz's,and not off bed for 3 days. pt. states intense pain all over. drinks a liter a day and last drinks 3 days ago. HISTORY OF PRESENT ILLNESS   (Location, Timing/Onset, Context/Setting, Quality, Duration, Modifying Factors, Severity, Associated Signs and Symptoms)  Note limiting factors. Arpit Anna is a 50 y.o. male with history of alcoholism presents for evaluation with suicidal ideation. Patient states that he normally drinks a liter of alcohol a day. Last drink 3 days ago. Keeps telling me that he is sick he is sick. He states that he hurts all over, cannot keep anything down. He states he has not eaten in 4 days, has anyone got out of bed 3 days. He states that he is now having seizures. He states that he is so miserable and in so much pain that he just wants to end his life. He states that his wife found him today with a gun to his head, took it from him and brought him here. He continues to express suicidal ideation, stating that he just wants to end it. He has no other complaints or concerns at this time. Nursing Notes were all reviewed and agreed with or any disagreements were addressed in the HPI. REVIEW OF SYSTEMS    (2-9 systems for level 4, 10 or more for level 5)     Review of Systems   Constitutional: Positive for fatigue. Negative for appetite change, chills and fever. HENT: Negative for congestion and rhinorrhea. Eyes: Negative for visual disturbance. Respiratory: Negative for cough, shortness of breath and wheezing. Cardiovascular: Negative for chest pain. Gastrointestinal: Positive for abdominal pain and nausea. Negative for diarrhea and vomiting. Genitourinary: Negative for difficulty urinating, dysuria and hematuria. Musculoskeletal: Positive for myalgias. Negative for neck pain and neck stiffness. Skin: Negative for rash. Neurological: Positive for tremors and weakness. Negative for dizziness, syncope, light-headedness and headaches. Psychiatric/Behavioral: Positive for suicidal ideas. Positives and Pertinent negatives as per HPI. Except as noted above in the ROS, all other systems were reviewed and negative.        PAST MEDICAL HISTORY     Past Medical History:   Diagnosis Date    Alcohol abuse     Arthritis     hands and back    Chronic bronchitis (HCC)     COPD (chronic obstructive pulmonary disease) (HCC)     bronchitis    ESBL (extended spectrum beta-lactamase) producing bacteria infection 07/06/2020    Hyperlipidemia     Hypertension     MDRO (multiple drug resistant organisms) resistance     MRSA in right arm [id number]    Radiculopathy of lumbosacral region     Spondylisthesis          SURGICAL HISTORY     Past Surgical History:   Procedure Laterality Date    COLONOSCOPY N/A 12/23/2019    COLONOSCOPY WITH BIOPSY performed by Pat Carrion MD at [address] [facility]  12/23/2019    COLONOSCOPY POLYPECTOMY SNARE/COLD BIOPSY performed by Pat Carrion MD at [address]       right lower arm in 30's    LUMBAR SPINE SURGERY Left 4/23/2019    LEFT LUMBAR4-LUMBAR5  MICRODISCECTOMY performed by Balta Serrano MD at [address] 12/23/2019    EGD DIAGNOSTIC ONLY performed by Pat Carrion MD at [address]       Discharge Medication List as of 7/7/2020  3:14 PM Neurological:      Mental Status: He is alert and oriented to person, place, and time. Psychiatric:         Mood and Affect: Affect is tearful. Behavior: Behavior normal.         Thought Content: Thought content includes suicidal ideation. Thought content does not include homicidal ideation. Thought content includes suicidal plan. Thought content does not include homicidal plan. DIAGNOSTIC RESULTS   LABS:    Labs Reviewed   CULTURE, URINE - Abnormal; Notable for the following components:       Result Value    Urine Culture, Routine   (*)     Value: <10,000 CFU/ml mixed skin/urogenital javed. No further workup    Organism Escherichia coli ESBL (*)     Urine Culture, Routine   (*)     Value: <25,000 CFU/ml  CONTACT PRECAUTIONS INDICATED  Carbapenem antibiotics are the best option for infections caused  by ESBL producing organisms. Other antibiotic classes are  likely to result in treatment failure, even for organisms  demonstrating in vitro susceptibility.       All other components within normal limits    Narrative:     ORDER#: [id number]                          ORDERED BY: Kenya Rubalcava  SOURCE: Urine Clean Catch                  COLLECTED:  07/03/20 17:33  ANTIBIOTICS AT TERESA.:                      RECEIVED :  07/04/20 10:28  CALL  [place]  [facility] tel. [phone],  Microbiology results called to and read back by John Olivo, 07/06/2020  07:42, by [facility]  Performed at:  [place] [address]   Phone [phone]   CBC WITH AUTO DIFFERENTIAL - Abnormal; Notable for the following components:    WBC 3.0 (*)     RBC 3.79 (*)     Hemoglobin 11.9 (*)     Hematocrit 35.3 (*)     RDW 16.5 (*)     Platelets 61 (*)     Neutrophils Absolute 1.3 (*)     All other components within normal limits    Narrative:     Performed at:  [facility] [address], [place], [facility] [address]   Phone [phone]   LIPASE following components:    Acetaminophen Level <5 (*)     All other components within normal limits    Narrative:     Shahrzad Christian  [facility] tel. [phone],  Chemistry results called to and read back by BETH Fernandez, 06/29/2020  21:25, by [facility]  Performed at:  [facility] [address], [place], [address] [facility]   Phone [phone]   SALICYLATE LEVEL - Abnormal; Notable for the following components:    Salicylate, Serum <6.2 (*)     All other components within normal limits    Narrative:     Shahrzad Christian  [facility] tel. [phone],  Chemistry results called to and read back by BETH Fernandez, 06/29/2020  21:25, by [facility]  Performed at:  [facility] [address], [place], [address] [facility]   Phone [phone]   BASIC METABOLIC PANEL W/ REFLEX TO MG FOR LOW K - Abnormal; Notable for the following components:    Sodium 134 (*)     Potassium reflex Magnesium 3.3 (*)     Glucose 101 (*)     CREATININE 0.7 (*)     All other components within normal limits    Narrative:     Performed at:  [facility] [address], [place], [address] [facility]   Phone [phone]   CBC - Abnormal; Notable for the following components:    WBC 2.9 (*)     RBC 3.13 (*)     Hemoglobin 9.9 (*)     Hematocrit 29.4 (*)     RDW 16.6 (*)     Platelets 61 (*)     All other components within normal limits    Narrative:     Performed at:  [facility] [address], [place], [address] [facility]   Phone [phone]   MAGNESIUM - Abnormal; Notable for the following components:    Magnesium 1.20 (*)     All other components within normal limits    Narrative:     Performed at:  [facility] [address], [place], [address] [place]   Phone [phone]   HEPATIC FUNCTION PANEL - Abnormal; Notable for the following components:    Alb 2.8 (*)     Alkaline Phosphatase 146 (*)     AST 95 (*)     Bilirubin, Direct 0.4 (*)     All other components within normal limits    Narrative:     Performed at:  [facility] [address],  [place] [address] [employer]   Phone [phone]   LIPASE - Abnormal; Notable for the following components:    Lipase 70.0 (*)     All other components within normal limits    Narrative:     Performed at:  [facility] [address],  [place] [address] [employer]   Phone [phone]   BASIC METABOLIC PANEL W/ REFLEX TO MG FOR LOW K - Abnormal; Notable for the following components:    Sodium 131 (*)     Glucose 119 (*)     CREATININE 0.8 (*)     All other components within normal limits    Narrative:     Performed at:  [facility] [address]  [place] [address] [employer]   Phone [phone]   CBC - Abnormal; Notable for the following components:    RBC 3.35 (*)     Hemoglobin 10.7 (*)     Hematocrit 31.3 (*)     RDW 16.2 (*)     Platelets 67 (*)     All other components within normal limits    Narrative:     Performed at:  [facility] [address],  [place] [address] [employer]   Phone [phone]   BASIC METABOLIC PANEL W/ REFLEX TO MG FOR LOW K - Abnormal; Notable for the following components:    Sodium 129 (*)     Chloride 98 (*)     CREATININE 0.7 (*)     All other components within normal limits    Narrative:     Performed at:  [facility] [address],  [place] [address] [employer]   Phone [phone]   URINE RT REFLEX TO CULTURE - Abnormal; Notable for the following components:    Color, UA CORNELL (*)     Bilirubin Urine SMALL (*)     Ketones, Urine TRACE (*)     Urobilinogen, Urine 4.0 (*)     Nitrite, Urine POSITIVE (*)     Leukocyte Esterase, Urine SMALL (*)     All other components within normal limits    Narrative:     Performed at:  [facility] [address],  [place] [address] [employer]   Phone [phone]   CBC Abnormal; Notable for the following components:    Cannabinoid Scrn, Ur POSITIVE (*)     All other components within normal limits    Narrative:     Performed at:  [facility] [address] [place], [facility] [employer] [place] [employer]   Phone [phone]   HEPATITIS C ANTIBODY - Abnormal; Notable for the following components:    Hep C Ab Interp REACTIVE (*)     All other components within normal limits    Narrative:     Performed at:  [place] [address] [place], [place] [address]   Phone [phone]   PROCALCITONIN - Abnormal; Notable for the following components:    Procalcitonin 0.16 (*)     All other components within normal limits    Narrative:     Performed at:  [facility] [address] [place] [address] [place]   Phone [phone]   BASIC METABOLIC PANEL - Abnormal; Notable for the following components:    Sodium 132 (*)     CO2 20 (*)     Glucose 101 (*)     CREATININE 0.8 (*)     All other components within normal limits    Narrative:     Performed at:  [facility] [address] [place]   Phone [phone]   BASIC METABOLIC PANEL - Abnormal; Notable for the following components:    Sodium 135 (*)     CO2 18 (*)     CREATININE 0.8 (*)     All other components within normal limits    Narrative:     Collection has been rescheduled by [facility] at 07/06/2020 10:18 Reason: Add  Performed at:  [facility] [address]  [place]   Phone [phone]   CBC WITH AUTO DIFFERENTIAL - Abnormal; Notable for the following components:    WBC 3.3 (*)     RBC 3.10 (*)     Hemoglobin 9.9 (*)     Hematocrit 29.7 (*)     RDW 16.2 (*)     Platelets 94 (*)     All other components within normal limits    Narrative:     Collection has been rescheduled by [facility] at 07/06/2020 10:18 Reason: Add  Performed at:  [facility]  [place] [address],  [facility], [address]   Phone [phone]   MAGNESIUM - Abnormal; Notable for the following components:    Magnesium 1.50 (*)     All other components within normal limits    Narrative:     Collection has been rescheduled by [facility] at 07/06/2020 10:18 Reason: Add  Performed at:  [facility] [address],  [facility], [address] [facility]   Phone [phone]   BASIC METABOLIC PANEL - Abnormal; Notable for the following components:    Sodium 134 (*)     BUN 6 (*)     CREATININE 0.7 (*)     All other components within normal limits    Narrative:     Performed at:  [facility] [address],  [facility], [address]   Phone [phone]   CBC WITH AUTO DIFFERENTIAL - Abnormal; Notable for the following components:    RBC 3.15 (*)     Hemoglobin 10.1 (*)     Hematocrit 29.9 (*)     RDW 15.7 (*)     Platelets 741 (*)     All other components within normal limits    Narrative:     Performed at:  [facility] [address],  [facility], [address]   Phone [phone]   MAGNESIUM - Abnormal; Notable for the following components:    Magnesium 1.60 (*)     All other components within normal limits    Narrative:     Performed at:  [facility] [address],  [facility], [address]   Phone [phone]   CULTURE, BLOOD 2    Narrative:     ORDER#: [id number]                          ORDERED BY: Aydin Jain  SOURCE: Blood Hand, Right                  COLLECTED:  07/02/20 14:41  ANTIBIOTICS AT TERESA.:                      RECEIVED :  07/02/20 20:00  If child <=2 yrs old please draw pediatric bottle. ~Blood Culture #2  Performed at:  [place] [address] [place], [place] [address]   Phone [phone]   CULTURE, BLOOD 1    Narrative:     ORDER#: [id number]                          ORDERED BY: Anny Polo  SOURCE: Blood COLLECTED:  07/03/20 14:20  ANTIBIOTICS AT TERESA.:                      RECEIVED :  07/03/20 20:01  If child <=2 yrs old please draw pediatric bottle. ~Blood Culture #1  Performed at:  [facility]  [address] [place], [place] [address]   Phone [phone]   CULTURE, BLOOD 2    Narrative:     ORDER#: [id number]                          ORDERED BY: Bridger Johnson  SOURCE: Blood                              COLLECTED:  07/03/20 14:20  ANTIBIOTICS AT TERESA.:                      RECEIVED :  07/03/20 20:01  If child <=2 yrs old please draw pediatric bottle. ~Blood Culture #2  Performed at:  [facility]  [address] [place], [place] [address]   Phone [phone]   LEGIONELLA ANTIGEN, URINE    Narrative:     ORDER#: [id number]                          ORDERED BY: Mayelin Castellanos  SOURCE: Urine Clean Catch                  COLLECTED:  07/03/20 17:35  ANTIBIOTICS AT TERESA.:                      RECEIVED :  07/04/20 10:58  Performed at:  [facility]  [address] [place], [place] [address]   Phone [phone]   STREP PNEUMONIAE ANTIGEN    Narrative:     ORDER#: [id number]                          ORDERED BY: Mayelin Castellanos  SOURCE: Urine Clean Catch                  COLLECTED:  07/03/20 17:35  ANTIBIOTICS AT TERESA.:                      RECEIVED :  07/04/20 10:58  Performed at:  [facility]  [address] [place], [place] [address]   Phone [phone]   CULTURE, MRSA, SCREENING    Narrative:     ORDER#: [id number]                          ORDERED BY: Mayelin Castellanos  SOURCE: Axilla                             COLLECTED:  07/03/20 18:35  ANTIBIOTICS AT TERESA.:                      RECEIVED :  07/04/20 10:28  ORDER WAS CANCELLED 07/04/2020 02:91, Cancelled: Duplicate order 84/36/8567  03:02.   Performed at:  [address] [place] [address] [place] [facility] [place] [address]   Phone [phone]   CULTURE, BLOOD 1    Narrative:     ORDER#: [id number]                          ORDERED BY: Sam Tena  SOURCE: Blood Antecubital-Lef              COLLECTED:  07/06/20 12:39  ANTIBIOTICS AT TERESA.:                      RECEIVED :  07/06/20 21:55  If child <=2 yrs old please draw pediatric bottle. ~Blood Culture #1  Performed at:  [facility]  [address] [place], [facility] [place] [address]   Phone [phone]   CULTURE, BLOOD 2    Narrative:     ORDER#: [id number]                          ORDERED BY: Sam Tena  SOURCE: Blood Hand, Right                  COLLECTED:  07/06/20 12:42  ANTIBIOTICS AT TERESA.:                      RECEIVED :  07/06/20 21:55  If child <=2 yrs old please draw pediatric bottle. ~Blood Culture #2  Performed at:  [facility]  [address] [place], [facility] [place] [address]   Phone [phone]   RESPIRATORY PANEL, MOLECULAR   URINE RT REFLEX TO CULTURE    Narrative:     Performed at:  [facility] [address], [place], [employer] [facility]   Phone [phone]   TROPONIN    Narrative:     Dakota Maynard  [facility] tel. [phone],  Chemistry results called to and read back by BETH Smith, 06/29/2020  21:25, by [facility]  Performed at:  [facility] [address], [place], [facility]   Phone [phone]   PROTIME-INR    Narrative:     Performed at:  [facility] [address], [place], [facility]   Phone [phone]   ETHANOL    Narrative:     Dakota Maynard  \[facility] [place] [facility]\"" tel. [phone],  Chemistry results called to and read back by BETH Smith, 06/29/2020  21:25, by [facility]  Performed at:  [facility]  [facility] [address] [employer]   Phone [phone]   PHOSPHORUS    Narrative:     Performed at:  [facility]  [address] [facility] [place], [address]   Phone [phone]   IRON AND TIBC    Narrative:     Performed at:  [facility]  [address] [facility] [place] [address]   Phone [phone]   FERRITIN    Narrative:     Performed at:  [facility]  [address] [facility] [place] [address]   Phone [phone]   VITAMIN B12 & FOLATE    Narrative:     Performed at:  [facility]  [address] [facility] [place], [place] [address]   Phone [phone]   LACTIC ACID, PLASMA    Narrative:     Performed at:  [facility]  [address] [facility], [address]   Phone [phone]   MICROSCOPIC URINALYSIS    Narrative:     Performed at:  [facility]  [address] [facility], [address]   Phone [phone]    Narrative:     Performed at:  [facility]  [address] [place], [place] [address]   Phone [phone]   SPECIMEN REJECTION    Narrative:     Performed at:  [facility]  [address] [facility], [address]   Phone [phone]   FIBRINOGEN    Narrative:     Performed at:  [facility]  [address] [facility], [address]   Phone [phone]   APTT    Narrative:     Performed at:  [facility]  [address] [facility], [address]   Phone [phone]   HIV SCREEN    Narrative:     Performed at:  [facility]  [address] [facility] [place] [address]   Phone [phone]   HEPATITIS B SURFACE ANTIBODY    Narrative:     Performed at:  [facility]  [address] [facility] [place] [address]   Phone [phone]    Narrative:     Performed at:  [facility]  [address] [place], [place] [address]   Phone [phone] potassium of 2.8 and thrombocytopenia with a potassium of 61. LFTs slightly elevated, consistent with baseline Lipase 94. And an alcoholic pattern. Urinalysis is negative. Urine drug screen is positive for cannabinoids. Troponin is negative. Acetaminophen and salicylate levels are negative. Ethanol was 303. Coags within normal limits. Patient was given oral and IV potassium supplementation in the ED and will be admitted for further evaluation and management of acute alcohol intoxication with concern for subsequent withdrawal as well as hypokalemia and forbidding medically cleared for transfer to psychiatric facility for treatment of his suicidal ideation with plan. Hospitals will resume care the patient at this time. Patient was informed and is agreeable. He is stable for admission. FINAL IMPRESSION      1. Suicidal ideation    2. Acute alcoholic intoxication with complication ([address]    3. Hypokalemia    4.  Alcohol withdrawal syndrome without complication [facility])          DISPOSITION/PLAN   DISPOSITION        PATIENT REFERREDTO:  Greg Sarita, DO  [address] [facility] [address]  [phone]    In 1 week        DISCHARGE MEDICATIONS:  Discharge Medication List as of 7/7/2020  3:14 PM      START taking these medications    Details   sertraline (ZOLOFT) 50 MG tablet Take 1 tablet by mouth daily, Disp-30 tablet, R-0Normal      ferrous sulfate (IRON 325) 325 (65 Fe) MG tablet Take 1 tablet by mouth 2 times daily (with meals), Disp-60 tablet, R-0Normal      Multiple Vitamin (MULTIVITAMIN) TABS tablet Take 1 tablet by mouth daily, Disp-30 tablet, R-0Normal      omeprazole (PRILOSEC) 20 MG delayed release capsule Take 1 capsule by mouth 2 times daily (before meals), Disp-60 capsule, R-0Normal      LORazepam (ATIVAN) 1 MG tablet Days 1-3:  1 tab PO q6h  Days 4-6:  1 tab PO q8h  Days 7-8:  1 tab PO q12h  Days 9-10:  1 tab PO daily  Day 11:  OFF, Disp-27 tablet, R-0Print      vitamin B-1 (THIAMINE) 100 MG tablet Take 1 tablet by mouth daily, Disp-30 tablet, [id number]      folic acid (FOLVITE) 1 MG tablet Take 1 tablet by mouth daily, Disp-30 tablet, R-0Normal             DISCONTINUED MEDICATIONS:  Discharge Medication List as of 7/7/2020  3:14 PM      STOP taking these medications       ondansetron (ZOFRAN) 4 MG tablet Comments:   Reason for Stopping:         pantoprazole (PROTONIX) 20 MG tablet Comments:   Reason for Stopping:         naproxen (NAPROSYN) 500 MG tablet Comments:   Reason for Stopping:         methocarbamol (ROBAXIN) 750 MG tablet Comments:   Reason for Stopping:                      (Please note that portions of this note were completed with a voice recognition program.  Efforts were made to edit the dictations but occasionally words are mis-transcribed.)    Marylou Ferreira PA-C (electronically signed)           Marie Loomis PA-C  06/29/20 [address] [place] [address]  07/11/20 6039

## 2020-06-30 NOTE — CARE COORDINATION
CM  Attempted to meet with patient , patient too sleepy to hold a conversation. Will continue to follow.

## 2020-06-30 NOTE — PROGRESS NOTES
Admission complete. Fall and seizure precaution in place. Pt is a/o and answered all questions appropriately during admission. Pt was advised of restrictions while under suicide precautions. Room environment was cleared/removed items of potential harm and or threat to patient or others.  in room. Snack provided. Medication administered per CIWA/MAR.

## 2020-06-30 NOTE — ED NOTES
Report from [facility], safety precautions remain in place room free of all moveable equipment, dariel ed tech remains at bedside due to [address]. Pt on monitor due to low K. Call light within reach, pt with seizure pads in place.              Jazzmine Sherwood RN  06/30/20 7703

## 2020-07-01 LAB
ANION GAP SERPL CALCULATED.3IONS-SCNC: 9 MMOL/L (ref 3–16)
BUN BLDV-MCNC: 8 MG/DL (ref 7–20)
CALCIUM SERPL-MCNC: 8.3 MG/DL (ref 8.3–10.6)
CHLORIDE BLD-SCNC: 100 MMOL/L (ref 99–110)
CO2: 22 MMOL/L (ref 21–32)
CREAT SERPL-MCNC: 0.8 MG/DL (ref 0.9–1.3)
GFR AFRICAN AMERICAN: >60
GFR NON-AFRICAN AMERICAN: >60
GLUCOSE BLD-MCNC: 119 MG/DL (ref 70–99)
POTASSIUM REFLEX MAGNESIUM: 3.9 MMOL/L (ref 3.5–5.1)
SODIUM BLD-SCNC: 131 MMOL/L (ref 136–145)

## 2020-07-01 PROCEDURE — 2500000003 HC RX 250 WO HCPCS: Performed by: INTERNAL MEDICINE

## 2020-07-01 PROCEDURE — 1200000000 HC SEMI PRIVATE

## 2020-07-01 PROCEDURE — 2580000003 HC RX 258: Performed by: INTERNAL MEDICINE

## 2020-07-01 PROCEDURE — 6370000000 HC RX 637 (ALT 250 FOR IP): Performed by: PHYSICIAN ASSISTANT

## 2020-07-01 PROCEDURE — 36415 COLL VENOUS BLD VENIPUNCTURE: CPT

## 2020-07-01 PROCEDURE — 6360000002 HC RX W HCPCS: Performed by: PHYSICIAN ASSISTANT

## 2020-07-01 PROCEDURE — 99231 SBSQ HOSP IP/OBS SF/LOW 25: CPT | Performed by: PSYCHIATRY & NEUROLOGY

## 2020-07-01 PROCEDURE — 80048 BASIC METABOLIC PNL TOTAL CA: CPT

## 2020-07-01 PROCEDURE — 6360000002 HC RX W HCPCS: Performed by: INTERNAL MEDICINE

## 2020-07-01 PROCEDURE — 6370000000 HC RX 637 (ALT 250 FOR IP): Performed by: INTERNAL MEDICINE

## 2020-07-01 RX ORDER — HYDROXYZINE HYDROCHLORIDE 10 MG/1
10 TABLET, FILM COATED ORAL 3 TIMES DAILY PRN
Status: DISCONTINUED | OUTPATIENT
Start: 2020-07-01 | End: 2020-07-07 | Stop reason: HOSPADM

## 2020-07-01 RX ADMIN — LORAZEPAM 1 MG: 1 TABLET ORAL at 11:56

## 2020-07-01 RX ADMIN — Medication 10 ML: at 08:30

## 2020-07-01 RX ADMIN — LORAZEPAM 1 MG: 2 INJECTION INTRAMUSCULAR; INTRAVENOUS at 20:34

## 2020-07-01 RX ADMIN — THERA TABS 1 TABLET: TAB at 08:30

## 2020-07-01 RX ADMIN — LORAZEPAM 1 MG: 2 INJECTION INTRAMUSCULAR; INTRAVENOUS at 00:39

## 2020-07-01 RX ADMIN — FOLIC ACID: 5 INJECTION, SOLUTION INTRAMUSCULAR; INTRAVENOUS; SUBCUTANEOUS at 20:34

## 2020-07-01 RX ADMIN — Medication 10 ML: at 20:35

## 2020-07-01 RX ADMIN — LORAZEPAM 1 MG: 1 TABLET ORAL at 14:57

## 2020-07-01 ASSESSMENT — PAIN SCALES - GENERAL
PAINLEVEL_OUTOF10: 0

## 2020-07-01 NOTE — PROGRESS NOTES
07/01/20 0839 07/01/20 1153   BP: 137/82  (!) 145/88 123/81   Pulse: 95  94 98   Resp: 16  18 18   Temp: 98.7 °F (37.1 °C)  98.7 °F (37.1 °C) 98 °F (36.7 °C)   TempSrc: Axillary  Oral Oral   SpO2: 95%  95% 95%   Weight:  110 lb 14.3 oz (50.3 kg)     Height:           Mental Status Exam:   Appearance    alert, cooperative but pretty sleepy  Speech    normal volume and slow  Mood/Affect    Depressed / blunted  Thought Process    linear, goal directed and slow  Thought Content    intact , +hopelessness, +suicidal ideation with various plans  Associations    logical connections  Attention/Concentration    intact  Orientation    oriented to person, place, time, and general circumstances  Memory    recent and remote memory intact  Insight/Judgement    Fair / Impaired    Labs:   Lab Results   Component Value Date    CREATININE 0.8 (L) 07/01/2020    BUN 8 07/01/2020     (L) 07/01/2020    K 3.9 07/01/2020     07/01/2020    CO2 22 07/01/2020     This SmartLink has not been configured with any valid records. Lab Results   Component Value Date    WBC 2.9 (L) 06/30/2020    HGB 9.9 (L) 06/30/2020    HCT 29.4 (L) 06/30/2020    MCV 93.9 06/30/2020    PLT 61 (L) 06/30/2020     Lab Results   Component Value Date    ALT 27 06/30/2020    AST 95 (H) 06/30/2020    ALKPHOS 146 (H) 06/30/2020    BILITOT 0.8 06/30/2020     A:   49 yo M with depression, alcoholism, suicidal ideation. He continues to endorse suicidal ideation. Alcohol withdrawal seems to be improving and lorazepam requirement is less today, vitals are stable. However, he has several physical complaints, particularly weakness and difficulty with ambulation, that likely needs assessed/addressed before we can safely transition him to inpatient psych as well as correction of electrolytes. 1. Unspecified depressive disorder  2. Alcohol use disorder, severe  3. Alcohol withdrawal  4. Chronic back pain    Recommendations:   1.  Will await hospitalist assessment

## 2020-07-01 NOTE — PLAN OF CARE
Problem: Falls - Risk of:  Goal: Will remain free from falls  Description: Will remain free from falls  7/1/2020 0145 by Megan Hirsch RN  Outcome: Ongoing  Note: Fall precautions in place. Pt is a/o and appropriate when asking for assistance.  Bed in lowest position and locked, non skid socks on pt, call light within reach  7/1/2020 0029 by Megan Hirsch RN  Outcome: Ongoing  Note: Pt remains free from falls

## 2020-07-01 NOTE — PLAN OF CARE
Problem: Falls - Risk of:  Goal: Will remain free from falls  Description: Will remain free from falls  7/1/2020 1413 by Georgie Crigler, RN  Outcome: Ongoing  Note: Pt is wearing the fall bracelet, S.A.F.E. sign is posted outside door, and yellow blanket is on the bed. Pt informed of fall risks, verbalizes understanding, and agrees to ask for help to ambulate. Goal: Absence of physical injury  Description: Absence of physical injury  7/1/2020 1413 by Georgie Crigler, RN  Outcome: Ongoing     Problem: Pain:  Goal: Pain level will decrease  Description: Pain level will decrease  7/1/2020 1413 by Georgie Crigler, RN  Outcome: Ongoing  Note: Pain /discomfort being managed with PRN analgesics per MD orders. Patient able to express presence and absence of pain and rate pain appropriately using numerical scale.      Goal: Control of acute pain  Description: Control of acute pain  7/1/2020 1413 by Georgie Crigler, RN  Outcome: Ongoing  Goal: Control of chronic pain  Description: Control of chronic pain  7/1/2020 1413 by Georgie Crigler, RN  Outcome: Ongoing     Problem: Skin Integrity:  Goal: Will show no infection signs and symptoms  Description: Will show no infection signs and symptoms  7/1/2020 1413 by Georgie Crigler, RN  Outcome: Ongoing  Goal: Absence of new skin breakdown  Description: Absence of new skin breakdown  7/1/2020 1413 by Georgie Crigler, RN  Outcome: Ongoing     Problem: Suicide risk  Goal: Provide patient with safe environment  Description: Provide patient with safe environment  7/1/2020 1705 by Lizett Liu RN  Outcome: Ongoing  7/1/2020 1413 by Georgie Crigler, RN  Outcome: Ongoing

## 2020-07-01 NOTE — PLAN OF CARE
Problem: Falls - Risk of:  Goal: Will remain free from falls  Description: Will remain free from falls  7/1/2020 0145 by Capri Hardy RN  Outcome: Ongoing  Note: Fall precautions in place. Pt is a/o and appropriate when asking for assistance.  Bed in lowest position and locked, non skid socks on pt, call light within reach  7/1/2020 0029 by Capri Hardy RN  Outcome: Ongoing  Note: Pt remains free from falls lower

## 2020-07-01 NOTE — PLAN OF CARE
Problem: Falls - Risk of:  Goal: Will remain free from falls  Description: Will remain free from falls  7/1/2020 1413 by Low Knight RN  Outcome: Ongoing  Note: Pt is wearing the fall bracelet, S.A.F.E. sign is posted outside door, and yellow blanket is on the bed. Pt informed of fall risks, verbalizes understanding, and agrees to ask for help to ambulate. Goal: Absence of physical injury  Description: Absence of physical injury  Outcome: Ongoing     Problem: Pain:  Goal: Pain level will decrease  Description: Pain level will decrease  Outcome: Ongoing  Note: Pain /discomfort being managed with PRN analgesics per MD orders. Patient able to express presence and absence of pain and rate pain appropriately using numerical scale. Goal: Control of acute pain  Description: Control of acute pain  Outcome: Ongoing  Goal: Control of chronic pain  Description: Control of chronic pain  Outcome: Ongoing     Problem: Skin Integrity:  Goal: Will show no infection signs and symptoms  Description: Will show no infection signs and symptoms  Outcome: Ongoing  Goal: Absence of new skin breakdown  Description: Absence of new skin breakdown  Outcome: Ongoing     Problem: Suicide risk  Goal: Provide patient with safe environment  Description: Provide patient with safe environment  7/1/2020 1413 by Low Knight RN  Outcome: Ongoing  Note: Suicide precautions in place. Constant observer at bedside. Objects removed from environment for the safety of the pt and others. Seizures pads on bed. CIWA protocol.

## 2020-07-01 NOTE — PROGRESS NOTES
[address] [place] [address] PROGRESS NOTE    7/1/2020 3:43 PM        Name: Pat Guzman . Admitted: 6/29/2020  Primary Care Provider: Dallas Catherine DO (Tel: [phone])    Brief Course: Mr. Dre Franklin is a 53-year-old  male who presented to ED overnight with suicidal ideation. Patient is admitted to the floor with 1:1 sitter and psychiatric consultation. On [facility] protocol of alcohol withdrawal.     CC: none  Subjective:  No acute events reported overnight. Pt is receiving ativan as of [facility] protocol.    Reviewed interval ancillary notes    Current Medications  potassium chloride (KLOR-CON M) extended release tablet 40 mEq, PRN    Or  potassium bicarb-citric acid (EFFER-K) effervescent tablet 40 mEq, PRN    Or  potassium chloride 10 mEq/100 mL IVPB (Peripheral Line), PRN  LORazepam (ATIVAN) injection 1 mg, Once  LORazepam (ATIVAN) tablet 1 mg, Q1H PRN    Or  LORazepam (ATIVAN) injection 1 mg, Q1H PRN    Or  LORazepam (ATIVAN) tablet 2 mg, Q1H PRN    Or  LORazepam (ATIVAN) injection 2 mg, Q1H PRN    Or  LORazepam (ATIVAN) tablet 3 mg, Q1H PRN    Or  LORazepam (ATIVAN) injection 3 mg, Q1H PRN    Or  LORazepam (ATIVAN) tablet 4 mg, Q1H PRN    Or  LORazepam (ATIVAN) injection 4 mg, Q1H PRN  sodium chloride 0.9 % 0,035 mL with folic acid 1 mg, adult multi-vitamin with vitamin k 10 mL, thiamine 300 mg, Once  sodium chloride flush 0.9 % injection 10 mL, 2 times per day  sodium chloride flush 0.9 % injection 10 mL, PRN  acetaminophen (TYLENOL) tablet 650 mg, Q6H PRN    Or  acetaminophen (TYLENOL) suppository 650 mg, Q6H PRN  polyethylene glycol (GLYCOLAX) packet 17 g, Daily PRN  promethazine (PHENERGAN) tablet 12.5 mg, Q6H PRN    Or  ondansetron (ZOFRAN) injection 4 mg, Q6H PRN  sodium chloride 0.9 % 282 mL with folic acid 1 mg, adult multi-vitamin with vitamin k 10 mL, thiamine 300 mg, Daily  multivitamin 1 tablet, Daily        Objective:  /87   Pulse 90   Temp 98 °F (36.7 °C) (Oral)   Resp 18   Ht 5' 5\" (1.651 m)   Wt 110 lb 14.3 oz (50.3 kg)   SpO2 95%   BMI 18.45 kg/m²     Intake/Output Summary (Last 24 hours) at 7/1/2020 1543  Last data filed at 7/1/2020 1031  Gross per 24 hour   Intake 240 ml   Output 450 ml   Net -210 ml      Wt Readings from Last 3 Encounters:   07/01/20 110 lb 14.3 oz (50.3 kg)   12/21/19 140 lb (63.5 kg)   08/24/19 140 lb (63.5 kg)       General appearance:  White ,male, Appears comfortable  Cardiovascular: Regular rhythm, normal S1, S2. No murmur. No edema in lower extremities  Respiratory: Not using accessory muscles. Good inspiratory effort. Clear to auscultation bilaterally, no wheeze or crackles. GI: Abdomen soft, no tenderness, not distended, normal bowel sounds  Psych: Normal affect. Alert and oriented in time, place and person  Skin: Warm, dry, normal turgor  Extremity exam shows brisk capillary refill. Peripheral pulses are palpable in lower extremities     Labs and Tests:  CBC:   Recent Labs     06/29/20 2050 06/30/20  0643   WBC 3.0* 2.9*   HGB 11.9* 9.9*   PLT 61* 61*     BMP:    Recent Labs     06/29/20 2050 06/30/20  0643 07/01/20  0554   * 134* 131*   K 2.8* 3.3* 3.9   CL 95* 103 100   CO2 24 21 22   BUN 7 7 8   CREATININE 0.8* 0.7* 0.8*   GLUCOSE 91 101* 119*     Hepatic:   Recent Labs     06/29/20 2050 06/30/20  0643   * 95*   ALT 33 27   BILITOT 1.0 0.8   ALKPHOS 150* 146*     No orders to display       Problem List  Principal Problem:    Suicidal ideation  Active Problems:    Alcohol use disorder, severe, dependence (HCC)    Hypokalemia    Depression    Alcohol withdrawal syndrome without complication ([address]  Resolved Problems:    * No resolved hospital problems. *     Assessment & Plan:      #1. Suicidal ideation and attempt:  Psychiatry service on board. Getting evaluated for in-pt psych admit. 1:1 sitter with suicidal precautions.      #2. History of depression:  Antidepressants held on admission. on CIWA protocol     #3. H/o alcohol abuse:  Was intoxicated upon arrival in ED. On CIWA protocol with Ativan as needed.     #4. Hypokalemia: repeat level in am     #5. Hypomagnesemia: replaced IV.  Repeat labs in am      DVT Prophylaxis: Lovenox   Diet: DIET GENERAL;  Code Status: Full Code    Mile Lowe MD   7/1/2020 3:43 PM

## 2020-07-01 NOTE — CARE COORDINATION
Discharge Planning Assessment  RN/SW discharge planner met with patient/ (and family member) to discuss reason for admission, current living situation, and potential needs at the time of discharge. Demographics/Insurance verified Yes -  [employer]    Current type of dwelling:apartment  2nd floor  With 12 stairs    Patient from ECF/SW confirmed with:  n/a    Living arrangements: lives   with  leonaienyayo -Adelfo    Level of function/Support: Independent    PCP:  Dr Ben De La Rosa    Last Visit to PCP:  December 2019    DME: Charu Canales with any community resources/agencies/skilled home care: No    Medication compliance issues:  Denies    Financial issues that could impact healthcare: denies        Tentative discharge plan: depend on Psychiatrist recommendation. Discussed and provided facilities of choice if transition to a skilled nursing facility is required at the time of discharge-   possibility to Inpatient psych placement depending on psych recommendation. Will be provided with outpatient  substance abuse resources. Discussed with patient and/or family that on the day of discharge home tentative time of discharge will be between 10 AM and noon.     Transportation at the time of discharge: Girlfriend

## 2020-07-02 LAB
ANION GAP SERPL CALCULATED.3IONS-SCNC: 10 MMOL/L (ref 3–16)
BILIRUBIN URINE: ABNORMAL
BLOOD, URINE: NEGATIVE
BUN BLDV-MCNC: 10 MG/DL (ref 7–20)
CALCIUM SERPL-MCNC: 8.6 MG/DL (ref 8.3–10.6)
CHLORIDE BLD-SCNC: 98 MMOL/L (ref 99–110)
CLARITY: CLEAR
CO2: 21 MMOL/L (ref 21–32)
COLOR: ABNORMAL
CREAT SERPL-MCNC: 0.7 MG/DL (ref 0.9–1.3)
EPITHELIAL CELLS, UA: 1 /HPF (ref 0–5)
FERRITIN: 311.2 NG/ML (ref 30–400)
FOLATE: 18.79 NG/ML (ref 4.78–24.2)
GFR AFRICAN AMERICAN: >60
GFR NON-AFRICAN AMERICAN: >60
GLUCOSE BLD-MCNC: 93 MG/DL (ref 70–99)
GLUCOSE URINE: NEGATIVE MG/DL
HCT VFR BLD CALC: 31.3 % (ref 40.5–52.5)
HEMOGLOBIN: 10.7 G/DL (ref 13.5–17.5)
HYALINE CASTS: 1 /LPF (ref 0–8)
IRON SATURATION: 21 % (ref 20–50)
IRON: 62 UG/DL (ref 59–158)
KETONES, URINE: ABNORMAL MG/DL
LACTIC ACID: 0.9 MMOL/L (ref 0.4–2)
LEUKOCYTE ESTERASE, URINE: ABNORMAL
MCH RBC QN AUTO: 31.9 PG (ref 26–34)
MCHC RBC AUTO-ENTMCNC: 34.2 G/DL (ref 31–36)
MCV RBC AUTO: 93.5 FL (ref 80–100)
MICROSCOPIC EXAMINATION: YES
NITRITE, URINE: POSITIVE
PDW BLD-RTO: 16.2 % (ref 12.4–15.4)
PH UA: 6 (ref 5–8)
PLATELET # BLD: 67 K/UL (ref 135–450)
PMV BLD AUTO: 8.3 FL (ref 5–10.5)
POTASSIUM REFLEX MAGNESIUM: 4.4 MMOL/L (ref 3.5–5.1)
PROTEIN UA: NEGATIVE MG/DL
RBC # BLD: 3.35 M/UL (ref 4.2–5.9)
RBC UA: 2 /HPF (ref 0–4)
REASON FOR REJECTION: NORMAL
REJECTED TEST: NORMAL
SODIUM BLD-SCNC: 129 MMOL/L (ref 136–145)
SPECIFIC GRAVITY UA: 1.02 (ref 1–1.03)
TOTAL IRON BINDING CAPACITY: 289 UG/DL (ref 260–445)
URINE REFLEX TO CULTURE: ABNORMAL
URINE TYPE: ABNORMAL
UROBILINOGEN, URINE: 4 E.U./DL
VITAMIN B-12: 681 PG/ML (ref 211–911)
WBC # BLD: 4.9 K/UL (ref 4–11)
WBC UA: 1 /HPF (ref 0–5)

## 2020-07-02 PROCEDURE — 83605 ASSAY OF LACTIC ACID: CPT

## 2020-07-02 PROCEDURE — 82607 VITAMIN B-12: CPT

## 2020-07-02 PROCEDURE — 6370000000 HC RX 637 (ALT 250 FOR IP): Performed by: INTERNAL MEDICINE

## 2020-07-02 PROCEDURE — 82728 ASSAY OF FERRITIN: CPT

## 2020-07-02 PROCEDURE — U0003 INFECTIOUS AGENT DETECTION BY NUCLEIC ACID (DNA OR RNA); SEVERE ACUTE RESPIRATORY SYNDROME CORONAVIRUS 2 (SARS-COV-2) (CORONAVIRUS DISEASE [COVID-19]), AMPLIFIED PROBE TECHNIQUE, MAKING USE OF HIGH THROUGHPUT TECHNOLOGIES AS DESCRIBED BY CMS-2020-01-R: HCPCS

## 2020-07-02 PROCEDURE — 2580000003 HC RX 258: Performed by: INTERNAL MEDICINE

## 2020-07-02 PROCEDURE — 83540 ASSAY OF IRON: CPT

## 2020-07-02 PROCEDURE — 6360000002 HC RX W HCPCS: Performed by: PHYSICIAN ASSISTANT

## 2020-07-02 PROCEDURE — 80048 BASIC METABOLIC PNL TOTAL CA: CPT

## 2020-07-02 PROCEDURE — 6370000000 HC RX 637 (ALT 250 FOR IP): Performed by: PHYSICIAN ASSISTANT

## 2020-07-02 PROCEDURE — 36415 COLL VENOUS BLD VENIPUNCTURE: CPT

## 2020-07-02 PROCEDURE — 82746 ASSAY OF FOLIC ACID SERUM: CPT

## 2020-07-02 PROCEDURE — 87040 BLOOD CULTURE FOR BACTERIA: CPT

## 2020-07-02 PROCEDURE — 1200000000 HC SEMI PRIVATE

## 2020-07-02 PROCEDURE — 81001 URINALYSIS AUTO W/SCOPE: CPT

## 2020-07-02 PROCEDURE — 83550 IRON BINDING TEST: CPT

## 2020-07-02 PROCEDURE — 85027 COMPLETE CBC AUTOMATED: CPT

## 2020-07-02 RX ORDER — FERROUS SULFATE 325(65) MG
325 TABLET ORAL 2 TIMES DAILY WITH MEALS
Status: DISCONTINUED | OUTPATIENT
Start: 2020-07-03 | End: 2020-07-07 | Stop reason: HOSPADM

## 2020-07-02 RX ADMIN — LORAZEPAM 2 MG: 1 TABLET ORAL at 17:43

## 2020-07-02 RX ADMIN — ACETAMINOPHEN 650 MG: 325 TABLET, FILM COATED ORAL at 01:47

## 2020-07-02 RX ADMIN — LORAZEPAM 2 MG: 1 TABLET ORAL at 20:58

## 2020-07-02 RX ADMIN — LORAZEPAM 2 MG: 2 INJECTION INTRAMUSCULAR; INTRAVENOUS at 01:52

## 2020-07-02 RX ADMIN — LORAZEPAM 1 MG: 1 TABLET ORAL at 09:03

## 2020-07-02 RX ADMIN — ACETAMINOPHEN 650 MG: 325 TABLET, FILM COATED ORAL at 12:34

## 2020-07-02 RX ADMIN — Medication 10 ML: at 21:00

## 2020-07-02 RX ADMIN — THERA TABS 1 TABLET: TAB at 08:55

## 2020-07-02 RX ADMIN — ACETAMINOPHEN 650 MG: 325 TABLET, FILM COATED ORAL at 20:58

## 2020-07-02 RX ADMIN — Medication 10 ML: at 08:56

## 2020-07-02 ASSESSMENT — PAIN SCALES - GENERAL
PAINLEVEL_OUTOF10: 0

## 2020-07-02 NOTE — PROGRESS NOTES
[facility] PROGRESS NOTE    7/2/2020 7:32 PM        Name: Ceci Lopez . Admitted: 6/29/2020  Primary Care Provider: Tomas Simeon DO (Tel: [phone])    Brief Course:  Mr. Ricky Perez is a 82-year-old  male who presented to ED overnight with suicidal ideation. Gaurav Landon is admitted to the floor with 1:1 sitter and psychiatric consultation. On [facility] protocol for alcohol withdrawal.      CC: none  Subjective:  No acute events reported overnight. Pt is receiving ativan as of [facility] protocol. Noted to have feverx2 today.    Reviewed interval ancillary notes    Current Medications  hydrOXYzine (ATARAX) tablet 10 mg, TID PRN  potassium chloride (KLOR-CON M) extended release tablet 40 mEq, PRN    Or  potassium bicarb-citric acid (EFFER-K) effervescent tablet 40 mEq, PRN    Or  potassium chloride 10 mEq/100 mL IVPB (Peripheral Line), PRN  LORazepam (ATIVAN) injection 1 mg, Once  LORazepam (ATIVAN) tablet 1 mg, Q1H PRN    Or  LORazepam (ATIVAN) injection 1 mg, Q1H PRN    Or  LORazepam (ATIVAN) tablet 2 mg, Q1H PRN    Or  LORazepam (ATIVAN) injection 2 mg, Q1H PRN    Or  LORazepam (ATIVAN) tablet 3 mg, Q1H PRN    Or  LORazepam (ATIVAN) injection 3 mg, Q1H PRN    Or  LORazepam (ATIVAN) tablet 4 mg, Q1H PRN    Or  LORazepam (ATIVAN) injection 4 mg, Q1H PRN  sodium chloride 0.9 % 1,192 mL with folic acid 1 mg, adult multi-vitamin with vitamin k 10 mL, thiamine 300 mg, Once  sodium chloride flush 0.9 % injection 10 mL, 2 times per day  sodium chloride flush 0.9 % injection 10 mL, PRN  acetaminophen (TYLENOL) tablet 650 mg, Q6H PRN    Or  acetaminophen (TYLENOL) suppository 650 mg, Q6H PRN  polyethylene glycol (GLYCOLAX) packet 17 g, Daily PRN  promethazine (PHENERGAN) tablet 12.5 mg, Q6H PRN    Or  ondansetron (ZOFRAN) injection 4 mg, Q6H PRN  multivitamin 1 tablet, Daily        Objective:  /79   Pulse 94   Temp 99.6 °F (37.6 °C) (Oral)   Resp 20   Ht 5' 5\" (1.651 m)   Wt 110 lb 14.3 oz (50.3 kg)   SpO2 96%   BMI 18.45 kg/m²     Intake/Output Summary (Last 24 hours) at 7/2/2020 1932  Last data filed at 7/2/2020 0915  Gross per 24 hour   Intake 120 ml   Output --   Net 120 ml      Wt Readings from Last 3 Encounters:   07/01/20 110 lb 14.3 oz (50.3 kg)   12/21/19 140 lb (63.5 kg)   08/24/19 140 lb (63.5 kg)     General appearance:  White ,male, Appears comfortable  Cardiovascular: Regular rhythm, normal S1, S2. No murmur. No edema in lower extremities  Respiratory: Not using accessory muscles. Good inspiratory effort. Clear to auscultation bilaterally, no wheeze or crackles. GI: Abdomen soft, no tenderness, not distended, normal bowel sounds  Psych: Normal affect. Alert and oriented in time, place and person  Skin: Warm, dry, normal turgor  Extremity exam shows brisk capillary refill. Peripheral pulses are palpable in lower extremities      Labs and Tests:  CBC:   Recent Labs     06/29/20 2050 06/30/20  0643 07/02/20  1301   WBC 3.0* 2.9* 4.9   HGB 11.9* 9.9* 10.7*   PLT 61* 61* 67*     BMP:    Recent Labs     06/30/20  0643 07/01/20  0554 07/02/20  1301   * 131* 129*   K 3.3* 3.9 4.4    100 98*   CO2 21 22 21   BUN 7 8 10   CREATININE 0.7* 0.8* 0.7*   GLUCOSE 101* 119* 93     Hepatic:   Recent Labs     06/29/20 2050 06/30/20  0643   * 95*   ALT 33 27   BILITOT 1.0 0.8   ALKPHOS 150* 146*     No orders to display       Problem List  Principal Problem:    Suicidal ideation  Active Problems:    Alcohol use disorder, severe, dependence (HCC)    Hypokalemia    Depression    Alcohol withdrawal syndrome without complication ([address]  Resolved Problems:    * No resolved hospital problems. *       Assessment & Plan:     Mild leucocytosis with Fevers:  Working up for possible source of infection. COVID-19 sent sent. Droplet precautions/isolation.    Ordered for pro-calcitonin, d-dimer, fibrinogen, ferritin and LDH levels in am.   Clinically stable. No clear source of infection. Not starting on iv antibiotics yet. Will f/u cultures. HypoNa, hypoKa, HypoMg:   Replaced yesterday. Repeat labs ordered. Sr Na at 129. Likely due to poor po intake. Ordered osmolalities and urine Na level with BMP in am.     Sr K and Mg level in normal range after replacement. Anemia:   Hb level of 10.7. Likely chronic from poor nutrition/alcohol use. W/u suggestive of Iron def. Started on oral Iron replacement. Suicidal ideation:   1:1 sitter. Psych on board. Will admit to in-pt psych once medically stable. Alcohol withdrawal:   On CIWA protocol. Getting Ativan prn.        Mile Lowe MD   7/2/2020 7:32 PM

## 2020-07-02 NOTE — PROGRESS NOTES
Nutrition Assessment    Type and Reason for Visit: Initial, Positive Nutrition Screen    Nutrition Recommendations:  1. Continue Ensure Enlive BID  2. Continue current diet    Nutrition Assessment: Pt is nutritionally compromised r/t poor po intake and underweight BMI. Pt admitted for suicidal ideation; ETOH w/d, pt drinks one liter liquor per day. RD ordered Ensure Enlive 7/1 to increase energy intake, although ONS intake unknown. PO intake recorded less than 50%. Will continue to send ONS to supplement meals. Malnutrition Assessment:  · Malnutrition Status: Insufficient data    Nutrition Risk Level: Moderate    Nutrient Needs:  · Estimated Daily Total Kcal: 0733-5506  · Estimated Daily Protein (g): 65-75  · Estimated Daily Total Fluid (ml/day): 1mL/kcal    Nutrition Diagnosis:   · Problem: Inadequate oral intake  · Etiology: related to Impaired respiratory function-inability to consume food, Psychological cause/life stress     Signs and symptoms:  as evidenced by Other (Comment), BMI(18.45kg/m2; Intake 1-50%)    Objective Information:  · Nutrition-Focused Physical Findings: Active BS. No edema. · Wound Type: None  · Current Nutrition Therapies:  · Oral Diet Orders: General(Safety Tray)   · Oral Diet intake: 1-25%, 26-50%  · Oral Nutrition Supplement (ONS) Orders: Standard High Calorie Oral Supplement  · ONS intake: Unable to assess  · Anthropometric Measures:  · Ht: 5' 5\" (165.1 cm)   · Current Body Wt: 110 lb (49.9 kg)  · % Weight Change:  ,  EMR shows wt hx of 140#. Unsure if actual or stated. · Ideal Body Wt: 136 lb (61.7 kg), % Ideal Body 80%  · BMI Classification: BMI <18.5 Underweight    Nutrition Interventions:   Continue current diet, Continue current ONS  Continued Inpatient Monitoring, Education Not Indicated    Nutrition Evaluation:   · Evaluation: Goals set   · Goals: PO and supplement intake greater than 50% at all meals.      · Monitoring: Meal Intake, Supplement Intake, Weight      Electronically signed by Kayleigh Ruiz RD, LD on 7/2/20 at 2:14 PM EDT    Contact Number: [phone]

## 2020-07-02 NOTE — CARE COORDINATION
[facility] confirmed receipt of the referral papers. Will call back with determination.

## 2020-07-02 NOTE — CARE COORDINATION
CM faxed referral to [facility] facility -   Phone [phone] for Psych Inpatient placement. Will continue to follow.

## 2020-07-03 ENCOUNTER — APPOINTMENT (OUTPATIENT)
Dept: GENERAL RADIOLOGY | Age: 55
End: 2020-07-03
Payer: COMMERCIAL

## 2020-07-03 LAB
ALBUMIN SERPL-MCNC: 2.9 G/DL (ref 3.4–5)
ALP BLD-CCNC: 119 U/L (ref 40–129)
ALT SERPL-CCNC: 23 U/L (ref 10–40)
AMPHETAMINE SCREEN, URINE: ABNORMAL
ANION GAP SERPL CALCULATED.3IONS-SCNC: 12 MMOL/L (ref 3–16)
APTT: 35.3 SEC (ref 24.2–36.2)
AST SERPL-CCNC: 70 U/L (ref 15–37)
BARBITURATE SCREEN URINE: ABNORMAL
BASOPHILS ABSOLUTE: 0.1 K/UL (ref 0–0.2)
BASOPHILS RELATIVE PERCENT: 1.9 %
BENZODIAZEPINE SCREEN, URINE: ABNORMAL
BILIRUB SERPL-MCNC: 1.3 MG/DL (ref 0–1)
BILIRUBIN DIRECT: 0.7 MG/DL (ref 0–0.3)
BILIRUBIN, INDIRECT: 0.6 MG/DL (ref 0–1)
BUN BLDV-MCNC: 14 MG/DL (ref 7–20)
CALCIUM SERPL-MCNC: 9 MG/DL (ref 8.3–10.6)
CANNABINOID SCREEN URINE: POSITIVE
CHLORIDE BLD-SCNC: 98 MMOL/L (ref 99–110)
CO2: 22 MMOL/L (ref 21–32)
COCAINE METABOLITE SCREEN URINE: ABNORMAL
CREAT SERPL-MCNC: 0.8 MG/DL (ref 0.9–1.3)
D DIMER: 698 NG/ML DDU (ref 0–229)
EOSINOPHILS ABSOLUTE: 0 K/UL (ref 0–0.6)
EOSINOPHILS RELATIVE PERCENT: 0.3 %
FIBRINOGEN: 311 MG/DL (ref 200–397)
GFR AFRICAN AMERICAN: >60
GFR NON-AFRICAN AMERICAN: >60
GLUCOSE BLD-MCNC: 132 MG/DL (ref 70–99)
HCT VFR BLD CALC: 30.3 % (ref 40.5–52.5)
HEMOGLOBIN: 10.2 G/DL (ref 13.5–17.5)
INR BLD: 1.31 (ref 0.86–1.14)
LYMPHOCYTES ABSOLUTE: 1 K/UL (ref 1–5.1)
LYMPHOCYTES RELATIVE PERCENT: 22.3 %
Lab: ABNORMAL
MCH RBC QN AUTO: 31.7 PG (ref 26–34)
MCHC RBC AUTO-ENTMCNC: 33.6 G/DL (ref 31–36)
MCV RBC AUTO: 94.4 FL (ref 80–100)
METHADONE SCREEN, URINE: ABNORMAL
MONOCYTES ABSOLUTE: 0.5 K/UL (ref 0–1.3)
MONOCYTES RELATIVE PERCENT: 11 %
NEUTROPHILS ABSOLUTE: 2.8 K/UL (ref 1.7–7.7)
NEUTROPHILS RELATIVE PERCENT: 64.5 %
OPIATE SCREEN URINE: ABNORMAL
OXYCODONE URINE: ABNORMAL
PDW BLD-RTO: 16.2 % (ref 12.4–15.4)
PH UA: 6
PHENCYCLIDINE SCREEN URINE: ABNORMAL
PLATELET # BLD: 76 K/UL (ref 135–450)
PMV BLD AUTO: 7.9 FL (ref 5–10.5)
POTASSIUM REFLEX MAGNESIUM: 3.9 MMOL/L (ref 3.5–5.1)
PROCALCITONIN: 0.16 NG/ML (ref 0–0.15)
PROCALCITONIN: 0.19 NG/ML (ref 0–0.15)
PROPOXYPHENE SCREEN: ABNORMAL
PROTHROMBIN TIME: 15.2 SEC (ref 10–13.2)
RBC # BLD: 3.21 M/UL (ref 4.2–5.9)
SARS-COV-2, PCR: NOT DETECTED
SODIUM BLD-SCNC: 132 MMOL/L (ref 136–145)
TOTAL PROTEIN: 7.2 G/DL (ref 6.4–8.2)
WBC # BLD: 4.3 K/UL (ref 4–11)

## 2020-07-03 PROCEDURE — 6370000000 HC RX 637 (ALT 250 FOR IP): Performed by: PHYSICIAN ASSISTANT

## 2020-07-03 PROCEDURE — 36415 COLL VENOUS BLD VENIPUNCTURE: CPT

## 2020-07-03 PROCEDURE — 87077 CULTURE AEROBIC IDENTIFY: CPT

## 2020-07-03 PROCEDURE — 85379 FIBRIN DEGRADATION QUANT: CPT

## 2020-07-03 PROCEDURE — 85730 THROMBOPLASTIN TIME PARTIAL: CPT

## 2020-07-03 PROCEDURE — 6370000000 HC RX 637 (ALT 250 FOR IP): Performed by: INTERNAL MEDICINE

## 2020-07-03 PROCEDURE — 1200000000 HC SEMI PRIVATE

## 2020-07-03 PROCEDURE — U0003 INFECTIOUS AGENT DETECTION BY NUCLEIC ACID (DNA OR RNA); SEVERE ACUTE RESPIRATORY SYNDROME CORONAVIRUS 2 (SARS-COV-2) (CORONAVIRUS DISEASE [COVID-19]), AMPLIFIED PROBE TECHNIQUE, MAKING USE OF HIGH THROUGHPUT TECHNOLOGIES AS DESCRIBED BY CMS-2020-01-R: HCPCS

## 2020-07-03 PROCEDURE — 6370000000 HC RX 637 (ALT 250 FOR IP): Performed by: PSYCHIATRY & NEUROLOGY

## 2020-07-03 PROCEDURE — 99231 SBSQ HOSP IP/OBS SF/LOW 25: CPT | Performed by: PSYCHIATRY & NEUROLOGY

## 2020-07-03 PROCEDURE — 2580000003 HC RX 258: Performed by: INTERNAL MEDICINE

## 2020-07-03 PROCEDURE — 71045 X-RAY EXAM CHEST 1 VIEW: CPT

## 2020-07-03 PROCEDURE — 86706 HEP B SURFACE ANTIBODY: CPT

## 2020-07-03 PROCEDURE — 80048 BASIC METABOLIC PNL TOTAL CA: CPT

## 2020-07-03 PROCEDURE — U0002 COVID-19 LAB TEST NON-CDC: HCPCS

## 2020-07-03 PROCEDURE — 86803 HEPATITIS C AB TEST: CPT

## 2020-07-03 PROCEDURE — 6360000002 HC RX W HCPCS: Performed by: PHYSICIAN ASSISTANT

## 2020-07-03 PROCEDURE — 87081 CULTURE SCREEN ONLY: CPT

## 2020-07-03 PROCEDURE — 86701 HIV-1ANTIBODY: CPT

## 2020-07-03 PROCEDURE — 87086 URINE CULTURE/COLONY COUNT: CPT

## 2020-07-03 PROCEDURE — 99255 IP/OBS CONSLTJ NEW/EST HI 80: CPT | Performed by: INTERNAL MEDICINE

## 2020-07-03 PROCEDURE — 87040 BLOOD CULTURE FOR BACTERIA: CPT

## 2020-07-03 PROCEDURE — 85025 COMPLETE CBC W/AUTO DIFF WBC: CPT

## 2020-07-03 PROCEDURE — 87390 HIV-1 AG IA: CPT

## 2020-07-03 PROCEDURE — 80076 HEPATIC FUNCTION PANEL: CPT

## 2020-07-03 PROCEDURE — 87186 SC STD MICRODIL/AGAR DIL: CPT

## 2020-07-03 PROCEDURE — 84145 PROCALCITONIN (PCT): CPT

## 2020-07-03 PROCEDURE — 85610 PROTHROMBIN TIME: CPT

## 2020-07-03 PROCEDURE — 6360000002 HC RX W HCPCS: Performed by: INTERNAL MEDICINE

## 2020-07-03 PROCEDURE — 85384 FIBRINOGEN ACTIVITY: CPT

## 2020-07-03 PROCEDURE — 80307 DRUG TEST PRSMV CHEM ANLYZR: CPT

## 2020-07-03 PROCEDURE — 87449 NOS EACH ORGANISM AG IA: CPT

## 2020-07-03 PROCEDURE — 86702 HIV-2 ANTIBODY: CPT

## 2020-07-03 RX ORDER — CIPROFLOXACIN 500 MG/1
500 TABLET, FILM COATED ORAL EVERY 12 HOURS SCHEDULED
Status: DISCONTINUED | OUTPATIENT
Start: 2020-07-03 | End: 2020-07-06

## 2020-07-03 RX ORDER — CHLORDIAZEPOXIDE HYDROCHLORIDE 5 MG/1
10 CAPSULE, GELATIN COATED ORAL 3 TIMES DAILY
Status: DISCONTINUED | OUTPATIENT
Start: 2020-07-03 | End: 2020-07-04

## 2020-07-03 RX ADMIN — Medication 10 ML: at 10:59

## 2020-07-03 RX ADMIN — CHLORDIAZEPOXIDE HYDROCHLORIDE 10 MG: 5 CAPSULE ORAL at 10:57

## 2020-07-03 RX ADMIN — CHLORDIAZEPOXIDE HYDROCHLORIDE 10 MG: 5 CAPSULE ORAL at 22:26

## 2020-07-03 RX ADMIN — SERTRALINE 50 MG: 50 TABLET, FILM COATED ORAL at 09:32

## 2020-07-03 RX ADMIN — ACETAMINOPHEN 650 MG: 325 TABLET, FILM COATED ORAL at 04:20

## 2020-07-03 RX ADMIN — FERROUS SULFATE TAB 325 MG (65 MG ELEMENTAL FE) 325 MG: 325 (65 FE) TAB at 16:22

## 2020-07-03 RX ADMIN — THERA TABS 1 TABLET: TAB at 09:32

## 2020-07-03 RX ADMIN — LORAZEPAM 2 MG: 1 TABLET ORAL at 13:43

## 2020-07-03 RX ADMIN — VANCOMYCIN HYDROCHLORIDE 750 MG: 750 INJECTION, POWDER, LYOPHILIZED, FOR SOLUTION INTRAVENOUS at 16:32

## 2020-07-03 RX ADMIN — LORAZEPAM 3 MG: 2 INJECTION INTRAMUSCULAR; INTRAVENOUS at 18:51

## 2020-07-03 RX ADMIN — LORAZEPAM 3 MG: 2 INJECTION INTRAMUSCULAR; INTRAVENOUS at 16:16

## 2020-07-03 RX ADMIN — LORAZEPAM 2 MG: 1 TABLET ORAL at 09:33

## 2020-07-03 RX ADMIN — LORAZEPAM 2 MG: 1 TABLET ORAL at 10:58

## 2020-07-03 RX ADMIN — LORAZEPAM 1 MG: 1 TABLET ORAL at 22:26

## 2020-07-03 RX ADMIN — LORAZEPAM 2 MG: 1 TABLET ORAL at 00:01

## 2020-07-03 RX ADMIN — Medication 10 ML: at 22:26

## 2020-07-03 RX ADMIN — CIPROFLOXACIN HYDROCHLORIDE 500 MG: 500 TABLET, FILM COATED ORAL at 19:54

## 2020-07-03 RX ADMIN — FERROUS SULFATE TAB 325 MG (65 MG ELEMENTAL FE) 325 MG: 325 (65 FE) TAB at 09:38

## 2020-07-03 RX ADMIN — CHLORDIAZEPOXIDE HYDROCHLORIDE 10 MG: 5 CAPSULE ORAL at 16:22

## 2020-07-03 RX ADMIN — LORAZEPAM 3 MG: 2 INJECTION INTRAMUSCULAR; INTRAVENOUS at 14:53

## 2020-07-03 RX ADMIN — LORAZEPAM 2 MG: 1 TABLET ORAL at 04:21

## 2020-07-03 ASSESSMENT — ENCOUNTER SYMPTOMS
SORE THROAT: 0
WHEEZING: 0
CONSTIPATION: 0
TROUBLE SWALLOWING: 0
DIARRHEA: 1
SHORTNESS OF BREATH: 1
EYE DISCHARGE: 0
BACK PAIN: 0
ABDOMINAL PAIN: 0
RHINORRHEA: 0
NAUSEA: 0
COUGH: 1
EYE REDNESS: 0

## 2020-07-03 ASSESSMENT — PAIN SCALES - GENERAL: PAINLEVEL_OUTOF10: 3

## 2020-07-03 NOTE — PROGRESS NOTES
Pt verbalizes agitation and anxiety. Requesting to leave AMA. Pt states\" I ready to hit the s**t and you know what i'm talken bout. I need a drink. \" Encourage pt to stay and will help to manage withdrawal. CIWA completed, numbers elevated and ativan given per protocol.

## 2020-07-03 NOTE — PROGRESS NOTES
Clinical Pharmacy Note: Pharmacy to Dose Vancomycin    Missael Sharif is a 50 y.o. male started on Vancomycin for Fevers; consult received from Dr. Howard Rios to manage therapy. Also receiving the following antibiotics: PO ciprofloxacin. Goal Trough Level: 10-15 mcg/mL    Assessment/Plan:  Initiate vancomycin 750 mg IV every 12 hours. A vancomycin trough has been ordered for 7/5/20 @ 0400. Changes in regimen will be determined based on culture results, renal function, and clinical response. Pharmacy will continue to monitor and adjust regimen as necessary. Allergies:  Patient has no known allergies. Recent Labs     07/02/20  1301 07/03/20  0627   CREATININE 0.7* 0.8*       Recent Labs     07/02/20  1301 07/03/20  0627   WBC 4.9 4.3       Ht Readings from Last 1 Encounters:   06/30/20 5' 5\" (1.651 m)        Wt Readings from Last 1 Encounters:   07/01/20 110 lb 14.3 oz (50.3 kg)         Estimated Creatinine Clearance: 76 mL/min (A) (based on SCr of 0.8 mg/dL (L)).       Thank you for the consult,    Jean Tierney, PharmD  Clinical Pharmacist [id number]  7/3/2020

## 2020-07-03 NOTE — PROGRESS NOTES
Shift assessment completed and PRN medications administered. CIWA score at 11. Medicated per MAR. Temp elevated at 102. 3. Medicated with Tylenol. Patient requested for this RN to call wife and update her on his current status in covid-19 rule out. Mary Beth Barlow called. Verbalized understanding. Asked that she be notified with the results.

## 2020-07-03 NOTE — PROGRESS NOTES
PSYCHIATRY CONSULT PROGRESS NOTE    7/3/2020  Gloria Mark  [id number]    CC/Reason for Consult: suicidal ideation    S: patient has had a fever at times over the last day so was not able to be cleared for transfer to psychiatry. He reports he is doing emotionally better. Less anxiety, less feeling of sadness and depression. More future oriented about getting better, seeing a psychiatrist, getting treatment for his alcoholism. He denies and suicidal ideation at this time. Feels his physical pain is a little better and this helps - now an 8/10 rather than 10/10. ROS: no fevers or chills, no abdominal pain, +pain in back/head.      PMH/SH/FH reviewed and no changes     ferrous sulfate  325 mg Oral BID WC    LORazepam  1 mg Intravenous Once    folic acid, thiamine, multi-vitamin with vitamin K infusion   Intravenous Once    sodium chloride flush  10 mL Intravenous 2 times per day    multivitamin  1 tablet Oral Daily     hydrOXYzine, potassium chloride **OR** potassium alternative oral replacement **OR** potassium chloride, LORazepam **OR** LORazepam **OR** LORazepam **OR** LORazepam **OR** LORazepam **OR** LORazepam **OR** LORazepam **OR** LORazepam, sodium chloride flush, acetaminophen **OR** acetaminophen, polyethylene glycol, promethazine **OR** ondansetron    O:  .  Vitals:    07/02/20 1733 07/02/20 2045 07/02/20 2342 07/03/20 0418   BP: 117/79 119/79 106/68 120/76   Pulse: 94 105 96 93   Resp:  19 16 16   Temp:  102.3 °F (39.1 °C) 100.3 °F (37.9 °C) 100.8 °F (38.2 °C)   TempSrc:  Oral Temporal Temporal   SpO2:  95% 95% 96%   Weight:       Height:           Mental Status Exam:   Appearance    alert, cooperative  Speech    normal volume and slow  Mood/Affect   \"better\" / blunted  Thought Process    linear, goal directed and slow  Thought Content    intact , no suicidal ideation, intent or plan, future oriented  Associations    logical connections  Attention/Concentration    intact  Orientation oriented to person, place, time, and general circumstances  Memory    recent and remote memory intact  Insight/Judgement    Fair / fair    Labs:   Lab Results   Component Value Date    CREATININE 0.8 (L) 07/03/2020    BUN 14 07/03/2020     (L) 07/03/2020    K 3.9 07/03/2020    CL 98 (L) 07/03/2020    CO2 22 07/03/2020     This SmartLink has not been configured with any valid records. Lab Results   Component Value Date    WBC 4.3 07/03/2020    HGB 10.2 (L) 07/03/2020    HCT 30.3 (L) 07/03/2020    MCV 94.4 07/03/2020    PLT 76 (L) 07/03/2020     Lab Results   Component Value Date    ALT 23 07/03/2020    AST 70 (H) 07/03/2020    ALKPHOS 119 07/03/2020    BILITOT 1.3 (H) 07/03/2020     A:   51 yo M with depression, alcoholism, suicidal ideation. He is in better spirits now, no longer suicidal and is future oriented. He would benefit from alcohol treatment as likely his suicidal ideation and depression has largely been influenced by this. 1. Unspecified depressive disorder  2. Alcohol use disorder, severe  3. Alcohol withdrawal  4. Chronic back pain    Recommendations:   1. No longer requires inpatient psychiatric admission  2. I recommended he try to connect with outpatient substance abuse treatment at [facility]. It would be helpful if  talks to him prior to discharge about addition treatment options. 3. Will start sertraline 50mg daily for his depression and anxiety. 4. I have already confirmed with wife that he doesn't actually have access to any firearms and there are none in the home. Dispo: no longer requires inpatient psychiatric admission  Safety: RF include  male, substance abuse, chronic medical issues/chronic pain. Pt is high risk for future dangerousness, however at this time he is clinically stable, sober, with reasonable follow up plan in place. Thank you for this consult, please call the psychiatry consult line for further questions. I will continue to follow.      Chava

## 2020-07-03 NOTE — PROGRESS NOTES
[facility] PROGRESS NOTE    7/3/2020 3:34 PM        Name: Missael Sharif . Admitted: 6/29/2020  Primary Care Provider: Shoaib Swain DO (Tel: [phone])    Brief Course:   [facility] is a 61-year-old  male who presented to ED overnight with suicidal ideation. Tomasz Rodriguez is admitted to the floor with 1:1 sitter and psychiatric consultation. On [facility] protocol for alcohol withdrawal.      CC: Anxiety and suicidal ideation. Subjective:      Still little anxious. Asking for Ativan. Denies any shortness of breath or cough.       Current Medications  sertraline (ZOLOFT) tablet 50 mg, Daily  chlordiazePOXIDE (LIBRIUM) capsule 10 mg, TID  ciprofloxacin (CIPRO) tablet 500 mg, 2 times per day  ferrous sulfate (IRON 325) tablet 325 mg, BID WC  hydrOXYzine (ATARAX) tablet 10 mg, TID PRN  potassium chloride (KLOR-CON M) extended release tablet 40 mEq, PRN    Or  potassium bicarb-citric acid (EFFER-K) effervescent tablet 40 mEq, PRN    Or  potassium chloride 10 mEq/100 mL IVPB (Peripheral Line), PRN  LORazepam (ATIVAN) injection 1 mg, Once  LORazepam (ATIVAN) tablet 1 mg, Q1H PRN    Or  LORazepam (ATIVAN) injection 1 mg, Q1H PRN    Or  LORazepam (ATIVAN) tablet 2 mg, Q1H PRN    Or  LORazepam (ATIVAN) injection 2 mg, Q1H PRN    Or  LORazepam (ATIVAN) tablet 3 mg, Q1H PRN    Or  LORazepam (ATIVAN) injection 3 mg, Q1H PRN    Or  LORazepam (ATIVAN) tablet 4 mg, Q1H PRN    Or  LORazepam (ATIVAN) injection 4 mg, Q1H PRN  sodium chloride 0.9 % 9,105 mL with folic acid 1 mg, adult multi-vitamin with vitamin k 10 mL, thiamine 300 mg, Once  sodium chloride flush 0.9 % injection 10 mL, 2 times per day  sodium chloride flush 0.9 % injection 10 mL, PRN  acetaminophen (TYLENOL) tablet 650 mg, Q6H PRN    Or  acetaminophen (TYLENOL) suppository 650 mg, Q6H PRN  polyethylene glycol (GLYCOLAX) packet 17 g, Daily PRN  promethazine (PHENERGAN) tablet 12.5 mg, Q6H PRN    Or  ondansetron (ZOFRAN) injection 4 mg, Q6H PRN  multivitamin 1 tablet, Daily        Objective:  BP (!) 154/85   Pulse 93   Temp 98.4 °F (36.9 °C) (Oral)   Resp 18   Ht 5' 5\" (1.651 m)   Wt 110 lb 14.3 oz (50.3 kg)   SpO2 99%   BMI 18.45 kg/m²     Intake/Output Summary (Last 24 hours) at 7/3/2020 1534  Last data filed at 7/3/2020 1245  Gross per 24 hour   Intake 480 ml   Output --   Net 480 ml      Wt Readings from Last 3 Encounters:   07/01/20 110 lb 14.3 oz (50.3 kg)   12/21/19 140 lb (63.5 kg)   08/24/19 140 lb (63.5 kg)     General appearance:  White ,male, Appears comfortable  Cardiovascular: Regular rhythm, normal S1, S2. No murmur. No edema in lower extremities  Respiratory: Not using accessory muscles. Good inspiratory effort. Clear to auscultation bilaterally, no wheeze or crackles. GI: Abdomen soft, no tenderness, not distended, normal bowel sounds  Psych: Normal affect. Alert and oriented in time, place and person  Skin: Warm, dry, normal turgor  Extremity exam shows brisk capillary refill. Peripheral pulses are palpable in lower extremities       Labs and Tests:  CBC:   Recent Labs     07/02/20  1301 07/03/20  0627   WBC 4.9 4.3   HGB 10.7* 10.2*   PLT 67* 76*     BMP:    Recent Labs     07/01/20  0554 07/02/20  1301 07/03/20  0627   * 129* 132*   K 3.9 4.4 3.9    98* 98*   CO2 22 21 22   BUN 8 10 14   CREATININE 0.8* 0.7* 0.8*   GLUCOSE 119* 93 132*     Hepatic:   Recent Labs     07/03/20  0627   AST 70*   ALT 23   BILITOT 1.3*   ALKPHOS 119     XR CHEST PORTABLE   Final Result   No acute cardiopulmonary disease. Problem List  Principal Problem:    Suicidal ideation  Active Problems:    Alcohol use disorder, severe, dependence (HCC)    Hypokalemia    Depression    Alcohol withdrawal syndrome without complication ([facility] [address]  Resolved Problems:    * No resolved hospital problems.  *       Assessment & Plan:     Mild leucocytosis with

## 2020-07-03 NOTE — CONSULTS
Infectious Diseases   Consult Note        Admission Date: 6/29/2020  Hospital Day: Hospital Day: 5   Attending: Shaila Boogie MD  Date of service: 7/3/20     Reason for admission: Suicidal ideation [R45.851]  Suicidal ideation [R45.851]    Chief complaint/ Reason for consult: High fever, unknown source, developing right-sided pneumonia    Microbiology:        I have reviewed allavailable micro lab data and cultures    Blood culture (2/2) - collected on 7/3/2020: *In process    Antibiotics and immunizations:       Current antibiotics: All antibiotics and their doses were reviewed by me    Recent Abx Admin                   ciprofloxacin (CIPRO) tablet 500 mg (mg) 500 mg Given 07/03/20 1954    vancomycin (VANCOCIN) 750 mg in dextrose 5 % 250 mL IVPB (mg) 750 mg New Bag 07/03/20 1632                  Immunization History: All immunization history was reviewed by me today. There is no immunization history for the selected administration types on file for this patient. Known drug allergies: All allergies were reviewed and updated    No Known Allergies    Social history:     Social History:  All social andepidemiologic history was reviewed and updated by me today as needed. · Tobacco use:   reports that he has been smoking cigarettes. He has been smoking about 1.00 pack per day. He has never used smokeless tobacco.  · Alcohol use:   reports current alcohol use. · Currently lives in: [address] [place] [address]  ·  reports no history of drug use. Assessment:     The patient is a 50 y.o. old male who  has a past medical history of Alcohol abuse, Arthritis, Chronic bronchitis ([address] COPD (chronic obstructive pulmonary disease) ([facility] [address] Hyperlipidemia, Hypertension, MDRO (multiple drug resistant organisms) resistance, Radiculopathy of lumbosacral region, and Spondylisthesis.  with following problems:    · High fever  · Developing a right-sided pneumonia on the chest x-ray  · Hyponatremia  · Cough  · Diarrhea  · Low procalcitonin of 0.19  · COPD  · Cannabis abuse  · Alcoholism  · Elevated d-dimer of 698      Discussion: This is a 56-year-old man with history of alcoholism and cannabis abuse, who was initially admitted for suicidal ideation and has been released on suicidal precautions by psychiatry. He started spiking fever 2 days ago and spiked high-grade fever up to 102.3 yesterday. White cell count is  4300 today. I reviewed his chest x-ray done earlier today. It is not completely normal.  Patient has a developing infiltrate in the right lower lobe. Patient has been having cough, diarrhea and has a normal white cell count concerning for atypical pneumonia        Plan:     Diagnostic Workup:    · Agree with blood cultures  · Repeat COVID 19 PCR test.  First COVID 19 PCR testing negative  · We will order HIV screen  · Urine Legionella and pneumococcal antigen  · Nasal MRSA probe  · Hepatitis B surface antibody and hepatitis C serology  · Continue to follow fever curve, WBC count and blood cultures  · Follow up on liverand renal functions closely    Antimicrobials:    · Okay to continue empiric oral ciprofloxacin 500 mg every 12 hour  · We will start empiric IV vancomycin  Check Vancomycin trough before the 5th dose. Target vancomycin trough of around 15. Keep vancomycin trough below 20 at all times. Avoid increasing the dose of vancomycin above a total of 4 grams in a 24-hour period in patients younger than 45 years and above 3 grams in a 24-hour period in a patient of age 39 years or older. Continue to monitor serum creatinine and Vanco levels closely, while the patient is on I/v Vancomycin.    · We will follow-up on the culture results and clinical progress and will make further changes in the antibiotics accordingly  · Aspiration precautions  · Cough and deep breathing exercises  · Continue to monitor his oxygen requirement  · Recommend droplet plus isolation until second COVID 19 PCR results are back  · DVT prophylaxis  · Discussed the above plan with  RN       Drug Monitoring:    · Continue serial monitoring for antibiotic toxicity as follows: Vanco trough, CMP, QTc interval  · Continue to watch for following: new or worsening fever, hypotension, hives, lip swelling and redness or purulence at vascular access sites. I/v access Management:    · Continue to monitor i.v access sites for erythema, induration, discharge or tenderness. · As always, continue efforts to minimizetubes/lines/drains as clinically appropriate to reduce chances of line associated infections. Current isolation precautions:    Currently active isolation(s): Droplet Plus       Level of complexity of consult: High     Risk of Complications/Morbidity: High     · Illness(es)/ Infection present that pose threat to life/bodily function. · There is potential for severe exacerbation of infection/side effects of treatment. · Therapy requires intensive monitoring for antimicrobial agent toxicity. Thank you for involving me in the care of your patient. I will continue to follow. If you have any additional questions, please do not hesitate to contact me. Subjective:     Presenting complaint in ER:     Chief Complaint   Patient presents with    Suicidal     pt. states he is a severe alcohlic and he wants to end his life. he tried to shoot himself in the head today. wife brought him here. pt. states he has not ate, having sz's,and not off bed for 3 days. pt. states intense pain all over. drinks a liter a day and last drinks 3 days ago. HPI: Roxana Gill is a 50 y.o. male patient, who was seen at the request of Dr. Danielle Hayward MD.    History was obtained from chart review and RN as patient not able to give much history    The patient was admitted on 6/29/2020. I have been consulted to see the patient for above mentioned reason(s).     The patient has multiple medical comorbidities, and presented to the ER for suicidal thoughts. He was brought to the ER by his wife. The patient is an alcoholic who drinks 1 L of alcohol daily and was brought to the ER by his wife. He was initially afebrile. White cell count was 2900. The patient started having a fever up to 100.9 on the night of 7/1/2020 and the fever spiked up to 102.3 last night. 2 sets of blood cultures were sent. Patient has been started empirically on oral ciprofloxacin    I have been asked for my opinion for management for this patient. Past Medical History: All past medical history reviewed today. Past Medical History:   Diagnosis Date    Alcohol abuse     Arthritis     hands and back    Chronic bronchitis (HCC)     COPD (chronic obstructive pulmonary disease) (HCC)     bronchitis    Hyperlipidemia     Hypertension     MDRO (multiple drug resistant organisms) resistance     MRSA in right arm [id number]    Radiculopathy of lumbosacral region     Spondylisthesis          Past Surgical History: All pastsurgical history was reviewed today. Past Surgical History:   Procedure Laterality Date    COLONOSCOPY N/A 12/23/2019    COLONOSCOPY WITH BIOPSY performed by Peggy Bland MD at [address] [facility]  12/23/2019    COLONOSCOPY POLYPECTOMY SNARE/COLD BIOPSY performed by Peggy Bland MD at [address]      right lower arm in 30's    LUMBAR SPINE SURGERY Left 4/23/2019    LEFT LUMBAR4-LUMBAR5  MICRODISCECTOMY performed by Edmond Perez MD at [address] [place] [address] 12/23/2019    EGD DIAGNOSTIC ONLY performed by Peggy Bland MD at [address] [place] [address]         Family History: All family history was reviewed today. History reviewed. No pertinent family history. Medications: All current and past medications were reviewed. No medications prior to admission.      sertraline  50 mg Oral Daily    chlordiazePOXIDE  10 mg Oral TID    ciprofloxacin  500 mg Oral 2 times per day    vancomycin  750 mg Intravenous Q12H    ferrous sulfate  325 mg Oral BID WC    LORazepam  1 mg Intravenous Once    folic acid, thiamine, multi-vitamin with vitamin K infusion   Intravenous Once    sodium chloride flush  10 mL Intravenous 2 times per day    multivitamin  1 tablet Oral Daily          REVIEW OF SYSTEMS:       Review of Systems   Constitutional: Positive for fatigue and fever. Negative for chills and diaphoresis. HENT: Negative for ear discharge, ear pain, rhinorrhea, sore throat and trouble swallowing. Eyes: Negative for discharge and redness. Respiratory: Positive for cough and shortness of breath. Negative for wheezing. Cardiovascular: Negative for chest pain and leg swelling. Gastrointestinal: Positive for diarrhea. Negative for abdominal pain, constipation and nausea. Endocrine: Negative for polyuria. Genitourinary: Negative for dysuria, flank pain, frequency, hematuria and urgency. Musculoskeletal: Negative for back pain and myalgias. Skin: Negative for rash. Neurological: Negative for dizziness, seizures and headaches. Hematological: Does not bruise/bleed easily. Psychiatric/Behavioral: Negative for hallucinations and suicidal ideas. All other systems reviewed and are negative. Objective:       PHYSICAL EXAM:      Vitals:   Vitals:    07/03/20 1452 07/03/20 1600 07/03/20 1750 07/03/20 1945   BP: (!) 154/85 (!) 137/92 138/79 138/88   Pulse: 93 93 99 93   Resp:  17  18   Temp:  98.8 °F (37.1 °C)  98.8 °F (37.1 °C)   TempSrc:  Temporal  Oral   SpO2:  98%     Weight:       Height:           Physical Exam      PHYSICAL EXAM:     In-person bedside physical examination deferred.   Pursuant to the emergency declaration under the [facility], [address] [place] [address] authority and the [employer] Act, this clinical encounter was conducted to provide necessary medical care. (Also consistent with new provisions and guidance offered by Adrian Piña on March 18, 2020 in setting of COVID 19 outbreak and in order to preserve personal protective equipment in accordance with the flexibilities announced by CMS on March 30, 2020)   References: https://[facility]. [facility]/Portals/0/Resources/COVID-19/3_18%20Telemed%20Guidance%20Updated%20March%2018. pdf?[email or web address]                      https://[email or web address]. [facility]/Portals/0/Resources/COVID-19/3_18%20Telemed%20Guidance%20Updated%20March%2018. pdf?ctn=[email or web address]                      http://[email or web address] pdf                            General: Awake, getting Ativan e, vitals reviewed  HEENT: Deferred  Cardiovascular: Telemetry data reviewed, rest deferred   Pulmonary: deferred  Abdomen/GI: deferred  Neuro: Awake, moves all extremities according to primary service  Skin: deferred  Musculoskeletal:  deferred  Genitourinary: Deferred  Psych: deferred  Lymphatic/Immunologic: deferred    Intake and output:     I/O last 3 completed shifts: In: 480 [P.O.:480]  Out: -     Lab Data:   All available labs were reviewed by me today.      CBC:   Recent Labs     07/02/20  1301 07/03/20  0627   WBC 4.9 4.3   RBC 3.35* 3.21*   HGB 10.7* 10.2*   HCT 31.3* 30.3*   PLT 67* 76*   MCV 93.5 94.4   MCH 31.9 31.7   MCHC 34.2 33.6   RDW 16.2* 16.2*        BMP:  Recent Labs     07/01/20  0554 07/02/20  1301 07/03/20  0627   * 129* 132*   K 3.9 4.4 3.9    98* 98*   CO2 22 21 22   BUN 8 10 14   CREATININE 0.8* 0.7* 0.8*   CALCIUM 8.3 8.6 9.0   GLUCOSE 119* 93 132*        Hepatic FunctionPanel:   Lab Results   Component Value Date    ALKPHOS 119 07/03/2020    ALT 23 07/03/2020    AST 70 07/03/2020    PROT 7.2 07/03/2020    BILITOT 1.3 07/03/2020    BILIDIR 0.7 07/03/2020    IBILI 0.6 07/03/2020    LABALBU 2.9 07/03/2020 CPK:   Lab Results   Component Value Date    CKTOTAL 54 04/22/2019     ESR:   Lab Results   Component Value Date    SEDRATE 29 (H) 05/28/2019     CRP:   Lab Results   Component Value Date    CRP 10.7 (H) 05/28/2019         Imaging: All pertinent images and reports for the current visit were reviewed by meduring this visit. XR CHEST PORTABLE   Final Result   No acute cardiopulmonary disease. XR CHEST PORTABLE    (Results Pending)       Outside records:    Labs, Microbiology, Radiology and pertinent results from Care everywhere, if available, were reviewed as a part ofthe consultation. Problem list:       Patient Active Problem List   Diagnosis Code    Chest pain R07.9    PATRICIO (acute kidney injury) ([facility] [address] N17.9    Degenerative disc disease, lumbar M51.36    Acute cholecystitis K81.0    Alcohol use disorder, severe, dependence ([facility] [address] F10.20    Adjustment disorder with mixed anxiety and depressed mood F43.23    Suicidal ideation R45.851    Hypokalemia E87.6    Depression F32.9    Alcohol withdrawal syndrome without complication (HCC) X51.928    Acute alcoholic intoxication with complication (HCC) H48.740    High fever R50.9    Pneumonia of right lower lobe due to infectious organism ([facility] [address] J18.1    Hyponatremia E87.1    Cough R05    Diarrhea R19.7    Alcohol abuse F10.10    Cannabis abuse F12.10    Elevated d-dimer R79.89    Screening for HIV (human immunodeficiency virus) Z11.4    Need for hepatitis B screening test Z11.59    Encounter for hepatitis C virus screening test for high risk patient Z11.59, Z91.89         Please note that this chart was generated using Dragon dictation software. Although every effort was made to ensure the accuracy of this automated transcription, some errors in transcription may have occurred inadvertently.  If you may need any clarification, please do not hesitate to contact me through [facility] or at the phone number provided below with my electronic signature. Any pictures or media included in this note were obtained after taking informed verbal consent from the patient and with their approval to include those in the patient's medical record.       Aguila Chong MD, MPH  7/3/20, 3:52 PM EDT   [facility]   Office: [phone]  Fax: [phone]  Tuesday AM clinic:   [address] [address]  Thursday AM clinic: [facility]

## 2020-07-04 LAB
HIV AG/AB: NORMAL
HIV ANTIGEN: NORMAL
HIV-1 ANTIBODY: NORMAL
HIV-2 AB: NORMAL
SARS-COV-2, PCR: NOT DETECTED

## 2020-07-04 PROCEDURE — 2580000003 HC RX 258: Performed by: INTERNAL MEDICINE

## 2020-07-04 PROCEDURE — 6360000002 HC RX W HCPCS: Performed by: INTERNAL MEDICINE

## 2020-07-04 PROCEDURE — 6360000002 HC RX W HCPCS: Performed by: PHYSICIAN ASSISTANT

## 2020-07-04 PROCEDURE — 6370000000 HC RX 637 (ALT 250 FOR IP): Performed by: INTERNAL MEDICINE

## 2020-07-04 PROCEDURE — 97530 THERAPEUTIC ACTIVITIES: CPT

## 2020-07-04 PROCEDURE — 2000000000 HC ICU R&B

## 2020-07-04 PROCEDURE — 97162 PT EVAL MOD COMPLEX 30 MIN: CPT

## 2020-07-04 PROCEDURE — 6370000000 HC RX 637 (ALT 250 FOR IP): Performed by: PSYCHIATRY & NEUROLOGY

## 2020-07-04 RX ORDER — DEXMEDETOMIDINE HYDROCHLORIDE 4 UG/ML
0.2 INJECTION, SOLUTION INTRAVENOUS CONTINUOUS
Status: DISCONTINUED | OUTPATIENT
Start: 2020-07-04 | End: 2020-07-07

## 2020-07-04 RX ORDER — CHLORDIAZEPOXIDE HYDROCHLORIDE 25 MG/1
25 CAPSULE, GELATIN COATED ORAL 3 TIMES DAILY
Status: DISCONTINUED | OUTPATIENT
Start: 2020-07-04 | End: 2020-07-07 | Stop reason: HOSPADM

## 2020-07-04 RX ADMIN — CHLORDIAZEPOXIDE HYDROCHLORIDE 25 MG: 25 CAPSULE ORAL at 22:05

## 2020-07-04 RX ADMIN — LORAZEPAM 3 MG: 2 INJECTION INTRAMUSCULAR; INTRAVENOUS at 04:30

## 2020-07-04 RX ADMIN — SERTRALINE 50 MG: 50 TABLET, FILM COATED ORAL at 10:25

## 2020-07-04 RX ADMIN — CHLORDIAZEPOXIDE HYDROCHLORIDE 10 MG: 5 CAPSULE ORAL at 10:26

## 2020-07-04 RX ADMIN — LORAZEPAM 4 MG: 2 INJECTION INTRAMUSCULAR; INTRAVENOUS at 03:27

## 2020-07-04 RX ADMIN — THERA TABS 1 TABLET: TAB at 11:42

## 2020-07-04 RX ADMIN — FERROUS SULFATE TAB 325 MG (65 MG ELEMENTAL FE) 325 MG: 325 (65 FE) TAB at 17:16

## 2020-07-04 RX ADMIN — LORAZEPAM 2 MG: 2 INJECTION INTRAMUSCULAR; INTRAVENOUS at 21:28

## 2020-07-04 RX ADMIN — LORAZEPAM 4 MG: 2 INJECTION INTRAMUSCULAR; INTRAVENOUS at 23:52

## 2020-07-04 RX ADMIN — LORAZEPAM 2 MG: 2 INJECTION INTRAMUSCULAR; INTRAVENOUS at 20:28

## 2020-07-04 RX ADMIN — CIPROFLOXACIN HYDROCHLORIDE 500 MG: 500 TABLET, FILM COATED ORAL at 20:28

## 2020-07-04 RX ADMIN — LORAZEPAM 4 MG: 2 INJECTION INTRAMUSCULAR; INTRAVENOUS at 15:21

## 2020-07-04 RX ADMIN — Medication 10 ML: at 10:06

## 2020-07-04 RX ADMIN — VANCOMYCIN HYDROCHLORIDE 750 MG: 750 INJECTION, POWDER, LYOPHILIZED, FOR SOLUTION INTRAVENOUS at 04:50

## 2020-07-04 RX ADMIN — VANCOMYCIN HYDROCHLORIDE 750 MG: 750 INJECTION, POWDER, LYOPHILIZED, FOR SOLUTION INTRAVENOUS at 17:05

## 2020-07-04 RX ADMIN — CHLORDIAZEPOXIDE HYDROCHLORIDE 25 MG: 25 CAPSULE ORAL at 15:25

## 2020-07-04 RX ADMIN — LORAZEPAM 2 MG: 2 INJECTION INTRAMUSCULAR; INTRAVENOUS at 10:27

## 2020-07-04 RX ADMIN — LORAZEPAM 2 MG: 2 INJECTION INTRAMUSCULAR; INTRAVENOUS at 22:41

## 2020-07-04 RX ADMIN — LORAZEPAM 4 MG: 2 INJECTION INTRAMUSCULAR; INTRAVENOUS at 05:30

## 2020-07-04 RX ADMIN — CIPROFLOXACIN HYDROCHLORIDE 500 MG: 500 TABLET, FILM COATED ORAL at 10:26

## 2020-07-04 RX ADMIN — LORAZEPAM 1 MG: 2 INJECTION INTRAMUSCULAR; INTRAVENOUS at 02:14

## 2020-07-04 RX ADMIN — FERROUS SULFATE TAB 325 MG (65 MG ELEMENTAL FE) 325 MG: 325 (65 FE) TAB at 10:25

## 2020-07-04 RX ADMIN — Medication 10 ML: at 20:28

## 2020-07-04 RX ADMIN — LORAZEPAM 4 MG: 2 INJECTION INTRAMUSCULAR; INTRAVENOUS at 06:30

## 2020-07-04 ASSESSMENT — PAIN SCALES - GENERAL
PAINLEVEL_OUTOF10: 0

## 2020-07-04 NOTE — PROGRESS NOTES
Pt's partner Dada Self calling for update on Pt. This RN received permission from Pt to speak to her about his condition and treatment. Dada Self asking for updates daily in the evenings ([phone]).

## 2020-07-04 NOTE — PLAN OF CARE
Problem: Pain:  Description: Pain management should include both nonpharmacologic and pharmacologic interventions. Goal: Control of chronic pain  Description: Control of chronic pain  Outcome: Met This Shift     Problem: Falls - Risk of:  Goal: Will remain free from falls  Description: Will remain free from falls  Outcome: Ongoing  Goal: Absence of physical injury  Description: Absence of physical injury  Outcome: Ongoing     Problem: Pain:  Description: Pain management should include both nonpharmacologic and pharmacologic interventions.   Goal: Pain level will decrease  Description: Pain level will decrease  Outcome: Ongoing  Goal: Control of acute pain  Description: Control of acute pain  Outcome: Ongoing     Problem: Skin Integrity:  Goal: Absence of new skin breakdown  Description: Absence of new skin breakdown  Outcome: Ongoing     Problem: Nutrition  Goal: Optimal nutrition therapy  Outcome: Ongoing

## 2020-07-04 NOTE — PROGRESS NOTES
Speech Language Pathology   Hold Note  Name: Elaine Lakewood  : 1965    Pt is in droplet plus isolation to r/o COVID-19. In order to minimize exposure, SLP follow-up will consist of daily chart review of PO tolerance, discussion with pt's nurse, and assessment of pt's respiratory status, as indicated. Direct (face to face) dysphagia evaluation/treatment will only be completed as clinically indicated. Impressions:  Per chart review, pt demonstrates low risk indicators for potential dysphagia / aspiration. Per RN report, pt is tolerating current diet without overt difficulty or concerns for aspiration. Therefore, due to pending COVID-19 lab results, direct (face to face) dysphagia evaluation/treatment will not be completed this date in order to minimize infection exposure. Recommendations:   1.) Continue current diet- regular textures with thin liquids, as tolerated  2.) If pt presents with worsening mental status, respiratory status, and/or clinical concern for aspiration/aspiration pneumonia, please hold PO intake and notify speech therapy     Treatment Plan: Ongoing daily assessment of PO tolerance per chart review. Initiate direct (face to face) dysphagia evaluation/treatment when pt is no longer in droplet plus isolation, and as clinically indicated.      Marien Canavan, M.S. [address] [facility]   Speech-Language Pathologist

## 2020-07-04 NOTE — PROGRESS NOTES
Hypertension, MDRO (multiple drug resistant organisms) resistance, Radiculopathy of lumbosacral region, and Spondylisthesis. has a past surgical history that includes fracture surgery; Lumbar spine surgery (Left, 4/23/2019); Upper gastrointestinal endoscopy (N/A, 12/23/2019); Colonoscopy (N/A, 12/23/2019); and Colonoscopy (12/23/2019). Restrictions  Restrictions/Precautions  Restrictions/Precautions: Seizure, Fall Risk(high fall risk, gen diet)  Position Activity Restriction  Other position/activity restrictions: Kimberly Shelton is a 50 y.o. male with history of alcoholism presents for evaluation with suicidal ideation. Patient states that he normally drinks a liter of alcohol a day. Last drink 3 days ago. Keeps telling me that he is sick he is sick. He states that he hurts all over, cannot keep anything down. He states he has not eaten in 4 days, has anyone got out of bed 3 days. He states that he is now having seizures. He states that he is so miserable and in so much pain that he just wants to end his life. He states that his wife found him today with a gun to his head, took it from him and brought him here. He continues to express suicidal ideation, stating that he just wants to end it. He has no other complaints or concerns at this time.   Vision/Hearing  Vision: Impaired  Vision Exceptions: Wears glasses for reading  Hearing: Within functional limits     Subjective  General  Chart Reviewed: Yes  Diagnosis: suicidal ideation   General Comment  Comments: supine in bed at arrival  Subjective  Subjective: stating he needs to use bathroom, also stating he feels very shaky and having increased anxiety   Pain Screening  Patient Currently in Pain: Denies  Vital Signs  Patient Currently in Pain: Denies       Orientation  Orientation  Overall Orientation Status: Within Functional Limits  Social/Functional History  Social/Functional History  Lives With: Spouse  Type of Home: Apartment(2nd floor)  Home Layout: One level  Home Access: Stairs to enter with rails  Entrance Stairs - Number of Steps: 1 flight  Entrance Stairs - Rails: Right  Bathroom Shower/Tub: Tub/Shower unit  Bathroom Toilet: Standard  Home Equipment: [place], Rolling walker  ADL Assistance: Independent  Homemaking Responsibilities: Yes  Ambulation Assistance: Independent  Transfer Assistance: Independent  Active : Yes  Additional Comments: Too many falls to count   Cognition        Objective     Observation/Palpation  Posture: Fair  Observation: shakiness     AROM RLE (degrees)  RLE AROM: WFL  AROM LLE (degrees)  LLE AROM : WFL  Strength RLE  Comment: generalized weakness, functional for transfers  Strength LLE  Comment: generalized weakness, functional for transfers        Bed mobility  Supine to Sit: Contact guard assistance  Sit to Supine: Contact guard assistance  Scooting: Contact guard assistance  Comment: HOB slightly elevated  Transfers  Sit to Stand: Minimal Assistance(from EOB and commode x 2)  Stand to sit: Minimal Assistance  Ambulation  Ambulation?: Yes  Ambulation 1  Surface: level tile  Device: Single point cane;Hand-Held Assist  Assistance: Minimal assistance  Quality of Gait: B knee flexion,  mildly unsteady with shakiness   Gait Deviations: Slow Emily;Decreased step length;Decreased step height  Distance: 10 ft x 2   Stairs/Curb  Stairs?: No     Balance  Posture: Fair  Sitting - Static: -;Good  Sitting - Dynamic: Fair;+  Standing - Static: Fair  Standing - Dynamic: Fair  Comments: After use of bathroom pt standing at sink washing hands. Knees starting to flex more and pt slowly lowering down, unresponsive briefly. Able to pull BSC behind pt to safely sit. Responsive and stated he just feels shaky. Recovered and able to ambulate back to bed.          Plan   Plan  Times per week: 3-5x/wk   Times per day: Daily  Current Treatment Recommendations: Strengthening, Gait Training, Balance Training, Functional Mobility Training, Transfer Training  Safety Devices  Type of devices:  All fall risk precautions in place, Bed alarm in place, Left in bed, Call light within reach, Nurse notified    G-Code       OutComes Score                                                  AM-PAC Score  AM-PAC Inpatient Mobility Raw Score : 17 (07/04/20 1044)  AM-PAC Inpatient T-Scale Score : 42.13 (07/04/20 1044)  Mobility Inpatient CMS 0-100% Score: 50.57 (07/04/20 1044)  Mobility Inpatient CMS G-Code Modifier : CK (07/04/20 1044)          Goals  Short term goals  Time Frame for Short term goals: to be met by discharge  Short term goal 1: pt able to perform bed mobility with supervision  Short term goal 2: pt able to perform transfers with supervision  Short term goal 3: pt able to ambulate 50 ft with [facility]   Patient Goals   Patient goals : did not state       Therapy Time   Individual Concurrent Group Co-treatment   Time In 0900         Time Out 0927         Minutes 27         Timed Code Treatment Minutes: [address] [place], [address]

## 2020-07-04 NOTE — PROGRESS NOTES
[facility] PROGRESS NOTE    7/4/2020 11:27 AM        Name: Ayan Jhaveri . Admitted: 6/29/2020  Primary Care Provider: Adam Delaney DO (Tel: [phone])      Brief Course:  Mr. Mondragon is a 82-year-old  male who presented to ED overnight with suicidal ideation. Naila Garcia is admitted to the floor with 1:1 sitter and psychiatric consultation. On [facility] protocol for alcohol withdrawal.      CC: Anxiety and suicidal ideation. Subjective:   Patient needed a lot of Ativan over last 24 hours. Wants to stop drinking completely. But still anxious. Nursing is concerned about Ativan requirements.        Current Medications  chlordiazePOXIDE (LIBRIUM) capsule 25 mg, TID  dexmedetomidine (PRECEDEX) 400 mcg in sodium chloride 0.9 % 100 mL infusion, Continuous  sertraline (ZOLOFT) tablet 50 mg, Daily  ciprofloxacin (CIPRO) tablet 500 mg, 2 times per day  vancomycin (VANCOCIN) 750 mg in dextrose 5 % 250 mL IVPB, Q12H  ferrous sulfate (IRON 325) tablet 325 mg, BID WC  hydrOXYzine (ATARAX) tablet 10 mg, TID PRN  potassium chloride (KLOR-CON M) extended release tablet 40 mEq, PRN    Or  potassium bicarb-citric acid (EFFER-K) effervescent tablet 40 mEq, PRN    Or  potassium chloride 10 mEq/100 mL IVPB (Peripheral Line), PRN  LORazepam (ATIVAN) injection 1 mg, Once  LORazepam (ATIVAN) tablet 1 mg, Q1H PRN    Or  LORazepam (ATIVAN) injection 1 mg, Q1H PRN    Or  LORazepam (ATIVAN) tablet 2 mg, Q1H PRN    Or  LORazepam (ATIVAN) injection 2 mg, Q1H PRN    Or  LORazepam (ATIVAN) tablet 3 mg, Q1H PRN    Or  LORazepam (ATIVAN) injection 3 mg, Q1H PRN    Or  LORazepam (ATIVAN) tablet 4 mg, Q1H PRN    Or  LORazepam (ATIVAN) injection 4 mg, Q1H PRN  sodium chloride 0.9 % 5,578 mL with folic acid 1 mg, adult multi-vitamin with vitamin k 10 mL, thiamine 300 mg, Once  sodium chloride flush 0.9 % injection 10 mL, 2 times per day  sodium chloride flush 0.9 % injection 10 mL, PRN  acetaminophen (TYLENOL) tablet 650 mg, Q6H PRN    Or  acetaminophen (TYLENOL) suppository 650 mg, Q6H PRN  polyethylene glycol (GLYCOLAX) packet 17 g, Daily PRN  promethazine (PHENERGAN) tablet 12.5 mg, Q6H PRN    Or  ondansetron (ZOFRAN) injection 4 mg, Q6H PRN  multivitamin 1 tablet, Daily        Objective:  BP (!) 138/93   Pulse 94   Temp 98.4 °F (36.9 °C) (Oral)   Resp 18   Ht 5' 5\" (1.651 m)   Wt 112 lb 4.8 oz (50.9 kg)   SpO2 98%   BMI 18.69 kg/m²     Intake/Output Summary (Last 24 hours) at 7/4/2020 1127  Last data filed at 7/3/2020 1245  Gross per 24 hour   Intake 240 ml   Output --   Net 240 ml      Wt Readings from Last 3 Encounters:   07/04/20 112 lb 4.8 oz (50.9 kg)   12/21/19 140 lb (63.5 kg)   08/24/19 140 lb (63.5 kg)     General appearance:  White ,male,  still anxious. Cardiovascular: Regular rhythm, normal S1, S2. No murmur. No edema in lower extremities  Respiratory: Not using accessory muscles. Good inspiratory effort. Clear to auscultation bilaterally, no wheeze or crackles. GI: Abdomen soft, no tenderness, not distended, normal bowel sounds  Psych: Normal affect. Alert and oriented in time, place and person  Skin: Warm, dry, normal turgor  Extremity exam shows brisk capillary refill. Peripheral pulses are palpable in lower extremities       Labs and Tests:  CBC:   Recent Labs     07/02/20  1301 07/03/20  0627   WBC 4.9 4.3   HGB 10.7* 10.2*   PLT 67* 76*     BMP:    Recent Labs     07/02/20  1301 07/03/20  0627   * 132*   K 4.4 3.9   CL 98* 98*   CO2 21 22   BUN 10 14   CREATININE 0.7* 0.8*   GLUCOSE 93 132*     Hepatic:   Recent Labs     07/03/20 0627   AST 70*   ALT 23   BILITOT 1.3*   ALKPHOS 119     XR CHEST PORTABLE   Final Result   Clear lungs. XR CHEST PORTABLE   Final Result   No acute cardiopulmonary disease.          CT ABDOMEN PELVIS WO CONTRAST Additional Contrast? None    (Results Pending)   CT CHEST WO

## 2020-07-04 NOTE — PROGRESS NOTES
Patient agitated at this time and continuously trying to climb out of bed. Patient oriented to name//location. Patient states that he drinks \"a lot\" during the day and does have ideations of killing himself when he gets \"to that point\" of drunk. At bedside, patient calmer but still needs frequent reminders to stay in bed.

## 2020-07-04 NOTE — PROGRESS NOTES
Occupational Therapy   Occupational Therapy Initial Assessment  Date: 2020   Patient Name: Santy Clayton  MRN: [id number]     : 1965    Date of Service: 2020    Discharge Recommendations:Steven Dia scored a 15 on the AM-PAC ADL Inpatient form. Current research shows that an AM-PAC score of 17 or less is typically not associated with a discharge to the patient's home setting. Based on the patient's AM-PAC score and their current ADL deficits, it is recommended that the patient have 3-5 sessions per week of Occupational Therapy at d/c to increase the patient's independence. Please see assessment section for further patient specific details. If patient discharges prior to next session this note will serve as a discharge summary. Please see below for the latest assessment towards goals. OT Equipment Recommendations  Equipment Needed: Yes  Mobility Devices: ADL Assistive Devices  ADL Assistive Devices: Shower Chair with back    Assessment   Performance deficits / Impairments: Decreased functional mobility ; Decreased ADL status; Decreased cognition;Decreased high-level IADLs;Decreased endurance;Decreased balance;Decreased safe awareness  Treatment Diagnosis: Decreased ADLs, IADLs, transfers, mobility associated with Suicidal ideation  Prognosis: Good;Fair  Decision Making: Medium Complexity  History: ETOH abuse  Exam: as above  OT Education: OT Role;Plan of Care  Patient Education: Eval, discharge recommendations-patient will require reinforcement  REQUIRES OT FOLLOW UP: Yes  Activity Tolerance  Activity Tolerance: Patient limited by fatigue;Treatment limited secondary to decreased cognition  Safety Devices  Safety Devices in place: Yes  Type of devices: All fall risk precautions in place; Bed alarm in place;Call light within reach; Left in bed;Patient at risk for falls;Nurse notified           Patient Diagnosis(es): The primary encounter diagnosis was Suicidal ideation.  Diagnoses of Acute alcoholic intoxication with complication ([address] and Hypokalemia were also pertinent to this visit. has a past medical history of Alcohol abuse, Arthritis, Chronic bronchitis ([address] COPD (chronic obstructive pulmonary disease) ([address] Hyperlipidemia, Hypertension, MDRO (multiple drug resistant organisms) resistance, Radiculopathy of lumbosacral region, and Spondylisthesis. has a past surgical history that includes fracture surgery; Lumbar spine surgery (Left, 4/23/2019); Upper gastrointestinal endoscopy (N/A, 12/23/2019); Colonoscopy (N/A, 12/23/2019); and Colonoscopy (12/23/2019). Treatment Diagnosis: Decreased ADLs, IADLs, transfers, mobility associated with Suicidal ideation      Restrictions  Restrictions/Precautions  Restrictions/Precautions: Seizure, Fall Risk(High fall risk, gen diet)  Position Activity Restriction  Other position/activity restrictions: Kimberly Shelton is a 50 y.o. male with history of alcoholism presents for evaluation with suicidal ideation. Patient states that he normally drinks a liter of alcohol a day. Last drink 3 days ago. Keeps telling me that he is sick he is sick. He states that he hurts all over, cannot keep anything down. He states he has not eaten in 4 days, has anyone got out of bed 3 days. He states that he is now having seizures. He states that he is so miserable and in so much pain that he just wants to end his life. He states that his wife found him today with a gun to his head, took it from him and brought him here. He continues to express suicidal ideation, stating that he just wants to end it. He has no other complaints or concerns at this time.     Subjective   General  Chart Reviewed: Yes  Family / Caregiver Present: No  Diagnosis: Suicidal Ideation  Subjective  Subjective: Patient supine in bed, seizure pads in place upon arrival, agreeable to evaluation  Patient Currently in Pain: Denies  Pre Treatment Pain Screening  Intervention List: Patient able to continue with treatment  Vital Signs  Temp: 98.4 °F (36.9 °C)  Temp Source: Oral  Pulse: 94  Heart Rate Source: Monitor  Resp: 18  BP: (!) 138/93  BP Location: Right upper arm  BP Upper/Lower: Upper  MAP (mmHg): 103  Patient Position: Semi fowlers  Level of Consciousness: Alert  MEWS Score: 1  Patient Currently in Pain: Denies  Oxygen Therapy  SpO2: 98 %  O2 Device: None (Room air)  Social/Functional History  Social/Functional History  Lives With: Spouse  Type of Home: Apartment(2nd floor)  Home Layout: One level  Home Access: Stairs to enter with rails  Entrance Stairs - Number of Steps: 1 flight  Entrance Stairs - Rails: Right  Bathroom Shower/Tub: Tub/Shower unit  Bathroom Toilet: Standard  Home Equipment: Cane, Rolling walker  ADL Assistance: Independent  Homemaking Responsibilities: Yes  Ambulation Assistance: Independent  Transfer Assistance: Independent  Active : Yes  Additional Comments: Too many falls to count        Objective   Vision: Impaired  Vision Exceptions: Wears glasses for reading  Hearing: Within functional limits    Orientation  Overall Orientation Status: Within Functional Limits(slow to respond but oriented)  Observation/Palpation  Posture: Fair  Observation: shakiness   Balance  Sitting Balance: Contact guard assistance  Standing Balance: Minimal assistance  Standing Balance  Time: Functional mobility <>bathroom with SPC and HHA Lois (~5-7 feet). Patient stood at sink following BM for thorough hand washing, briefly closed eyes and began to lower self/knees buckling.  BSC brought to patient to rest (patient responsive throughout, unable to fully describe symptoms, felt \"weak\")  Toilet Transfers  Toilet - Technique: Ambulating  Equipment Used: Standard toilet  Toilet Transfer: Minimal assistance  Wheelchair Bed Transfers  Wheelchair/Bed - Technique: Ambulating  Equipment Used: Bed  Level of Asssistance: Minimal assistance  ADL  Feeding: None  Grooming: Setup;Contact guard assistance;Minimal assistance(for balance in stance, cues to locate soap and for thoroughness)  LE Bathing: Dependent/Total(for thoroughness following loose BM)  LE Dressing: Maximum assistance(doff/don depends)  Toileting: Maximum assistance;Dependent/Total(incontinent of stool in depends (required depends change and assist for thoroughness))  Tone RUE  RUE Tone: Normotonic  Tone LUE  LUE Tone: Normotonic  Coordination  Movements Are Fluid And Coordinated: Yes     Bed mobility  Supine to Sit: Contact guard assistance  Sit to Supine: Contact guard assistance  Scooting: Contact guard assistance  Comment: HOB slightly elevated  Transfers  Sit to stand: Minimal assistance  Stand to sit: Minimal assistance  Vision - Basic Assessment  Patient Visual Report: No visual complaint reported. Cognition  Overall Cognitive Status: Exceptions  Arousal/Alertness: Delayed responses to stimuli  Following Commands: Follows one step commands with increased time; Follows one step commands with repetition  Attention Span: Difficulty attending to directions; Difficulty dividing attention  Memory: Decreased recall of recent events  Safety Judgement: Decreased awareness of need for assistance;Decreased awareness of need for safety  Problem Solving: Decreased awareness of errors  Insights: Decreased awareness of deficits  Initiation: Requires cues for some  Sequencing: Requires cues for some  Perception  Overall Perceptual Status: WFL     Sensation  Overall Sensation Status: WFL        LUE AROM (degrees)  LUE AROM : WFL  RUE AROM (degrees)  RUE AROM : WFL  LUE Strength  L Hand General: 4/5  RUE Strength  R Hand General: 4/5                   Plan   Plan  Times per week: 3-5  Times per day: Daily  Current Treatment Recommendations: Strengthening, Balance Training, Functional Mobility Training, Endurance Training, Safety Education & Training, Patient/Caregiver Education & Training, Self-Care / ADL      AM-PAC Score        AM-PAC Inpatient Daily Activity Raw Score: 15 (07/04/20 1117)  AM-PAC Inpatient ADL T-Scale Score : 34.69 (07/04/20 1117)  ADL Inpatient CMS 0-100% Score: 56.46 (07/04/20 1117)  ADL Inpatient CMS G-Code Modifier : CK (07/04/20 1117)    Goals  Short term goals  Time Frame for Short term goals: Discharge  Short term goal 1: Bed mobility SBA  Short term goal 2: Functional ADL transfers CGA  Short term goal 3: Functional mobility with appropriate AD CGA  Short term goal 4: UB ADLs setup, LB Lois  Long term goals  Time Frame for Long term goals : LTG=STG  Patient Goals   Patient goals : Home       Therapy Time   Individual Concurrent Group Co-treatment   Time In 0900         Time Out 0926         Minutes 26              Timed Code Treatment Minutes:  12 Minutes    Total Treatment Minutes:  [address], OTR/L [id number]

## 2020-07-04 NOTE — PROGRESS NOTES
VSS, assessment completed, and meds given. CIWA scale complete, no medication needed. Pt appears drowsy and calm with no signs of distress. Breathing is regular and unlabored. Call light is within reach, bed in lowest position, and alarm is active. All needs addressed at this time.

## 2020-07-05 ENCOUNTER — APPOINTMENT (OUTPATIENT)
Dept: CT IMAGING | Age: 55
End: 2020-07-05
Payer: COMMERCIAL

## 2020-07-05 LAB
ANION GAP SERPL CALCULATED.3IONS-SCNC: 13 MMOL/L (ref 3–16)
BUN BLDV-MCNC: 13 MG/DL (ref 7–20)
CALCIUM SERPL-MCNC: 8.8 MG/DL (ref 8.3–10.6)
CHLORIDE BLD-SCNC: 99 MMOL/L (ref 99–110)
CO2: 20 MMOL/L (ref 21–32)
CREAT SERPL-MCNC: 0.8 MG/DL (ref 0.9–1.3)
GFR AFRICAN AMERICAN: >60
GFR NON-AFRICAN AMERICAN: >60
GLUCOSE BLD-MCNC: 101 MG/DL (ref 70–99)
HBV SURFACE AB TITR SER: <3.5 MIU/ML
HEPATITIS C ANTIBODY INTERPRETATION: REACTIVE
L. PNEUMOPHILA SEROGP 1 UR AG: NORMAL
MRSA CULTURE ONLY: NORMAL
POTASSIUM SERPL-SCNC: 4.1 MMOL/L (ref 3.5–5.1)
SODIUM BLD-SCNC: 132 MMOL/L (ref 136–145)
STREP PNEUMONIAE ANTIGEN, URINE: NORMAL
VANCOMYCIN TROUGH: 13.2 UG/ML (ref 10–20)

## 2020-07-05 PROCEDURE — 6370000000 HC RX 637 (ALT 250 FOR IP): Performed by: INTERNAL MEDICINE

## 2020-07-05 PROCEDURE — 80202 ASSAY OF VANCOMYCIN: CPT

## 2020-07-05 PROCEDURE — 2580000003 HC RX 258: Performed by: INTERNAL MEDICINE

## 2020-07-05 PROCEDURE — 6370000000 HC RX 637 (ALT 250 FOR IP): Performed by: PSYCHIATRY & NEUROLOGY

## 2020-07-05 PROCEDURE — 6360000002 HC RX W HCPCS: Performed by: INTERNAL MEDICINE

## 2020-07-05 PROCEDURE — 6360000002 HC RX W HCPCS: Performed by: PHYSICIAN ASSISTANT

## 2020-07-05 PROCEDURE — 36415 COLL VENOUS BLD VENIPUNCTURE: CPT

## 2020-07-05 PROCEDURE — 2000000000 HC ICU R&B

## 2020-07-05 PROCEDURE — 71250 CT THORAX DX C-: CPT

## 2020-07-05 PROCEDURE — 2500000003 HC RX 250 WO HCPCS: Performed by: INTERNAL MEDICINE

## 2020-07-05 PROCEDURE — 80048 BASIC METABOLIC PNL TOTAL CA: CPT

## 2020-07-05 RX ADMIN — LORAZEPAM 2 MG: 2 INJECTION INTRAMUSCULAR; INTRAVENOUS at 13:08

## 2020-07-05 RX ADMIN — FERROUS SULFATE TAB 325 MG (65 MG ELEMENTAL FE) 325 MG: 325 (65 FE) TAB at 16:34

## 2020-07-05 RX ADMIN — CIPROFLOXACIN HYDROCHLORIDE 500 MG: 500 TABLET, FILM COATED ORAL at 20:49

## 2020-07-05 RX ADMIN — DEXMEDETOMIDINE HYDROCHLORIDE 0.2 MCG/KG/HR: 4 INJECTION, SOLUTION INTRAVENOUS at 00:41

## 2020-07-05 RX ADMIN — LORAZEPAM 2 MG: 2 INJECTION INTRAMUSCULAR; INTRAVENOUS at 16:35

## 2020-07-05 RX ADMIN — FERROUS SULFATE TAB 325 MG (65 MG ELEMENTAL FE) 325 MG: 325 (65 FE) TAB at 08:32

## 2020-07-05 RX ADMIN — VANCOMYCIN HYDROCHLORIDE 750 MG: 750 INJECTION, POWDER, LYOPHILIZED, FOR SOLUTION INTRAVENOUS at 16:34

## 2020-07-05 RX ADMIN — CHLORDIAZEPOXIDE HYDROCHLORIDE 25 MG: 25 CAPSULE ORAL at 10:30

## 2020-07-05 RX ADMIN — Medication 10 ML: at 08:33

## 2020-07-05 RX ADMIN — LORAZEPAM 2 MG: 2 INJECTION INTRAMUSCULAR; INTRAVENOUS at 20:49

## 2020-07-05 RX ADMIN — VANCOMYCIN HYDROCHLORIDE 750 MG: 750 INJECTION, POWDER, LYOPHILIZED, FOR SOLUTION INTRAVENOUS at 05:00

## 2020-07-05 RX ADMIN — THERA TABS 1 TABLET: TAB at 08:32

## 2020-07-05 RX ADMIN — CHLORDIAZEPOXIDE HYDROCHLORIDE 25 MG: 25 CAPSULE ORAL at 23:25

## 2020-07-05 RX ADMIN — CIPROFLOXACIN HYDROCHLORIDE 500 MG: 500 TABLET, FILM COATED ORAL at 08:32

## 2020-07-05 RX ADMIN — LORAZEPAM 2 MG: 2 INJECTION INTRAMUSCULAR; INTRAVENOUS at 10:30

## 2020-07-05 RX ADMIN — LORAZEPAM 2 MG: 2 INJECTION INTRAMUSCULAR; INTRAVENOUS at 19:02

## 2020-07-05 RX ADMIN — CHLORDIAZEPOXIDE HYDROCHLORIDE 25 MG: 25 CAPSULE ORAL at 16:34

## 2020-07-05 RX ADMIN — SERTRALINE 50 MG: 50 TABLET, FILM COATED ORAL at 08:32

## 2020-07-05 RX ADMIN — LORAZEPAM 2 MG: 2 INJECTION INTRAMUSCULAR; INTRAVENOUS at 08:32

## 2020-07-05 ASSESSMENT — PAIN SCALES - GENERAL
PAINLEVEL_OUTOF10: 0

## 2020-07-05 NOTE — FLOWSHEET NOTE
Precedex drip started to aid in calming patient from withdrawal/anxiousness. Pt slept but BP began to drop, so titrated as high as 0.4 mcg/kg/min then came back down. Precedex gtt stopped just after 0400. Physically, pt reassessment generally unchanged. Mentally, pt remains oriented to self with intermittent episodes of orientation to time, place, and/or situation but not simultaneously. Pt anxiousness drastically decreased, pt was able to sleep. Pt able and willing to follow commands. After precedex stopped, BP rebounded WNL.

## 2020-07-05 NOTE — PROGRESS NOTES
Awakened for assessment. Arouses easily to name. Alert, oriented and cooperative. Complains of feeling very \"shakey\" inside. Moderate tremors noted of hands. Slightly diaphoretic. CIWA score 13. Will medicate.

## 2020-07-05 NOTE — FLOWSHEET NOTE
Reassessment complete. Pt now only oriented to self, increasingly agitated, speaking nonsensically though can be understood. Pt verbalizing desire to \"get out of here\" but cannot identify where \"here\" is - knows its not his home. Identified his wife's name but labeled her a \"girlfriend\", knows he has two children but loses track when identifying ages (both adults). Indicated the year was \"7796 or maybe 2013\". Continues to be impulsive, jumping out of bed while still attached to monitors/IVs, even with bed alarm. Physically, pt is generally unchanged. With agitation, heart rate/respiratory rate increase.

## 2020-07-05 NOTE — PROGRESS NOTES
Noon assessment done and recorded. Drowsy when not disturbed. Up to [facility] to void and for loose brown stool. Gait slightly unsteady but needs minimal hand on support.

## 2020-07-05 NOTE — PROGRESS NOTES
Clinical Pharmacy Note: Pharmacy to Dose Vancomcyin    Vancomycin Day: 3  Current Dosin mg IV every 12 hours      Recent Labs     20  0627 20  0408   BUN 14 13       Recent Labs     20  0627 20  0408   CREATININE 0.8* 0.8*       Recent Labs     20  1301 20  0627   WBC 4.9 4.3         Intake/Output Summary (Last 24 hours) at 2020 0948  Last data filed at 2020 0800  Gross per 24 hour   Intake 611.47 ml   Output --   Net 611.47 ml         Ht Readings from Last 1 Encounters:   20 5' 5\" (1.651 m)        Wt Readings from Last 1 Encounters:   20 111 lb 12.4 oz (50.7 kg)         Body mass index is 18.6 kg/m². Estimated Creatinine Clearance: 77 mL/min (A) (based on SCr of 0.8 mg/dL (L)). Trough/Random: 13.2    Assessment/Plan:  Vancomycin level is therapeutic. Level was drawn appropriately in respect to last dose given. A vancomycin trough has been ordered for  043. Changes in regimen will be determined based on culture results, renal function, and clinical response. Pharmacy will continue to monitor and adjust regimen as necessary.     Thank you for the consult,    Mathew Hernandez, PharmD, [phone] Pharmacist  [phone]    2020

## 2020-07-05 NOTE — FLOWSHEET NOTE
Assessment complete. VSS, see flowsheet. Medications administered, see MAR. Pt tolerating CIWA treatment per protocol but is impulsive and consistently gets out of bed. Pt expressed \"need [for] lots of ativan\". Pt has call light in reach. Bed in lowest position, wheels locked, and bed alarm on. No additional needs noticed at this time. Will continue to monitor.

## 2020-07-05 NOTE — PROGRESS NOTES
Speech Language Pathology  Attempt    Indu Skill  1965    \"SLP eval and treat\" orders received. Attempted to see pt this date for evaluation. Chart reviewed and discussed with RN. Per RN, no noted difficulty with current diet or when taking medications this morning. RN indicated pt recently took ativan and is sleeping heavily. Per discussion with RN, will hold evaluation at this time and RN to cancel orders if no noted swallowing difficulty is noted.     Thanks,  Vanessa Corona M.S. [place]  Speech-Language Pathologist

## 2020-07-06 LAB
ANION GAP SERPL CALCULATED.3IONS-SCNC: 12 MMOL/L (ref 3–16)
BASOPHILS ABSOLUTE: 0.1 K/UL (ref 0–0.2)
BASOPHILS RELATIVE PERCENT: 1.9 %
BUN BLDV-MCNC: 11 MG/DL (ref 7–20)
CALCIUM SERPL-MCNC: 8.4 MG/DL (ref 8.3–10.6)
CHLORIDE BLD-SCNC: 105 MMOL/L (ref 99–110)
CO2: 18 MMOL/L (ref 21–32)
CREAT SERPL-MCNC: 0.8 MG/DL (ref 0.9–1.3)
CULTURE, BLOOD 2: NORMAL
EOSINOPHILS ABSOLUTE: 0 K/UL (ref 0–0.6)
EOSINOPHILS RELATIVE PERCENT: 1.1 %
GFR AFRICAN AMERICAN: >60
GFR NON-AFRICAN AMERICAN: >60
GLUCOSE BLD-MCNC: 91 MG/DL (ref 70–99)
HCT VFR BLD CALC: 29.7 % (ref 40.5–52.5)
HEMOGLOBIN: 9.9 G/DL (ref 13.5–17.5)
LYMPHOCYTES ABSOLUTE: 1.1 K/UL (ref 1–5.1)
LYMPHOCYTES RELATIVE PERCENT: 34.3 %
MAGNESIUM: 1.5 MG/DL (ref 1.8–2.4)
MCH RBC QN AUTO: 31.8 PG (ref 26–34)
MCHC RBC AUTO-ENTMCNC: 33.1 G/DL (ref 31–36)
MCV RBC AUTO: 95.8 FL (ref 80–100)
MONOCYTES ABSOLUTE: 0.4 K/UL (ref 0–1.3)
MONOCYTES RELATIVE PERCENT: 12.2 %
NEUTROPHILS ABSOLUTE: 1.7 K/UL (ref 1.7–7.7)
NEUTROPHILS RELATIVE PERCENT: 50.5 %
ORGANISM: ABNORMAL
PDW BLD-RTO: 16.2 % (ref 12.4–15.4)
PHOSPHORUS: 3.1 MG/DL (ref 2.5–4.9)
PLATELET # BLD: 94 K/UL (ref 135–450)
PMV BLD AUTO: 9.1 FL (ref 5–10.5)
POTASSIUM SERPL-SCNC: 3.7 MMOL/L (ref 3.5–5.1)
RBC # BLD: 3.1 M/UL (ref 4.2–5.9)
SODIUM BLD-SCNC: 135 MMOL/L (ref 136–145)
URINE CULTURE, ROUTINE: ABNORMAL
URINE CULTURE, ROUTINE: ABNORMAL
VANCOMYCIN TROUGH: 14.8 UG/ML (ref 10–20)
WBC # BLD: 3.3 K/UL (ref 4–11)

## 2020-07-06 PROCEDURE — 6370000000 HC RX 637 (ALT 250 FOR IP): Performed by: INTERNAL MEDICINE

## 2020-07-06 PROCEDURE — 6370000000 HC RX 637 (ALT 250 FOR IP): Performed by: PSYCHIATRY & NEUROLOGY

## 2020-07-06 PROCEDURE — 97530 THERAPEUTIC ACTIVITIES: CPT

## 2020-07-06 PROCEDURE — 92526 ORAL FUNCTION THERAPY: CPT

## 2020-07-06 PROCEDURE — 99233 SBSQ HOSP IP/OBS HIGH 50: CPT | Performed by: INTERNAL MEDICINE

## 2020-07-06 PROCEDURE — 6360000002 HC RX W HCPCS: Performed by: PHYSICIAN ASSISTANT

## 2020-07-06 PROCEDURE — 80202 ASSAY OF VANCOMYCIN: CPT

## 2020-07-06 PROCEDURE — 97116 GAIT TRAINING THERAPY: CPT

## 2020-07-06 PROCEDURE — 85025 COMPLETE CBC W/AUTO DIFF WBC: CPT

## 2020-07-06 PROCEDURE — 84100 ASSAY OF PHOSPHORUS: CPT

## 2020-07-06 PROCEDURE — 92523 SPEECH SOUND LANG COMPREHEN: CPT

## 2020-07-06 PROCEDURE — 2580000003 HC RX 258: Performed by: INTERNAL MEDICINE

## 2020-07-06 PROCEDURE — 83735 ASSAY OF MAGNESIUM: CPT

## 2020-07-06 PROCEDURE — 6360000002 HC RX W HCPCS: Performed by: INTERNAL MEDICINE

## 2020-07-06 PROCEDURE — 97535 SELF CARE MNGMENT TRAINING: CPT

## 2020-07-06 PROCEDURE — 36415 COLL VENOUS BLD VENIPUNCTURE: CPT

## 2020-07-06 PROCEDURE — 80048 BASIC METABOLIC PNL TOTAL CA: CPT

## 2020-07-06 PROCEDURE — 92610 EVALUATE SWALLOWING FUNCTION: CPT

## 2020-07-06 PROCEDURE — 87040 BLOOD CULTURE FOR BACTERIA: CPT

## 2020-07-06 PROCEDURE — 6370000000 HC RX 637 (ALT 250 FOR IP): Performed by: PHYSICIAN ASSISTANT

## 2020-07-06 PROCEDURE — 2000000000 HC ICU R&B

## 2020-07-06 RX ADMIN — CIPROFLOXACIN HYDROCHLORIDE 500 MG: 500 TABLET, FILM COATED ORAL at 10:13

## 2020-07-06 RX ADMIN — Medication 10 ML: at 10:15

## 2020-07-06 RX ADMIN — LORAZEPAM 1 MG: 1 TABLET ORAL at 10:14

## 2020-07-06 RX ADMIN — FERROUS SULFATE TAB 325 MG (65 MG ELEMENTAL FE) 325 MG: 325 (65 FE) TAB at 10:13

## 2020-07-06 RX ADMIN — FERROUS SULFATE TAB 325 MG (65 MG ELEMENTAL FE) 325 MG: 325 (65 FE) TAB at 16:28

## 2020-07-06 RX ADMIN — Medication 10 ML: at 21:55

## 2020-07-06 RX ADMIN — LORAZEPAM 1 MG: 2 INJECTION INTRAMUSCULAR; INTRAVENOUS at 13:52

## 2020-07-06 RX ADMIN — LORAZEPAM 2 MG: 2 INJECTION INTRAMUSCULAR; INTRAVENOUS at 02:33

## 2020-07-06 RX ADMIN — LORAZEPAM 1 MG: 1 TABLET ORAL at 21:02

## 2020-07-06 RX ADMIN — THERA TABS 1 TABLET: TAB at 10:13

## 2020-07-06 RX ADMIN — CHLORDIAZEPOXIDE HYDROCHLORIDE 25 MG: 25 CAPSULE ORAL at 11:17

## 2020-07-06 RX ADMIN — VANCOMYCIN HYDROCHLORIDE 750 MG: 750 INJECTION, POWDER, LYOPHILIZED, FOR SOLUTION INTRAVENOUS at 05:41

## 2020-07-06 RX ADMIN — LORAZEPAM 1 MG: 2 INJECTION INTRAMUSCULAR; INTRAVENOUS at 16:28

## 2020-07-06 RX ADMIN — CHLORDIAZEPOXIDE HYDROCHLORIDE 25 MG: 25 CAPSULE ORAL at 16:28

## 2020-07-06 RX ADMIN — SERTRALINE 50 MG: 50 TABLET, FILM COATED ORAL at 10:18

## 2020-07-06 RX ADMIN — LORAZEPAM 2 MG: 2 INJECTION INTRAMUSCULAR; INTRAVENOUS at 00:25

## 2020-07-06 ASSESSMENT — PAIN SCALES - GENERAL
PAINLEVEL_OUTOF10: 0

## 2020-07-06 NOTE — PROGRESS NOTES
Infectious Diseases   Progress Note      Admission Date: 6/29/2020  Hospital Day: Hospital Day: 8   Attending: Andres Owens MD  Date of service: 7/6/2020     Chief complaint/ Reason for consult:     · Multifocal pneumonia, likely viral  · Alcohol withdrawal  · Urine colonization with ESBL E. coli  · Essential hypertension    Microbiology:        I have reviewed allavailable micro lab data and cultures    · Blood culture (2/2) - collected on 6/29/2020: Negative    · Urine culture  - collected on 6/29/2020: Less than 25,000 CFU per mL of ESBL E. coli      Antibiotics and immunizations:       Current antibiotics: All antibiotics and their doses were reviewed by me    Recent Abx Admin                   ciprofloxacin (CIPRO) tablet 500 mg (mg) 500 mg Given 07/06/20 1013     500 mg Given 07/05/20 2049    vancomycin (VANCOCIN) 750 mg in dextrose 5 % 250 mL IVPB (mg) 750 mg New Bag 07/06/20 0541     750 mg New Bag 07/05/20 1634                  Immunization History: All immunization history was reviewed by me today. There is no immunization history for the selected administration types on file for this patient. Known drug allergies: All allergies were reviewed and updated    No Known Allergies    Social history:     Social History:  All social andepidemiologic history was reviewed and updated by me today as needed. · Tobacco use:   reports that he has been smoking cigarettes. He has been smoking about 1.00 pack per day. He has never used smokeless tobacco.  · Alcohol use:   reports current alcohol use. · Currently lives in: [address] [place] [address]  ·  reports no history of drug use.          Assessment:     The patient is a 50 y.o. old male who  has a past medical history of Alcohol abuse, Arthritis, Chronic bronchitis ([address] COPD (chronic obstructive pulmonary disease) ([facility] [address] Hyperlipidemia, Hypertension, MDRO (multiple drug resistant organisms) resistance, Radiculopathy of lumbosacral region, and Management:    · Continue to monitor i.v access sites for erythema, induration,discharge or tenderness. · As always, continue efforts to minimize tubes/ lines/ drains as clinically appropriate to reduce chances of line associated infections. Level of complexity of consult: High     TIME SPENT TODAY:     - Spent over  35  minutes on visit (including interval history, physical exam, review of data including labs, cultures, imaging, development and implementation of treatment plan and coordination of complex care). Thank you for involving me in the care of your patient. I will continue to follow. If you have any additional questions, please do not hesitate to contact me. Subjective: Interval history: Interval history was obtained from chart review and RN. The patient is afebrile. He has been on empiric IV vancomycin and ciprofloxacin. He is on room air. Has been hypotensive, getting Ativan and Librium     REVIEW OF SYSTEMS:      Review of Systems   Unable to perform ROS: Mental status change     Patient under effect of sedation    Past Medical History: All past medical history reviewed today. Past Medical History:   Diagnosis Date    Alcohol abuse     Arthritis     hands and back    Chronic bronchitis (HCC)     COPD (chronic obstructive pulmonary disease) (HCC)     bronchitis    Hyperlipidemia     Hypertension     MDRO (multiple drug resistant organisms) resistance     MRSA in right arm [id number]    Radiculopathy of lumbosacral region     Spondylisthesis        Past Surgical History: All past surgical history was reviewed today.     Past Surgical History:   Procedure Laterality Date    COLONOSCOPY N/A 12/23/2019    COLONOSCOPY WITH BIOPSY performed by Gamal Meneses MD at [address]  12/23/2019    COLONOSCOPY POLYPECTOMY SNARE/COLD BIOPSY performed by Gamal Meneses MD at [address]      right lower arm in 30's    LUMBAR SPINE SURGERY Left 4/23/2019    LEFT LUMBAR4-LUMBAR5  MICRODISCECTOMY performed by Lorrie Hicks MD at [facility] 12/23/2019    EGD DIAGNOSTIC ONLY performed by Hola Blackburn MD at [address] [facility] ENDOSCOPY       Family History: All family history was reviewed today. History reviewed. No pertinent family history. Objective:       PHYSICAL EXAM:      Vitals:   Vitals:    07/06/20 0600 07/06/20 0858 07/06/20 0900 07/06/20 0940   BP: 96/63 105/78  105/78   Pulse:   88 84   Resp:    14   Temp:    96.9 °F (36.1 °C)   TempSrc:    Temporal   SpO2:       Weight:       Height:           Physical Exam       General:  sedated but protecting the airway so far  HEENT: normocephalic, atraumatic, sclera clear, pupils equal, light reflex preserved bilaterally  Cardiovascular: RRR, no murmurs/rubs/gallops detected  Pulmonary: CTABL, no rhonchi/rales   Abdomen/GI: soft, no organomegaly, bowel sounds positive  Neuro: Sedated, pupils are equal and reactive to light, moves all extremities  Skin: no rash,   Musculoskeletal:  No obvious joint swelling, redness. No limitation of range of passive motion  Genitourinary: Best's catheter in place   Psych: could not assess   Lymphatic/Immunologic: No obvious bruising, no cervical lymphadenopathy    Lines: All vascular access sites are healthy with no local erythema, discharge or tenderness    Intake and output:    I/O last 3 completed shifts: In: 2808 [P.O.:400; I.V.:2808; IV Piggyback:500]  Out: 700 [Urine:700]    Lab Data:   All available labs and old records have been reviewed by me.     CBC:  Recent Labs     07/06/20  0418   PLT 94*        BMP:  Recent Labs     07/05/20  0408 07/06/20  0418   * 135*   K 4.1 3.7   CL 99 105   CO2 20* 18*   BUN 13 11   CREATININE 0.8* 0.8*   CALCIUM 8.8 8.4   GLUCOSE 101* 91        Hepatic Function Panel:   Lab Results   Component Value Date    ALKPHOS 119 07/03/2020    ALT 23 07/03/2020    AST 70 07/03/2020 PROT 7.2 07/03/2020    BILITOT 1.3 07/03/2020    BILIDIR 0.7 07/03/2020    IBILI 0.6 07/03/2020    LABALBU 2.9 07/03/2020       CPK:   Lab Results   Component Value Date    CKTOTAL 54 04/22/2019     ESR:   Lab Results   Component Value Date    SEDRATE 29 (H) 05/28/2019     CRP:   Lab Results   Component Value Date    CRP 10.7 (H) 05/28/2019           Imaging: All pertinent images and reports for the current visit were reviewed by me during this visit. CT CHEST ABDOMEN PELVIS WO CONTRAST   Final Result   Chest CT: Multifocal small ground-glass infiltrates within the lungs   bilaterally, suggesting multifocal pneumonia. Consider both typical and   atypical etiologies, including viral pneumonia. Abdomen and pelvis CT: Cirrhosis with portal hypertension, evidenced by   splenomegaly, gastric varices, gastroesophageal varices, recanalization of   the umbilical vein, and small volume ascites. XR CHEST PORTABLE   Final Result   Clear lungs. XR CHEST PORTABLE   Final Result   No acute cardiopulmonary disease. Medications: All current and past medications were reviewed.      chlordiazePOXIDE  25 mg Oral TID    sertraline  50 mg Oral Daily    ferrous sulfate  325 mg Oral BID WC    sodium chloride flush  10 mL Intravenous 2 times per day    multivitamin  1 tablet Oral Daily        dexmedetomidine Stopped (07/05/20 0405)       hydrOXYzine, potassium chloride **OR** potassium alternative oral replacement **OR** potassium chloride, LORazepam **OR** LORazepam **OR** LORazepam **OR** LORazepam **OR** LORazepam **OR** LORazepam **OR** LORazepam **OR** LORazepam, sodium chloride flush, acetaminophen **OR** acetaminophen, polyethylene glycol, promethazine **OR** ondansetron      Problem list:       Patient Active Problem List   Diagnosis Code    Chest pain R07.9    PATRICIO (acute kidney injury) ([facility] [address] N17.9    Degenerative disc disease, lumbar M51.36    Acute cholecystitis K81.0    Alcohol use disorder, severe, dependence ([facility] [address] F10.20    Adjustment disorder with mixed anxiety and depressed mood F43.23    Suicidal ideation R45.851    Hypokalemia E87.6    Depression F32.9    Alcohol withdrawal syndrome without complication (HCC) I25.635    Acute alcoholic intoxication with complication (HCC) Y20.230    High fever R50.9    Pneumonia of right lower lobe due to infectious organism ([facility] [address] J18.1    Hyponatremia E87.1    Cough R05    Diarrhea R19.7    Alcohol abuse F10.10    Cannabis abuse F12.10    Elevated d-dimer R79.89    Screening for HIV (human immunodeficiency virus) Z11.4    Need for hepatitis B screening test Z11.59    Encounter for hepatitis C virus screening test for high risk patient Z11.59, Z91.89       Please note that this chart was generated using Dragon dictation software. Although every effort was made to ensure the accuracy of this automated transcription, some errors in transcription may have occurred inadvertently. If you may need any clarification, please do not hesitate to contact me through EPIC or at the phone number provided below with my electronic signature. Any pictures or media included in this note were obtained after taking informed verbal consent from the patient and with their approval to include those in the patient's medical record.     Carleen Vasquez MD, MPH  7/6/2020 , 12:57 PM   [facility] [place] [facility]   Office: [phone]  Fax: [phone]  Tuesday AM clinic:   [address] [place] [address], [facility] [address]  Thursday AM [id number] [facility]

## 2020-07-06 NOTE — PROGRESS NOTES
Occupational Therapy  Facility/Department: [facility] ICU  Daily Treatment Note  NAME: Kimberly Shelton  : 1965  MRN: [id number]    Date of Service: 2020    Discharge Recommendations: Kimberly Shelton scored a 18/24 on the AM-PAC ADL Inpatient form. Current research shows that an AM-PAC score of 17 or less is typically not associated with a discharge to the patient's home setting. Based on the patient's AM-PAC score and their current ADL deficits, it is recommended that the patient have 3-5 sessions per week of Occupational Therapy at d/c to increase the patient's independence. High AM-PAC score however pt demonstrates decreased safety awareness and impulsivity as well as decreased balance and activity tolerance during functional tasks. Please see assessment section for further patient specific details. If patient discharges prior to next session this note will serve as a discharge summary. Please see below for the latest assessment towards goals. If pt should decline recommended :HOME HEALTH CARE: LEVEL 3 SAFETY     - Initial home health evaluation to occur within 24-48 hours, in patient home   - Therapy evaluations in home within 24-48 hours of discharge; including DME and home safety   - Frontload therapy 5 days, then 3x a week   - Therapy to evaluate if patient has [address] needs for personal care   -  evaluation within 24-48 hours, includes evaluation of resources and insurance to determine [place], [place], LTC, and Medicaid options        OT Equipment Recommendations  Equipment Needed: Yes  Mobility Devices: ADL Assistive Devices  ADL Assistive Devices: Shower Chair with back    Assessment   Performance deficits / Impairments: Decreased functional mobility ; Decreased ADL status; Decreased cognition;Decreased high-level IADLs;Decreased endurance;Decreased balance;Decreased safe awareness  Assessment: pt presents with the above deficits impacting his occupational performance.  Pt is not at his baseline level of occupational performance. Pt currently requiring Corrina-CGA for mobility and ADL completion, pt limited by fatigue and decreased safety awareness. Pt would benefit from continued OT services to maximize independence and safety  Treatment Diagnosis: Decreased ADLs, IADLs, transfers, mobility associated with Suicidal ideation  Prognosis: Good;Fair  OT Education: OT Role;Plan of Care;ADL Adaptive Strategies;Transfer Training  Patient Education: patient will require reinforcement  REQUIRES OT FOLLOW UP: Yes  Activity Tolerance  Activity Tolerance: Patient Tolerated treatment well  Safety Devices  Safety Devices in place: Yes  Type of devices: Left in bed;Bed alarm in place;Call light within reach;Nurse notified         Patient Diagnosis(es): The primary encounter diagnosis was Suicidal ideation. Diagnoses of Acute alcoholic intoxication with complication (HCC) and Hypokalemia were also pertinent to this visit. has a past medical history of Alcohol abuse, Arthritis, Chronic bronchitis ([facility] [address] COPD (chronic obstructive pulmonary disease) ([facility] [address] Hyperlipidemia, Hypertension, MDRO (multiple drug resistant organisms) resistance, Radiculopathy of lumbosacral region, and Spondylisthesis. has a past surgical history that includes fracture surgery; Lumbar spine surgery (Left, 4/23/2019); Upper gastrointestinal endoscopy (N/A, 12/23/2019); Colonoscopy (N/A, 12/23/2019); and Colonoscopy (12/23/2019). Restrictions  Restrictions/Precautions  Restrictions/Precautions: Seizure, Fall Risk, Contact Precautions(High fall risk, gen diet)  Position Activity Restriction  Other position/activity restrictions: Ry Beatty is a 50 y.o. male with history of alcoholism presents for evaluation with suicidal ideation. Patient states that he normally drinks a liter of alcohol a day. Last drink 3 days ago. Keeps telling me that he is sick he is sick. He states that he hurts all over, cannot keep anything down.   He states he has not eaten in 4 days, has anyone got out of bed 3 days. He states that he is now having seizures. He states that he is so miserable and in so much pain that he just wants to end his life. He states that his wife found him today with a gun to his head, took it from him and brought him here. He continues to express suicidal ideation, stating that he just wants to end it. He has no other complaints or concerns at this time. Subjective   General  Chart Reviewed: Yes  Family / Caregiver Present: No  Diagnosis: Suicidal Ideation  Subjective  Subjective: Patient supine in bed, seizure pads in place upon arrival, agreeable to evaluation           Objective    ADL  Grooming: Contact guard assistance;Setup(oral care and face washing in stance at sink)  UE Bathing: Setup;Supervision  LE Bathing: Contact guard assistance;Setup(CGA during stance for pier hygiene)  UE Dressing: Minimal assistance;Setup(Corrina secondary to line management when doffing/downing gown)  LE Dressing: Contact guard assistance;Setup(doffing/donning brief)  Toileting: Contact guard assistance(stance at commode to void urine)  Additional Comments: pt ambulated to/from bathroom w/ RW; UB/LB bathing w/ warm wipes and bathing/dressing completed seated in recliner with stances up to RW as needed. Balance  Sitting Balance: Contact guard assistance  Standing Balance: Minimal assistance(Corrina to CGA)  Standing Balance  Time: 16-18mins total  Activity: ADL completion, functional mobility/transfers  Functional Mobility  Functional - Mobility Device: Rolling Walker  Activity: Other; To/from bathroom  Assist Level: Minimal assistance(Corrina-CGA)  Functional Mobility Comments: ~20ft + 40ft within room; use of RW and cane for mobility w/ Corrina for RW management  Bed mobility  Rolling to Right: Stand by assistance  Supine to Sit: Stand by assistance  Sit to Supine: Stand by assistance  Scooting: Stand by assistance  Comment: use of HR, HOB slightly

## 2020-07-06 NOTE — PROGRESS NOTES
[address] [place], [place] [address]ISTS PROGRESS NOTE    7/6/2020 10:12 AM        Name: Christina De La Rosa . Admitted: 6/29/2020  Primary Care Provider: Malachi Perez DO (Tel: [phone])    Brief Course:  49 yo male who presented to ED overnight with suicidal ideation. Mare Merino was admitted as inpatient to the floor with 1:1 sitter and psychiatric consultation. On CIWA protocol for alcohol withdrawal.   Transferred to ICU for Precedex drip for DT on 7/4. Having fevers. COVID-19 x2-. ID consulted. On Vanco and Cipro for pneumonia. CC:  Suicidal ideation    Subjective:  . Patient states he is shaking. Sisters states he drinks liters of liquor a day. Patient thinks he is in [facility].  No CP, SOB, HA or fevers. Tremulous and anxious.     Reviewed interval ancillary notes    Current Medications  chlordiazePOXIDE (LIBRIUM) capsule 25 mg, TID  dexmedetomidine (PRECEDEX) 400 mcg in sodium chloride 0.9 % 100 mL infusion, Continuous  sertraline (ZOLOFT) tablet 50 mg, Daily  ciprofloxacin (CIPRO) tablet 500 mg, 2 times per day  vancomycin (VANCOCIN) 750 mg in dextrose 5 % 250 mL IVPB, Q12H  ferrous sulfate (IRON 325) tablet 325 mg, BID WC  hydrOXYzine (ATARAX) tablet 10 mg, TID PRN  potassium chloride (KLOR-CON M) extended release tablet 40 mEq, PRN    Or  potassium bicarb-citric acid (EFFER-K) effervescent tablet 40 mEq, PRN    Or  potassium chloride 10 mEq/100 mL IVPB (Peripheral Line), PRN  LORazepam (ATIVAN) tablet 1 mg, Q1H PRN    Or  LORazepam (ATIVAN) injection 1 mg, Q1H PRN    Or  LORazepam (ATIVAN) tablet 2 mg, Q1H PRN    Or  LORazepam (ATIVAN) injection 2 mg, Q1H PRN    Or  LORazepam (ATIVAN) tablet 3 mg, Q1H PRN    Or  LORazepam (ATIVAN) injection 3 mg, Q1H PRN    Or  LORazepam (ATIVAN) tablet 4 mg, Q1H PRN    Or  LORazepam (ATIVAN) injection 4 mg, Q1H PRN  sodium chloride flush 0.9 % injection 10 mL, 2 times per

## 2020-07-06 NOTE — FLOWSHEET NOTE
Assessment complete. VSS, see flowsheet. Medications administered, see MAR. Pt tolerating CIWA, frequent doses. Pt denies needs, appears comfortable. Pt has call light in reach. Bed in lowest position, wheels locked, and bed alarm on. No visible needs at this time. Will continue to monitor.

## 2020-07-06 NOTE — PROGRESS NOTES
Physical Therapy  Facility/Department: [facility] ICU  Daily Treatment Note  NAME: Saleem Goode  : 1965  MRN: [id number]    Date of Service: 2020    Discharge Recommendations:  Saleem Goode scored a 17/24 on the AM-PAC short mobility form. Current research shows that an AM-PAC score of 17 or less is typically not associated with a discharge to the patient's home setting. Based on the patient's AM-PAC score and their current functional mobility deficits, it is recommended that the patient have 3-5 sessions per week of Physical Therapy at d/c to increase the patient's independence. Please see assessment section for further patient specific details. If patient discharges prior to next session this note will serve as a discharge summary. Please see below for the latest assessment towards goals. If patient refuses above recommendation:  HOME HEALTH CARE: LEVEL 3 SAFETY     - Initial home health evaluation to occur within 24-48 hours, in patient home   - Therapy evaluations in home within 24-48 hours of discharge; including DME and home safety   - Frontload therapy 5 days, then 3x a week   - Therapy to evaluate if patient has [address] needs for personal care   -  evaluation within 24-48 hours, includes evaluation of resources and insurance to determine [place], [place], LTC, and Medicaid options         PT Equipment Recommendations  Equipment Needed: No  Other: Pt owns RW and SPC    Assessment   Body structures, Functions, Activity limitations: Decreased functional mobility ; Decreased posture;Decreased endurance;Decreased balance;Decreased ADL status; Decreased strength;Decreased safe awareness  Assessment: Pt continues to present with generalized weakness and balance instability resulting in need for assistance to maintain safe completion of mobility tasks.   Patient remains below baseline ability, and would benefit from continued therapy services in attempt to regain baseline functional brought him here. He continues to express suicidal ideation, stating that he just wants to end it. He has no other complaints or concerns at this time. Subjective   General  Chart Reviewed: Yes  Subjective  Subjective: patient denies pain. reports overall fatigue and (B) LE weakness. General Comment  Comments: supine in bed at arrival, requesting to use restroom. Cognition   Cognition  Overall Cognitive Status: Exceptions  Following Commands: Follows one step commands with increased time; Follows multistep commands with repitition  Attention Span: Attends with cues to redirect  Safety Judgement: Decreased awareness of need for assistance;Decreased awareness of need for safety  Initiation: Requires cues for some  Sequencing: Requires cues for some  Cognition Comment: pt impulsive at times    Objective   Bed mobility  Rolling to Left: Stand by assistance  Rolling to Right: Stand by assistance  Supine to Sit: Stand by assistance  Sit to Supine: Stand by assistance  Scooting: Stand by assistance  Comment: HOB slightly elevated without use of HR  Transfers  Sit to Stand: Contact guard assistance(multiple completions within session from EOB and recliner.   At times requires 2 attempts to achieve standing position.)  Stand to sit: Minimal Assistance(impulsive with poor hand placement despite frequent cueing)  Ambulation  Ambulation?: Yes  More Ambulation?: Yes  Ambulation 1  Surface: level tile  Device: Rolling Walker  Assistance: Minimal assistance(CGA/min (A))  Quality of Gait: B knee flexion, narrow AAYUSH, mildly unsteady with shakiness, increased lateral trunk movement with occasional LOB  Distance: 20 ft + 20 ft + 40 ft   Ambulation 2  Surface - 2: level tile  Device 2: Single point cane  Assistance 2: Minimal assistance  Quality of Gait 2: same mechanics as RW with increased lateral sway and increased need for physical assist to correct balance disturbances  Distance: 40 ft   Stairs/Curb  Stairs?: No

## 2020-07-06 NOTE — FLOWSHEET NOTE
07/06/20 1405   Encounter Summary   Services provided to: Patient and family together   Continue Visiting   (POA complete 7/6 TJR)   Complexity of Encounter Moderate   Length of Encounter 45 minutes   Advance Care Planning Yes   Advance Directives (For Healthcare)   Healthcare Directive Yes, patient has an advance directive for healthcare treatment   Type of Healthcare Directive Durable power of  for health care   Copy in Chart Yes, copy in chart   Information on Healthcare Directives Requested Yes   Advance Directives Documents given;Documents explained; Healthcare power of attornery completed   Healthcare Agent Appointed Adult Heather 21 Agent's Name Chano Im  Chano Im)   Healthcare Agent's Phone Number [phone] / [phone]   If you are unable to speak for yourself, does your Healthcare Agent or Legal Spokesperson know your healthcare wishes? Yes     Patient completed POA that daughter had printed on line.  left information and Living Will document for further review. AD/POA Ed completed.  Documents explanatory information and contact information left with patient, who will contact us (via RN, \"0\" ask for a )  if further questions or if they wish to complete.     Electronically signed by Monie Israel on 7/6/2020 at 2:09 PM

## 2020-07-06 NOTE — PROGRESS NOTES
[facility]    7/5/2020 10:34 PM        Name: Chase Palma . Admitted: 6/29/2020  Primary Care Provider: Naeem Osborn DO (Tel: [phone])      Brief Course:  Mr. Shirl Barthel is a 68-year-old  male who presented to ED overnight with suicidal ideation. Toñito Gonzalez is admitted to the floor with 1:1 sitter and psychiatric consultation. On CIWA protocol for alcohol withdrawal.   Transfer to ICU for Precedex drip. Having fevers. COVID-19 x2-. ID consulted. On Vanco and Cipro for pneumonia.     CC: Anxiety and suicidal ideation. Subjective:   Precedex drip stopped this morning. Blood pressure little on the  lower side.   Asking for ativan this morning     Current Medications  chlordiazePOXIDE (LIBRIUM) capsule 25 mg, TID  dexmedetomidine (PRECEDEX) 400 mcg in sodium chloride 0.9 % 100 mL infusion, Continuous  sertraline (ZOLOFT) tablet 50 mg, Daily  ciprofloxacin (CIPRO) tablet 500 mg, 2 times per day  vancomycin (VANCOCIN) 750 mg in dextrose 5 % 250 mL IVPB, Q12H  ferrous sulfate (IRON 325) tablet 325 mg, BID WC  hydrOXYzine (ATARAX) tablet 10 mg, TID PRN  potassium chloride (KLOR-CON M) extended release tablet 40 mEq, PRN    Or  potassium bicarb-citric acid (EFFER-K) effervescent tablet 40 mEq, PRN    Or  potassium chloride 10 mEq/100 mL IVPB (Peripheral Line), PRN  LORazepam (ATIVAN) injection 1 mg, Once  LORazepam (ATIVAN) tablet 1 mg, Q1H PRN    Or  LORazepam (ATIVAN) injection 1 mg, Q1H PRN    Or  LORazepam (ATIVAN) tablet 2 mg, Q1H PRN    Or  LORazepam (ATIVAN) injection 2 mg, Q1H PRN    Or  LORazepam (ATIVAN) tablet 3 mg, Q1H PRN    Or  LORazepam (ATIVAN) injection 3 mg, Q1H PRN    Or  LORazepam (ATIVAN) tablet 4 mg, Q1H PRN    Or  LORazepam (ATIVAN) injection 4 mg, Q1H PRN  sodium chloride 0.9 % 6,751 mL with folic acid 1 mg, adult multi-vitamin with vitamin k 10 mL, thiamine 300 mg, Once  sodium chloride flush 0.9 % injection 10 mL, 2 times per day  sodium chloride flush 0.9 % injection 10 mL, PRN  acetaminophen (TYLENOL) tablet 650 mg, Q6H PRN    Or  acetaminophen (TYLENOL) suppository 650 mg, Q6H PRN  polyethylene glycol (GLYCOLAX) packet 17 g, Daily PRN  promethazine (PHENERGAN) tablet 12.5 mg, Q6H PRN    Or  ondansetron (ZOFRAN) injection 4 mg, Q6H PRN  multivitamin 1 tablet, Daily        Objective:  /72   Pulse 81   Temp 98 °F (36.7 °C) (Temporal)   Resp 16   Ht 5' 5\" (1.651 m)   Wt 111 lb 12.4 oz (50.7 kg)   SpO2 98%   BMI 18.60 kg/m²     Intake/Output Summary (Last 24 hours) at 7/5/2020 2234  Last data filed at 7/5/2020 2000  Gross per 24 hour   Intake 2219.47 ml   Output 700 ml   Net 1519.47 ml      Wt Readings from Last 3 Encounters:   07/05/20 111 lb 12.4 oz (50.7 kg)   12/21/19 140 lb (63.5 kg)   08/24/19 140 lb (63.5 kg)     General appearance:  White ,male,  slightly tremulous. Cardiovascular: Regular rhythm, normal S1, S2. No murmur. No edema in lower extremities  Respiratory: Not using accessory muscles. Good inspiratory effort. Clear to auscultation bilaterally, no wheeze or crackles. GI: Abdomen soft, no tenderness, not distended, normal bowel sounds  Psych: Normal affect. Alert and oriented in time, place and person  Skin: Warm, dry, normal turgor  Extremity exam shows brisk capillary refill.   Peripheral pulses are palpable in lower extremities       Labs and Tests:  CBC:   Recent Labs     07/03/20 0627   WBC 4.3   HGB 10.2*   PLT 76*     BMP:    Recent Labs     07/03/20 0627 07/05/20  0408   * 132*   K 3.9 4.1   CL 98* 99   CO2 22 20*   BUN 14 13   CREATININE 0.8* 0.8*   GLUCOSE 132* 101*     Hepatic:   Recent Labs     07/03/20 0627   AST 70*   ALT 23   BILITOT 1.3*   ALKPHOS 119     CT CHEST ABDOMEN PELVIS WO CONTRAST   Final Result   Chest CT: Multifocal small ground-glass infiltrates within the lungs   bilaterally, suggesting multifocal pneumonia. Consider both typical and   atypical etiologies, including viral pneumonia. Abdomen and pelvis CT: Cirrhosis with portal hypertension, evidenced by   splenomegaly, gastric varices, gastroesophageal varices, recanalization of   the umbilical vein, and small volume ascites. XR CHEST PORTABLE   Final Result   Clear lungs. XR CHEST PORTABLE   Final Result   No acute cardiopulmonary disease. Problem List  Principal Problem:    Suicidal ideation  Active Problems:    Alcohol use disorder, severe, dependence (HCC)    Hypokalemia    Depression    Alcohol withdrawal syndrome without complication (HCC)    Acute alcoholic intoxication with complication (HCC)    High fever    Pneumonia of right lower lobe due to infectious organism (HCC)    Hyponatremia    Cough    Diarrhea    Alcohol abuse    Cannabis abuse    Elevated d-dimer    Screening for HIV (human immunodeficiency virus)    Need for hepatitis B screening test    Encounter for hepatitis C virus screening test for high risk patient  Resolved Problems:    * No resolved hospital problems. *       Assessment & Plan:     Multifocal pneumonia:   COVID-19 negative x2. CT chest today showed multifocal pneumonia. cx neg so far. On Vanco and Cipro at this time. ID on board. Fevers resolved. Await ID recs. Alcohol withdrawal: off precedex drip. On ativan per [facility] protocol. Cont   Librium to 25 mg 3 times daily. HypoNa, hypoKa, HypoMg:   Improving overall with replacements. Anemia:   Hb level of 10.7. Likely chronic from poor nutrition/alcohol use. W/u suggestive of Iron def. Started on oral Iron replacement. Suicidal ideation:   . Psych consulted.    -No need for inpatient psychiatry.          lovenox ppx  General diet       Disp: inpt 2 more days       Octaviano Walker MD   7/5/2020 10:34 PM

## 2020-07-06 NOTE — PROGRESS NOTES
declining short term recall over the past several months. CLINICAL SWALLOW EVALUATION:  Dysphagia Treatment Diagnosis: Mild Oropharyngeal Dysphagia     Impressions: Pt demonstrates adequate oral motor strength and ROM with no overt facial asymmetry noted. Mild reduced bolus control, mastication and A-P propulsion noted with all textures. Clinical symptoms of premature bolus loss to pharynx, mild delayed swallow initiation and mild reduced laryngeal excursion noted with all textures. Delayed throat clears intermittently noted with thin liquids via straw only. No other overt signs of aspiration noted. Education regarding aspiration risk and precautions explained to the Pt who verbalized understanding. Dietary Recommendations: Continue current regular texture diet with thin liquids/no straws/meds with applesauce    Strategies: 90 degree positioning with all p.o. intake; small bites/sips; alternate textures through meal; reduce rate of intake; No straws     Dysphagia Treatment/Goals: Speech therapy for dysphagia tx 1-2 sessions to ensure diet tolerance. ST.) Pt will tolerate recommended diet without s/s of aspiration   2.) If clinical symptoms of penetration/aspiration continue to be noted,Pt will tolerate MBS to r/o aspiration and determine appropriate diet/liquid level. 3.) Pt will improve oral motor function via bolus control exercises 5/5     SPEECH LANGUAGE EVALUATION:  Speech Language Treatment Diagnosis:  Cognitive Linguistic Deficits    Speech Language Impressions: No overt dysarthria; Mild delayed auditory processing of lengthy/complex information with intermittent repetition required; No overt expressive aphasia; Mild pragmatic deficits with flat affect and delayed verbal initiation in conversation; Moderate cognitive linguistic deficits for immediate and short term recall of listed items and new learning.     Oral Motor exam/Motor Speech:  · No overt dysarthria or apraxia noted     Verbal Expression:   · No overt expressive aphasia noted  · Mild pragmatic deficits with flat affect, and delayed verbal initiation in conversation    Auditory Comprehension:   · Mild delayed auditory processing of lengthy/complex information with repetition required  · Simple commands and Y/N questions: WFL  · Complex Y/N questions: 80% with intermittent repetition    Cognitive-Linguistic:   · Orientation: largely WFL  · Mod reduced short term recall of listed items, recent events and new learning  · High level executive function not fully assessed at this time    Plan: Speech therapy 3-5 times a weeks for speech-language treatment, and dysphagia treatment     Discharge Recommendations:  Pt will benefit from continued skilled Speech Therapy for Speech and Dysphagia services, prior to returning home. Speech Language Short-term goals: 1. Pt will improve auditory processing/comprehension of commands and questions to 80%, via graded tasks   2. Pt will improve immediate and short term recall via graded tasks to 80%  3. Ongoing assessment of cognitive linguistic function as indicated    Patient/Family Education:Education, results and recommendations given to the Pt and nurse, who verbalized understanding    Timed Code Treatment: 0 min    Total Treatment Time: 40 min     If patient discharges prior to next session this note will serve as a discharge summary.      Feliciano Alfaro XMARCELLOJUK-MARZENA#[id number]   Speech-Language Pathologist

## 2020-07-06 NOTE — PROGRESS NOTES
Clinical Pharmacy Note: Pharmacy to Dose Vancomcyin    Vancomycin Day: 4  Current Dosin mg q12 hours      Recent Labs     20  0408   BUN 13       Recent Labs     20  0408   CREATININE 0.8*       No results for input(s): WBC in the last 72 hours. Intake/Output Summary (Last 24 hours) at 2020 0815  Last data filed at 2020 0600  Gross per 24 hour   Intake 3608 ml   Output 700 ml   Net 2908 ml         Ht Readings from Last 1 Encounters:   20 5' 5\" (1.651 m)        Wt Readings from Last 1 Encounters:   20 112 lb 3.4 oz (50.9 kg)         Body mass index is 18.67 kg/m². Estimated Creatinine Clearance: 77 mL/min (A) (based on SCr of 0.8 mg/dL (L)). Trough/Random: 14.8    Assessment/Plan:  Vancomycin level is therapeutic. Level was drawn appropriately in respect to last dose given. Continue vancomycin at 750 mg q12 hours. Changes in regimen will be determined based on culture results, renal function, and clinical response. Pharmacy will continue to monitor and adjust regimen as necessary.     Thank you for the consult,    Pacheco Saleh, PharmD, [address]   [id number]    2020

## 2020-07-06 NOTE — PLAN OF CARE
Problem: Falls - Risk of:  Goal: Will remain free from falls  Description: Will remain free from falls  Outcome: Ongoing  Goal: Absence of physical injury  Description: Absence of physical injury  Outcome: Ongoing     Problem: Pain:  Goal: Pain level will decrease  Description: Pain level will decrease  Outcome: Ongoing  Goal: Control of acute pain  Description: Control of acute pain  Outcome: Ongoing  Goal: Control of chronic pain  Description: Control of chronic pain  Outcome: Ongoing     Problem: Skin Integrity:  Goal: Will show no infection signs and symptoms  Description: Will show no infection signs and symptoms  Outcome: Ongoing  Goal: Absence of new skin breakdown  Description: Absence of new skin breakdown  Outcome: Ongoing     Problem: Suicide risk  Goal: Provide patient with safe environment  Description: Provide patient with safe environment  Outcome: Ongoing     Problem: Nutrition  Goal: Optimal nutrition therapy  Outcome: Ongoing

## 2020-07-07 VITALS
OXYGEN SATURATION: 96 % | WEIGHT: 116.84 LBS | HEART RATE: 84 BPM | SYSTOLIC BLOOD PRESSURE: 103 MMHG | DIASTOLIC BLOOD PRESSURE: 66 MMHG | TEMPERATURE: 98.5 F | HEIGHT: 65 IN | BODY MASS INDEX: 19.47 KG/M2 | RESPIRATION RATE: 16 BRPM

## 2020-07-07 LAB
ANION GAP SERPL CALCULATED.3IONS-SCNC: 11 MMOL/L (ref 3–16)
BASOPHILS ABSOLUTE: 0 K/UL (ref 0–0.2)
BASOPHILS RELATIVE PERCENT: 1.1 %
BLOOD CULTURE, ROUTINE: NORMAL
BUN BLDV-MCNC: 6 MG/DL (ref 7–20)
CALCIUM SERPL-MCNC: 8.3 MG/DL (ref 8.3–10.6)
CHLORIDE BLD-SCNC: 102 MMOL/L (ref 99–110)
CO2: 21 MMOL/L (ref 21–32)
CREAT SERPL-MCNC: 0.7 MG/DL (ref 0.9–1.3)
CULTURE, BLOOD 2: NORMAL
EOSINOPHILS ABSOLUTE: 0.1 K/UL (ref 0–0.6)
EOSINOPHILS RELATIVE PERCENT: 1.5 %
GFR AFRICAN AMERICAN: >60
GFR NON-AFRICAN AMERICAN: >60
GLUCOSE BLD-MCNC: 96 MG/DL (ref 70–99)
HCT VFR BLD CALC: 29.9 % (ref 40.5–52.5)
HEMOGLOBIN: 10.1 G/DL (ref 13.5–17.5)
LYMPHOCYTES ABSOLUTE: 1.4 K/UL (ref 1–5.1)
LYMPHOCYTES RELATIVE PERCENT: 33.3 %
MAGNESIUM: 1.6 MG/DL (ref 1.8–2.4)
MCH RBC QN AUTO: 32 PG (ref 26–34)
MCHC RBC AUTO-ENTMCNC: 33.6 G/DL (ref 31–36)
MCV RBC AUTO: 95.1 FL (ref 80–100)
MONOCYTES ABSOLUTE: 0.5 K/UL (ref 0–1.3)
MONOCYTES RELATIVE PERCENT: 12.5 %
NEUTROPHILS ABSOLUTE: 2.1 K/UL (ref 1.7–7.7)
NEUTROPHILS RELATIVE PERCENT: 51.6 %
PDW BLD-RTO: 15.7 % (ref 12.4–15.4)
PHOSPHORUS: 2.8 MG/DL (ref 2.5–4.9)
PLATELET # BLD: 108 K/UL (ref 135–450)
PMV BLD AUTO: 8.4 FL (ref 5–10.5)
POTASSIUM SERPL-SCNC: 3.5 MMOL/L (ref 3.5–5.1)
RBC # BLD: 3.15 M/UL (ref 4.2–5.9)
SODIUM BLD-SCNC: 134 MMOL/L (ref 136–145)
WBC # BLD: 4.1 K/UL (ref 4–11)

## 2020-07-07 PROCEDURE — 83735 ASSAY OF MAGNESIUM: CPT

## 2020-07-07 PROCEDURE — 85025 COMPLETE CBC W/AUTO DIFF WBC: CPT

## 2020-07-07 PROCEDURE — 80048 BASIC METABOLIC PNL TOTAL CA: CPT

## 2020-07-07 PROCEDURE — 6370000000 HC RX 637 (ALT 250 FOR IP): Performed by: PSYCHIATRY & NEUROLOGY

## 2020-07-07 PROCEDURE — 6370000000 HC RX 637 (ALT 250 FOR IP): Performed by: INTERNAL MEDICINE

## 2020-07-07 PROCEDURE — 6370000000 HC RX 637 (ALT 250 FOR IP): Performed by: PHYSICIAN ASSISTANT

## 2020-07-07 PROCEDURE — 84100 ASSAY OF PHOSPHORUS: CPT

## 2020-07-07 PROCEDURE — 94760 N-INVAS EAR/PLS OXIMETRY 1: CPT

## 2020-07-07 PROCEDURE — 2580000003 HC RX 258: Performed by: INTERNAL MEDICINE

## 2020-07-07 RX ORDER — FERROUS SULFATE 325(65) MG
325 TABLET ORAL 2 TIMES DAILY WITH MEALS
Qty: 60 TABLET | Refills: 0 | Status: SHIPPED | OUTPATIENT
Start: 2020-07-07

## 2020-07-07 RX ORDER — LORAZEPAM 1 MG/1
TABLET ORAL
Qty: 27 TABLET | Refills: 0 | Status: SHIPPED | OUTPATIENT
Start: 2020-07-07 | End: 2020-07-16

## 2020-07-07 RX ORDER — THIAMINE MONONITRATE (VIT B1) 100 MG
100 TABLET ORAL DAILY
Qty: 30 TABLET | Refills: 3 | Status: SHIPPED | OUTPATIENT
Start: 2020-07-07

## 2020-07-07 RX ORDER — FOLIC ACID 1 MG/1
1 TABLET ORAL DAILY
Qty: 30 TABLET | Refills: 0 | Status: SHIPPED | OUTPATIENT
Start: 2020-07-07

## 2020-07-07 RX ORDER — OMEPRAZOLE 20 MG/1
20 CAPSULE, DELAYED RELEASE ORAL
Qty: 60 CAPSULE | Refills: 0 | Status: SHIPPED | OUTPATIENT
Start: 2020-07-07

## 2020-07-07 RX ORDER — MULTIVITAMIN WITH IRON
1 TABLET ORAL DAILY
Qty: 30 TABLET | Refills: 0 | Status: SHIPPED | OUTPATIENT
Start: 2020-07-08

## 2020-07-07 RX ADMIN — LORAZEPAM 1 MG: 1 TABLET ORAL at 04:41

## 2020-07-07 RX ADMIN — Medication 10 ML: at 09:18

## 2020-07-07 RX ADMIN — SERTRALINE 50 MG: 50 TABLET, FILM COATED ORAL at 09:17

## 2020-07-07 RX ADMIN — LORAZEPAM 1 MG: 1 TABLET ORAL at 00:14

## 2020-07-07 RX ADMIN — CHLORDIAZEPOXIDE HYDROCHLORIDE 25 MG: 25 CAPSULE ORAL at 11:54

## 2020-07-07 RX ADMIN — CHLORDIAZEPOXIDE HYDROCHLORIDE 25 MG: 25 CAPSULE ORAL at 00:04

## 2020-07-07 RX ADMIN — FERROUS SULFATE TAB 325 MG (65 MG ELEMENTAL FE) 325 MG: 325 (65 FE) TAB at 09:17

## 2020-07-07 RX ADMIN — THERA TABS 1 TABLET: TAB at 09:17

## 2020-07-07 ASSESSMENT — PAIN DESCRIPTION - DESCRIPTORS: DESCRIPTORS: ACHING

## 2020-07-07 ASSESSMENT — PAIN SCALES - GENERAL
PAINLEVEL_OUTOF10: 9
PAINLEVEL_OUTOF10: 0
PAINLEVEL_OUTOF10: 9

## 2020-07-07 ASSESSMENT — PAIN SCALES - WONG BAKER
WONGBAKER_NUMERICALRESPONSE: 0

## 2020-07-07 ASSESSMENT — PAIN DESCRIPTION - PAIN TYPE
TYPE: ACUTE PAIN
TYPE: ACUTE PAIN

## 2020-07-07 ASSESSMENT — PAIN DESCRIPTION - LOCATION
LOCATION: GENERALIZED
LOCATION: GENERALIZED

## 2020-07-07 NOTE — DISCHARGE INSTR - COC
Continuity of Care Form    Patient Name: Roxana Gill   :  1965  MRN:  [id number]    Admit date:  2020  Discharge date:  20    Code Status Order: Full Code   Advance Directives:   Advance Care Flowsheet Documentation     Date/Time Healthcare Directive Type of Healthcare Directive Copy in [address] Agent's Name Healthcare Agent's Phone Number    [phone]  Yes, patient has an advance directive for healthcare treatment  Durable power of  for health care  Yes, copy in chart  Adult Children  Devora Rodríguez  [phone] / [phone]    20 [phone]  No, patient does not have an advance directive for healthcare treatment  --  --  --  --  --          Admitting Physician:  Teja Snyder MD  PCP: Karolina Wood DO    Discharging Nurse: Russell Corbett Unit/Room#: HAYLEY-3449/9135-87  Discharging Unit Phone Number: [phone]    Emergency Contact:   Extended Emergency Contact Information  Primary Emergency Contact: jordana estes  Home Phone: [phone]  Relation: Domestic Partner   needed? No  Secondary Emergency Contact: Esthela Barraza  Address: [address]. [facility] [address]  Home Phone: [phone]  Relation: Parent    Past Surgical History:  Past Surgical History:   Procedure Laterality Date    COLONOSCOPY N/A 2019    COLONOSCOPY WITH BIOPSY performed by Mayelin Merrill MD at [facility]  2019    COLONOSCOPY POLYPECTOMY SNARE/COLD BIOPSY performed by Mayelin Merrill MD at [address] [place] [address]      right lower arm in 30's    LUMBAR SPINE SURGERY Left 2019    LEFT LUMBAR4-LUMBAR5  MICRODISCECTOMY performed by Macrina Marquez MD at [address] [facility] [address] 2019    EGD DIAGNOSTIC ONLY performed by Mayelin Merrill MD at [address] [facility] ENDOSCOPY       Immunization History:    There is no immunization history for the selected administration types on file for this patient.     Active Problems:  Patient Active Problem List   Diagnosis Code    Chest pain R07.9    PATRICIO (acute kidney injury) ([facility] N17.9    Degenerative disc disease, lumbar M51.36    Acute cholecystitis K81.0    Alcohol use disorder, severe, dependence ([facility] F10.20    Adjustment disorder with mixed anxiety and depressed mood F43.23    Suicidal ideation R45.851    Hypokalemia E87.6    Depression F32.9    Alcohol withdrawal syndrome without complication (HCC) R26.506    Acute alcoholic intoxication with complication ([facility] R56.782    High fever R50.9    Pneumonia of right lower lobe due to infectious [facility] J18.1    Hyponatremia E87.1    Cough R05    Diarrhea R19.7    Alcohol abuse F10.10    Cannabis abuse F12.10    Elevated d-dimer R79.89    Screening for HIV (human immunodeficiency virus) Z11.4    Need for hepatitis B screening test Z11.59    Encounter for hepatitis C virus screening test for high risk patient Z11.59, Z91.89       Isolation/Infection:   Isolation          Contact and Droplet        Patient Infection Status     Infection Onset Added Last Indicated Last Indicated By Review Planned Expiration Resolved Resolved By    ESBL (Extended Spectrum Beta Lactamase) 07/03/20 07/06/20 07/03/20 Culture, Urine        Resolved    COVID-19 Rule Out 07/03/20 07/03/20 07/03/20 COVID-19 (Ordered)   07/04/20 Rule-Out Test Resulted    COVID-19 Rule Out 07/02/20 07/02/20 07/02/20 COVID-19 (Ordered)   07/03/20 Rule-Out Test Resulted    C-diff Rule Out  12/21/19 06/30/20 Clostridium difficile toxin/antigen (Ordered)   07/02/20 Eduardo Barahona RN          Nurse Assessment:  Last Vital Signs: /66   Pulse 84   Temp 98.5 °F (36.9 °C) (Temporal)   Resp 16   Ht 5' 5\" (1.651 m)   Wt 116 lb 13.5 oz (53 kg)   SpO2 96%   BMI 19.44 kg/m²     Last documented pain score (0-10 scale): Pain Level: 0  Last Weight:   Wt Readings from Last 1 Encounters: 07/07/20 116 lb 13.5 oz (53 kg)     Mental Status:  oriented and alert    IV Access:  - None    Nursing Mobility/ADLs:  Walking   Independent  Transfer  Independent  Bathing  Independent  Dressing  Assisted  Toileting  Assisted  Feeding  Independent  Med Admin  Assisted  Med Delivery   whole    Wound Care Documentation and Therapy:        Elimination:  Continence:   · Bowel: Yes  · Bladder: Yes  Urinary Catheter: None   Colostomy/Ileostomy/Ileal Conduit: No       Date of Last BM: 07/07/2020    Intake/Output Summary (Last 24 hours) at 7/7/2020 1318  Last data filed at 7/7/2020 0927  Gross per 24 hour   Intake 305 ml   Output --   Net 305 ml     I/O last 3 completed shifts: In: 36 [P.O.:375]  Out: -     Safety Concerns: At Risk for Falls    Impairments/Disabilities:      None    Nutrition Therapy:  Current Nutrition Therapy:   - Oral Diet:  General    Routes of Feeding: Oral  Liquids: No Restrictions  Daily Fluid Restriction: no  Last Modified Barium Swallow with Video (Video Swallowing Test): not done    Treatments at the Time of Hospital Discharge:   Respiratory Treatments: NA  Oxygen Therapy:  is not on home oxygen therapy.   Ventilator:    - No ventilator support    Rehab Therapies: Physical Therapy, Occupational Therapy and Speech/Language Therapy  Weight Bearing Status/Restrictions: No weight bearing restirctions  Other Medical Equipment (for information only, NOT a DME order):  cane  Other Treatments: NA    Patient's personal belongings (please select all that are sent with patient):  None    RN SIGNATURE:  Electronically signed by Gamaliel Santana RN on 7/7/20 at 2:42 PM EDT    CASE MANAGEMENT/SOCIAL WORK SECTION    Inpatient Status Date: 6/29/20    Readmission Risk Assessment Score:  Readmission Risk              Risk of Unplanned Readmission:        18           Discharging to Facility/ [address]  PHONE; [phone]  FAX: [phone]    / signature: Electronically signed by Claude Gunther, RN on 7/7/20 at 1:46 PM EDT    PHYSICIAN SECTION    Prognosis: Good    Condition at Discharge: Stable    Rehab Potential (if transferring to Rehab): Good    Recommended Labs or Other Treatments After Discharge:     Physician Certification: I certify the above information and transfer of Missael Sharif  is necessary for the continuing treatment of the diagnosis listed and that he requires Home Care for less 30 days.      Update Admission H&P: No change in H&P    PHYSICIAN SIGNATURE:  Electronically signed by Devora Palomo MD on 7/7/20 at 1:18 PM EDT

## 2020-07-07 NOTE — PLAN OF CARE
Nutrition Problem: Inadequate oral intake  Intervention: Food and/or Nutrient Delivery: Continue current diet, Continue current ONS  Nutritional Goals: PO and supplement intake greater than 50% at all meals.

## 2020-07-07 NOTE — PROGRESS NOTES
larisa hospitalist:    \" Patient's daughter want to talk to  you in reference to patient going home because he live alone on a two level house. Can you please, stop by the unit or call her at [phone].  Thanks\"

## 2020-07-07 NOTE — PROGRESS NOTES
I access and tele removed. Daughter Demi Driscoll) will  patient around 3:30,  All personal belongs packed, patient dress up and ready waiting for family. Per daughter request waiting for her to explain discharge order.

## 2020-07-07 NOTE — PROGRESS NOTES
Discharge instructions given and explained to patient and family. Patient agrees to go home with daughter and home care.  Patient left with all prescription

## 2020-07-07 NOTE — CARE COORDINATION
Discharge Note:    Patient will discharge home today with [facility] services through:    [address]  PHONE; [phone]: [phone]    Provided substance abuse resources including information about [facility]'s OP Program    Patient agreeable to [facility] services and that he will contact [address] [place] [address].     Gricel Dao RN BSN  Case Management  [phone]

## 2020-07-07 NOTE — PROGRESS NOTES
Nutrition Assessment    Type and Reason for Visit: Reassess    Nutrition Recommendations:   1. Increase Ensure Enlive to TID  2. Encourage PO intake  3. Document all PO intake in flow sheets     Nutrition Assessment: Pt remains nutritionally compromised as evidenced by PO intake less than 50% of meals on general diet. Pt does like Ensure Enlive and is drinking this BID; will increase to TID. Will monitor for improved PO intake and continued tolerance to supplement. Malnutrition Assessment:  · Malnutrition Status: Insufficient data  · Context: Chronic illness  · Findings of the 6 clinical characteristics of malnutrition (Minimum of 2 out of 6 clinical characteristics is required to make the diagnosis of moderate or severe Protein Calorie Malnutrition based on AND/ASPEN Guidelines):  1. Energy Intake-Unable to assess,      2. Weight Loss-Unable to assess,    3. Fat Loss-Unable to assess,    4. Muscle Loss-Unable to assess,    5. Fluid Accumulation-No significant fluid accumulation,    6.  Strength-Not measured    Nutrition Risk Level: High    Nutrient Needs:  · Estimated Daily Total Kcal: 9317-9313  · Estimated Daily Protein (g): 64-80 grams  · Estimated Daily Total Fluid (ml/day): 1 mL per kcal     Nutrition Diagnosis:   · Problem: Inadequate oral intake  · Etiology: related to Insufficient energy/nutrient consumption     Signs and symptoms:  as evidenced by Intake 0-25%, Intake 25-50%    Objective Information:  · Nutrition-Focused Physical Findings: No edema noted. Na+ 134. Mg+ 1.6   · Wound Type: None  · Current Nutrition Therapies:  · Oral Diet Orders: General   · Oral Diet intake: 1-25%, 26-50%  · Oral Nutrition Supplement (ONS) Orders: Standard High Calorie Oral Supplement  · ONS intake: %  · Anthropometric Measures:  · Ht: 5' 5\" (165.1 cm)   · Current Body Wt: 116 lb (52.6 kg)  · Admission Body Wt: 114 lb (51.7 kg)  · % Weight Change:  ,  EMR shows wt hx of 140#. Unsure if actual or stated. · Ideal Body Wt: 136 lb (61.7 kg)   · BMI Classification: BMI 18.5 - 24.9 Normal Weight    Nutrition Interventions:   Continue current diet, Continue current ONS  Continued Inpatient Monitoring, Education Not Indicated    Nutrition Evaluation:   · Evaluation: Progressing toward goals   · Goals: PO and supplement intake greater than 50% at all meals.      · Monitoring: Meal Intake, Supplement Intake, Diet Tolerance      Electronically signed by Dorina Deutsch RD, AURA on 7/7/20 at 11:21 AM EDT    Contact Number: [phone]

## 2020-07-07 NOTE — PLAN OF CARE
Problem: Falls - Risk of:  Goal: Will remain free from falls  Description: Will remain free from falls  Outcome: Ongoing  Goal: Absence of physical injury  Description: Absence of physical injury  Outcome: Ongoing     Problem: Pain:  Goal: Pain level will decrease  Description: Pain level will decrease  Outcome: Ongoing  Goal: Control of acute pain  Description: Control of acute pain  Outcome: Ongoing  Goal: Control of chronic pain  Description: Control of chronic pain  Outcome: Ongoing     Problem: Skin Integrity:  Goal: Will show no infection signs and symptoms  Description: Will show no infection signs and symptoms  Outcome: Ongoing  Goal: Absence of new skin breakdown  Description: Absence of new skin breakdown  Outcome: Ongoing     Problem: Nutrition  Goal: Optimal nutrition therapy  Outcome: Ongoing

## 2020-07-07 NOTE — PROGRESS NOTES
CLINICAL PHARMACY NOTE: MEDS TO [address] Select Patient?: No  Total # of Prescriptions Filled: 6   The following medications were delivered to the patient:  · Ferrous sulfate 325  · Sertraline 50mg  · Omeprazole 47DE  · Folic acid 1gm  · Vitamin B-1 100mg  · One Daily multivitamin   Total # of Interventions Completed: 0  Time Spent (min): 45    Additional Documentation:    Delivered to Patient    Kelby Elkins CPhT

## 2020-07-07 NOTE — PROGRESS NOTES
Reassessment complete, vitals obtained. CIWA 8 medicated per MAR. Pt ambulated to bathroom. BM x1. Returned to bed. Pt denies further needs. Call light in reach. Safety precautions in place.

## 2020-07-07 NOTE — PROGRESS NOTES
Assessment complete, vitals obtained. CIWA 8- medicated per MAR, see MAR. Pt A/O x4, denies pain or SOB. Resting in bed in no signs of distress. On room air. Lungs clear / diminished. Ab soft, nontender. + bowel sounds. Skin warm and dry. Pulses palpable. Pt repositioning self in bed. Reviewed plan of care. Pt denies acute needs. Call light in reach. Safety precautions in place.

## 2020-07-07 NOTE — PROGRESS NOTES
Reassessment complete, vitals obtained. Pt ambulated to bathroom w/ cane. BM + voided. New brief. CIWA : 8. Medicated per MAR. Pt denies further needs. Call light in reach. Safety precautions in place.

## 2020-07-08 NOTE — PROGRESS NOTES
Per daughter request if home health care can call her to her cell phone to schedule visit.  Meme Liu (daughter) [phone]

## 2020-07-08 NOTE — DISCHARGE SUMMARY
Hospital Medicine Discharge Summary    Patient: Carole Keith     Gender: male  : 1965   Age: 50 y.o. MRN: [id number]    Admitting Physician: Rehana Steward MD  Discharge Physician: Ana Wood MD     Code Status: Full code    Admit Date: 2020   Discharge Date: 2020      Disposition:  Home    Discharge Diagnoses: Active Hospital Problems    Diagnosis Date Noted    Acute alcoholic intoxication with complication ([facility] [address] [F37.761]     High fever [R50.9]     Pneumonia of right lower lobe due to infectious organism ([facility] [address] [J18.1]     Hyponatremia [E87.1]     Cough [R05]     Diarrhea [R19.7]     Alcohol abuse [F10.10]     Cannabis abuse [F12.10]     Elevated d-dimer [R79.89]     Screening for HIV (human immunodeficiency virus) [Z11.4]     Need for hepatitis B screening test [Z11.59]     Encounter for hepatitis C virus screening test for high risk patient [Z11.59, Z91.89]     Depression [F32.9] 2020    Alcohol withdrawal syndrome without complication (HCC) [J79.619]     Suicidal ideation [R45.851] 2020    Hypokalemia [E87.6] 2020    Alcohol use disorder, severe, dependence ([facility] [address] [F10.20]        Follow-up appointments:  one week    Outpatient to do list: F/U with PCP    Condition at Discharge:  Stable    Hospital Course:   51 yo M with continuous alcohol abuse, depression, alcoholic cirrhosis with varices came to ER with suicidal ideation. Admitted as inpatient. Followed by Psychiatry and ID. Developed DT and transferred to ICU for Precedex. Improved. Psych did not feel needed IP Psych. Seen by PT/OT, recommended for home. Arranged for home PT/OT/VNS/HHA. Will go home with daughter. Will be on Ativan taper at home for EtOH WD over next 10 days. F/U with PCP. Counseled to stop drinking.     Discharge Medications:   Discharge Medication List as of 2020  3:14 PM      START taking these medications    Details   sertraline (ZOLOFT) 50 MG tablet Take 1 tablet by mouth daily, Disp-30 tablet, R-0Normal      ferrous sulfate (IRON 325) 325 (65 Fe) MG tablet Take 1 tablet by mouth 2 times daily (with meals), Disp-60 tablet, R-0Normal      Multiple Vitamin (MULTIVITAMIN) TABS tablet Take 1 tablet by mouth daily, Disp-30 tablet, R-0Normal      omeprazole (PRILOSEC) 20 MG delayed release capsule Take 1 capsule by mouth 2 times daily (before meals), Disp-60 capsule, R-0Normal      LORazepam (ATIVAN) 1 MG tablet Days 1-3:  1 tab PO q6h  Days 4-6:  1 tab PO q8h  Days 7-8:  1 tab PO q12h  Days 9-10:  1 tab PO daily  Day 11:  OFF, Disp-27 tablet, R-0Print      vitamin B-1 (THIAMINE) 100 MG tablet Take 1 tablet by mouth daily, Disp-30 tablet, [id number]      folic acid (FOLVITE) 1 MG tablet Take 1 tablet by mouth daily, Disp-30 tablet, R-0Normal           Discharge Medication List as of 7/7/2020  3:14 PM        Discharge Medication List as of 7/7/2020  3:14 PM        Discharge Medication List as of 7/7/2020  3:14 PM      STOP taking these medications       ondansetron (ZOFRAN) 4 MG tablet Comments:   Reason for Stopping:         pantoprazole (PROTONIX) 20 MG tablet Comments:   Reason for Stopping:         naproxen (NAPROSYN) 500 MG tablet Comments:   Reason for Stopping:         methocarbamol (ROBAXIN) 750 MG tablet Comments:   Reason for Stopping:                Discharge Exam:    /66   Pulse 84   Temp 98.5 °F (36.9 °C) (Temporal)   Resp 16   Ht 5' 5\" (1.651 m)   Wt 116 lb 13.5 oz (53 kg)   SpO2 96%   BMI 19.44 kg/m²   General appearance:  Appears comfortable  Eyes: Sclera clear. Pupils equal.  ENT: Moist oral mucosa. Trachea midline, no adenopathy. Cardiovascular: Regular rhythm, normal S1, S2. No murmur. No edema in lower extremities  Respiratory: Not using accessory muscles. Good inspiratory effort. Clear to auscultation bilaterally, no wheeze or crackles.    GI: Abdomen soft, no tenderness, not distended, normal bowel sounds  Musculoskeletal: No cyanosis in Gallbladder is unremarkable. Normal adrenals. Normal pancreas. There is punctate nonobstructing nephrolithiasis within the right kidney. The kidneys are otherwise unremarkable. GI/Bowel: Large bowel is unremarkable. The appendix is normal. Stomach, duodenal sweep, and the remainder of the small bowel are unremarkable in appearance. Pelvis: There is a scant amount ascites. Urinary bladder unremarkable. Prostate unremarkable. Peritoneum/Retroperitoneum: Small volume ascites noted, mainly in a perihepatic region and along the pericolic gutters. Abdominal aorta normal in caliber. Moderate vascular calcifications are noted. No retroperitoneal lymphadenopathy is seen. Bones/Soft Tissues: No osteolytic or osteoblastic bone lesions. No acute fractures. No soft tissue abnormalities are detected. Chest CT: Multifocal small ground-glass infiltrates within the lungs bilaterally, suggesting multifocal pneumonia. Consider both typical and atypical etiologies, including viral pneumonia. Abdomen and pelvis CT: Cirrhosis with portal hypertension, evidenced by splenomegaly, gastric varices, gastroesophageal varices, recanalization of the umbilical vein, and small volume ascites. The patient was seen and examined on day of discharge and this discharge summary is in conjunction with any daily progress note from day of discharge. Time Spent on discharge is 45 minutes  in the examination, evaluation, counseling and review of medications and discharge plan. Note that more than 30 minutes was spent in preparing discharge papers, discussing discharge with patient, medication review, etc.       Signed:    Arvie Apgar, MD   2020      Thank you Saravanan Venegas DO for the opportunity to be involved in this patient's care.  If you have any questions or concerns please feel free to contact me at [address] [place] [address]

## 2020-07-10 LAB
BLOOD CULTURE, ROUTINE: NORMAL
CULTURE, BLOOD 2: NORMAL

## 2020-08-06 ENCOUNTER — HOSPITAL ENCOUNTER (INPATIENT)
Age: 55
LOS: 2 days | Discharge: LEFT BEFORE DISCHARGE | DRG: 253 | End: 2020-08-08
Attending: EMERGENCY MEDICINE | Admitting: INTERNAL MEDICINE
Payer: COMMERCIAL

## 2020-08-06 ENCOUNTER — APPOINTMENT (OUTPATIENT)
Dept: CT IMAGING | Age: 55
DRG: 253 | End: 2020-08-06
Payer: COMMERCIAL

## 2020-08-06 PROBLEM — Z72.0 TOBACCO ABUSE: Chronic | Status: ACTIVE | Noted: 2020-08-06

## 2020-08-06 PROBLEM — D69.1 THROMBOCYTOPATHIA (HCC): Chronic | Status: ACTIVE | Noted: 2020-08-06

## 2020-08-06 PROBLEM — K70.31 ALCOHOLIC CIRRHOSIS OF LIVER WITH ASCITES (HCC): Chronic | Status: ACTIVE | Noted: 2020-08-06

## 2020-08-06 PROBLEM — K70.30 ALCOHOLIC CIRRHOSIS OF LIVER WITHOUT ASCITES (HCC): Chronic | Status: ACTIVE | Noted: 2020-08-06

## 2020-08-06 PROBLEM — D69.6 THROMBOCYTOPENIA (HCC): Chronic | Status: ACTIVE | Noted: 2020-08-06

## 2020-08-06 PROBLEM — K92.2 ACUTE GI BLEEDING: Status: ACTIVE | Noted: 2020-08-06

## 2020-08-06 PROBLEM — K92.2 ACUTE GI HEMORRHAGE: Status: ACTIVE | Noted: 2020-08-06

## 2020-08-06 LAB
A/G RATIO: 0.9 (ref 1.1–2.2)
ABO/RH: NORMAL
ALBUMIN SERPL-MCNC: 3.6 G/DL (ref 3.4–5)
ALP BLD-CCNC: 138 U/L (ref 40–129)
ALT SERPL-CCNC: 46 U/L (ref 10–40)
ANION GAP SERPL CALCULATED.3IONS-SCNC: 9 MMOL/L (ref 3–16)
ANTIBODY SCREEN: NORMAL
AST SERPL-CCNC: 99 U/L (ref 15–37)
BASOPHILS ABSOLUTE: 0 K/UL (ref 0–0.2)
BASOPHILS RELATIVE PERCENT: 0.8 %
BILIRUB SERPL-MCNC: 0.7 MG/DL (ref 0–1)
BILIRUBIN URINE: NEGATIVE
BLOOD, URINE: NEGATIVE
BUN BLDV-MCNC: 8 MG/DL (ref 7–20)
CALCIUM SERPL-MCNC: 9.2 MG/DL (ref 8.3–10.6)
CHLORIDE BLD-SCNC: 107 MMOL/L (ref 99–110)
CLARITY: CLEAR
CO2: 24 MMOL/L (ref 21–32)
COLOR: YELLOW
CREAT SERPL-MCNC: 1 MG/DL (ref 0.9–1.3)
EOSINOPHILS ABSOLUTE: 0.1 K/UL (ref 0–0.6)
EOSINOPHILS RELATIVE PERCENT: 3 %
ETHANOL: NORMAL MG/DL (ref 0–0.08)
FIBRINOGEN: 201 MG/DL (ref 200–397)
GFR AFRICAN AMERICAN: >60
GFR NON-AFRICAN AMERICAN: >60
GLOBULIN: 4 G/DL
GLUCOSE BLD-MCNC: 82 MG/DL (ref 70–99)
GLUCOSE URINE: NEGATIVE MG/DL
HCT VFR BLD CALC: 32.5 % (ref 40.5–52.5)
HEMOGLOBIN: 11 G/DL (ref 13.5–17.5)
INR BLD: 1.21 (ref 0.86–1.14)
KETONES, URINE: NEGATIVE MG/DL
LACTIC ACID: 1.2 MMOL/L (ref 0.4–2)
LEUKOCYTE ESTERASE, URINE: NEGATIVE
LIPASE: 68 U/L (ref 13–60)
LYMPHOCYTES ABSOLUTE: 1.2 K/UL (ref 1–5.1)
LYMPHOCYTES RELATIVE PERCENT: 29 %
MCH RBC QN AUTO: 31.2 PG (ref 26–34)
MCHC RBC AUTO-ENTMCNC: 33.8 G/DL (ref 31–36)
MCV RBC AUTO: 92.2 FL (ref 80–100)
MICROSCOPIC EXAMINATION: NORMAL
MONOCYTES ABSOLUTE: 0.3 K/UL (ref 0–1.3)
MONOCYTES RELATIVE PERCENT: 7.8 %
NEUTROPHILS ABSOLUTE: 2.4 K/UL (ref 1.7–7.7)
NEUTROPHILS RELATIVE PERCENT: 59.4 %
NITRITE, URINE: NEGATIVE
OCCULT BLOOD SCREENING: ABNORMAL
PDW BLD-RTO: 15 % (ref 12.4–15.4)
PH UA: 7 (ref 5–8)
PLATELET # BLD: 68 K/UL (ref 135–450)
PLATELET SLIDE REVIEW: ABNORMAL
PMV BLD AUTO: 7.1 FL (ref 5–10.5)
POTASSIUM SERPL-SCNC: 3.9 MMOL/L (ref 3.5–5.1)
PROTEIN UA: NEGATIVE MG/DL
PROTHROMBIN TIME: 14.1 SEC (ref 10–13.2)
RBC # BLD: 3.53 M/UL (ref 4.2–5.9)
SLIDE REVIEW: ABNORMAL
SODIUM BLD-SCNC: 140 MMOL/L (ref 136–145)
SPECIFIC GRAVITY UA: 1.01 (ref 1–1.03)
TOTAL PROTEIN: 7.6 G/DL (ref 6.4–8.2)
URINE TYPE: NORMAL
UROBILINOGEN, URINE: 0.2 E.U./DL
WBC # BLD: 4 K/UL (ref 4–11)

## 2020-08-06 PROCEDURE — 96376 TX/PRO/DX INJ SAME DRUG ADON: CPT

## 2020-08-06 PROCEDURE — 86850 RBC ANTIBODY SCREEN: CPT

## 2020-08-06 PROCEDURE — 80307 DRUG TEST PRSMV CHEM ANLYZR: CPT

## 2020-08-06 PROCEDURE — 85610 PROTHROMBIN TIME: CPT

## 2020-08-06 PROCEDURE — 96374 THER/PROPH/DIAG INJ IV PUSH: CPT

## 2020-08-06 PROCEDURE — G0480 DRUG TEST DEF 1-7 CLASSES: HCPCS

## 2020-08-06 PROCEDURE — G0328 FECAL BLOOD SCRN IMMUNOASSAY: HCPCS

## 2020-08-06 PROCEDURE — 99285 EMERGENCY DEPT VISIT HI MDM: CPT

## 2020-08-06 PROCEDURE — 6360000002 HC RX W HCPCS: Performed by: INTERNAL MEDICINE

## 2020-08-06 PROCEDURE — 2580000003 HC RX 258: Performed by: INTERNAL MEDICINE

## 2020-08-06 PROCEDURE — 96375 TX/PRO/DX INJ NEW DRUG ADDON: CPT

## 2020-08-06 PROCEDURE — G0378 HOSPITAL OBSERVATION PER HR: HCPCS

## 2020-08-06 PROCEDURE — 36415 COLL VENOUS BLD VENIPUNCTURE: CPT

## 2020-08-06 PROCEDURE — 85384 FIBRINOGEN ACTIVITY: CPT

## 2020-08-06 PROCEDURE — 80053 COMPREHEN METABOLIC PANEL: CPT

## 2020-08-06 PROCEDURE — 81003 URINALYSIS AUTO W/O SCOPE: CPT

## 2020-08-06 PROCEDURE — 86901 BLOOD TYPING SEROLOGIC RH(D): CPT

## 2020-08-06 PROCEDURE — 6360000002 HC RX W HCPCS: Performed by: EMERGENCY MEDICINE

## 2020-08-06 PROCEDURE — 83605 ASSAY OF LACTIC ACID: CPT

## 2020-08-06 PROCEDURE — 86900 BLOOD TYPING SEROLOGIC ABO: CPT

## 2020-08-06 PROCEDURE — 74177 CT ABD & PELVIS W/CONTRAST: CPT

## 2020-08-06 PROCEDURE — 2060000000 HC ICU INTERMEDIATE R&B

## 2020-08-06 PROCEDURE — 6360000004 HC RX CONTRAST MEDICATION: Performed by: EMERGENCY MEDICINE

## 2020-08-06 PROCEDURE — 85025 COMPLETE CBC W/AUTO DIFF WBC: CPT

## 2020-08-06 PROCEDURE — 83690 ASSAY OF LIPASE: CPT

## 2020-08-06 RX ORDER — PANTOPRAZOLE SODIUM 40 MG/1
40 TABLET, DELAYED RELEASE ORAL
Status: DISCONTINUED | OUTPATIENT
Start: 2020-08-07 | End: 2020-08-08 | Stop reason: HOSPADM

## 2020-08-06 RX ORDER — ACETAMINOPHEN 325 MG/1
650 TABLET ORAL EVERY 6 HOURS PRN
Status: DISCONTINUED | OUTPATIENT
Start: 2020-08-06 | End: 2020-08-08 | Stop reason: HOSPADM

## 2020-08-06 RX ORDER — PROMETHAZINE HYDROCHLORIDE 25 MG/1
12.5 TABLET ORAL EVERY 6 HOURS PRN
Status: DISCONTINUED | OUTPATIENT
Start: 2020-08-06 | End: 2020-08-08 | Stop reason: HOSPADM

## 2020-08-06 RX ORDER — M-VIT,TX,IRON,MINS/CALC/FOLIC 27MG-0.4MG
1 TABLET ORAL DAILY
Status: DISCONTINUED | OUTPATIENT
Start: 2020-08-07 | End: 2020-08-08 | Stop reason: HOSPADM

## 2020-08-06 RX ORDER — LORAZEPAM 1 MG/1
4 TABLET ORAL
Status: DISCONTINUED | OUTPATIENT
Start: 2020-08-06 | End: 2020-08-08 | Stop reason: HOSPADM

## 2020-08-06 RX ORDER — LORAZEPAM 1 MG/1
2 TABLET ORAL
Status: DISCONTINUED | OUTPATIENT
Start: 2020-08-06 | End: 2020-08-08 | Stop reason: HOSPADM

## 2020-08-06 RX ORDER — MORPHINE SULFATE 4 MG/ML
4 INJECTION, SOLUTION INTRAMUSCULAR; INTRAVENOUS ONCE
Status: COMPLETED | OUTPATIENT
Start: 2020-08-06 | End: 2020-08-06

## 2020-08-06 RX ORDER — LORAZEPAM 2 MG/ML
2 INJECTION INTRAMUSCULAR
Status: DISCONTINUED | OUTPATIENT
Start: 2020-08-06 | End: 2020-08-08 | Stop reason: HOSPADM

## 2020-08-06 RX ORDER — ACETAMINOPHEN 650 MG/1
650 SUPPOSITORY RECTAL EVERY 6 HOURS PRN
Status: DISCONTINUED | OUTPATIENT
Start: 2020-08-06 | End: 2020-08-08 | Stop reason: HOSPADM

## 2020-08-06 RX ORDER — LORAZEPAM 2 MG/ML
4 INJECTION INTRAMUSCULAR
Status: DISCONTINUED | OUTPATIENT
Start: 2020-08-06 | End: 2020-08-08 | Stop reason: HOSPADM

## 2020-08-06 RX ORDER — ONDANSETRON 2 MG/ML
4 INJECTION INTRAMUSCULAR; INTRAVENOUS EVERY 6 HOURS PRN
Status: DISCONTINUED | OUTPATIENT
Start: 2020-08-06 | End: 2020-08-08 | Stop reason: HOSPADM

## 2020-08-06 RX ORDER — FOLIC ACID 1 MG/1
1 TABLET ORAL DAILY
Status: DISCONTINUED | OUTPATIENT
Start: 2020-08-07 | End: 2020-08-08 | Stop reason: HOSPADM

## 2020-08-06 RX ORDER — THIAMINE MONONITRATE (VIT B1) 100 MG
100 TABLET ORAL DAILY
Status: DISCONTINUED | OUTPATIENT
Start: 2020-08-07 | End: 2020-08-08 | Stop reason: HOSPADM

## 2020-08-06 RX ORDER — LORAZEPAM 1 MG/1
3 TABLET ORAL
Status: DISCONTINUED | OUTPATIENT
Start: 2020-08-06 | End: 2020-08-08 | Stop reason: HOSPADM

## 2020-08-06 RX ORDER — LORAZEPAM 2 MG/ML
1 INJECTION INTRAMUSCULAR
Status: DISCONTINUED | OUTPATIENT
Start: 2020-08-06 | End: 2020-08-08 | Stop reason: HOSPADM

## 2020-08-06 RX ORDER — MAGNESIUM SULFATE 1 G/100ML
1 INJECTION INTRAVENOUS PRN
Status: DISCONTINUED | OUTPATIENT
Start: 2020-08-06 | End: 2020-08-08 | Stop reason: HOSPADM

## 2020-08-06 RX ORDER — NICOTINE 21 MG/24HR
1 PATCH, TRANSDERMAL 24 HOURS TRANSDERMAL DAILY
Status: DISCONTINUED | OUTPATIENT
Start: 2020-08-07 | End: 2020-08-08 | Stop reason: HOSPADM

## 2020-08-06 RX ORDER — LORAZEPAM 2 MG/ML
3 INJECTION INTRAMUSCULAR
Status: DISCONTINUED | OUTPATIENT
Start: 2020-08-06 | End: 2020-08-08 | Stop reason: HOSPADM

## 2020-08-06 RX ORDER — SODIUM CHLORIDE, SODIUM LACTATE, POTASSIUM CHLORIDE, CALCIUM CHLORIDE 600; 310; 30; 20 MG/100ML; MG/100ML; MG/100ML; MG/100ML
2000 INJECTION, SOLUTION INTRAVENOUS ONCE
Status: COMPLETED | OUTPATIENT
Start: 2020-08-06 | End: 2020-08-07

## 2020-08-06 RX ORDER — HYDROMORPHONE HCL 110MG/55ML
0.5 PATIENT CONTROLLED ANALGESIA SYRINGE INTRAVENOUS EVERY 4 HOURS PRN
Status: DISCONTINUED | OUTPATIENT
Start: 2020-08-06 | End: 2020-08-08 | Stop reason: HOSPADM

## 2020-08-06 RX ORDER — ONDANSETRON 2 MG/ML
4 INJECTION INTRAMUSCULAR; INTRAVENOUS ONCE
Status: COMPLETED | OUTPATIENT
Start: 2020-08-06 | End: 2020-08-06

## 2020-08-06 RX ORDER — POTASSIUM CHLORIDE 20 MEQ/1
40 TABLET, EXTENDED RELEASE ORAL PRN
Status: DISCONTINUED | OUTPATIENT
Start: 2020-08-06 | End: 2020-08-08 | Stop reason: HOSPADM

## 2020-08-06 RX ORDER — POTASSIUM CHLORIDE 7.45 MG/ML
10 INJECTION INTRAVENOUS PRN
Status: DISCONTINUED | OUTPATIENT
Start: 2020-08-06 | End: 2020-08-08 | Stop reason: HOSPADM

## 2020-08-06 RX ORDER — FAMOTIDINE 20 MG/1
20 TABLET, FILM COATED ORAL ONCE
Status: DISCONTINUED | OUTPATIENT
Start: 2020-08-06 | End: 2020-08-06

## 2020-08-06 RX ORDER — LORAZEPAM 1 MG/1
1 TABLET ORAL
Status: DISCONTINUED | OUTPATIENT
Start: 2020-08-06 | End: 2020-08-08 | Stop reason: HOSPADM

## 2020-08-06 RX ORDER — HYDROMORPHONE HCL 110MG/55ML
1 PATIENT CONTROLLED ANALGESIA SYRINGE INTRAVENOUS EVERY 4 HOURS PRN
Status: DISCONTINUED | OUTPATIENT
Start: 2020-08-06 | End: 2020-08-07

## 2020-08-06 RX ADMIN — MORPHINE SULFATE 4 MG: 4 INJECTION, SOLUTION INTRAMUSCULAR; INTRAVENOUS at 19:05

## 2020-08-06 RX ADMIN — IOPAMIDOL 75 ML: 755 INJECTION, SOLUTION INTRAVENOUS at 19:46

## 2020-08-06 RX ADMIN — MORPHINE SULFATE 4 MG: 4 INJECTION, SOLUTION INTRAMUSCULAR; INTRAVENOUS at 21:21

## 2020-08-06 RX ADMIN — ONDANSETRON 4 MG: 2 INJECTION INTRAMUSCULAR; INTRAVENOUS at 19:05

## 2020-08-06 RX ADMIN — HYDROMORPHONE HYDROCHLORIDE 1 MG: 2 INJECTION, SOLUTION INTRAMUSCULAR; INTRAVENOUS; SUBCUTANEOUS at 23:28

## 2020-08-06 RX ADMIN — SODIUM CHLORIDE, POTASSIUM CHLORIDE, SODIUM LACTATE AND CALCIUM CHLORIDE 2000 ML: 600; 310; 30; 20 INJECTION, SOLUTION INTRAVENOUS at 23:02

## 2020-08-06 ASSESSMENT — PAIN SCALES - GENERAL
PAINLEVEL_OUTOF10: 10
PAINLEVEL_OUTOF10: 4

## 2020-08-06 ASSESSMENT — PAIN DESCRIPTION - LOCATION: LOCATION: ABDOMEN

## 2020-08-06 NOTE — ED PROVIDER NOTES
Emergency Department Provider Note  Location: [address] [place] [address]  8/6/2020     Patient Identification  Ceci Lopez is a 50 y.o. male    Chief Complaint  Abdominal Pain (Patient states he woke up this morning and \"I shit bright red blood 4 times. \" Patient also c/o severe abdominal pain. )          HPI  (History provided by patient)  26-year-old male history of alcohol abuse cirrhosis, COPD, hypertension hyperlipidemia presents with abdominal pain bright red blood per rectum that started today. Patient states he has had total of 5 stools with fair amount of bright red blood. Reports stools are light brown in color. No rectal pain no pain with passing stool. States that he has an umbilical and supraumbilical midline abdominal pain and a recent hernia midline of his abdomen which is been present for the past 3 days. Nausea but no vomiting no fevers no chills no skin changes in the abdomen. No chest pain shortness of breath. I have reviewed the following nursing documentation:  Allergies: No Known Allergies    Past medical history:  has a past medical history of Alcohol abuse, Arthritis, Chronic bronchitis ([address] COPD (chronic obstructive pulmonary disease) ([facility] [address] ESBL (extended spectrum beta-lactamase) producing bacteria infection (07/06/2020), Hyperlipidemia, Hypertension, MDRO (multiple drug resistant organisms) resistance, Radiculopathy of lumbosacral region, and Spondylisthesis. Past surgical history:  has a past surgical history that includes fracture surgery; Lumbar spine surgery (Left, 4/23/2019); Upper gastrointestinal endoscopy (N/A, 12/23/2019); Colonoscopy (N/A, 12/23/2019); and Colonoscopy (12/23/2019). Home medications:   Prior to Admission medications    Medication Sig Start Date End Date Taking?  Authorizing Provider   sertraline (ZOLOFT) 50 MG tablet Take 1 tablet by mouth daily 7/8/20   Katelin Hinkle MD   ferrous sulfate (IRON 325) 325 (65 Fe) MG tablet Take 1 tablet by mouth 2 times daily (with meals) 7/7/20   Humberto Alford MD   Multiple Vitamin (MULTIVITAMIN) TABS tablet Take 1 tablet by mouth daily 7/8/20   Humberto Alford MD   omeprazole (PRILOSEC) 20 MG delayed release capsule Take 1 capsule by mouth 2 times daily (before meals) 7/7/20   Humberto Alford MD   vitamin B-1 (THIAMINE) 100 MG tablet Take 1 tablet by mouth daily 7/7/20   Humberto Alford MD   folic acid (FOLVITE) 1 MG tablet Take 1 tablet by mouth daily 7/7/20   Humberto Alford MD       Social history:  reports that he has been smoking cigarettes. He has been smoking about 1.00 pack per day. He has never used smokeless tobacco. He reports current alcohol use. He reports that he does not use drugs. Family history:  History reviewed. No pertinent family history. ROS  Review of Systems   Constitutional: Negative for chills and fever. HENT: Negative for congestion and rhinorrhea. Eyes: Negative for photophobia and visual disturbance. Respiratory: Negative for cough, shortness of breath and wheezing. Cardiovascular: Negative for chest pain and palpitations. Gastrointestinal: Positive for abdominal pain, anal bleeding and nausea. Negative for diarrhea, rectal pain and vomiting. Genitourinary: Negative for dysuria and hematuria. Musculoskeletal: Negative for back pain and neck pain. Skin: Negative for rash and wound. Neurological: Negative for syncope and weakness. Psychiatric/Behavioral: Negative for agitation and confusion. Exam  ED Triage Vitals [08/06/20 1842]   BP Temp Temp src Pulse Resp SpO2 Height Weight   (!) 156/79 98.9 °F (37.2 °C) -- 78 20 100 % 5' 5\" (1.651 m) 120 lb (54.4 kg)       Physical Exam  Vitals signs and nursing note reviewed. Constitutional:       General: He is not in acute distress. Appearance: He is well-developed. HENT:      Head: Normocephalic and atraumatic. Nose: Nose normal. No congestion. Eyes:      Extraocular Movements: Extraocular movements intact.       Pupils: Pupils are equal, round, and reactive to light. Neck:      Musculoskeletal: Normal range of motion and neck supple. Cardiovascular:      Rate and Rhythm: Normal rate and regular rhythm. Heart sounds: No murmur. Pulmonary:      Effort: Pulmonary effort is normal.      Breath sounds: Normal breath sounds. Abdominal:      General: There is no distension. Palpations: Abdomen is soft. Tenderness: There is no right CVA tenderness or left CVA tenderness. Comments: Ventral hernia present which is fully reducible. No significant tenderness of the hernia does endorse generalized tenderness without guarding. Genitourinary:     Comments: SONIA non-melanic stool there is specks of bright red blood small amount. No obvious hemorrhoids no evidence of anal fissure. Musculoskeletal: Normal range of motion. General: No deformity. Skin:     General: Skin is warm. Findings: No rash. Neurological:      Mental Status: He is alert and oriented to person, place, and time. Motor: No abnormal muscle tone.    Psychiatric:         Behavior: Behavior normal.           ED Course    ED Medication Orders (From admission, onward)    Start Ordered     Status Ordering Provider    08/07/20 2100 08/06/20 2239  melatonin ER tablet 2 mg  NIGHTLY PRN      Acknowledged LORRAINE BAZE    08/07/20 0954 08/07/20 0954  morphine (MSIR) tablet 15 mg  EVERY 6 HOURS PRN      Acknowledged BLADE TADEO    08/07/20 0900 15/81/65 7368  folic acid (FOLVITE) tablet 1 mg  DAILY      Last MAR action:  [facility]    08/07/20 0900 08/06/20 2239  sertraline (ZOLOFT) tablet 50 mg  DAILY      Last MAR action:  Given - by Oscar Holden on 08/07/20 at [address] LORRAINE BAEZ    08/07/20 0900 08/06/20 2239  vitamin B-1 (THIAMINE) tablet 100 mg  DAILY      Last MAR action:  Given - by Oscar Holden on 08/07/20 at [address]    08/07/20 0900 08/06/20 2239  therapeutic multivitamin-minerals 1 tablet  DAILY      Last MAR action:  [facility]    08/07/20 0900 08/06/20 2239  nicotine (NICODERM CQ) 14 MG/24HR 1 patch  DAILY      Last MAR action:  Patch Applied - by Kailey Lui on 08/07/20 at [address] [facility], LORRAINE    08/07/20 0700 08/06/20 2239  pantoprazole (PROTONIX) tablet 40 mg  DAILY BEFORE BREAKFAST      Last MAR action:  Given - by Corinne Dela Cruz on 08/07/20 at [place] [address]    08/06/20 2300 08/06/20 2239  lactated ringers infusion 2,000 mL  ONCE      Last MAR action:  Stopped - by Corinne Dela Cruz on 08/07/20 at [place] [address]    08/06/20 2249 08/06/20 2249  HYDROmorphone (DILAUDID) injection 0.5 mg  EVERY 4 HOURS PRN      Last MAR action:  Given - by Kailey Lui on 08/07/20 at [address] [facility], LORRAINE    08/06/20 2239 08/06/20 2239  potassium chloride (KLOR-CON M) extended release tablet 40 mEq  PRN      Acknowledged LORRAINE BAEZ    08/06/20 2239 08/06/20 2239  potassium bicarb-citric acid (EFFER-K) effervescent tablet 40 mEq  PRN      Acknowledged LORRAINE BAEZ    08/06/20 2239 08/06/20 2239  potassium chloride 10 mEq/100 mL IVPB (Peripheral Line)  PRN      Acknowledged LORRAINE BAEZ    08/06/20 2239 08/06/20 2239  acetaminophen (TYLENOL) tablet 650 mg  EVERY 6 HOURS PRN      Last MAR action:  Given - by Kailey Lui on 08/07/20 at [address]    08/06/20 2239 08/06/20 2239  acetaminophen (TYLENOL) suppository 650 mg  EVERY 6 HOURS PRN      Last MAR action:  See Alternative - by Kailey Lui on 08/07/20 at [address] V[address] BAEZ    08/06/20 2239 08/06/20 2239  promethazine (PHENERGAN) tablet 12.5 mg  EVERY 6 HOURS PRN      Last MAR action:  See Alternative - by Kailey Lui on 08/07/20 at 0916 LORRAINE BAEZ    08/06/20 2239 08/06/20 2239  ondansetron (ZOFRAN) injection 4 mg  EVERY 6 HOURS PRN Last MAR action:  Given - by Vi Smith on 08/07/20 at [address]    08/06/20 2239 08/06/20 2239  LORazepam (ATIVAN) tablet 1 mg  EVERY 1 HOUR PRN (WITHDRAWAL)      Last MAR action:  See Alternative - by Tl Jury on 08/07/20 at [address] [place] [address], LORRAINE    08/06/20 2239 08/06/20 2239  LORazepam (ATIVAN) injection 1 mg  EVERY 1 HOUR PRN (WITHDRAWAL)      Last MAR action:  See Alternative - by Tl Jury on 08/07/20 at [address] [place] [address], LORRAINE    08/06/20 2239 08/06/20 2239  LORazepam (ATIVAN) tablet 2 mg  EVERY 1 HOUR PRN (WITHDRAWAL)      Last MAR action:  See Alternative - by Tl Jury on 08/07/20 at [address] [place] [address], LORRAINE    08/06/20 2239 08/06/20 2239  LORazepam (ATIVAN) injection 2 mg  EVERY 1 HOUR PRN (WITHDRAWAL)      Last MAR action:  Given - by Tl Jury on 08/07/20 at [address] [place] [address], LORRAINE    08/06/20 2239 08/06/20 2239  LORazepam (ATIVAN) tablet 3 mg  EVERY 1 HOUR PRN (WITHDRAWAL)      Last MAR action:  See Alternative - by Tl Jury on 08/07/20 at [address] [place] [address], LORRAINE    08/06/20 2239 08/06/20 2239  LORazepam (ATIVAN) injection 3 mg  EVERY 1 HOUR PRN (WITHDRAWAL)      Last MAR action:  See Alternative - by Tl Jury on 08/07/20 at [address] [place] [address], LORRAINE    08/06/20 2239 08/06/20 2239  LORazepam (ATIVAN) tablet 4 mg  EVERY 1 HOUR PRN (WITHDRAWAL)      Last MAR action:  See Alternative - by Tl Jury on 08/07/20 at [address] [place] [address], LORRAINE    08/06/20 2239 08/06/20 2239  LORazepam (ATIVAN) injection 4 mg  EVERY 1 HOUR PRN (WITHDRAWAL)      Last MAR action:  See Alternative - by Tl Jury on 08/07/20 at [address] [place] [address], LORRAINE    08/06/20 2239 08/06/20 2239  magnesium sulfate 1 g in dextrose 5% 100 mL IVPB  PRN      Acknowledged LORRAINE BAEZ    08/06/20 2115 08/06/20 2113  morphine (PF) injection 4 mg  ONCE      Last MAR action:  Given - by Sean Sero on 08/06/20 at [address], [address] [facility] Avinash ETIENNE    08/06/20 1939 08/06/20 1939  iopamidol (ISOVUE-370) 76 % injection 75 mL  IMG ONCE PRN      Last MAR action:  Given - by Orquidea Anthony on [facility]06/20 [facility] [address] [facility] [address] [facility] ETIENNE    08/06/20 1915 08/06/20 1901  morphine (PF) injection 4 mg  ONCE      Last MAR action:  Given - by Fausto Levi on 08/06/20 at [address], [address] [facility]    08/06/20 1915 08/06/20 1901  ondansetron (ZOFRAN) injection 4 mg  ONCE      Last MAR action:  Given - by Fausto Levi on 08/06/20 at [address], [address] [facility] [address]              Radiology  Ct Abdomen Pelvis W Iv Contrast Additional Contrast? None    Result Date: 8/6/2020  EXAMINATION: CT OF THE ABDOMEN AND PELVIS WITH CONTRAST 8/6/2020 7:39 pm TECHNIQUE: CT of the abdomen and pelvis was performed with the administration of intravenous contrast. Multiplanar reformatted images are provided for review. Dose modulation, iterative reconstruction, and/or weight based adjustment of the mA/kV was utilized to reduce the radiation dose to as low as reasonably achievable. COMPARISON: July 5, 2020 HISTORY: ORDERING SYSTEM PROVIDED HISTORY: abd pain, ventral hernia, BRBPR TECHNOLOGIST PROVIDED HISTORY: Reason for exam:->abd pain, ventral hernia, BRBPR Additional Contrast?->None Reason for Exam: abd pain, ventral hernia, BRBPR, blood in stool x16 hrs pta Acuity: Acute Type of Exam: Initial FINDINGS: Lower Chest: Unremarkable Organs: Liver is slightly nodular in contour. No discrete hepatic lesion visualized. Gallbladder and pancreas are unremarkable. Spleen is mildly enlarged measuring up to 13 cm craniocaudal. GI/Bowel: Varices surrounding the distal esophagus. No findings to suggest bowel obstruction. Appendix is normal. Pelvis: No bladder stone. Small volume ascites in the pelvis. Peritoneum/Retroperitoneum: No abdominal aortic aneurysm. Adrenal glands are unremarkable. A few punctate stones in the right kidney. No hydronephrosis. Left kidney is unremarkable.   Again noted are subcentimeter gastrohepatic ligament lymph nodes. Periportal lymph nodes noted measuring up to 1.0 cm in short axis, similar to previous exam.  Small volume ascites, new. Bones/Soft Tissues: No acute bony abnormality. Cirrhotic morphology of the liver and signs of portal hypertension. Small volume ascites, increased. Nonobstructing right renal stones. No hernia visualized. No significant wall thickening or inflammation the colon.          Labs  Results for orders placed or performed during the hospital encounter of 08/06/20   Comprehensive Metabolic Panel   Result Value Ref Range    Sodium 140 136 - 145 mmol/L    Potassium 3.9 3.5 - 5.1 mmol/L    Chloride 107 99 - 110 mmol/L    CO2 24 21 - 32 mmol/L    Anion Gap 9 3 - 16    Glucose 82 70 - 99 mg/dL    BUN 8 7 - 20 mg/dL    CREATININE 1.0 0.9 - 1.3 mg/dL    GFR Non-African American >60 >60    GFR African American >60 >60    Calcium 9.2 8.3 - 10.6 mg/dL    Total Protein 7.6 6.4 - 8.2 g/dL    Alb 3.6 3.4 - 5.0 g/dL    Albumin/Globulin Ratio 0.9 (L) 1.1 - 2.2    Total Bilirubin 0.7 0.0 - 1.0 mg/dL    Alkaline Phosphatase 138 (H) 40 - 129 U/L    ALT 46 (H) 10 - 40 U/L    AST 99 (H) 15 - 37 U/L    Globulin 4.0 g/dL   CBC Auto Differential   Result Value Ref Range    WBC 4.0 4.0 - 11.0 K/uL    RBC 3.53 (L) 4.20 - 5.90 M/uL    Hemoglobin 11.0 (L) 13.5 - 17.5 g/dL    Hematocrit 32.5 (L) 40.5 - 52.5 %    MCV 92.2 80.0 - 100.0 fL    MCH 31.2 26.0 - 34.0 pg    MCHC 33.8 31.0 - 36.0 g/dL    RDW 15.0 12.4 - 15.4 %    Platelets 68 (L) 583 - 450 K/uL    MPV 7.1 5.0 - 10.5 fL    PLATELET SLIDE REVIEW Decreased     SLIDE REVIEW see below     Neutrophils % 59.4 %    Lymphocytes % 29.0 %    Monocytes % 7.8 %    Eosinophils % 3.0 %    Basophils % 0.8 %    Neutrophils Absolute 2.4 1.7 - 7.7 K/uL    Lymphocytes Absolute 1.2 1.0 - 5.1 K/uL    Monocytes Absolute 0.3 0.0 - 1.3 K/uL    Eosinophils Absolute 0.1 0.0 - 0.6 K/uL    Basophils Absolute 0.0 0.0 - 0.2 K/uL   Lipase   Result Value Ref Range    Lipase 68.0 (H) 13.0 - 60.0 U/L   Urinalysis   Result Value Ref Range    Color, UA Yellow Straw/Yellow    Clarity, UA Clear Clear    Glucose, Ur Negative Negative mg/dL    Bilirubin Urine Negative Negative    Ketones, Urine Negative Negative mg/dL    Specific Gravity, UA 1.010 1.005 - 1.030    Blood, Urine Negative Negative    pH, UA 7.0 5.0 - 8.0    Protein, UA Negative Negative mg/dL    Urobilinogen, Urine 0.2 <2.0 E.U./dL    Nitrite, Urine Negative Negative    Leukocyte Esterase, Urine Negative Negative    Microscopic Examination Not Indicated     Urine Type NotGiven    Blood Occult Stool Screen #1   Result Value Ref Range    Occult Blood Screening Result: POSITIVE  Normal range: Negative   (A)    Lactic acid, plasma   Result Value Ref Range    Lactic Acid 1.2 0.4 - 2.0 mmol/L   Protime-INR   Result Value Ref Range    Protime 14.1 (H) 10.0 - 13.2 sec    INR 1.21 (H) 0.86 - 1.14   Ethanol   Result Value Ref Range    Ethanol Lvl None Detected mg/dL   Fibrinogen   Result Value Ref Range    Fibrinogen 201 200 - 397 mg/dL   CBC Auto Differential   Result Value Ref Range    WBC 3.4 (L) 4.0 - 11.0 K/uL    RBC 3.09 (L) 4.20 - 5.90 M/uL    Hemoglobin 9.7 (L) 13.5 - 17.5 g/dL    Hematocrit 28.6 (L) 40.5 - 52.5 %    MCV 92.4 80.0 - 100.0 fL    MCH 31.3 26.0 - 34.0 pg    MCHC 33.9 31.0 - 36.0 g/dL    RDW 15.3 12.4 - 15.4 %    Platelets 61 (L) 338 - 450 K/uL    MPV 7.7 5.0 - 10.5 fL    Neutrophils % 56.2 %    Lymphocytes % 30.1 %    Monocytes % 8.3 %    Eosinophils % 4.2 %    Basophils % 1.2 %    Neutrophils Absolute 1.9 1.7 - 7.7 K/uL    Lymphocytes Absolute 1.0 1.0 - 5.1 K/uL    Monocytes Absolute 0.3 0.0 - 1.3 K/uL    Eosinophils Absolute 0.1 0.0 - 0.6 K/uL    Basophils Absolute 0.0 0.0 - 0.2 K/uL   Comprehensive Metabolic Panel   Result Value Ref Range    Sodium 135 (L) 136 - 145 mmol/L    Potassium 3.7 3.5 - 5.1 mmol/L    Chloride 104 99 - 110 mmol/L    CO2 22 21 - 32 mmol/L    Anion Gap 9 3 - 16    Glucose 105 (H) 70 - 99 mg/dL    BUN 7 7 - 20 mg/dL    CREATININE 0.8 (L) 0.9 - 1.3 mg/dL    GFR Non-African American >60 >60    GFR African American >60 >60    Calcium 8.5 8.3 - 10.6 mg/dL    Total Protein 6.4 6.4 - 8.2 g/dL    Alb 2.9 (L) 3.4 - 5.0 g/dL    Albumin/Globulin Ratio 0.8 (L) 1.1 - 2.2    Total Bilirubin 0.7 0.0 - 1.0 mg/dL    Alkaline Phosphatase 104 40 - 129 U/L    ALT 40 10 - 40 U/L    AST 83 (H) 15 - 37 U/L    Globulin 3.5 g/dL   Magnesium   Result Value Ref Range    Magnesium 1.70 (L) 1.80 - 2.40 mg/dL   Urine Drug Screen   Result Value Ref Range    Amphetamine Screen, Urine Neg Negative <1000ng/mL    Barbiturate Screen, Ur Neg Negative <200 ng/mL    Benzodiazepine Screen, Urine Neg Negative <200 ng/mL    Cannabinoid Scrn, Ur Neg Negative <50 ng/mL    Cocaine Metabolite Screen, Urine Neg Negative <300 ng/mL    Opiate Scrn, Ur POSITIVE (A) Negative <300 ng/mL    PCP Screen, Urine Neg Negative <25 ng/mL    Methadone Screen, Urine Neg Negative <300 ng/mL    Propoxyphene Scrn, Ur Neg Negative <300 ng/mL    Oxycodone Urine Neg Negative <100 ng/ml    pH, UA 7.0     Drug Screen Comment: see below    TYPE AND SCREEN   Result Value Ref Range    ABO/Rh O POS     Antibody Screen NEG          MDM  53-year-old male presents with bright red blood per rectum and generalized abdominal pain. On exam uncomfortable but nontoxic-appearing reassuring vitals he was hemodynamically stable. Has generalized abdominal tenderness that does not appear to correlate with the reducible ventral hernia. His labs do not show significant anemia however he is a known cirrhotic with coagulopathy and thrombocytopenia. He had 2 episodes of grossly bloody diarrhea per nursing staff in the emergency department. Would consider him moderate to high risk for worsening bleed. All evidence points of lower GI bleed. Nurse stated that he saw the stool which was well formed and was light brown.   No melena on exam.  Therefore do not feel that Protonix or Rocephin is indicated for upper GI bleed in the setting of cirrhosis. Also due to social logistical barriers feel that would best be admitted to the hospital with GI consult. I discussed the case with Dr. Benjamin Rodgers admitting as well as GI provider who agrees with admission. Clinical Impression:  1. BRBPR (bright red blood per rectum)    2. Thrombocytopenia (HCC)    3. Elevated INR          Disposition:  Admit to telemetry in stable condition. Blood pressure (!) 144/87, pulse 69, temperature 97.6 °F (36.4 °C), temperature source Oral, resp. rate 16, height 5' 5\" (1.651 m), weight 117 lb (53.1 kg), SpO2 99 %. Patient was given scripts for the following medications. I counseled patient how to take these medications. Current Discharge Medication List          Disposition referral (if applicable):  Tomas Simeon DO  [address] [place] [address]  [phone]              Total critical care time is 0 minutes, which excludes separately billable procedures and updating family. Time spent is specifically for management of the presenting complaint and symptoms initially, direct bedside care, reevaluation, review of records, and consultation. There was a high probability of clinically significant life-threatening deterioration in the patient's condition, which required my urgent intervention. This chart was generated in part by using Dragon Dictation system and may contain errors related to that system including errors in grammar, punctuation, and spelling, as well as words and phrases that may be inappropriate. If there are any questions or concerns please feel free to contact the dictating provider for clarification.      Saw Jacobs MD  [address] Zuñiga MD  08/07/20 1880

## 2020-08-07 LAB
A/G RATIO: 0.8 (ref 1.1–2.2)
ALBUMIN SERPL-MCNC: 2.9 G/DL (ref 3.4–5)
ALP BLD-CCNC: 104 U/L (ref 40–129)
ALT SERPL-CCNC: 40 U/L (ref 10–40)
AMPHETAMINE SCREEN, URINE: ABNORMAL
ANION GAP SERPL CALCULATED.3IONS-SCNC: 9 MMOL/L (ref 3–16)
AST SERPL-CCNC: 83 U/L (ref 15–37)
BARBITURATE SCREEN URINE: ABNORMAL
BASOPHILS ABSOLUTE: 0 K/UL (ref 0–0.2)
BASOPHILS RELATIVE PERCENT: 1.2 %
BENZODIAZEPINE SCREEN, URINE: ABNORMAL
BILIRUB SERPL-MCNC: 0.7 MG/DL (ref 0–1)
BUN BLDV-MCNC: 7 MG/DL (ref 7–20)
CALCIUM SERPL-MCNC: 8.5 MG/DL (ref 8.3–10.6)
CANNABINOID SCREEN URINE: ABNORMAL
CHLORIDE BLD-SCNC: 104 MMOL/L (ref 99–110)
CO2: 22 MMOL/L (ref 21–32)
COCAINE METABOLITE SCREEN URINE: ABNORMAL
CREAT SERPL-MCNC: 0.8 MG/DL (ref 0.9–1.3)
EOSINOPHILS ABSOLUTE: 0.1 K/UL (ref 0–0.6)
EOSINOPHILS RELATIVE PERCENT: 4.2 %
GFR AFRICAN AMERICAN: >60
GFR NON-AFRICAN AMERICAN: >60
GLOBULIN: 3.5 G/DL
GLUCOSE BLD-MCNC: 105 MG/DL (ref 70–99)
HCT VFR BLD CALC: 28.6 % (ref 40.5–52.5)
HEMOGLOBIN: 9.7 G/DL (ref 13.5–17.5)
LYMPHOCYTES ABSOLUTE: 1 K/UL (ref 1–5.1)
LYMPHOCYTES RELATIVE PERCENT: 30.1 %
Lab: ABNORMAL
MAGNESIUM: 1.7 MG/DL (ref 1.8–2.4)
MCH RBC QN AUTO: 31.3 PG (ref 26–34)
MCHC RBC AUTO-ENTMCNC: 33.9 G/DL (ref 31–36)
MCV RBC AUTO: 92.4 FL (ref 80–100)
METHADONE SCREEN, URINE: ABNORMAL
MONOCYTES ABSOLUTE: 0.3 K/UL (ref 0–1.3)
MONOCYTES RELATIVE PERCENT: 8.3 %
NEUTROPHILS ABSOLUTE: 1.9 K/UL (ref 1.7–7.7)
NEUTROPHILS RELATIVE PERCENT: 56.2 %
OPIATE SCREEN URINE: POSITIVE
OXYCODONE URINE: ABNORMAL
PDW BLD-RTO: 15.3 % (ref 12.4–15.4)
PH UA: 7
PHENCYCLIDINE SCREEN URINE: ABNORMAL
PLATELET # BLD: 61 K/UL (ref 135–450)
PMV BLD AUTO: 7.7 FL (ref 5–10.5)
POTASSIUM SERPL-SCNC: 3.7 MMOL/L (ref 3.5–5.1)
PROPOXYPHENE SCREEN: ABNORMAL
RBC # BLD: 3.09 M/UL (ref 4.2–5.9)
SARS-COV-2, NAAT: NOT DETECTED
SODIUM BLD-SCNC: 135 MMOL/L (ref 136–145)
TOTAL PROTEIN: 6.4 G/DL (ref 6.4–8.2)
WBC # BLD: 3.4 K/UL (ref 4–11)

## 2020-08-07 PROCEDURE — 96375 TX/PRO/DX INJ NEW DRUG ADDON: CPT

## 2020-08-07 PROCEDURE — 80053 COMPREHEN METABOLIC PANEL: CPT

## 2020-08-07 PROCEDURE — 85025 COMPLETE CBC W/AUTO DIFF WBC: CPT

## 2020-08-07 PROCEDURE — 96376 TX/PRO/DX INJ SAME DRUG ADON: CPT

## 2020-08-07 PROCEDURE — 6360000002 HC RX W HCPCS: Performed by: INTERNAL MEDICINE

## 2020-08-07 PROCEDURE — U0002 COVID-19 LAB TEST NON-CDC: HCPCS

## 2020-08-07 PROCEDURE — 6370000000 HC RX 637 (ALT 250 FOR IP): Performed by: INTERNAL MEDICINE

## 2020-08-07 PROCEDURE — G0378 HOSPITAL OBSERVATION PER HR: HCPCS

## 2020-08-07 PROCEDURE — 36415 COLL VENOUS BLD VENIPUNCTURE: CPT

## 2020-08-07 PROCEDURE — 83735 ASSAY OF MAGNESIUM: CPT

## 2020-08-07 PROCEDURE — 2060000000 HC ICU INTERMEDIATE R&B

## 2020-08-07 RX ORDER — MORPHINE SULFATE 15 MG/1
15 TABLET ORAL EVERY 6 HOURS PRN
Status: DISCONTINUED | OUTPATIENT
Start: 2020-08-07 | End: 2020-08-08 | Stop reason: HOSPADM

## 2020-08-07 RX ADMIN — FOLIC ACID 1 MG: 1 TABLET ORAL at 08:57

## 2020-08-07 RX ADMIN — Medication 1 TABLET: at 08:57

## 2020-08-07 RX ADMIN — Medication 2 MG: at 22:01

## 2020-08-07 RX ADMIN — LORAZEPAM 2 MG: 2 INJECTION INTRAMUSCULAR; INTRAVENOUS at 02:19

## 2020-08-07 RX ADMIN — ONDANSETRON 4 MG: 2 INJECTION INTRAMUSCULAR; INTRAVENOUS at 09:16

## 2020-08-07 RX ADMIN — HYDROMORPHONE HYDROCHLORIDE 0.5 MG: 2 INJECTION, SOLUTION INTRAMUSCULAR; INTRAVENOUS; SUBCUTANEOUS at 01:52

## 2020-08-07 RX ADMIN — HYDROMORPHONE HYDROCHLORIDE 0.5 MG: 2 INJECTION, SOLUTION INTRAMUSCULAR; INTRAVENOUS; SUBCUTANEOUS at 16:57

## 2020-08-07 RX ADMIN — HYDROMORPHONE HYDROCHLORIDE 0.5 MG: 2 INJECTION, SOLUTION INTRAMUSCULAR; INTRAVENOUS; SUBCUTANEOUS at 10:17

## 2020-08-07 RX ADMIN — HYDROMORPHONE HYDROCHLORIDE 1 MG: 2 INJECTION, SOLUTION INTRAMUSCULAR; INTRAVENOUS; SUBCUTANEOUS at 05:42

## 2020-08-07 RX ADMIN — Medication 100 MG: at 08:56

## 2020-08-07 RX ADMIN — PANTOPRAZOLE SODIUM 40 MG: 40 TABLET, DELAYED RELEASE ORAL at 05:42

## 2020-08-07 RX ADMIN — HYDROMORPHONE HYDROCHLORIDE 0.5 MG: 2 INJECTION, SOLUTION INTRAMUSCULAR; INTRAVENOUS; SUBCUTANEOUS at 22:01

## 2020-08-07 RX ADMIN — ACETAMINOPHEN 650 MG: 325 TABLET ORAL at 09:16

## 2020-08-07 RX ADMIN — SERTRALINE HYDROCHLORIDE 50 MG: 50 TABLET ORAL at 08:56

## 2020-08-07 RX ADMIN — MORPHINE SULFATE 15 MG: 15 TABLET ORAL at 11:37

## 2020-08-07 ASSESSMENT — PAIN DESCRIPTION - DESCRIPTORS
DESCRIPTORS: JABBING
DESCRIPTORS: HEADACHE;ACHING

## 2020-08-07 ASSESSMENT — ENCOUNTER SYMPTOMS
COUGH: 0
WHEEZING: 0
NAUSEA: 1
RHINORRHEA: 0
SHORTNESS OF BREATH: 0
ABDOMINAL PAIN: 1
ANAL BLEEDING: 1
RECTAL PAIN: 0
PHOTOPHOBIA: 0
BACK PAIN: 0
VOMITING: 0
DIARRHEA: 0

## 2020-08-07 ASSESSMENT — PAIN DESCRIPTION - LOCATION
LOCATION: ABDOMEN
LOCATION: ABDOMEN

## 2020-08-07 ASSESSMENT — PAIN SCALES - GENERAL
PAINLEVEL_OUTOF10: 9
PAINLEVEL_OUTOF10: 7
PAINLEVEL_OUTOF10: 9
PAINLEVEL_OUTOF10: 8
PAINLEVEL_OUTOF10: 10
PAINLEVEL_OUTOF10: 10
PAINLEVEL_OUTOF10: 9
PAINLEVEL_OUTOF10: 8
PAINLEVEL_OUTOF10: 9
PAINLEVEL_OUTOF10: 3
PAINLEVEL_OUTOF10: 8
PAINLEVEL_OUTOF10: 8

## 2020-08-07 ASSESSMENT — PAIN DESCRIPTION - FREQUENCY: FREQUENCY: CONTINUOUS

## 2020-08-07 ASSESSMENT — PAIN DESCRIPTION - PAIN TYPE
TYPE: ACUTE PAIN

## 2020-08-07 ASSESSMENT — PAIN DESCRIPTION - ONSET: ONSET: ON-GOING

## 2020-08-07 ASSESSMENT — PAIN SCALES - WONG BAKER
WONGBAKER_NUMERICALRESPONSE: 8

## 2020-08-07 NOTE — ED NOTES
Spoke to RN at [facility] where patient came from. They asked to call them and update them in the morning on patients status. Will need to ask for Reva CAMPOS from [address]      Ludmila Crocker RN  08/06/20 7974

## 2020-08-07 NOTE — PROGRESS NOTES
Hospitalist Progress Note      PCP: Cristian Storm DO    Date of Admission: 8/6/2020    Chief Complaint: Bleeding per rectum    Hospital Course: See H&P    Subjective:   Patient is up in bed, comfortable, not in distress. Denies any chest pain or shortness of breath. Complaining of abdominal discomfort. No new event overnight noted. Medications:  Reviewed    Infusion Medications   Scheduled Medications    folic acid  1 mg Oral Daily    sertraline  50 mg Oral Daily    thiamine  100 mg Oral Daily    therapeutic multivitamin-minerals  1 tablet Oral Daily    nicotine  1 patch Transdermal Daily    pantoprazole  40 mg Oral QAM AC     PRN Meds: morphine, potassium chloride **OR** potassium alternative oral replacement **OR** potassium chloride, magnesium sulfate, acetaminophen **OR** acetaminophen, promethazine **OR** ondansetron, LORazepam **OR** LORazepam **OR** LORazepam **OR** LORazepam **OR** LORazepam **OR** LORazepam **OR** LORazepam **OR** LORazepam, melatonin ER, [DISCONTINUED] HYDROmorphone **OR** HYDROmorphone      Intake/Output Summary (Last 24 hours) at 8/7/2020 1133  Last data filed at 8/7/2020 1010  Gross per 24 hour   Intake 70 ml   Output 200 ml   Net -130 ml       Physical Exam Performed:    BP (!) 144/87   Pulse 69   Temp 97.6 °F (36.4 °C) (Oral)   Resp 16   Ht 5' 5\" (1.651 m)   Wt 117 lb (53.1 kg)   SpO2 99%   BMI 19.47 kg/m²     General appearance: No apparent distress, appears stated age and cooperative. HEENT: Pupils equal, round, and reactive to light. Conjunctivae/corneas clear. Neck: Supple, with full range of motion. No jugular venous distention. Trachea midline. Respiratory:  Normal respiratory effort. Clear to auscultation, bilaterally without Rales/Wheezes/Rhonchi. Cardiovascular: Regular rate and rhythm with normal S1/S2 without murmurs, rubs or gallops. Abdomen: Soft, non-tender, non-distended with normal bowel sounds.   Musculoskeletal: No clubbing, noted, plan to continue to monitor.     Alcoholic cirrhosis  -  Patient claims he has been abstinent from alcohol x 6 weeks. Serum ethanol undetectable. CIWA protocol enacted out of caution.  -  Liver clearance and synthetic function overall seems well compensated at this time. -  Continue oral MVI, thiamine, folate and PPI.     Tobacco abuse  -  Cessation counseled. NRT provided.     DVT Prophylaxis: SCD  Diet: DIET CLEAR LIQUID;  Code Status: Full Code    PT/OT Eval Status: Not ordered    Dispo -in 2 to 4 days    Carlito Moyer MD

## 2020-08-07 NOTE — PROGRESS NOTES
STAN Lomeli pt had 2 episodes of emesis looks like jello followed by extreme weakness and unable to hardly ambulate. prior, pt has been able to get to bathroom by himself. he continues to c/o of abdominal pain 9/10 and is a bit lethargic but alert x3. BP was 164/102 and now 153/94. HR 81 and afebrile. Dr. Neil Stevens has been messaged.

## 2020-08-07 NOTE — CONSULTS
Gastroenterology Consult Note    Patient:   Darek Huizar   YOB: 1965   Facility:   [facility]   Referring/PCP: Maritza Vera DO  Date:     8/7/2020  Consultant:   Reji Ferrera MD    Subjective: This 50 y.o. male was admitted 8/6/2020 with a diagnosis of \"Acute GI bleeding [K92.2]  Acute GI bleeding [K92.2]\" and is seen in consultation regarding \"hematochezia\". Information was obtained from interview of  the patient, examination of the patient, and review of records. I did  update the past medical, surgical, social and / or family history. Hematochezia for 1 day mild in LGI tract assoc w cirrhosis    Current status  Present  Diet Order: Diet NPO, After Midnight Exceptions are: Sips with Meds and he is tolerating diet. Recently, he has experienced moderate, maximum 8/04 periumbilical abdominal  Pain and he has not required Intravenous narcotic analgesics. The patient has also experienced no constipation, diarrhea, fever and melena      Prior to Admission medications    Medication Sig Start Date End Date Taking?  Authorizing Provider   sertraline (ZOLOFT) 50 MG tablet Take 1 tablet by mouth daily 7/8/20   Elie Mendiola MD   ferrous sulfate (IRON 325) 325 (65 Fe) MG tablet Take 1 tablet by mouth 2 times daily (with meals) 7/7/20   Elie Mendiola MD   Multiple Vitamin (MULTIVITAMIN) TABS tablet Take 1 tablet by mouth daily 7/8/20   Elie Mendiola MD   omeprazole (PRILOSEC) 20 MG delayed release capsule Take 1 capsule by mouth 2 times daily (before meals) 7/7/20   Elie Mendiola MD   vitamin B-1 (THIAMINE) 100 MG tablet Take 1 tablet by mouth daily 7/7/20   Elie Menidola MD   folic acid (FOLVITE) 1 MG tablet Take 1 tablet by mouth daily 7/7/20   Elie Mendiola MD      Scheduled Medications:    folic acid  1 mg Oral Daily    sertraline  50 mg Oral Daily    thiamine  100 mg Oral Daily    therapeutic multivitamin-minerals  1 tablet Oral Daily    nicotine  1 patch Transdermal Daily    pantoprazole  40 mg Oral QAM AC     Infusions:   PRN Medications: potassium chloride **OR** potassium alternative oral replacement **OR** potassium chloride, magnesium sulfate, acetaminophen **OR** acetaminophen, promethazine **OR** ondansetron, LORazepam **OR** LORazepam **OR** LORazepam **OR** LORazepam **OR** LORazepam **OR** LORazepam **OR** LORazepam **OR** LORazepam, melatonin ER, HYDROmorphone **OR** HYDROmorphone  Allergies: No Known Allergies    Past Medical History:   Diagnosis Date    Alcohol abuse     Arthritis     hands and back    Chronic bronchitis (HCC)     COPD (chronic obstructive pulmonary disease) (HCC)     bronchitis    ESBL (extended spectrum beta-lactamase) producing bacteria infection 07/06/2020    Hyperlipidemia     Hypertension     MDRO (multiple drug resistant organisms) resistance     MRSA in right arm [id number]    Radiculopathy of lumbosacral region     Spondylisthesis      Past Surgical History:   Procedure Laterality Date    COLONOSCOPY N/A 12/23/2019    COLONOSCOPY WITH BIOPSY performed by Saqib Kim MD at [facility]  12/23/2019    COLONOSCOPY POLYPECTOMY SNARE/COLD BIOPSY performed by Saqib Kim MD at [address]      right lower arm in 30's    LUMBAR SPINE SURGERY Left 4/23/2019    LEFT LUMBAR4-LUMBAR5  MICRODISCECTOMY performed by Wilber Long MD at [facility] N/A 12/23/2019    EGD DIAGNOSTIC ONLY performed by Saqib Kim MD at [address]:   Social History     Tobacco Use    Smoking status: Current Every Day Smoker     Packs/day: 1.00     Types: Cigarettes    Smokeless tobacco: Never Used   Substance Use Topics    Alcohol use: Yes     Comment: 5th of whisky a day for 6 weeks     Family: History reviewed. No pertinent family history. ROS: Pertinent items are noted in HPI.     Objective:   Vital Signs:  Temp (24hrs), Av.5 °F (36.9 °C), Min:98.3 °F (36.8 °C), Max:98.9 °F (92.5 °C)     Systolic (16DDG), BEAU:539 , Min:102 , RFK:059      Diastolic (43XAM), EWR:22, Min:56, Max:82     Pulse  Av.8  Min: 77  Max: 86  BP (!) 102/56   Pulse 86   Temp 98.6 °F (37 °C) (Oral)   Resp 16   Ht 5' 5\" (1.651 m)   Wt 117 lb (53.1 kg)   SpO2 97%   BMI 19.47 kg/m²      Physical Exam:   BP (!) 102/56   Pulse 86   Temp 98.6 °F (37 °C) (Oral)   Resp 16   Ht 5' 5\" (1.651 m)   Wt 117 lb (53.1 kg)   SpO2 97%   BMI 19.47 kg/m²   General appearance: alert, appears stated age and cooperative  Lungs: clear to auscultation bilaterally  Chest wall: no tenderness  Heart: regular rate and rhythm, S1, S2 normal, no murmur, click, rub or gallop  Abdomen: soft, non-tender; bowel sounds normal; no masses,  no organomegaly  Extremities: extremities normal, atraumatic, no cyanosis or edema  Skin: Skin color, texture, turgor normal. No rashes or lesions  Neurologic: Grossly normal    Lab and Imaging Review   Recent Labs     20  1857 20  0504   WBC 4.0 3.4*   HGB 11.0* 9.7*   MCV 92.2 92.4   PLT 68* 61*   INR 1.21*  --     135*   K 3.9 3.7    104   CO2 24 22   BUN 8 7   CREATININE 1.0 0.8*   GLUCOSE 82 105*   CALCIUM 9.2 8.5   PROT 7.6 6.4   LABALBU 3.6 2.9*   AST 99* 83*   ALT 46* 40   ALKPHOS 138* 104   BILITOT 0.7 0.7   LIPASE 68.0*  --    MG  --  1.70*       Assessment:     Patient Active Problem List    Diagnosis Date Noted    [facility]    Acute GI hemorrhage 2020    Thrombocytopenia (HCC) 2020    Acute GI bleeding 2020    Tobacco abuse 2020    Acute alcoholic intoxication with complication (HCC)     High fever     Pneumonia of right lower lobe due to infectious organism (HCC)     Hyponatremia     Diarrhea     Alcohol abuse     Cannabis abuse     Elevated d-dimer     Encounter for hepatitis C virus screening test for high risk patient     Depression 06/30/2020    Alcohol withdrawal syndrome without complication [facility]     Suicidal ideation 06/29/2020    Hypokalemia 06/29/2020    Alcohol use disorder, severe, dependence ([facility]     Adjustment disorder with mixed anxiety and depressed mood     Acute cholecystitis 12/21/2019    PATRICIO (acute kidney injury) ([facility] 04/18/2019    Degenerative disc disease, lumbar 04/18/2019    Chest pain 01/11/2015     49 yo w ETOH cirrhosis admitted for hematochezia (none today) and mid abd pain w 1 episode of non bloody emesis reportedly. H/H 10/29, Plt 61, INR 1.2, AST/ALT 99/46. CT is unremarkable except for very little ascites. EGD/colonoscopy to [facility] in 12/2019 revealed only trace esoph varices and PHG, w colon polyps. Plan:   1. Supportive care  2. Daily H/H and follow clinically   3. Will start clear liquid diet  4. May need repeat scopes at some point  5. Abstain from ETOH  6.  Will follow    Ifeanyi Sherwood MD       (O) [phone]

## 2020-08-07 NOTE — CARE COORDINATION
Spoke with patient at bedside regarding his discharge plan. He states he came from [facility] where he has been for 6 weeks and plans to return to finish his inpatient substance abuse treatment. He states alcohol has always been his \"drug of choice\". Placed call to Carlos with [facility] to discuss the above and inquire as to if there are any barriers to him returning. She states that patient has to be able to walk on his own, he must make a phone call to [facility] in admissions for verbal agreement, etc and he must have a negative COVID test. RN aware of the above. COVID test ordered and pending. Please call [facility] at [phone] for any questions.

## 2020-08-07 NOTE — H&P
Hospital Medicine History & Physical      Patient:  Darek Huizar  :   1965  MRN:   [id number]  Date of Service: 20    CHIEF COMPLAINT:  \"I shit bright red blood 4 times. \" Patient also c/o severe abdominal pain. HISTORY OF PRESENT ILLNESS:    Darek Huizar is a 50 y.o. male. He has a h/o alcohol abuse with cirrhosis, ascites and thrombocytopenia. He describes having bloody red stools four times throughout the day today. He also describes more or less constant central/periumbilical pain. He states he has had a normal appetite and oral intake, then in the next sentence states he vomited twice today. He denies hematemesis. He is asking frequently for opiate pain medications. He states he just completed detox from [facility] six weeks ago. He states he has remained abstinent since then. Review of Systems:  All pertinent positives and negatives are as noted in the HPI section. All other systems were reviewed and are negative.     Past Medical History:   Diagnosis Date    Alcohol abuse     Arthritis     hands and back    Chronic bronchitis (HCC)     COPD (chronic obstructive pulmonary disease) (HCC)     bronchitis    ESBL (extended spectrum beta-lactamase) producing bacteria infection 2020    Hyperlipidemia     Hypertension     MDRO (multiple drug resistant organisms) resistance     MRSA in right arm [id number]    Radiculopathy of lumbosacral region     Spondylisthesis        Past Surgical History:   Procedure Laterality Date    COLONOSCOPY N/A 2019    COLONOSCOPY WITH BIOPSY performed by Justyna Law MD at [facility]  2019    COLONOSCOPY POLYPECTOMY SNARE/COLD BIOPSY performed by Justyna Law MD at [address]      right lower arm in 30's    LUMBAR SPINE SURGERY Left 2019    LEFT LUMBAR4-LUMBAR5  MICRODISCECTOMY performed by Niurka Gibson MD at [facility] 12/23/2019    EGD DIAGNOSTIC ONLY performed by Chichi Tariq MD at [address] [place] [address]         Prior to Admission medications    Medication Sig Start Date End Date Taking? Authorizing Provider   sertraline (ZOLOFT) 50 MG tablet Take 1 tablet by mouth daily 7/8/20   Gwen Echols MD   ferrous sulfate (IRON 325) 325 (65 Fe) MG tablet Take 1 tablet by mouth 2 times daily (with meals) 7/7/20   Gwen Echols MD   Multiple Vitamin (MULTIVITAMIN) TABS tablet Take 1 tablet by mouth daily 7/8/20   Gwen Echols MD   omeprazole (PRILOSEC) 20 MG delayed release capsule Take 1 capsule by mouth 2 times daily (before meals) 7/7/20   Gwen Echols MD   vitamin B-1 (THIAMINE) 100 MG tablet Take 1 tablet by mouth daily 7/7/20   Gwen Echols MD   folic acid (FOLVITE) 1 MG tablet Take 1 tablet by mouth daily 7/7/20   Gwen Echols MD       Allergies:   Patient has no known allergies. Social:   reports that he has been smoking cigarettes. He has been smoking about 1.00 pack per day. He has never used smokeless tobacco.   reports current alcohol use. Social History     Substance and Sexual Activity   Drug Use No       History reviewed. No pertinent family history. PHYSICAL EXAM:  I performed this physical examination. Vitals:  Patient Vitals for the past 24 hrs:   BP Temp Pulse Resp SpO2 Height Weight   08/06/20 2035 (!) 171/81 -- 82 19 100 % -- --   08/06/20 1842 (!) 156/79 98.9 °F (37.2 °C) 78 20 100 % 5' 5\" (1.651 m) 120 lb (54.4 kg)     Room air    GEN:  Appearance:  Age appropriate . Level of Consciousness:  alert . Orientation:  full    HEENT: Sclera anicteric.  no conjunctival chemosis. moist mucus membranes. no specific or diagnostic oral lesions. NECK:  no signs of meningismus. Jugular veins not distended. Carotid pulses  2+.  no cervical lymphadenopathy. no thyromegaly. CV:  regular rhythm. normal S1 & S2.    no murmur. no rub.  no gallop. CHEST:     PULM:  Chest excursion is symmetric. Breath sounds are vesicular. Adventitious sounds:  none    AB:  Abdominal shape is normal.  Bowel sounds are active. Generally soft to palpation. General tenderness is present with finger-light palpation of the abdominal wall. Inconsistent and distractable. no involuntary guarding. no rebound guarding. EXTR:  Skin is warm. Capillary refill brisk. no specific or pathognomic rash. no clubbing. no pitting edema. no active wound or ulcer. NEURO: Slightly tremulous. No asterixis. LABS:  Lab Results   Component Value Date    WBC 4.0 08/06/2020    HGB 11.0 (L) 08/06/2020    HCT 32.5 (L) 08/06/2020    MCV 92.2 08/06/2020    PLT 68 (L) 08/06/2020     Lab Results   Component Value Date    CREATININE 1.0 08/06/2020    BUN 8 08/06/2020     08/06/2020    K 3.9 08/06/2020     08/06/2020    CO2 24 08/06/2020     Lab Results   Component Value Date    ALT 46 (H) 08/06/2020    AST 99 (H) 08/06/2020    ALKPHOS 138 (H) 08/06/2020    BILITOT 0.7 08/06/2020     Lab Results   Component Value Date    CKTOTAL 54 04/22/2019    TROPONINI <0.01 06/29/2020     No results for input(s): PHART, ZNV5QKN, PO2ART in the last 72 hours. IMAGING:  Ct Abdomen Pelvis W Iv Contrast Additional Contrast? None    Result Date: 8/6/2020  EXAMINATION: CT OF THE ABDOMEN AND PELVIS WITH CONTRAST 8/6/2020 7:39 pm TECHNIQUE: CT of the abdomen and pelvis was performed with the administration of intravenous contrast. Multiplanar reformatted images are provided for review. Dose modulation, iterative reconstruction, and/or weight based adjustment of the mA/kV was utilized to reduce the radiation dose to as low as reasonably achievable.  COMPARISON: July 5, 2020 HISTORY: ORDERING SYSTEM PROVIDED HISTORY: abd pain, ventral hernia, BRBPR TECHNOLOGIST PROVIDED HISTORY: Reason for exam:->abd pain, ventral hernia, BRBPR Additional Contrast?->None Reason for Exam: abd pain, ventral hernia, BRBPR, blood in stool x16 hrs pta Acuity: Acute Type of Exam: Initial FINDINGS: Lower Chest: Unremarkable Organs: Liver is slightly nodular in contour. No discrete hepatic lesion visualized. Gallbladder and pancreas are unremarkable. Spleen is mildly enlarged measuring up to 13 cm craniocaudal. GI/Bowel: Varices surrounding the distal esophagus. No findings to suggest bowel obstruction. Appendix is normal. Pelvis: No bladder stone. Small volume ascites in the pelvis. Peritoneum/Retroperitoneum: No abdominal aortic aneurysm. Adrenal glands are unremarkable. A few punctate stones in the right kidney. No hydronephrosis. Left kidney is unremarkable. Again noted are subcentimeter gastrohepatic ligament lymph nodes. Periportal lymph nodes noted measuring up to 1.0 cm in short axis, similar to previous exam.  Small volume ascites, new. Bones/Soft Tissues: No acute bony abnormality. Cirrhotic morphology of the liver and signs of portal hypertension. Small volume ascites, increased. Nonobstructing right renal stones. No hernia visualized. No significant wall thickening or inflammation the colon. I directly reviewed all recent imaging studies as well as pertinent prior studies. Active Hospital Problems    Diagnosis Date Noted    Alcoholic cirrhosis of liver with ascites ([facility] [K70.31] 08/06/2020    Acute GI hemorrhage [K92.2] 08/06/2020    Thrombocytopenia ([facility] [D69.6] 08/06/2020    Acute GI bleeding [K92.2] 08/06/2020    Tobacco abuse [Z72.0] 08/06/2020       ASSESSMENT & PLAN  Acute GI hemorrhage  -  Weight of evidence supports a lower GI bleeding source. -  No hemodynamic compromise apparent. -  Monitor H&H q6h. Transfuse for Hgb < 7 or Plt's < 50k. -  Dr. Millie Boas with the GI service will evaluate in the morning. Alcoholic cirrhosis  -  Patient claims he has been abstinent from alcohol x 6 weeks. Serum ethanol undetectable.   [facility] protocol enacted out of caution.  -  Liver clearance and synthetic function overall seems well compensated at this time. -  Continue oral MVI, thiamine, folate and PPI. Tobacco abuse  -  Cessation counseled. NRT provided.     DVT prophylaxis: SCDs only  Code Status:  Full  Disposition:  Inpatient    Jordan Durant MD

## 2020-08-07 NOTE — ED NOTES
[facility] @ 2126  RE: LGIB, thrombocytopenia per Dr. Kemi Javier called back @ 2133       Gonzalo Carbajal  08/06/20 2138

## 2020-08-08 VITALS
RESPIRATION RATE: 17 BRPM | DIASTOLIC BLOOD PRESSURE: 87 MMHG | WEIGHT: 115.5 LBS | SYSTOLIC BLOOD PRESSURE: 144 MMHG | HEART RATE: 95 BPM | TEMPERATURE: 97.5 F | BODY MASS INDEX: 19.24 KG/M2 | HEIGHT: 65 IN | OXYGEN SATURATION: 95 %

## 2020-08-08 LAB
A/G RATIO: 0.9 (ref 1.1–2.2)
ALBUMIN SERPL-MCNC: 3.2 G/DL (ref 3.4–5)
ALP BLD-CCNC: 97 U/L (ref 40–129)
ALT SERPL-CCNC: 47 U/L (ref 10–40)
ANION GAP SERPL CALCULATED.3IONS-SCNC: 8 MMOL/L (ref 3–16)
AST SERPL-CCNC: 104 U/L (ref 15–37)
BASOPHILS ABSOLUTE: 0 K/UL (ref 0–0.2)
BASOPHILS RELATIVE PERCENT: 0.6 %
BILIRUB SERPL-MCNC: 1 MG/DL (ref 0–1)
BUN BLDV-MCNC: 5 MG/DL (ref 7–20)
CALCIUM SERPL-MCNC: 9.4 MG/DL (ref 8.3–10.6)
CHLORIDE BLD-SCNC: 103 MMOL/L (ref 99–110)
CO2: 27 MMOL/L (ref 21–32)
CREAT SERPL-MCNC: 0.8 MG/DL (ref 0.9–1.3)
EOSINOPHILS ABSOLUTE: 0.1 K/UL (ref 0–0.6)
EOSINOPHILS RELATIVE PERCENT: 3 %
GFR AFRICAN AMERICAN: >60
GFR NON-AFRICAN AMERICAN: >60
GLOBULIN: 3.7 G/DL
GLUCOSE BLD-MCNC: 86 MG/DL (ref 70–99)
HCT VFR BLD CALC: 32.6 % (ref 40.5–52.5)
HEMOGLOBIN: 10.9 G/DL (ref 13.5–17.5)
LYMPHOCYTES ABSOLUTE: 0.9 K/UL (ref 1–5.1)
LYMPHOCYTES RELATIVE PERCENT: 22.5 %
MAGNESIUM: 1.7 MG/DL (ref 1.8–2.4)
MCH RBC QN AUTO: 30.9 PG (ref 26–34)
MCHC RBC AUTO-ENTMCNC: 33.5 G/DL (ref 31–36)
MCV RBC AUTO: 92.4 FL (ref 80–100)
MONOCYTES ABSOLUTE: 0.3 K/UL (ref 0–1.3)
MONOCYTES RELATIVE PERCENT: 7.1 %
NEUTROPHILS ABSOLUTE: 2.8 K/UL (ref 1.7–7.7)
NEUTROPHILS RELATIVE PERCENT: 66.8 %
PDW BLD-RTO: 15 % (ref 12.4–15.4)
PLATELET # BLD: 69 K/UL (ref 135–450)
PMV BLD AUTO: 8.1 FL (ref 5–10.5)
POTASSIUM SERPL-SCNC: 4.6 MMOL/L (ref 3.5–5.1)
RBC # BLD: 3.53 M/UL (ref 4.2–5.9)
SODIUM BLD-SCNC: 138 MMOL/L (ref 136–145)
TOTAL PROTEIN: 6.9 G/DL (ref 6.4–8.2)
WBC # BLD: 4.2 K/UL (ref 4–11)

## 2020-08-08 PROCEDURE — 83735 ASSAY OF MAGNESIUM: CPT

## 2020-08-08 PROCEDURE — 6370000000 HC RX 637 (ALT 250 FOR IP): Performed by: INTERNAL MEDICINE

## 2020-08-08 PROCEDURE — 85025 COMPLETE CBC W/AUTO DIFF WBC: CPT

## 2020-08-08 PROCEDURE — 96376 TX/PRO/DX INJ SAME DRUG ADON: CPT

## 2020-08-08 PROCEDURE — G0378 HOSPITAL OBSERVATION PER HR: HCPCS

## 2020-08-08 PROCEDURE — 36415 COLL VENOUS BLD VENIPUNCTURE: CPT

## 2020-08-08 PROCEDURE — 80053 COMPREHEN METABOLIC PANEL: CPT

## 2020-08-08 PROCEDURE — 6360000002 HC RX W HCPCS: Performed by: INTERNAL MEDICINE

## 2020-08-08 RX ADMIN — MORPHINE SULFATE 15 MG: 15 TABLET ORAL at 03:04

## 2020-08-08 RX ADMIN — HYDROMORPHONE HYDROCHLORIDE 0.5 MG: 2 INJECTION, SOLUTION INTRAMUSCULAR; INTRAVENOUS; SUBCUTANEOUS at 02:00

## 2020-08-08 RX ADMIN — HYDROMORPHONE HYDROCHLORIDE 0.5 MG: 2 INJECTION, SOLUTION INTRAMUSCULAR; INTRAVENOUS; SUBCUTANEOUS at 05:51

## 2020-08-08 RX ADMIN — FOLIC ACID 1 MG: 1 TABLET ORAL at 09:36

## 2020-08-08 RX ADMIN — Medication 1 TABLET: at 09:36

## 2020-08-08 RX ADMIN — HYDROMORPHONE HYDROCHLORIDE 0.5 MG: 2 INJECTION, SOLUTION INTRAMUSCULAR; INTRAVENOUS; SUBCUTANEOUS at 12:39

## 2020-08-08 RX ADMIN — SERTRALINE HYDROCHLORIDE 50 MG: 50 TABLET ORAL at 09:36

## 2020-08-08 RX ADMIN — Medication 100 MG: at 09:36

## 2020-08-08 RX ADMIN — PANTOPRAZOLE SODIUM 40 MG: 40 TABLET, DELAYED RELEASE ORAL at 05:52

## 2020-08-08 RX ADMIN — MORPHINE SULFATE 15 MG: 15 TABLET ORAL at 09:36

## 2020-08-08 ASSESSMENT — PAIN SCALES - GENERAL
PAINLEVEL_OUTOF10: 10
PAINLEVEL_OUTOF10: 9
PAINLEVEL_OUTOF10: 8

## 2020-08-08 NOTE — DISCHARGE SUMMARY
Hospital Medicine Discharge Summary    Patient: Ovidio Castillo     Gender: male  : 1965   Age: 50 y.o. MRN: [id number]    Admitting Physician: Barney Burger MD  Discharge Physician: Vanessa Duvall MD     Code Status: Prior     Admit Date: 2020   Discharge Date: 2020      Disposition: [address]  Discharge Diagnoses:  GI bleed    Active Hospital Problems    Diagnosis Date Noted    Alcoholic cirrhosis of liver without ascites ([facility] [K70.30] 2020    Acute GI hemorrhage [K92.2] 2020    Thrombocytopenia ([facility] [D69.6] 2020    Acute GI bleeding [K92.2] 2020    Tobacco abuse [Z72.0] 2020       Follow-up appointments: None arranged    Outpatient to do list: None arranged    Condition at Discharge:  [facility] [place] Course:   Unreliable history  Ovidio Castillo is a 50 y.o. male. He has a h/o alcohol abuse with cirrhosis, ascites and thrombocytopenia. He was admitted for having bloody red stools four times throughout the day today. He also describes more or less constant central/periumbilical pain. He had a normal appetite and oral intake, then in the next sentence states he vomited twice on the day. He denies hematemesis. He was asking frequently for opiate pain medications. He stated he just completed detox from [facility] six weeks ago. He states he has remained abstinent since then. He however opted to leave  [address]     Discharge Medications:   Discharge Medication List as of 2020  1:43 PM        Discharge Medication List as of 2020  1:43 PM        Discharge Medication List as of 2020  1:43 PM      CONTINUE these medications which have NOT CHANGED    Details   sertraline (ZOLOFT) 50 MG tablet Take 1 tablet by mouth daily, Disp-30 tablet, R-0Normal      ferrous sulfate (IRON 325) 325 (65 Fe) MG tablet Take 1 tablet by mouth 2 times daily (with meals), Disp-60 tablet, R-0Normal      Multiple Vitamin Signed:    Tammi Nicole MD   8/8/2020      Thank you Larisa Mitchell DO for the opportunity to be involved in this patient's care.  If you have any questions or concerns please feel free to contact me at [facility]

## 2020-08-08 NOTE — FLOWSHEET NOTE
08/07/20 1950   Assessment   Charting Type Shift assessment   Neurological   Neuro (WDL) WDL   Level of Consciousness 0   [place] Coma Scale   Eye Opening 4   Best Verbal Response 5   Best Motor Response 6   [place] Coma Scale Score 15   HEENT   HEENT (WDL) X   Nose Abrasion  (R nare)   Teeth Missing teeth   Respiratory   Respiratory (WDL) WDL   Respiratory Pattern Regular   Respiratory Depth Normal   Respiratory Quality/Effort Unlabored   Cardiac   Cardiac (WDL) WDL   Cardiac Rhythm NSR   Cardiac Monitor   Telemetry Monitor On Yes   Telemetry Audible Yes   Telemetry Alarms Set Yes   Gastrointestinal   Abdominal (WDL) X  (umbilical hernia, pain in abdomen )   RUQ Bowel Sounds Active   LUQ Bowel Sounds Active   RLQ Bowel Sounds Active   LLQ Bowel Sounds Active   Abdomen Inspection Soft;Rounded   Tenderness Guarding;Tenderness   GI Symptoms Nausea;Vomiting   Peripheral Vascular   Peripheral Vascular (WDL) WDL   Skin Color/Condition   Skin Color/Condition (WDL) WDL   Skin Integrity   Skin Integrity (WDL) WDL   Musculoskeletal   Musculoskeletal (WDL) X   RL Extremity Weakness; Unsteady   LL Extremity Weakness; Unsteady   Genitourinary   Genitourinary (WDL) WDL   Anus/Rectum   Anus/Rectum (WDL) X  (previous Blood in stool )   Psychosocial   Psychosocial (WDL) WDL   Patient lying in bed. Bed in lowest position and locked. Bed alarm on. 2/4 bed rails up. Patient call light and belongings within reach. Will continue to monitor.

## 2020-08-08 NOTE — PROGRESS NOTES
Pat Guzman is a 50 y.o. male patient.     Current Facility-Administered Medications   Medication Dose Route Frequency Provider Last Rate Last Dose    morphine (MSIR) tablet 15 mg  15 mg Oral Q6H PRN Kassandra Ochoa MD   15 mg at 87/85/97 2612    folic acid (FOLVITE) tablet 1 mg  1 mg Oral Daily Jerman Palacio MD   1 mg at 08/08/20 0936    sertraline (ZOLOFT) tablet 50 mg  50 mg Oral Daily Jerman Palacio MD   50 mg at 08/08/20 0936    potassium chloride (KLOR-CON M) extended release tablet 40 mEq  40 mEq Oral PRN Jerman Palacio MD        Or    potassium bicarb-citric acid (EFFER-K) effervescent tablet 40 mEq  40 mEq Oral PRN Jerman Palacio MD        Or    potassium chloride 10 mEq/100 mL IVPB (Peripheral Line)  10 mEq Intravenous PRN Jerman Palacio MD        magnesium sulfate 1 g in dextrose 5% 100 mL IVPB  1 g Intravenous PRN Jerman Palacio MD        acetaminophen (TYLENOL) tablet 650 mg  650 mg Oral Q6H PRN Jerman Palacio MD   650 mg at 08/07/20 5901    Or    acetaminophen (TYLENOL) suppository 650 mg  650 mg Rectal Q6H PRN Jerman Palacio MD        promethazine (PHENERGAN) tablet 12.5 mg  12.5 mg Oral Q6H PRN Jerman Palacio MD        Or    ondansetron [facility]) injection 4 mg  4 mg Intravenous Q6H PRN Jerman Palacio MD   4 mg at 08/07/20 3227    vitamin B-1 (THIAMINE) tablet 100 mg  100 mg Oral Daily Jerman Palacio MD   100 mg at 08/08/20 6847    therapeutic multivitamin-minerals 1 tablet  1 tablet Oral Daily Jerman Palacio MD   1 tablet at 08/08/20 0936    nicotine (NICODERM CQ) 14 MG/24HR 1 patch  1 patch Transdermal Daily Jerman Palacio MD   1 patch at 08/08/20 0936    LORazepam (ATIVAN) tablet 1 mg  1 mg Oral Q1H PRN Jerman Palacio MD        Or    LORazepam (ATIVAN) injection 1 mg  1 mg Intravenous Q1H PRN Jerman Palacio MD        Or    LORazepam (ATIVAN) tablet 2 mg  2 mg Oral Q1H PRN Jerman Palacio MD        Or    LORazepam (ATIVAN) injection 2 mg  2 mg Intravenous Q1H PRN Estela Cates MD   2 mg at 08/07/20 2703    Or    LORazepam (ATIVAN) tablet 3 mg  3 mg Oral Q1H PRN Estela Cates MD        Or    LORazepam (ATIVAN) injection 3 mg  3 mg Intravenous Q1H PRBIANKA Cates MD        Or    LORazepam (ATIVAN) tablet 4 mg  4 mg Oral Q1H PRN Estela Cates MD        Or    LORazepam (ATIVAN) injection 4 mg  4 mg Intravenous Q1H PRN Estela Cates MD        melatonin ER tablet 2 mg  2 mg Oral Nightly PRN Estela Cates MD   2 mg at 08/07/20 2201    pantoprazole (PROTONIX) tablet 40 mg  40 mg Oral QAM AC Estela Cates MD   40 mg at 08/08/20 3237    HYDROmorphone (DILAUDID) injection 0.5 mg  0.5 mg Intravenous Q4H PRN Estela Cates MD   0.5 mg at 08/08/20 0551     No Known Allergies  Principal Problem:    Acute GI hemorrhage  Active Problems:    Alcoholic cirrhosis of liver without ascites (HCC)    Thrombocytopenia ([facility]    Acute GI bleeding    Tobacco abuse  Resolved Problems:    * No resolved hospital problems. *    Blood pressure 121/78, pulse 88, temperature 97.9 °F (36.6 °C), temperature source Oral, resp. rate 15, height 5' 5\" (1.651 m), weight 115 lb 8 oz (52.4 kg), SpO2 98 %. Subjective:  Symptoms:  Stable. Pain:  He complains of pain that is mild. Objective:  General Appearance:  Not in pain. Vital signs: (most recent): Blood pressure 121/78, pulse 88, temperature 97.9 °F (36.6 °C), temperature source Oral, resp. rate 15, height 5' 5\" (1.651 m), weight 115 lb 8 oz (52.4 kg), SpO2 98 %. Heart: Normal rate. Regular rhythm. S1 normal and S2 normal.    Abdomen: Abdomen is soft. Bowel sounds are normal.   There is no abdominal tenderness. Assessment & Plan  51 yo w ETOH cirrhosis admitted for hematochezia (none today) and mid abd pain w 1 episode of non bloody emesis reportedly. H/H 10/29, Plt 61, INR 1.2, AST/ALT 99/46. CT is unremarkable except for very little ascites.  EGD/colonoscopy to [facility] in 12/2019 revealed only trace esoph varices and PHG, w colon polyps.     Plan:   1. Supportive care  2. Daily H/H and follow clinically   3. Will start clear liquid diet  4. Continue daily Protonix  5. May need repeat EGD ion Monday if N/V persists  6. Consider repeat colonoscopy as well  7. Abstain from ETOH  8.  Will follow     Palma Lenz MD       (O) [phone]    Palma Lenz MD  8/8/2020

## 2020-08-08 NOTE — PROGRESS NOTES
Pt leaving AMA, daughter at bedside, pt signed paperwork and verbalized understanding of the risk to leaving. Dr. Pauline Ordoñez aware. IV and telemetry removed.

## 2020-11-27 ENCOUNTER — HOSPITAL ENCOUNTER (INPATIENT)
Age: 55
LOS: 5 days | Discharge: HOME | DRG: 280 | End: 2020-12-02
Attending: STUDENT IN AN ORGANIZED HEALTH CARE EDUCATION/TRAINING PROGRAM | Admitting: HOSPITALIST
Payer: COMMERCIAL

## 2020-11-27 ENCOUNTER — APPOINTMENT (OUTPATIENT)
Dept: GENERAL RADIOLOGY | Age: 55
DRG: 280 | End: 2020-11-27
Payer: COMMERCIAL

## 2020-11-27 PROBLEM — K70.31 ALCOHOLIC CIRRHOSIS OF LIVER WITH ASCITES (HCC): Status: ACTIVE | Noted: 2020-11-27

## 2020-11-27 LAB
A/G RATIO: 1.1 (ref 1.1–2.2)
ALBUMIN SERPL-MCNC: 3.3 G/DL (ref 3.4–5)
ALP BLD-CCNC: 135 U/L (ref 40–129)
ALT SERPL-CCNC: 19 U/L (ref 10–40)
ANION GAP SERPL CALCULATED.3IONS-SCNC: 10 MMOL/L (ref 3–16)
APTT: 31 SEC (ref 24.2–36.2)
AST SERPL-CCNC: 48 U/L (ref 15–37)
BASOPHILS ABSOLUTE: 0 K/UL (ref 0–0.2)
BASOPHILS RELATIVE PERCENT: 0.1 %
BILIRUB SERPL-MCNC: 0.7 MG/DL (ref 0–1)
BILIRUBIN URINE: NEGATIVE
BLOOD, URINE: NEGATIVE
BUN BLDV-MCNC: 18 MG/DL (ref 7–20)
CALCIUM SERPL-MCNC: 8.3 MG/DL (ref 8.3–10.6)
CHLORIDE BLD-SCNC: 106 MMOL/L (ref 99–110)
CLARITY: ABNORMAL
CO2: 21 MMOL/L (ref 21–32)
COLOR: YELLOW
CREAT SERPL-MCNC: 1 MG/DL (ref 0.9–1.3)
EOSINOPHILS ABSOLUTE: 0 K/UL (ref 0–0.6)
EOSINOPHILS RELATIVE PERCENT: 0.3 %
EPITHELIAL CELLS, UA: 0 /HPF (ref 0–5)
GFR AFRICAN AMERICAN: >60
GFR NON-AFRICAN AMERICAN: >60
GLOBULIN: 3 G/DL
GLUCOSE BLD-MCNC: 73 MG/DL (ref 70–99)
GLUCOSE URINE: NEGATIVE MG/DL
HCT VFR BLD CALC: 25.2 % (ref 40.5–52.5)
HEMOGLOBIN: 8.4 G/DL (ref 13.5–17.5)
HYALINE CASTS: 0 /LPF (ref 0–8)
INR BLD: 1.08 (ref 0.86–1.14)
KETONES, URINE: NEGATIVE MG/DL
LEUKOCYTE ESTERASE, URINE: NEGATIVE
LIPASE: 57 U/L (ref 13–60)
LYMPHOCYTES ABSOLUTE: 1.1 K/UL (ref 1–5.1)
LYMPHOCYTES RELATIVE PERCENT: 10.7 %
MCH RBC QN AUTO: 28.7 PG (ref 26–34)
MCHC RBC AUTO-ENTMCNC: 33.2 G/DL (ref 31–36)
MCV RBC AUTO: 86.7 FL (ref 80–100)
MICROSCOPIC EXAMINATION: YES
MONOCYTES ABSOLUTE: 0.7 K/UL (ref 0–1.3)
MONOCYTES RELATIVE PERCENT: 7.1 %
NEUTROPHILS ABSOLUTE: 8.6 K/UL (ref 1.7–7.7)
NEUTROPHILS RELATIVE PERCENT: 81.8 %
NITRITE, URINE: NEGATIVE
OCCULT BLOOD DIAGNOSTIC: ABNORMAL
PDW BLD-RTO: 17.1 % (ref 12.4–15.4)
PH UA: 6.5 (ref 5–8)
PLATELET # BLD: 99 K/UL (ref 135–450)
PMV BLD AUTO: 7 FL (ref 5–10.5)
POTASSIUM REFLEX MAGNESIUM: 3.8 MMOL/L (ref 3.5–5.1)
PRO-BNP: 486 PG/ML (ref 0–124)
PROTEIN UA: NEGATIVE MG/DL
PROTHROMBIN TIME: 12.5 SEC (ref 10–13.2)
RBC # BLD: 2.91 M/UL (ref 4.2–5.9)
RBC UA: 0 /HPF (ref 0–4)
SODIUM BLD-SCNC: 137 MMOL/L (ref 136–145)
SPECIFIC GRAVITY UA: 1.01 (ref 1–1.03)
TOTAL PROTEIN: 6.3 G/DL (ref 6.4–8.2)
URINE REFLEX TO CULTURE: ABNORMAL
URINE TYPE: ABNORMAL
UROBILINOGEN, URINE: 0.2 E.U./DL
WBC # BLD: 10.6 K/UL (ref 4–11)
WBC UA: 0 /HPF (ref 0–5)

## 2020-11-27 PROCEDURE — 80053 COMPREHEN METABOLIC PANEL: CPT

## 2020-11-27 PROCEDURE — G0328 FECAL BLOOD SCRN IMMUNOASSAY: HCPCS

## 2020-11-27 PROCEDURE — 96374 THER/PROPH/DIAG INJ IV PUSH: CPT

## 2020-11-27 PROCEDURE — 1200000000 HC SEMI PRIVATE

## 2020-11-27 PROCEDURE — 6360000002 HC RX W HCPCS: Performed by: STUDENT IN AN ORGANIZED HEALTH CARE EDUCATION/TRAINING PROGRAM

## 2020-11-27 PROCEDURE — 87493 C DIFF AMPLIFIED PROBE: CPT

## 2020-11-27 PROCEDURE — 85610 PROTHROMBIN TIME: CPT

## 2020-11-27 PROCEDURE — 6360000002 HC RX W HCPCS: Performed by: NURSE PRACTITIONER

## 2020-11-27 PROCEDURE — 99284 EMERGENCY DEPT VISIT MOD MDM: CPT

## 2020-11-27 PROCEDURE — 83690 ASSAY OF LIPASE: CPT

## 2020-11-27 PROCEDURE — 36415 COLL VENOUS BLD VENIPUNCTURE: CPT

## 2020-11-27 PROCEDURE — 86708 HEPATITIS A ANTIBODY: CPT

## 2020-11-27 PROCEDURE — 83880 ASSAY OF NATRIURETIC PEPTIDE: CPT

## 2020-11-27 PROCEDURE — 87324 CLOSTRIDIUM AG IA: CPT

## 2020-11-27 PROCEDURE — 74018 RADEX ABDOMEN 1 VIEW: CPT

## 2020-11-27 PROCEDURE — 85025 COMPLETE CBC W/AUTO DIFF WBC: CPT

## 2020-11-27 PROCEDURE — 85730 THROMBOPLASTIN TIME PARTIAL: CPT

## 2020-11-27 PROCEDURE — 87449 NOS EACH ORGANISM AG IA: CPT

## 2020-11-27 PROCEDURE — 81001 URINALYSIS AUTO W/SCOPE: CPT

## 2020-11-27 RX ORDER — SPIRONOLACTONE 25 MG/1
50 TABLET ORAL DAILY
Status: DISCONTINUED | OUTPATIENT
Start: 2020-11-27 | End: 2020-11-28

## 2020-11-27 RX ORDER — MORPHINE SULFATE 4 MG/ML
4 INJECTION, SOLUTION INTRAMUSCULAR; INTRAVENOUS EVERY 4 HOURS PRN
Status: DISCONTINUED | OUTPATIENT
Start: 2020-11-27 | End: 2020-11-30

## 2020-11-27 RX ORDER — POLYETHYLENE GLYCOL 3350 17 G/17G
17 POWDER, FOR SOLUTION ORAL DAILY PRN
Status: DISCONTINUED | OUTPATIENT
Start: 2020-11-27 | End: 2020-12-02 | Stop reason: HOSPADM

## 2020-11-27 RX ORDER — SODIUM CHLORIDE 0.9 % (FLUSH) 0.9 %
10 SYRINGE (ML) INJECTION PRN
Status: DISCONTINUED | OUTPATIENT
Start: 2020-11-27 | End: 2020-12-02 | Stop reason: HOSPADM

## 2020-11-27 RX ORDER — PROMETHAZINE HYDROCHLORIDE 25 MG/1
12.5 TABLET ORAL EVERY 6 HOURS PRN
Status: DISCONTINUED | OUTPATIENT
Start: 2020-11-27 | End: 2020-12-02 | Stop reason: HOSPADM

## 2020-11-27 RX ORDER — FUROSEMIDE 20 MG/1
20 TABLET ORAL DAILY
Status: DISCONTINUED | OUTPATIENT
Start: 2020-11-28 | End: 2020-11-28

## 2020-11-27 RX ORDER — ALBUTEROL SULFATE 2.5 MG/3ML
2.5 SOLUTION RESPIRATORY (INHALATION) EVERY 6 HOURS PRN
Status: DISCONTINUED | OUTPATIENT
Start: 2020-11-27 | End: 2020-12-02 | Stop reason: HOSPADM

## 2020-11-27 RX ORDER — PANTOPRAZOLE SODIUM 40 MG/1
40 TABLET, DELAYED RELEASE ORAL
Status: DISCONTINUED | OUTPATIENT
Start: 2020-11-28 | End: 2020-12-02 | Stop reason: HOSPADM

## 2020-11-27 RX ORDER — FENTANYL CITRATE 50 UG/ML
25 INJECTION, SOLUTION INTRAMUSCULAR; INTRAVENOUS ONCE
Status: COMPLETED | OUTPATIENT
Start: 2020-11-27 | End: 2020-11-27

## 2020-11-27 RX ORDER — THIAMINE MONONITRATE (VIT B1) 100 MG
100 TABLET ORAL DAILY
Status: DISCONTINUED | OUTPATIENT
Start: 2020-11-28 | End: 2020-12-02 | Stop reason: HOSPADM

## 2020-11-27 RX ORDER — ONDANSETRON 2 MG/ML
4 INJECTION INTRAMUSCULAR; INTRAVENOUS EVERY 6 HOURS PRN
Status: DISCONTINUED | OUTPATIENT
Start: 2020-11-27 | End: 2020-12-02 | Stop reason: HOSPADM

## 2020-11-27 RX ORDER — FUROSEMIDE 10 MG/ML
40 INJECTION INTRAMUSCULAR; INTRAVENOUS ONCE
Status: COMPLETED | OUTPATIENT
Start: 2020-11-27 | End: 2020-11-27

## 2020-11-27 RX ORDER — MULTIVITAMIN WITH IRON
1 TABLET ORAL DAILY
Status: DISCONTINUED | OUTPATIENT
Start: 2020-11-28 | End: 2020-12-02 | Stop reason: HOSPADM

## 2020-11-27 RX ORDER — SODIUM CHLORIDE 0.9 % (FLUSH) 0.9 %
10 SYRINGE (ML) INJECTION EVERY 12 HOURS SCHEDULED
Status: DISCONTINUED | OUTPATIENT
Start: 2020-11-27 | End: 2020-12-02 | Stop reason: HOSPADM

## 2020-11-27 RX ORDER — FOLIC ACID 1 MG/1
1 TABLET ORAL DAILY
Status: DISCONTINUED | OUTPATIENT
Start: 2020-11-28 | End: 2020-12-02 | Stop reason: HOSPADM

## 2020-11-27 RX ADMIN — FENTANYL CITRATE 25 MCG: 50 INJECTION, SOLUTION INTRAMUSCULAR; INTRAVENOUS at 18:18

## 2020-11-27 RX ADMIN — MORPHINE SULFATE 4 MG: 4 INJECTION, SOLUTION INTRAMUSCULAR; INTRAVENOUS at 21:18

## 2020-11-27 RX ADMIN — FUROSEMIDE 40 MG: 10 INJECTION, SOLUTION INTRAMUSCULAR; INTRAVENOUS at 21:18

## 2020-11-27 ASSESSMENT — PAIN DESCRIPTION - PAIN TYPE
TYPE: ACUTE PAIN
TYPE: ACUTE PAIN

## 2020-11-27 ASSESSMENT — PAIN SCALES - GENERAL
PAINLEVEL_OUTOF10: 10
PAINLEVEL_OUTOF10: 9
PAINLEVEL_OUTOF10: 10
PAINLEVEL_OUTOF10: 10

## 2020-11-27 ASSESSMENT — ENCOUNTER SYMPTOMS
NAUSEA: 1
RESPIRATORY NEGATIVE: 1
CONSTIPATION: 1
ABDOMINAL DISTENTION: 1
ABDOMINAL PAIN: 1
VOMITING: 0
EYES NEGATIVE: 1

## 2020-11-27 ASSESSMENT — PAIN DESCRIPTION - FREQUENCY: FREQUENCY: CONTINUOUS

## 2020-11-27 ASSESSMENT — PAIN DESCRIPTION - PROGRESSION: CLINICAL_PROGRESSION: NOT CHANGED

## 2020-11-27 ASSESSMENT — PAIN DESCRIPTION - LOCATION: LOCATION: ABDOMEN

## 2020-11-27 ASSESSMENT — PAIN DESCRIPTION - ONSET: ONSET: ON-GOING

## 2020-11-27 NOTE — ED PROVIDER NOTES
status:      Spouse name: None    Number of children: None    Years of education: None    Highest education level: None   Occupational History    None   Social Needs    Financial resource strain: None    Food insecurity     Worry: None     Inability: None    Transportation needs     Medical: None     Non-medical: None   Tobacco Use    Smoking status: Current Every Day Smoker     Packs/day: 1.00     Types: Cigarettes    Smokeless tobacco: Never Used   Substance and Sexual Activity    Alcohol use: Not Currently     Comment: 5th of whisky a day for 6 weeks    Drug use: No    Sexual activity: None   Lifestyle    Physical activity     Days per week: None     Minutes per session: None    Stress: None   Relationships    Social connections     Talks on phone: None     Gets together: None     Attends Zoroastrian service: None     Active member of club or organization: None     Attends meetings of clubs or organizations: None     Relationship status: None    Intimate partner violence     Fear of current or ex partner: None     Emotionally abused: None     Physically abused: None     Forced sexual activity: None   Other Topics Concern    None   Social History Narrative    None        Review of Systems   10 total systems reviewed and found to be negative unless otherwise noted in HPI     Physical Exam   /71   Pulse 91   Temp 98.2 °F (36.8 °C) (Oral)   Resp 16   Ht 5' 5\" (1.651 m)   Wt 126 lb 8.7 oz (57.4 kg)   SpO2 98%   BMI 21.06 kg/m²      CONSTITUTIONAL: Ill looking  HEAD: atraumatic, normocephalic   EYES: PERRL, No injection, discharge or scleral icterus. ENT: Moist mucous membranes. NECK: Normal ROM, NO LAD   CARDIOVASCULAR: Regular rate and rhythm. No murmurs or gallop. PULMONARY/CHEST: Airway patent. No retractions. Breath sounds clear with good air entry bilaterally.    ABDOMEN: Abdomen firm and distended  SKIN: Acyanotic, warm, dry   MUSCULOSKELETAL: Lateral lower extremity swelling  NEUROLOGICAL: Awake and oriented x 3. Pulses intact. Grossly nonfocal   Nursing note and vitals reviewed.      ED Course & Medical Decision Making   Medications   morphine injection 4 mg (4 mg Intravenous Given 23/67/15 3299)   folic acid (FOLVITE) tablet 1 mg (1 mg Oral Given 11/28/20 0949)   multivitamin 1 tablet (1 tablet Oral Given 11/28/20 0948)   pantoprazole (PROTONIX) tablet 40 mg (40 mg Oral Given 11/28/20 0517)   sertraline (ZOLOFT) tablet 50 mg (50 mg Oral Given 11/28/20 0948)   vitamin B-1 (THIAMINE) tablet 100 mg (100 mg Oral Given 11/28/20 0948)   sodium chloride flush 0.9 % injection 10 mL (10 mLs Intravenous Given 11/28/20 2113)   sodium chloride flush 0.9 % injection 10 mL (has no administration in time range)   polyethylene glycol (GLYCOLAX) packet 17 g (has no administration in time range)   promethazine (PHENERGAN) tablet 12.5 mg (has no administration in time range)     Or   ondansetron (ZOFRAN) injection 4 mg (has no administration in time range)   albuterol (PROVENTIL) nebulizer solution 2.5 mg (has no administration in time range)   spironolactone (ALDACTONE) tablet 100 mg (has no administration in time range)   furosemide (LASIX) tablet 40 mg (has no administration in time range)   QUEtiapine (SEROQUEL) tablet 50 mg (50 mg Oral Given 11/28/20 2112)   fentaNYL (SUBLIMAZE) injection 25 mcg (25 mcg Intravenous Given 11/27/20 1818)   furosemide (LASIX) injection 40 mg (40 mg Intravenous Given 11/27/20 2118)   potassium chloride (KLOR-CON M) extended release tablet 40 mEq (40 mEq Oral Given 11/28/20 1622)   magnesium sulfate 1 g in dextrose 5% 100 mL IVPB (0 g Intravenous Stopped 11/28/20 2112)   LORazepam (ATIVAN) tablet 0.5 mg (0.5 mg Oral Given 11/28/20 2117)      Labs Reviewed   CBC WITH AUTO DIFFERENTIAL - Abnormal; Notable for the following components:       Result Value    RBC 2.91 (*)     Hemoglobin 8.4 (*)     Hematocrit 25.2 (*)     RDW 17.1 (*)     Platelets 99 (*) Neutrophils Absolute 8.6 (*)     All other components within normal limits    Narrative:     Performed at:  [facility] [address] [facility] [employer]   Phone [phone]   COMPREHENSIVE METABOLIC PANEL W/ REFLEX TO MG FOR LOW K - Abnormal; Notable for the following components:     Total Protein 6.3 (*)     Alb 3.3 (*)     Alkaline Phosphatase 135 (*)     AST 48 (*)     All other components within normal limits    Narrative:     Performed at:  [facility] [address] [place]   Phone [phone]   BRAIN NATRIURETIC PEPTIDE - Abnormal; Notable for the following components:    Pro- (*)     All other components within normal limits    Narrative:     Performed at:  [facility] [address] [place], [employer]   Phone [phone]   URINE RT REFLEX TO CULTURE - Abnormal; Notable for the following components:    Clarity, UA CLOUDY (*)     All other components within normal limits    Narrative:     Performed at:  [facility] [address] [place], [employer]   Phone [phone]   BLOOD OCCULT STOOL DIAGNOSTIC - Abnormal; Notable for the following components:    Occult Blood Diagnostic   (*)     Value: Result: POSITIVE  Normal range: Negative      All other components within normal limits    Narrative:     ORDER#: [id number]                          ORDERED BY: Jose Maria Leyva  SOURCE: Stool                              COLLECTED:  11/27/20 23:15  ANTIBIOTICS AT TERESA.:                      RECEIVED :  11/27/20 23:27  Performed at:  [facility] [address] [place]   Phone [phone]   IRON AND TIBC - Abnormal; Notable for the following components:    Iron 29 (*)     Iron Saturation 7 (*)     All other components within normal limits    Narrative:     Performed at:  [facility]  [address], [facility] [address]   Phone [phone]   COMPREHENSIVE METABOLIC PANEL W/ REFLEX TO MG FOR LOW K - Abnormal; Notable for the following components:    Sodium 135 (*)     Potassium reflex Magnesium 3.2 (*)     Calcium 8.0 (*)     Alb 3.2 (*)     Albumin/Globulin Ratio 0.9 (*)     AST 50 (*)     All other components within normal limits    Narrative:     Performed at:  [facility] [email or web address] [place]   Phone [phone]   CBC WITH AUTO DIFFERENTIAL - Abnormal; Notable for the following components:    RBC 3.20 (*)     Hemoglobin 9.3 (*)     Hematocrit 27.6 (*)     RDW 17.2 (*)     Platelets 777 (*)     All other components within normal limits    Narrative:     Performed at:  [facility] [email or web address], [place]   Phone [phone]   MAGNESIUM - Abnormal; Notable for the following components:    Magnesium 1.70 (*)     All other components within normal limits    Narrative:     Performed at:  [facility] [email or web address], [place]   Phone [phone]   C DIFF TOXIN/ANTIGEN    Narrative:     ORDER#: [id number]                          ORDERED BY: Ivanna Lewis  SOURCE: Stool                              COLLECTED:  11/27/20 23:15  ANTIBIOTICS AT TERESA.:                      RECEIVED :  11/27/20 23:28  Collect White vial (sterile container)  Performed at:  [facility] [address], [place]   Phone [phone]   LIPASE    Narrative:     Performed at:  [facility] [address], [place]   Phone [phone]   PROTIME-INR    Narrative:     Performed at:  [facility] [address], [place]   Phone [phone]   APTT    Narrative:     Performed at:  [facility]  [address] [place], [place]   Phone [phone]   MICROSCOPIC URINALYSIS    Narrative:     Performed at:  [facility]  [address], [address]   Phone [phone]   AMMONIA    Narrative:     Performed at:  [facility]  [address],  [place], [address]   Phone [phone]   VITAMIN B12 & FOLATE    Narrative:     Performed at:  [facility]  [address] [facility] [place] [address]   Phone [phone]   FERRITIN    Narrative:     Performed at:  [facility]  [address] [place], [place] [address]   Phone [phone]   HEPATITIS A ANTIBODY, TOTAL   CBC WITH AUTO DIFFERENTIAL   COMPREHENSIVE METABOLIC PANEL W/ REFLEX TO MG FOR LOW K      XR ABDOMEN (KUB) (SINGLE AP VIEW)   Final Result   1. Nonobstructive bowel gas pattern. 2. Central location of gas-filled small bowel loops likely due to ascites as   seen previously. 3. Splenomegaly. IR US GUIDED PARACENTESIS    (Results Pending)        PROCEDURES:   Procedures    ASSESSMENT AND PLAN:  Liliane Song is a 50 y.o. male  h/o alcohol abuse with cirrhosis, ascites, COPD, hypertension, hyperlipidemia and thrombocytopenia presenting this evening complaining of generalized body swelling with significant pain lower extremities as well as distended abdomen. Patient has a history of liver cirrhosis and has never undergone paracentesis. Patient denies any fevers, chills, nausea, vomiting chest pain or shortness of breath. On exam patient appears ill but nontoxic. Abdomen distended and firm with significant edema lower extremities bilaterally. Lab work obtained and showed no signs of systemic infection. At this time,  I do not think patient needed a CT scan given the fact that we already know about the cirrhosis.   Nonetheless, I am recommending that he admitted possible paracentesis. He does not need an emergent paracentesis in the emergency room. Hospitalist consulted and patient admitted to their service for further evaluation and treatment. ClINICAL IMPRESSION:  1. Alcoholic cirrhosis of liver with ascites (HCC)    2. Leg swelling            DISPOSITION    Hospitalist admit  Findings and recommendations explained to patient. He expressed understanding and agreed with the plan. Joaquim Paz MD (electronically signed)  11/29/2020  _________________________________________________________________________________________  _________________________________________________________________________________________  This record is transcribed utilizing voice recognition technology. There are inherent limitations in this technology. In addition, there may be limitations in editing of this report. If there are any discrepancies, please contact me directly.         Joaquim Paz MD  11/29/20 0929

## 2020-11-28 LAB
A/G RATIO: 0.9 (ref 1.1–2.2)
ALBUMIN SERPL-MCNC: 3.2 G/DL (ref 3.4–5)
ALP BLD-CCNC: 129 U/L (ref 40–129)
ALT SERPL-CCNC: 21 U/L (ref 10–40)
AMMONIA: 39 UMOL/L (ref 16–60)
ANION GAP SERPL CALCULATED.3IONS-SCNC: 11 MMOL/L (ref 3–16)
AST SERPL-CCNC: 50 U/L (ref 15–37)
BASOPHILS ABSOLUTE: 0 K/UL (ref 0–0.2)
BASOPHILS RELATIVE PERCENT: 0.1 %
BILIRUB SERPL-MCNC: 0.8 MG/DL (ref 0–1)
BUN BLDV-MCNC: 17 MG/DL (ref 7–20)
C DIFF TOXIN/ANTIGEN: NORMAL
CALCIUM SERPL-MCNC: 8 MG/DL (ref 8.3–10.6)
CHLORIDE BLD-SCNC: 101 MMOL/L (ref 99–110)
CO2: 23 MMOL/L (ref 21–32)
CREAT SERPL-MCNC: 1 MG/DL (ref 0.9–1.3)
EOSINOPHILS ABSOLUTE: 0 K/UL (ref 0–0.6)
EOSINOPHILS RELATIVE PERCENT: 0.4 %
FERRITIN: 55.8 NG/ML (ref 30–400)
FOLATE: >20 NG/ML (ref 4.78–24.2)
GFR AFRICAN AMERICAN: >60
GFR NON-AFRICAN AMERICAN: >60
GLOBULIN: 3.5 G/DL
GLUCOSE BLD-MCNC: 73 MG/DL (ref 70–99)
HCT VFR BLD CALC: 27.6 % (ref 40.5–52.5)
HEMOGLOBIN: 9.3 G/DL (ref 13.5–17.5)
IRON SATURATION: 7 % (ref 20–50)
IRON: 29 UG/DL (ref 59–158)
LYMPHOCYTES ABSOLUTE: 1.1 K/UL (ref 1–5.1)
LYMPHOCYTES RELATIVE PERCENT: 12.4 %
MAGNESIUM: 1.7 MG/DL (ref 1.8–2.4)
MCH RBC QN AUTO: 29 PG (ref 26–34)
MCHC RBC AUTO-ENTMCNC: 33.6 G/DL (ref 31–36)
MCV RBC AUTO: 86.2 FL (ref 80–100)
MONOCYTES ABSOLUTE: 0.7 K/UL (ref 0–1.3)
MONOCYTES RELATIVE PERCENT: 7.4 %
NEUTROPHILS ABSOLUTE: 7.2 K/UL (ref 1.7–7.7)
NEUTROPHILS RELATIVE PERCENT: 79.7 %
PDW BLD-RTO: 17.2 % (ref 12.4–15.4)
PLATELET # BLD: 111 K/UL (ref 135–450)
PMV BLD AUTO: 7 FL (ref 5–10.5)
POTASSIUM REFLEX MAGNESIUM: 3.2 MMOL/L (ref 3.5–5.1)
RBC # BLD: 3.2 M/UL (ref 4.2–5.9)
SODIUM BLD-SCNC: 135 MMOL/L (ref 136–145)
TOTAL IRON BINDING CAPACITY: 416 UG/DL (ref 260–445)
TOTAL PROTEIN: 6.7 G/DL (ref 6.4–8.2)
VITAMIN B-12: 851 PG/ML (ref 211–911)
WBC # BLD: 9 K/UL (ref 4–11)

## 2020-11-28 PROCEDURE — 82607 VITAMIN B-12: CPT

## 2020-11-28 PROCEDURE — 83550 IRON BINDING TEST: CPT

## 2020-11-28 PROCEDURE — 6360000002 HC RX W HCPCS: Performed by: NURSE PRACTITIONER

## 2020-11-28 PROCEDURE — 83735 ASSAY OF MAGNESIUM: CPT

## 2020-11-28 PROCEDURE — 82728 ASSAY OF FERRITIN: CPT

## 2020-11-28 PROCEDURE — 6360000002 HC RX W HCPCS: Performed by: INTERNAL MEDICINE

## 2020-11-28 PROCEDURE — 85025 COMPLETE CBC W/AUTO DIFF WBC: CPT

## 2020-11-28 PROCEDURE — 6370000000 HC RX 637 (ALT 250 FOR IP): Performed by: INTERNAL MEDICINE

## 2020-11-28 PROCEDURE — 82746 ASSAY OF FOLIC ACID SERUM: CPT

## 2020-11-28 PROCEDURE — 80053 COMPREHEN METABOLIC PANEL: CPT

## 2020-11-28 PROCEDURE — 36415 COLL VENOUS BLD VENIPUNCTURE: CPT

## 2020-11-28 PROCEDURE — 1200000000 HC SEMI PRIVATE

## 2020-11-28 PROCEDURE — 82140 ASSAY OF AMMONIA: CPT

## 2020-11-28 PROCEDURE — 83540 ASSAY OF IRON: CPT

## 2020-11-28 PROCEDURE — 2580000003 HC RX 258: Performed by: NURSE PRACTITIONER

## 2020-11-28 PROCEDURE — 6370000000 HC RX 637 (ALT 250 FOR IP): Performed by: NURSE PRACTITIONER

## 2020-11-28 RX ORDER — FUROSEMIDE 40 MG/1
40 TABLET ORAL DAILY
Status: DISCONTINUED | OUTPATIENT
Start: 2020-11-29 | End: 2020-12-02 | Stop reason: HOSPADM

## 2020-11-28 RX ORDER — MAGNESIUM SULFATE 1 G/100ML
1 INJECTION INTRAVENOUS ONCE
Status: COMPLETED | OUTPATIENT
Start: 2020-11-28 | End: 2020-11-28

## 2020-11-28 RX ORDER — LORAZEPAM 0.5 MG/1
0.5 TABLET ORAL
Status: COMPLETED | OUTPATIENT
Start: 2020-11-28 | End: 2020-11-28

## 2020-11-28 RX ORDER — POTASSIUM CHLORIDE 20 MEQ/1
40 TABLET, EXTENDED RELEASE ORAL ONCE
Status: COMPLETED | OUTPATIENT
Start: 2020-11-28 | End: 2020-11-28

## 2020-11-28 RX ORDER — SPIRONOLACTONE 25 MG/1
100 TABLET ORAL DAILY
Status: DISCONTINUED | OUTPATIENT
Start: 2020-11-29 | End: 2020-12-02 | Stop reason: HOSPADM

## 2020-11-28 RX ORDER — QUETIAPINE FUMARATE 25 MG/1
50 TABLET, FILM COATED ORAL 2 TIMES DAILY
Status: DISCONTINUED | OUTPATIENT
Start: 2020-11-28 | End: 2020-11-30

## 2020-11-28 RX ADMIN — LORAZEPAM 0.5 MG: 0.5 TABLET ORAL at 21:17

## 2020-11-28 RX ADMIN — Medication 10 ML: at 21:13

## 2020-11-28 RX ADMIN — MORPHINE SULFATE 4 MG: 4 INJECTION, SOLUTION INTRAMUSCULAR; INTRAVENOUS at 12:38

## 2020-11-28 RX ADMIN — MORPHINE SULFATE 4 MG: 4 INJECTION, SOLUTION INTRAMUSCULAR; INTRAVENOUS at 01:20

## 2020-11-28 RX ADMIN — MORPHINE SULFATE 4 MG: 4 INJECTION, SOLUTION INTRAMUSCULAR; INTRAVENOUS at 05:16

## 2020-11-28 RX ADMIN — POTASSIUM CHLORIDE 40 MEQ: 1500 TABLET, EXTENDED RELEASE ORAL at 16:22

## 2020-11-28 RX ADMIN — FOLIC ACID 1 MG: 1 TABLET ORAL at 09:49

## 2020-11-28 RX ADMIN — SERTRALINE HYDROCHLORIDE 50 MG: 50 TABLET ORAL at 09:48

## 2020-11-28 RX ADMIN — Medication 100 MG: at 09:48

## 2020-11-28 RX ADMIN — MORPHINE SULFATE 4 MG: 4 INJECTION, SOLUTION INTRAMUSCULAR; INTRAVENOUS at 16:23

## 2020-11-28 RX ADMIN — QUETIAPINE FUMARATE 50 MG: 25 TABLET ORAL at 21:12

## 2020-11-28 RX ADMIN — MORPHINE SULFATE 4 MG: 4 INJECTION, SOLUTION INTRAMUSCULAR; INTRAVENOUS at 09:49

## 2020-11-28 RX ADMIN — FUROSEMIDE 20 MG: 20 TABLET ORAL at 09:49

## 2020-11-28 RX ADMIN — Medication 10 ML: at 09:55

## 2020-11-28 RX ADMIN — SPIRONOLACTONE 50 MG: 25 TABLET ORAL at 09:48

## 2020-11-28 RX ADMIN — SPIRONOLACTONE 50 MG: 25 TABLET ORAL at 01:20

## 2020-11-28 RX ADMIN — MAGNESIUM SULFATE HEPTAHYDRATE 1 G: 1 INJECTION, SOLUTION INTRAVENOUS at 17:13

## 2020-11-28 RX ADMIN — THERA TABS 1 TABLET: TAB at 09:48

## 2020-11-28 RX ADMIN — MORPHINE SULFATE 4 MG: 4 INJECTION, SOLUTION INTRAMUSCULAR; INTRAVENOUS at 21:12

## 2020-11-28 RX ADMIN — PANTOPRAZOLE SODIUM 40 MG: 40 TABLET, DELAYED RELEASE ORAL at 05:17

## 2020-11-28 ASSESSMENT — PAIN DESCRIPTION - ONSET
ONSET: ON-GOING

## 2020-11-28 ASSESSMENT — PAIN DESCRIPTION - LOCATION
LOCATION: ABDOMEN
LOCATION: ABDOMEN;FOOT
LOCATION: ABDOMEN;LEG
LOCATION: ABDOMEN;FOOT

## 2020-11-28 ASSESSMENT — PAIN DESCRIPTION - PAIN TYPE
TYPE: ACUTE PAIN

## 2020-11-28 ASSESSMENT — PAIN SCALES - GENERAL
PAINLEVEL_OUTOF10: 10
PAINLEVEL_OUTOF10: 5
PAINLEVEL_OUTOF10: 10
PAINLEVEL_OUTOF10: 4
PAINLEVEL_OUTOF10: 10
PAINLEVEL_OUTOF10: 8
PAINLEVEL_OUTOF10: 5
PAINLEVEL_OUTOF10: 0
PAINLEVEL_OUTOF10: 9
PAINLEVEL_OUTOF10: 10

## 2020-11-28 ASSESSMENT — PAIN - FUNCTIONAL ASSESSMENT
PAIN_FUNCTIONAL_ASSESSMENT: ACTIVITIES ARE NOT PREVENTED
PAIN_FUNCTIONAL_ASSESSMENT: ACTIVITIES ARE NOT PREVENTED

## 2020-11-28 ASSESSMENT — PAIN DESCRIPTION - PROGRESSION
CLINICAL_PROGRESSION: NOT CHANGED
CLINICAL_PROGRESSION: NOT CHANGED

## 2020-11-28 ASSESSMENT — PAIN DESCRIPTION - DESCRIPTORS
DESCRIPTORS: SHARP
DESCRIPTORS: SHARP
DESCRIPTORS: SHARP;TIGHTNESS

## 2020-11-28 ASSESSMENT — PAIN DESCRIPTION - ORIENTATION
ORIENTATION: RIGHT
ORIENTATION: RIGHT
ORIENTATION: RIGHT;LEFT

## 2020-11-28 ASSESSMENT — PAIN DESCRIPTION - FREQUENCY
FREQUENCY: INTERMITTENT
FREQUENCY: CONTINUOUS
FREQUENCY: INTERMITTENT
FREQUENCY: CONTINUOUS

## 2020-11-28 NOTE — PROGRESS NOTES
4 Eyes Skin Assessment     NAME:  Maxwell Graves  YOB: 1965  MEDICAL RECORD NUMBER:  [id number]    The patient is being assess for  Admission    I agree that 2 RN's have performed a thorough Head to Toe Skin Assessment on the patient. ALL assessment sites listed below have been assessed. Areas assessed by both nurses: Total body        Does the Patient have a Wound?  No noted wound(s)       Jamie Prevention initiated:  No   Wound Care Orders initiated:  No    Pressure Injury (Stage 3,4, Unstageable, DTI, NWPT, and Complex wounds) if present place consult order under [de-identified] No    New and Established Ostomies if present place consult order under : No      Nurse 1 eSignature: Electronically signed by Asim Loza RN on 11/28/20 at 12:35 AM EST    **SHARE this note so that the co-signing nurse is able to place an eSignature**    Nurse 2 eSignature: Electronically signed by Michelle Wallis RN on 11/28/20 at 12:35 AM EST

## 2020-11-28 NOTE — H&P
HOSPITALISTS HISTORY AND PHYSICAL    11/27/2020 8:54 PM    Patient Information:  Talib Rojas is a 50 y.o. male [id number]  PCP:  Geo Montes DO (Tel: [phone] )    Chief complaint:    Chief Complaint   Patient presents with    Leg Swelling     Pt to ER with c/o billateral leg swelling and swelling in abdo x1 week, hx of gallstones and liver probs. was seen at [facility] on monday for same        History of Present Illness:  Ramya Grove is a 50 y.o. male with hx of Alcohol abuse, cirrhosis,  Depression, HTN, and COPD. Presents to ER for pain and swelling of abdomen and legs. States symptoms started Monday. He denies any fever. He states he feels like he is going to explode. Pain 10/10 in abdomen and feet. Pain medicine in ER did ease pain some. He states he his last normal BM was yesterday, now just loose stools/small amount. Difficulty urinating. Admits to some nausea but no vomiting. He states he quit drinking 6 months ago. He had been drinking a liter of fluid a day. He was diagnosed with cirrhosis last month per patient. History obtained from patient       REVIEW OF SYSTEMS:   Review of Systems   Constitutional: Positive for appetite change. Negative for fatigue and fever. HENT: Negative. Eyes: Negative. Respiratory: Negative. Cardiovascular: Positive for leg swelling. Negative for chest pain. Gastrointestinal: Positive for abdominal distention, abdominal pain, constipation and nausea. Negative for vomiting. Endocrine: Negative. Genitourinary: Negative. Musculoskeletal: Negative. Skin: Negative. Neurological: Negative. Hematological: Negative. Psychiatric/Behavioral: Negative. All other systems reviewed and are negative.       Past Medical History:   has a past medical history of Alcohol abuse, Arthritis, Chronic bronchitis ([facility] [address] COPD (chronic obstructive pulmonary disease) (HCC), ESBL (extended spectrum beta-lactamase) producing bacteria infection, Hyperlipidemia, Hypertension, MDRO (multiple drug resistant organisms) resistance, Radiculopathy of lumbosacral region, and Spondylisthesis. Past Surgical History:   has a past surgical history that includes fracture surgery; Lumbar spine surgery (Left, 2019); Upper gastrointestinal endoscopy (N/A, 2019); Colonoscopy (N/A, 2019); and Colonoscopy (2019). Medications:  No current facility-administered medications on file prior to encounter. Current Outpatient Medications on File Prior to Encounter   Medication Sig Dispense Refill    sertraline (ZOLOFT) 50 MG tablet Take 1 tablet by mouth daily 30 tablet 0    ferrous sulfate (IRON 325) 325 (65 Fe) MG tablet Take 1 tablet by mouth 2 times daily (with meals) 60 tablet 0    Multiple Vitamin (MULTIVITAMIN) TABS tablet Take 1 tablet by mouth daily 30 tablet 0    omeprazole (PRILOSEC) 20 MG delayed release capsule Take 1 capsule by mouth 2 times daily (before meals) 60 capsule 0    vitamin B-1 (THIAMINE) 100 MG tablet Take 1 tablet by mouth daily 30 tablet 3    folic acid (FOLVITE) 1 MG tablet Take 1 tablet by mouth daily 30 tablet 0       Allergies:  No Known Allergies     Social History:  Patient Lives with daughter and girlfriend    reports that he has been smoking cigarettes. He has been smoking about 1.00 pack per day. He has never used smokeless tobacco. He reports previous alcohol use. He reports that he does not use drugs. Family History:  Family hx father      Physical Exam:  BP (!) 144/89   Pulse 86   Temp 98.2 °F (36.8 °C) (Oral)   Resp 16   Ht 5' 5\" (1.651 m)   Wt 140 lb (63.5 kg)   SpO2 99%   BMI 23.30 kg/m²   Physical Exam  Vitals signs and nursing note reviewed. Constitutional:       General: He is in acute distress. Appearance: Normal appearance.    HENT:      Head: Normocephalic and atraumatic. Nose: Nose normal.   Eyes:      General: No scleral icterus. Neck:      Musculoskeletal: Normal range of motion. Cardiovascular:      Rate and Rhythm: Regular rhythm. Tachycardia present. Heart sounds: No murmur. Pulmonary:      Effort: Pulmonary effort is normal.      Breath sounds: Wheezing present. Abdominal:      General: There is distension. Tenderness: There is abdominal tenderness. Comments: Abdomen is severely distended and firm,  Bowel sounds distant,   Generalized pain. Musculoskeletal: Normal range of motion. Right lower leg: Edema present. Left lower leg: Edema present. Comments: 3+ pitting edema to feet,  Minimal leg swelling   Skin:     General: Skin is warm and dry. Coloration: Skin is not jaundiced. Findings: No rash. Neurological:      General: No focal deficit present. Mental Status: He is alert and oriented to person, place, and time. Cranial Nerves: No cranial nerve deficit. Psychiatric:         Mood and Affect: Mood normal.         Behavior: Behavior normal.         Thought Content: Thought content normal.         Labs:  CBC:   Lab Results   Component Value Date    WBC 10.6 11/27/2020    RBC 2.91 11/27/2020    HGB 8.4 11/27/2020    HCT 25.2 11/27/2020    MCV 86.7 11/27/2020    MCH 28.7 11/27/2020    MCHC 33.2 11/27/2020    RDW 17.1 11/27/2020    PLT 99 11/27/2020    MPV 7.0 11/27/2020     BMP:    Lab Results   Component Value Date     11/27/2020    K 3.8 11/27/2020     11/27/2020    CO2 21 11/27/2020    BUN 18 11/27/2020    CREATININE 1.0 11/27/2020    CALCIUM 8.3 11/27/2020    GFRAA >60 11/27/2020    LABGLOM >60 11/27/2020    GLUCOSE 73 11/27/2020     No orders to display     Chest Xray:   EKG:    I visualized CXR images and EKG strips  Problem List  Active Problems:    * No active hospital problems. *  Resolved Problems:    * No resolved hospital problems.  *        Assessment/Plan: 1. Alcoholic Cirrhosis with Ascites    Pt will be admitted to medical floor. Paracentesis in am   GI consult   Will give Lasix now and then Spironolactone oral    He is oriented, will check ammonia level in am.     No fevers, WBC normal, no  suspicion for SBP, will send fluid for culture. Limit Na in diet once he is eating, strict I and O, daily weight. 2. Abdominal Pain secondary to #1   Pain control. Will check xray of abdomen rule out obstruction given irregular BM  today and pain. However pain could very well be from Ascites     3. Thrombocytopenia   Monitor, probably secondary to #1    4. Anemia   He is on iron, will check stools and iron levels. 5. COPD no exacerbation   Will add prn nebs, wheezing on exam on room air, denies shortness of breath. DVT prophylaxis SCD,   Code status Full   Diet Clear liquid Na 2gm restriction, NPO after midnight   IV access peripheral     Admit as inpatient I anticipate hospitalization spanning more than two midnights for investigation and treatment of the above medically necessary diagnoses. Please note that some part of this chart was generated using Dragon dictation software. Although every effort was made to ensure the accuracy of this automated transcription, some errors in transcription may have occurred inadvertently. If you may need any clarification, please do not hesitate to contact me through [facility].        SHEILA Thomson CNP    11/27/2020 8:54 PM

## 2020-11-28 NOTE — CONSULTS
GI Consult Note      Admission Date: 11/27/2020  Hospital Day: Hospital Day: 2  Attending: Qi Uriarte MD  Date of service: 11/28/20    Subjective:     Chief complaint/ Reason for consult:   Abdominal pain and distension      HPI: Joselyn Silverio is a 50 y.o.  male patient, who was seen at the request of Dr. Qi Uriarte MD.    History was obtained from chart review and the patient. Patient known to have alcoholic and hep C cirrhosis. I saw him as a televisit in 9/2020. He was admitted at [facility] 8/2020 for hematochezia. He underwent EGD/colonoscopy - trace EV, PHG, small colon polyp (removed, path TA), hemorrhoids (cause of bleeding). CBC at [facility] revealed a Hb 9.1, PLT 98. He claimed to have stopped ETOU use 4 months ago (from Sept televisit and when asked today). I repeated an OP RUQ US and it only showed trace ascites. HCV Ab was positive in 7/2020 (confirmed with a positive HCV RNA 10/2020), never treated. He was negative for chronic Hep B (not immune). Hep A status unknown. He was supposed to see me for 1 month F/up but never scheduled. He presented to the ER now with worsening LE edema, abdominal pain and distension. He never had paracentesis before. He also thinks he has a ventral hernia. Of note, he was also seen by [address] [place] [address]) for recurrent rectal bleeding. He went home AMA. He currently lives with his daughter. He used to work with concrete. Past Endoscopic History at [facility]   EGD Dr. Jackie Dennis (12/2019)  1) Trace esophageal varices  2) Mild portal hypertensive gastropathy     Colonoscopy Dr. Darby Robledo (12/2019)   1) 4 mm transverse colon polyp removed with cold snare. 2) 3 mm descending colon polyp removed with cold snare. 3) Otherwise normal colonic mucosa throughout. Random biopsies obtained throughout the colon to r/o microscopic colitis.     A. Colon, random, biopsy:        - Colonic mucosa with no significant pathologic change.        - Negative for acute or chronic inflammatory injury, increased          intraepithelial lymphocytes or thickened subepithelial collagen          layer.        B. Colon, transverse, polyp, biopsy:        - Portions of tubular adenoma.        C. Colon, descending, polyp, biopsy:        - Tubular adenoma. Past Medical History:     Past Medical History:   Diagnosis Date    Alcohol abuse     Arthritis     hands and back    Chronic bronchitis (HCC)     COPD (chronic obstructive pulmonary disease) (HCC)     bronchitis    ESBL (extended spectrum beta-lactamase) producing bacteria infection 07/06/2020    Hyperlipidemia     Hypertension     MDRO (multiple drug resistant organisms) resistance     MRSA in right arm [id number]    Radiculopathy of lumbosacral region     Spondylisthesis        Past Surgical History:    Past Surgical History:   Procedure Laterality Date    COLONOSCOPY N/A 12/23/2019    COLONOSCOPY WITH BIOPSY performed by Marisol Royal MD at [facility]  12/23/2019    COLONOSCOPY POLYPECTOMY SNARE/COLD BIOPSY performed by Marisol Royal MD at [address]      right lower arm in 30's    LUMBAR SPINE SURGERY Left 4/23/2019    LEFT LUMBAR4-LUMBAR5  MICRODISCECTOMY performed by Oralia Ruelas MD at [address] [facility] [facility] [address] 12/23/2019    EGD DIAGNOSTIC ONLY performed by Marisol Royal MD at [place] [address] History:     · Tobacco use:   reports that he has been smoking cigarettes. He has been smoking about 1.00 pack per day. He has never used smokeless tobacco.  · Alcohol use:   reports previous alcohol use. · Currently lives in: [address] [place] [address]  ·  reports no history of drug use. · Applying for disability      Family History:   History reviewed. No pertinent family history.       Medications:    folic acid  1 mg Oral Daily    multivitamin  1 tablet Oral Daily    pantoprazole  40 mg Oral QAM Central location of gas-filled small bowel loops likely due to ascites as   seen previously. 3. Splenomegaly. IR US GUIDED PARACENTESIS    (Results Pending)         Known drug Allergies:   No Known Allergies        Assessment:   The patient is a 50 y.o. old male  with following problems:    Active Problems:    Alcoholic cirrhosis of liver with ascites ([address]  Resolved Problems:    * No resolved hospital problems. *    Ascites  Liver cirrhosis secondary to alcohol and Hep C (MELD Na 7)  Acute and chronic intermittent rectal bleeding and anemia. He had 2 EGDs and colonoscopies the past 2 years. He has trace EV (8/2020) and bleeding was from hemorrhoids (colonoscopy 8/2020)   Poor insight regarding his medical condition    Recommendations:   Hep A total to check for immune status. He will need OP Hep B vaccinations  Check iron panel, Vitamin B12 and folate  Low sodium diet (2 grams daily)  Lasix 40 mg and Aldactone 100 mg daily.  Monitor BMP   Therapeutic paracentesis with fluid analysis on 11/30     Gary Uriostegui MD, [address]

## 2020-11-28 NOTE — PROGRESS NOTES
[facility] PROGRESS NOTE    11/28/2020 11:23 AM        Name: Rosie Pang . Admitted: 11/27/2020  Primary Care Provider: Isaac Pena DO (Tel: [phone])    Brief Course: Rosie Pang is a 50 y.o. male with history of COPD, hypertension, hyperlipidemia, alcohol abuse, cirrhosis, radiculopathy who presented to ED with worsening abdominal pain and distention, worsening lower extremity edema. Patient reports that he was recently diagnosed of alcoholic cirrhosis. Follows up with Dr. Ry Stafford from GI as outpatient. Reports quitting drinking 6 months ago. Admitted with GI consult for possible paracentesis.     CC: Abdominal pain and distention, lower extremity edema  Subjective: Patient reports feeling tight in his abdomen and pain. Lower extremity edema seen. Able to ambulate in room with ease. Has good appetite and p.o. intake.     Reviewed interval ancillary notes    Current Medications  [START ON 11/29/2020] spironolactone (ALDACTONE) tablet 100 mg, Daily  [START ON 11/29/2020] furosemide (LASIX) tablet 40 mg, Daily  morphine injection 4 mg, M1P PRN  folic acid (FOLVITE) tablet 1 mg, Daily  multivitamin 1 tablet, Daily  pantoprazole (PROTONIX) tablet 40 mg, QAM AC  sertraline (ZOLOFT) tablet 50 mg, Daily  vitamin B-1 (THIAMINE) tablet 100 mg, Daily  sodium chloride flush 0.9 % injection 10 mL, 2 times per day  sodium chloride flush 0.9 % injection 10 mL, PRN  polyethylene glycol (GLYCOLAX) packet 17 g, Daily PRN  promethazine (PHENERGAN) tablet 12.5 mg, Q6H PRN    Or  ondansetron (ZOFRAN) injection 4 mg, Q6H PRN  albuterol (PROVENTIL) nebulizer solution 2.5 mg, Q6H PRN        Objective:  BP (!) 147/83   Pulse 89   Temp 98.1 °F (36.7 °C) (Oral)   Resp 18   Ht 5' 5\" (1.651 m)   Wt 126 lb 8.7 oz (57.4 kg)   SpO2 99%   BMI 21.06 kg/m²     Intake/Output Summary (Last 24 hours) at 11/28/2020 1123  Last Low-sodium diet. Iron panel, vitamin B12 and folate levels ordered. Checking hepatitis A total to check for immune status. Will need outpatient hep B vaccinations. Pain abdomen:  Due to severe ascites. Medical management for now. Therapeutic paracentesis planned by GI on Monday. Thrombocytopenia: In the setting of cirrhosis. Platelet count at 91W. Will monitor. Anemia:  Iron profile, vitamin B12 and folate levels ordered by GI. Patient is on oral iron replacement. COPD:  Stable, on as needed nebulizers. History of alcohol abuse:  Reports stopped drinking alcohol 6 months ago.      IV Access: Peripheral IV  Best: No  Diet: DIET LOW SODIUM 2 GM;  Code:Full Code  DVT PPX thrombocytopenia, ambulating in room with ease  Disposition pending therapeutic paracentesis on Monday, DC home after      Raysa Saleh MD   11/28/2020 11:23 AM

## 2020-11-28 NOTE — PROGRESS NOTES
Am assessment completed, vss, alert, oriented, complaining of abdominal pain, mediated with morphine md eitan rounded on patient, patient not having paracentesis until Monday, diet ordered for patient. Patient up ambulating in halls. Call light within reach.

## 2020-11-29 ENCOUNTER — APPOINTMENT (OUTPATIENT)
Dept: GENERAL RADIOLOGY | Age: 55
DRG: 280 | End: 2020-11-29
Payer: COMMERCIAL

## 2020-11-29 LAB
A/G RATIO: 1.1 (ref 1.1–2.2)
ALBUMIN SERPL-MCNC: 2.9 G/DL (ref 3.4–5)
ALP BLD-CCNC: 108 U/L (ref 40–129)
ALT SERPL-CCNC: 17 U/L (ref 10–40)
ANION GAP SERPL CALCULATED.3IONS-SCNC: 7 MMOL/L (ref 3–16)
ANISOCYTOSIS: ABNORMAL
AST SERPL-CCNC: 42 U/L (ref 15–37)
BASOPHILS ABSOLUTE: 0 K/UL (ref 0–0.2)
BASOPHILS RELATIVE PERCENT: 0.2 %
BILIRUB SERPL-MCNC: 0.6 MG/DL (ref 0–1)
BUN BLDV-MCNC: 12 MG/DL (ref 7–20)
C. DIFFICILE TOXIN MOLECULAR: ABNORMAL
CALCIUM IONIZED: 0.8 MMOL/L (ref 1.12–1.32)
CALCIUM SERPL-MCNC: 7.4 MG/DL (ref 8.3–10.6)
CHLORIDE BLD-SCNC: 103 MMOL/L (ref 99–110)
CO2: 23 MMOL/L (ref 21–32)
CREAT SERPL-MCNC: 0.8 MG/DL (ref 0.9–1.3)
EOSINOPHILS ABSOLUTE: 0.1 K/UL (ref 0–0.6)
EOSINOPHILS RELATIVE PERCENT: 1.3 %
GFR AFRICAN AMERICAN: >60
GFR NON-AFRICAN AMERICAN: >60
GLOBULIN: 2.7 G/DL
GLUCOSE BLD-MCNC: 91 MG/DL (ref 70–99)
HCT VFR BLD CALC: 22 % (ref 40.5–52.5)
HCT VFR BLD CALC: 24.2 % (ref 40.5–52.5)
HEMOGLOBIN: 7.4 G/DL (ref 13.5–17.5)
HEMOGLOBIN: 8 G/DL (ref 13.5–17.5)
LYMPHOCYTES ABSOLUTE: 0.8 K/UL (ref 1–5.1)
LYMPHOCYTES RELATIVE PERCENT: 18.4 %
MCH RBC QN AUTO: 28.7 PG (ref 26–34)
MCHC RBC AUTO-ENTMCNC: 33.8 G/DL (ref 31–36)
MCV RBC AUTO: 85.1 FL (ref 80–100)
MONOCYTES ABSOLUTE: 0.4 K/UL (ref 0–1.3)
MONOCYTES RELATIVE PERCENT: 9.2 %
NEUTROPHILS ABSOLUTE: 3.3 K/UL (ref 1.7–7.7)
NEUTROPHILS RELATIVE PERCENT: 70.9 %
ORGANISM: ABNORMAL
PDW BLD-RTO: 17 % (ref 12.4–15.4)
PH VENOUS: 7.68 (ref 7.35–7.45)
PLATELET # BLD: 76 K/UL (ref 135–450)
PLATELET SLIDE REVIEW: ABNORMAL
PMV BLD AUTO: 6.9 FL (ref 5–10.5)
POTASSIUM REFLEX MAGNESIUM: 3.7 MMOL/L (ref 3.5–5.1)
RBC # BLD: 2.59 M/UL (ref 4.2–5.9)
SLIDE REVIEW: ABNORMAL
SODIUM BLD-SCNC: 133 MMOL/L (ref 136–145)
TOTAL PROTEIN: 5.6 G/DL (ref 6.4–8.2)
WBC # BLD: 4.6 K/UL (ref 4–11)

## 2020-11-29 PROCEDURE — 80053 COMPREHEN METABOLIC PANEL: CPT

## 2020-11-29 PROCEDURE — 51798 US URINE CAPACITY MEASURE: CPT

## 2020-11-29 PROCEDURE — 6370000000 HC RX 637 (ALT 250 FOR IP): Performed by: INTERNAL MEDICINE

## 2020-11-29 PROCEDURE — 36415 COLL VENOUS BLD VENIPUNCTURE: CPT

## 2020-11-29 PROCEDURE — 85014 HEMATOCRIT: CPT

## 2020-11-29 PROCEDURE — 6370000000 HC RX 637 (ALT 250 FOR IP): Performed by: NURSE PRACTITIONER

## 2020-11-29 PROCEDURE — 82330 ASSAY OF CALCIUM: CPT

## 2020-11-29 PROCEDURE — 2580000003 HC RX 258: Performed by: NURSE PRACTITIONER

## 2020-11-29 PROCEDURE — 1200000000 HC SEMI PRIVATE

## 2020-11-29 PROCEDURE — 6360000002 HC RX W HCPCS: Performed by: NURSE PRACTITIONER

## 2020-11-29 PROCEDURE — 85018 HEMOGLOBIN: CPT

## 2020-11-29 PROCEDURE — 85025 COMPLETE CBC W/AUTO DIFF WBC: CPT

## 2020-11-29 PROCEDURE — 2500000003 HC RX 250 WO HCPCS: Performed by: INTERNAL MEDICINE

## 2020-11-29 PROCEDURE — 74019 RADEX ABDOMEN 2 VIEWS: CPT

## 2020-11-29 RX ORDER — HYDROCORTISONE ACETATE 25 MG/1
25 SUPPOSITORY RECTAL 2 TIMES DAILY
Status: DISCONTINUED | OUTPATIENT
Start: 2020-11-29 | End: 2020-12-02 | Stop reason: HOSPADM

## 2020-11-29 RX ORDER — CALCIUM GLUCONATE 20 MG/ML
1 INJECTION, SOLUTION INTRAVENOUS ONCE
Status: COMPLETED | OUTPATIENT
Start: 2020-11-29 | End: 2020-11-29

## 2020-11-29 RX ADMIN — SPIRONOLACTONE 100 MG: 25 TABLET ORAL at 08:28

## 2020-11-29 RX ADMIN — Medication 100 MG: at 08:28

## 2020-11-29 RX ADMIN — FOLIC ACID 1 MG: 1 TABLET ORAL at 08:28

## 2020-11-29 RX ADMIN — CALCIUM GLUCONATE 1 G: 20 INJECTION, SOLUTION INTRAVENOUS at 18:48

## 2020-11-29 RX ADMIN — QUETIAPINE FUMARATE 50 MG: 25 TABLET ORAL at 21:42

## 2020-11-29 RX ADMIN — PANTOPRAZOLE SODIUM 40 MG: 40 TABLET, DELAYED RELEASE ORAL at 05:27

## 2020-11-29 RX ADMIN — FUROSEMIDE 40 MG: 40 TABLET ORAL at 08:28

## 2020-11-29 RX ADMIN — THERA TABS 1 TABLET: TAB at 08:28

## 2020-11-29 RX ADMIN — MORPHINE SULFATE 4 MG: 4 INJECTION, SOLUTION INTRAMUSCULAR; INTRAVENOUS at 21:41

## 2020-11-29 RX ADMIN — Medication 10 ML: at 09:35

## 2020-11-29 RX ADMIN — MORPHINE SULFATE 4 MG: 4 INJECTION, SOLUTION INTRAMUSCULAR; INTRAVENOUS at 09:34

## 2020-11-29 RX ADMIN — MORPHINE SULFATE 4 MG: 4 INJECTION, SOLUTION INTRAMUSCULAR; INTRAVENOUS at 17:10

## 2020-11-29 RX ADMIN — QUETIAPINE FUMARATE 50 MG: 25 TABLET ORAL at 08:27

## 2020-11-29 RX ADMIN — MORPHINE SULFATE 4 MG: 4 INJECTION, SOLUTION INTRAMUSCULAR; INTRAVENOUS at 13:09

## 2020-11-29 RX ADMIN — MORPHINE SULFATE 4 MG: 4 INJECTION, SOLUTION INTRAMUSCULAR; INTRAVENOUS at 01:03

## 2020-11-29 RX ADMIN — Medication 10 ML: at 21:42

## 2020-11-29 RX ADMIN — MORPHINE SULFATE 4 MG: 4 INJECTION, SOLUTION INTRAMUSCULAR; INTRAVENOUS at 05:27

## 2020-11-29 RX ADMIN — SERTRALINE HYDROCHLORIDE 50 MG: 50 TABLET ORAL at 08:27

## 2020-11-29 ASSESSMENT — PAIN DESCRIPTION - PROGRESSION: CLINICAL_PROGRESSION: NOT CHANGED

## 2020-11-29 ASSESSMENT — PAIN SCALES - GENERAL
PAINLEVEL_OUTOF10: 5
PAINLEVEL_OUTOF10: 10
PAINLEVEL_OUTOF10: 4
PAINLEVEL_OUTOF10: 9
PAINLEVEL_OUTOF10: 10
PAINLEVEL_OUTOF10: 7
PAINLEVEL_OUTOF10: 10
PAINLEVEL_OUTOF10: 9
PAINLEVEL_OUTOF10: 9
PAINLEVEL_OUTOF10: 10
PAINLEVEL_OUTOF10: 10
PAINLEVEL_OUTOF10: 5

## 2020-11-29 ASSESSMENT — PAIN DESCRIPTION - ONSET
ONSET: ON-GOING

## 2020-11-29 ASSESSMENT — PAIN DESCRIPTION - DESCRIPTORS
DESCRIPTORS: SHARP
DESCRIPTORS: SHARP
DESCRIPTORS: PRESSURE
DESCRIPTORS: PRESSURE;SHARP
DESCRIPTORS: SHARP
DESCRIPTORS: SHARP
DESCRIPTORS: PRESSURE;SHARP
DESCRIPTORS: PRESSURE
DESCRIPTORS: SHARP

## 2020-11-29 ASSESSMENT — PAIN DESCRIPTION - LOCATION
LOCATION: ABDOMEN
LOCATION: ABDOMEN;FOOT
LOCATION: ABDOMEN
LOCATION: ABDOMEN;FOOT

## 2020-11-29 ASSESSMENT — PAIN DESCRIPTION - ORIENTATION
ORIENTATION: RIGHT

## 2020-11-29 ASSESSMENT — PAIN - FUNCTIONAL ASSESSMENT
PAIN_FUNCTIONAL_ASSESSMENT: ACTIVITIES ARE NOT PREVENTED

## 2020-11-29 ASSESSMENT — PAIN DESCRIPTION - PAIN TYPE
TYPE: ACUTE PAIN

## 2020-11-29 ASSESSMENT — PAIN DESCRIPTION - FREQUENCY
FREQUENCY: CONTINUOUS

## 2020-11-29 NOTE — PLAN OF CARE
Problem: Falls - Risk of:  Goal: Will remain free from falls  Description: Will remain free from falls  11/29/2020 0128 by Ever Maradiaga RN  Outcome: Met This Shift  11/28/2020 1615 by Mariana Sacks K  Outcome: Ongoing  Goal: Absence of physical injury  Description: Absence of physical injury  11/29/2020 0128 by Ever Maradiaga RN  Outcome: Met This Shift  11/28/2020 1615 by Mariana Sacks K  Outcome: Ongoing     Problem: Pain:  Goal: Pain level will decrease  Description: Pain level will decrease  11/29/2020 0128 by Ever Maradiaga RN  Outcome: Met This Shift  11/28/2020 1615 by Mariana Sacks K  Outcome: Ongoing  Goal: Control of acute pain  Description: Control of acute pain  11/29/2020 0128 by Ever Maradiaga RN  Outcome: Met This Shift  11/28/2020 1615 by Mariana Sacks K  Outcome: Ongoing  Goal: Control of chronic pain  Description: Control of chronic pain  11/29/2020 0128 by Ever Maradiaga RN  Outcome: Met This Shift  11/28/2020 1615 by Mariana Sacks K  Outcome: Ongoing

## 2020-11-29 NOTE — PROGRESS NOTES
Shift assessment complete. Vital signs stable. Afebrile. Gross ascites to abdomen w/ complaints of abdominal pain that radiates down to right foot. Medicated for pain with IV morphine. Pt tearful/anxious regarding inciedent that occurred earlier in the day with his sister. No visitors no longer allowed unless patient is made aware first. Educated pt to use urinal in order to monitor I&Os. Refusing bed alarm despite education & encouragement. The care plan and education has been reviewed and mutually agreed upon with the patient. Call light within reach and all needs met. Will continue to monitor.

## 2020-11-29 NOTE — PROGRESS NOTES
Am assessment completed, alert oriented, did not eat much breakfast, abd distended positive bowel sounds, no nausea, complaining of abd pain, pain meds due in 1 hour but requesting as soon as it is due. Took am meds. Plan of care reviewed with patient, no questions. Call light within reach.

## 2020-11-29 NOTE — PROGRESS NOTES
[facility] PROGRESS NOTE    11/29/2020 9:54 AM        Name: Lachelle Reveles . Admitted: 11/27/2020  Primary Care Provider: Feliciano Cho DO (Tel: [phone])    Brief Course: Lachelle Reveles is a 50 y.o. male with history of COPD, hypertension, hyperlipidemia, alcohol abuse, cirrhosis, radiculopathy who presented to ED with worsening abdominal pain and distention, worsening lower extremity edema. Patient reports that he was recently diagnosed of alcoholic cirrhosis. Follows up with Dr. Oh Berrios from GI as outpatient. Reports quitting drinking 6 months ago. Admitted with GI consult for possible paracentesis.     CC: Abdominal pain and distention, lower extremity edema  Subjective: Patient reports pain in his abdomen due to distention. Reports pain abdomen is not well controlled with IV morphine. However refusing to take oral meds. Ambulating in room with ease. Scheduled for therapeutics paracentesis by GI tomorrow.     Reviewed interval ancillary notes    Current Medications  spironolactone (ALDACTONE) tablet 100 mg, Daily  furosemide (LASIX) tablet 40 mg, Daily  QUEtiapine (SEROQUEL) tablet 50 mg, BID  morphine injection 4 mg, J2I PRN  folic acid (FOLVITE) tablet 1 mg, Daily  multivitamin 1 tablet, Daily  pantoprazole (PROTONIX) tablet 40 mg, QAM AC  sertraline (ZOLOFT) tablet 50 mg, Daily  vitamin B-1 (THIAMINE) tablet 100 mg, Daily  sodium chloride flush 0.9 % injection 10 mL, 2 times per day  sodium chloride flush 0.9 % injection 10 mL, PRN  polyethylene glycol (GLYCOLAX) packet 17 g, Daily PRN  promethazine (PHENERGAN) tablet 12.5 mg, Q6H PRN    Or  ondansetron (ZOFRAN) injection 4 mg, Q6H PRN  albuterol (PROVENTIL) nebulizer solution 2.5 mg, Q6H PRN        Objective:  /86   Pulse 94   Temp 98.5 °F (36.9 °C) (Oral)   Resp 20   Ht 5' 5\" (1.651 m)   Wt 126 lb 8.7 oz (57.4 kg)   SpO2 96%   BMI 21.06 kg/m²     Intake/Output Summary (Last 24 hours) at 11/29/2020 0954  Last data filed at 11/29/2020 0124  Gross per 24 hour   Intake 430 ml   Output --   Net 430 ml      Wt Readings from Last 3 Encounters:   11/27/20 126 lb 8.7 oz (57.4 kg)   08/08/20 115 lb 8 oz (52.4 kg)   07/07/20 116 lb 13.5 oz (53 kg)       General appearance: Short thin male, appears older for his age, not in distress  Cardiovascular: Regular rhythm, normal S1, S2. No murmur. 2+ pitting pedal edema extending below knees  Respiratory: Clear to auscultation bilaterally, no wheeze or crackles. GI: Abdomen firm, distended, mild generalized tenderness, sluggish bowel sounds  Musculoskeletal: No cyanosis in digits, neck supple  Neurology: CN 2-12 grossly intact. No speech or motor deficits  Extremity exam shows brisk capillary refill. Peripheral pulses are palpable in lower extremities     Labs and Tests:  CBC:   Recent Labs     11/27/20 1826 11/28/20  1113 11/29/20  0656   WBC 10.6 9.0 4.6   HGB 8.4* 9.3* 7.4*   PLT 99* 111* 76*     BMP:    Recent Labs     11/27/20 1826 11/28/20  1113 11/29/20  0656    135* 133*   K 3.8 3.2* 3.7    101 103   CO2 21 23 23   BUN 18 17 12   CREATININE 1.0 1.0 0.8*   GLUCOSE 73 73 91     Hepatic:   Recent Labs     11/27/20 1826 11/28/20  1113 11/29/20  0656   AST 48* 50* 42*   ALT 19 21 17   BILITOT 0.7 0.8 0.6   ALKPHOS 135* 129 108     XR ABDOMEN (KUB) (SINGLE AP VIEW)   Final Result   1. Nonobstructive bowel gas pattern. 2. Central location of gas-filled small bowel loops likely due to ascites as   seen previously. 3. Splenomegaly. IR US GUIDED PARACENTESIS    (Results Pending)         Problem List  Active Problems:    Alcoholic cirrhosis of liver with ascites ([address]  Resolved Problems:    * No resolved hospital problems. 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   EGD in 12/2019 showed trace esophageal varices with mild portal hypertensive gastropathy. MELD score of 7. Patient was seen by Dr. Nina Stinson from GI. Planning on therapeutic paracentesis with fluid analysis on 11/30. Recommended continuing Lasix 40 mg once daily and Aldactone 100 mg daily with close monitoring of BP and electrolytes. Low-sodium diet. Checking hepatitis A total to check for immune status. Will need outpatient hep B vaccinations. Anemia:  Noted to have drop in hemoglobin from 9.0-7.4 today morning. FOBT positive, however on oral iron. Patient has history of mild esophageal varices on last EGD. GI service will be notified of drop in hemoglobin. Repeat H&H and transfuse if below 7. Need to use caution because patient has cirrhosis with fluid overload. On oral iron replacement. Pain abdomen:  Due to severe ascites. Medical management for now. Therapeutic paracentesis planned by GI on Monday. Thrombocytopenia: In the setting of cirrhosis. Transfuse platelets as active GI bleed. Will monitor. COPD:  Stable, on as needed nebulizers. History of alcohol abuse:  Reports stopped drinking alcohol 6 months ago.      IV Access: Peripheral IV  Best: No  Diet: DIET LOW SODIUM 2 GM;  Code:Full Code  DVT PPX thrombocytopenia, ambulating in room with ease  Disposition- pending therapeutic paracentesis on Monday, DC home after      González Fitzpatrick MD   11/29/2020 9:54 AM

## 2020-11-29 NOTE — PROGRESS NOTES
Received call from lab, patient is positive for CDIFF. Also Ionized calcium level 0.80.  tamar WATERS.

## 2020-11-29 NOTE — PROGRESS NOTES
[facility]  Gastroenterology Progress Note    Agapito Martinez is a 50 y.o. male patient. 1. Alcoholic cirrhosis of liver with ascites (HCC)    2. Leg swelling        SUBJECTIVE:    Events yesterday noted. Patient was upset and depressed. He thinks he has 6 months to live, as told by a previous doctor. He is seeing a psychiatrist at [address] [place] [address] yesterday, not passing gas  He tolerated his diet   Hb dropping and had intermittent rectal bleeding     ROS:  Cardiovascular ROS: no chest pain or dyspnea on exertion  Gastrointestinal ROS: see above  Respiratory ROS: no cough, shortness of breath, or wheezing    Physical    VITALS:  /86   Pulse 94   Temp 98.5 °F (36.9 °C) (Oral)   Resp 20   Ht 5' 5\" (1.651 m)   Wt 126 lb 8.7 oz (57.4 kg)   SpO2 96%   BMI 21.06 kg/m²   TEMPERATURE:  Current - Temp: 98.5 °F (36.9 °C); Max - Temp  Av.3 °F (36.8 °C)  Min: 98 °F (36.7 °C)  Max: 98.8 °F (37.1 °C)    NAD  RRR, Nl s1s2  Lungs CTA Bilaterally, normal effort  Abdomen soft, distended from ascites, tender, Bowel sounds normal  AAOx3, No asterixis     Data      CBC:   Recent Labs     20  1113 20  0656   WBC 10.6 9.0 4.6   RBC 2.91* 3.20* 2.59*   HGB 8.4* 9.3* 7.4*   HCT 25.2* 27.6* 22.0*   PLT 99* 111*  --    MCV 86.7 86.2 85.1   MCH 28.7 29.0 28.7   MCHC 33.2 33.6 33.8   RDW 17.1* 17.2* 17.0*        BMP:  Recent Labs     20  1113 20  0656    135* 133*   K 3.8 3.2* 3.7    101 103   CO2 21 23 23   BUN 18 17 12   CREATININE 1.0 1.0 0.8*   CALCIUM 8.3 8.0* 7.4*   GLUCOSE 73 73 91        Hepatic Function Panel:   Recent Labs     20  1826 20  1113 20  0656   AST 48* 50* 42*   ALT 19 21 17   BILITOT 0.7 0.8 0.6   ALKPHOS 135* 129 108       Recent Labs     20   LIPASE 57.0     Recent Labs     20   PROTIME 12.5   INR 1.08     No results for input(s): PTT in the last 72 hours.   No results for input(s): OCCULTBLD in the last 72 hours. Radiology Review:    XR ABDOMEN (KUB) (SINGLE AP VIEW)   Final Result   1. Nonobstructive bowel gas pattern. 2. Central location of gas-filled small bowel loops likely due to ascites as   seen previously. 3. Splenomegaly. IR US GUIDED PARACENTESIS    (Results Pending)          Assessment:   The patient is a 50 y.o. old male  with following problems:     Active Problems:    Alcoholic cirrhosis of liver with ascites ([address]  Resolved Problems:    * No resolved hospital problems. *     Ascites  Liver cirrhosis secondary to alcohol and Hep C (MELD Na 7)  Acute and chronic intermittent rectal bleeding and anemia. He had 2 EGDs and colonoscopies the past 2 years. He has trace EV (8/2020) and bleeding was from hemorrhoids (colonoscopy 8/2020)   Acute on chronic anemia with rectal bleeding. Poor insight regarding his medical condition  Depression and anxiety      Recommendations:   Hep A total to check for immune status. He will need OP Hep B vaccinations  Check iron panel, Vitamin B12 and folate  Low sodium diet (2 grams daily), Lasix 40 mg and Aldactone 100 mg daily. Monitor BMP   Therapeutic paracentesis with fluid analysis on 11/30   Recheck H/H. Transfuse pRBCs to keep Hb>7   Anusol suppository BID X 10 days. May need OP colorectal visit for hemorrhoid banding/surgery   Inpatient psychiatry consult   Abdominal XR today - ?  Ileus on previous XR 11/27       Carlos Leal MD, MSc  [facility] [address]

## 2020-11-30 ENCOUNTER — APPOINTMENT (OUTPATIENT)
Dept: INTERVENTIONAL RADIOLOGY/VASCULAR | Age: 55
DRG: 280 | End: 2020-11-30
Payer: COMMERCIAL

## 2020-11-30 PROBLEM — R10.9 ABDOMINAL PAIN: Status: ACTIVE | Noted: 2020-11-30

## 2020-11-30 PROBLEM — D62 ACUTE ON CHRONIC BLOOD LOSS ANEMIA: Status: ACTIVE | Noted: 2020-11-30

## 2020-11-30 PROBLEM — I85.00 ESOPHAGEAL VARICES (HCC): Status: ACTIVE | Noted: 2020-11-30

## 2020-11-30 PROBLEM — E44.1 MILD MALNUTRITION (HCC): Chronic | Status: ACTIVE | Noted: 2020-11-30

## 2020-11-30 PROBLEM — B19.20 HEPATITIS C: Status: ACTIVE | Noted: 2020-11-30

## 2020-11-30 PROBLEM — F39 UNSPECIFIED MOOD (AFFECTIVE) DISORDER (HCC): Status: ACTIVE | Noted: 2020-06-30

## 2020-11-30 PROBLEM — K64.9 HEMORRHOIDS: Status: ACTIVE | Noted: 2020-11-30

## 2020-11-30 LAB
A/G RATIO: 1 (ref 1.1–2.2)
ALBUMIN FLUID: 0.8 G/DL
ALBUMIN SERPL-MCNC: 2.8 G/DL (ref 3.4–5)
ALP BLD-CCNC: 111 U/L (ref 40–129)
ALT SERPL-CCNC: 19 U/L (ref 10–40)
ANION GAP SERPL CALCULATED.3IONS-SCNC: 10 MMOL/L (ref 3–16)
APPEARANCE FLUID: CLEAR
AST SERPL-CCNC: 40 U/L (ref 15–37)
BASOPHILS ABSOLUTE: 0 K/UL (ref 0–0.2)
BASOPHILS RELATIVE PERCENT: 0.2 %
BILIRUB SERPL-MCNC: 0.8 MG/DL (ref 0–1)
BUN BLDV-MCNC: 11 MG/DL (ref 7–20)
CALCIUM SERPL-MCNC: 7.7 MG/DL (ref 8.3–10.6)
CELL COUNT FLUID TYPE: NORMAL
CHLORIDE BLD-SCNC: 99 MMOL/L (ref 99–110)
CLOT EVALUATION: NORMAL
CO2: 23 MMOL/L (ref 21–32)
COLOR FLUID: NORMAL
CREAT SERPL-MCNC: 0.9 MG/DL (ref 0.9–1.3)
EOSINOPHILS ABSOLUTE: 0 K/UL (ref 0–0.6)
EOSINOPHILS RELATIVE PERCENT: 1.1 %
FLUID TYPE: NORMAL
GFR AFRICAN AMERICAN: >60
GFR NON-AFRICAN AMERICAN: >60
GLOBULIN: 2.9 G/DL
GLUCOSE BLD-MCNC: 86 MG/DL (ref 70–99)
HAV AB SERPL IA-ACNC: NEGATIVE
HCT VFR BLD CALC: 22.7 % (ref 40.5–52.5)
HEMOGLOBIN: 7.5 G/DL (ref 13.5–17.5)
LYMPHOCYTES ABSOLUTE: 0.8 K/UL (ref 1–5.1)
LYMPHOCYTES RELATIVE PERCENT: 18 %
LYMPHOCYTES, BODY FLUID: 21 %
MACROPHAGE FLUID: 46 %
MAGNESIUM: 1.7 MG/DL (ref 1.8–2.4)
MCH RBC QN AUTO: 28.4 PG (ref 26–34)
MCHC RBC AUTO-ENTMCNC: 33.2 G/DL (ref 31–36)
MCV RBC AUTO: 85.7 FL (ref 80–100)
MONOCYTE, FLUID: 2 %
MONOCYTES ABSOLUTE: 0.4 K/UL (ref 0–1.3)
MONOCYTES RELATIVE PERCENT: 9 %
NEUTROPHIL, FLUID: 31 %
NEUTROPHILS ABSOLUTE: 3.2 K/UL (ref 1.7–7.7)
NEUTROPHILS RELATIVE PERCENT: 71.7 %
NUCLEATED CELLS FLUID: 225 /CUMM
NUMBER OF CELLS COUNTED FLUID: 100
PDW BLD-RTO: 16.3 % (ref 12.4–15.4)
PLATELET # BLD: 75 K/UL (ref 135–450)
PMV BLD AUTO: 6.8 FL (ref 5–10.5)
POTASSIUM REFLEX MAGNESIUM: 3.4 MMOL/L (ref 3.5–5.1)
PROTEIN FLUID: 1.7 G/DL
RBC # BLD: 2.65 M/UL (ref 4.2–5.9)
RBC FLUID: <1000 /CUMM
SODIUM BLD-SCNC: 132 MMOL/L (ref 136–145)
TOTAL PROTEIN: 5.7 G/DL (ref 6.4–8.2)
WBC # BLD: 4.5 K/UL (ref 4–11)

## 2020-11-30 PROCEDURE — 85025 COMPLETE CBC W/AUTO DIFF WBC: CPT

## 2020-11-30 PROCEDURE — 80053 COMPREHEN METABOLIC PANEL: CPT

## 2020-11-30 PROCEDURE — 6370000000 HC RX 637 (ALT 250 FOR IP): Performed by: PSYCHIATRY & NEUROLOGY

## 2020-11-30 PROCEDURE — 87205 SMEAR GRAM STAIN: CPT

## 2020-11-30 PROCEDURE — 49083 ABD PARACENTESIS W/IMAGING: CPT

## 2020-11-30 PROCEDURE — 6370000000 HC RX 637 (ALT 250 FOR IP): Performed by: INTERNAL MEDICINE

## 2020-11-30 PROCEDURE — 87070 CULTURE OTHR SPECIMN AEROBIC: CPT

## 2020-11-30 PROCEDURE — 2500000003 HC RX 250 WO HCPCS: Performed by: INTERNAL MEDICINE

## 2020-11-30 PROCEDURE — 6370000000 HC RX 637 (ALT 250 FOR IP): Performed by: NURSE PRACTITIONER

## 2020-11-30 PROCEDURE — C1729 CATH, DRAINAGE: HCPCS

## 2020-11-30 PROCEDURE — 51798 US URINE CAPACITY MEASURE: CPT

## 2020-11-30 PROCEDURE — 99252 IP/OBS CONSLTJ NEW/EST SF 35: CPT | Performed by: PSYCHIATRY & NEUROLOGY

## 2020-11-30 PROCEDURE — 0W9G3ZZ DRAINAGE OF PERITONEAL CAVITY, PERCUTANEOUS APPROACH: ICD-10-PCS | Performed by: RADIOLOGY

## 2020-11-30 PROCEDURE — 6360000002 HC RX W HCPCS: Performed by: INTERNAL MEDICINE

## 2020-11-30 PROCEDURE — 84157 ASSAY OF PROTEIN OTHER: CPT

## 2020-11-30 PROCEDURE — 88112 CYTOPATH CELL ENHANCE TECH: CPT

## 2020-11-30 PROCEDURE — 89051 BODY FLUID CELL COUNT: CPT

## 2020-11-30 PROCEDURE — 6360000002 HC RX W HCPCS: Performed by: NURSE PRACTITIONER

## 2020-11-30 PROCEDURE — 83735 ASSAY OF MAGNESIUM: CPT

## 2020-11-30 PROCEDURE — 82042 OTHER SOURCE ALBUMIN QUAN EA: CPT

## 2020-11-30 PROCEDURE — 87015 SPECIMEN INFECT AGNT CONCNTJ: CPT

## 2020-11-30 PROCEDURE — 88305 TISSUE EXAM BY PATHOLOGIST: CPT

## 2020-11-30 PROCEDURE — 1200000000 HC SEMI PRIVATE

## 2020-11-30 PROCEDURE — 2580000003 HC RX 258: Performed by: NURSE PRACTITIONER

## 2020-11-30 RX ORDER — FERROUS SULFATE 325(65) MG
325 TABLET ORAL 2 TIMES DAILY WITH MEALS
Status: DISCONTINUED | OUTPATIENT
Start: 2020-11-30 | End: 2020-12-02 | Stop reason: HOSPADM

## 2020-11-30 RX ORDER — MAGNESIUM SULFATE 1 G/100ML
1 INJECTION INTRAVENOUS PRN
Status: DISCONTINUED | OUTPATIENT
Start: 2020-11-30 | End: 2020-12-02 | Stop reason: HOSPADM

## 2020-11-30 RX ORDER — POTASSIUM BICARBONATE 25 MEQ/1
50 TABLET, EFFERVESCENT ORAL
Status: DISPENSED | OUTPATIENT
Start: 2020-11-30 | End: 2020-11-30

## 2020-11-30 RX ORDER — HYDROMORPHONE HYDROCHLORIDE 1 MG/ML
1 INJECTION, SOLUTION INTRAMUSCULAR; INTRAVENOUS; SUBCUTANEOUS
Status: DISCONTINUED | OUTPATIENT
Start: 2020-11-30 | End: 2020-12-01

## 2020-11-30 RX ORDER — LIDOCAINE HYDROCHLORIDE 10 MG/ML
5 INJECTION, SOLUTION EPIDURAL; INFILTRATION; INTRACAUDAL; PERINEURAL ONCE
Status: COMPLETED | OUTPATIENT
Start: 2020-11-30 | End: 2020-11-30

## 2020-11-30 RX ORDER — QUETIAPINE FUMARATE 25 MG/1
100 TABLET, FILM COATED ORAL 2 TIMES DAILY
Status: DISCONTINUED | OUTPATIENT
Start: 2020-11-30 | End: 2020-12-02 | Stop reason: HOSPADM

## 2020-11-30 RX ORDER — DICYCLOMINE HYDROCHLORIDE 10 MG/1
10 CAPSULE ORAL
Status: DISCONTINUED | OUTPATIENT
Start: 2020-12-01 | End: 2020-12-02 | Stop reason: HOSPADM

## 2020-11-30 RX ORDER — POTASSIUM BICARBONATE 25 MEQ/1
50 TABLET, EFFERVESCENT ORAL ONCE
Status: COMPLETED | OUTPATIENT
Start: 2020-11-30 | End: 2020-12-01

## 2020-11-30 RX ORDER — QUETIAPINE FUMARATE 25 MG/1
50 TABLET, FILM COATED ORAL ONCE
Status: COMPLETED | OUTPATIENT
Start: 2020-11-30 | End: 2020-11-30

## 2020-11-30 RX ADMIN — FUROSEMIDE 40 MG: 40 TABLET ORAL at 09:34

## 2020-11-30 RX ADMIN — LIDOCAINE HYDROCHLORIDE 5 ML: 10 INJECTION, SOLUTION EPIDURAL; INFILTRATION; INTRACAUDAL; PERINEURAL at 09:41

## 2020-11-30 RX ADMIN — Medication 10 ML: at 21:53

## 2020-11-30 RX ADMIN — Medication 100 MG: at 09:35

## 2020-11-30 RX ADMIN — MORPHINE SULFATE 4 MG: 4 INJECTION, SOLUTION INTRAMUSCULAR; INTRAVENOUS at 09:36

## 2020-11-30 RX ADMIN — QUETIAPINE FUMARATE 50 MG: 25 TABLET ORAL at 09:35

## 2020-11-30 RX ADMIN — HYDROMORPHONE HYDROCHLORIDE 1 MG: 1 INJECTION, SOLUTION INTRAMUSCULAR; INTRAVENOUS; SUBCUTANEOUS at 22:31

## 2020-11-30 RX ADMIN — THERA TABS 1 TABLET: TAB at 09:35

## 2020-11-30 RX ADMIN — SERTRALINE HYDROCHLORIDE 50 MG: 50 TABLET ORAL at 09:35

## 2020-11-30 RX ADMIN — POTASSIUM BICARBONATE 50 MEQ: 978 TABLET, EFFERVESCENT ORAL at 21:51

## 2020-11-30 RX ADMIN — QUETIAPINE FUMARATE 100 MG: 25 TABLET ORAL at 21:51

## 2020-11-30 RX ADMIN — MORPHINE SULFATE 4 MG: 4 INJECTION, SOLUTION INTRAMUSCULAR; INTRAVENOUS at 05:25

## 2020-11-30 RX ADMIN — SPIRONOLACTONE 100 MG: 25 TABLET ORAL at 09:35

## 2020-11-30 RX ADMIN — FOLIC ACID 1 MG: 1 TABLET ORAL at 09:35

## 2020-11-30 RX ADMIN — QUETIAPINE FUMARATE 50 MG: 25 TABLET ORAL at 14:48

## 2020-11-30 RX ADMIN — PANTOPRAZOLE SODIUM 40 MG: 40 TABLET, DELAYED RELEASE ORAL at 05:28

## 2020-11-30 RX ADMIN — HYDROMORPHONE HYDROCHLORIDE 1 MG: 1 INJECTION, SOLUTION INTRAMUSCULAR; INTRAVENOUS; SUBCUTANEOUS at 16:13

## 2020-11-30 RX ADMIN — HYDROMORPHONE HYDROCHLORIDE 1 MG: 1 INJECTION, SOLUTION INTRAMUSCULAR; INTRAVENOUS; SUBCUTANEOUS at 19:31

## 2020-11-30 RX ADMIN — MORPHINE SULFATE 4 MG: 4 INJECTION, SOLUTION INTRAMUSCULAR; INTRAVENOUS at 01:22

## 2020-11-30 RX ADMIN — FERROUS SULFATE TAB 325 MG (65 MG ELEMENTAL FE) 325 MG: 325 (65 FE) TAB at 21:57

## 2020-11-30 RX ADMIN — HYDROMORPHONE HYDROCHLORIDE 1 MG: 1 INJECTION, SOLUTION INTRAMUSCULAR; INTRAVENOUS; SUBCUTANEOUS at 13:37

## 2020-11-30 ASSESSMENT — PAIN SCALES - GENERAL
PAINLEVEL_OUTOF10: 10
PAINLEVEL_OUTOF10: 0
PAINLEVEL_OUTOF10: 7
PAINLEVEL_OUTOF10: 9
PAINLEVEL_OUTOF10: 7
PAINLEVEL_OUTOF10: 8

## 2020-11-30 ASSESSMENT — PAIN DESCRIPTION - LOCATION
LOCATION: ABDOMEN
LOCATION: ABDOMEN

## 2020-11-30 ASSESSMENT — PAIN DESCRIPTION - PAIN TYPE
TYPE: ACUTE PAIN
TYPE: ACUTE PAIN

## 2020-11-30 ASSESSMENT — PAIN DESCRIPTION - DESCRIPTORS
DESCRIPTORS: PRESSURE;SHARP;CONSTANT
DESCRIPTORS: PRESSURE

## 2020-11-30 ASSESSMENT — PAIN DESCRIPTION - ONSET
ONSET: ON-GOING
ONSET: ON-GOING

## 2020-11-30 ASSESSMENT — PAIN DESCRIPTION - FREQUENCY
FREQUENCY: CONTINUOUS
FREQUENCY: CONTINUOUS

## 2020-11-30 NOTE — PROGRESS NOTES
Behavioral-Environmental Outcomes:  None Identified   Food/Nutrient Intake Outcomes:  Food and Nutrient Intake, Supplement Intake  Physical Signs/Symptoms Outcomes:  Biochemical Data, Weight     Discharge Planning:     Too soon to determine     Electronically signed by Cristi Hendrix RD, LD on 11/30/20 at 2:22 PM EST    Contact: [phone]

## 2020-11-30 NOTE — PLAN OF CARE
Problem: Falls - Risk of:  Goal: Will remain free from falls  Description: Will remain free from falls  Outcome: Met This Shift  Goal: Absence of physical injury  Description: Absence of physical injury  Outcome: Met This Shift     Problem: Pain:  Goal: Pain level will decrease  Description: Pain level will decrease  Outcome: Met This Shift  Goal: Control of acute pain  Description: Control of acute pain  Outcome: Met This Shift  Goal: Control of chronic pain  Description: Control of chronic pain  Outcome: Met This Shift     Problem:  Activity:  Goal: Ability to tolerate increased activity will improve  Description: Ability to tolerate increased activity will improve  Outcome: Met This Shift

## 2020-11-30 NOTE — PROGRESS NOTES
2200ml of fluid removed  Spoke to patients RN and advised her the amount of fluid removed and that patient was returning to the floor.

## 2020-11-30 NOTE — PROGRESS NOTES
[facility] PROGRESS NOTE    11/30/2020 10:32 PM        Name: Joe Bennett . Admitted: 11/27/2020  Primary Care Provider: Sherwin Lynn DO (Tel: [phone])    Brief Course:  50 y. o. male with history of COPD, hypertension, hyperlipidemia, alcohol abuse, cirrhosis, radiculopathy who presented to ED with worsening abdominal pain and distention, worsening lower extremity edema. Patient reports that he was recently diagnosed of alcoholic cirrhosis. Follows up with Dr. Edwige Martinez from GI as outpatient. Reports quitting drinking 6 months ago. Admitted with GI consult for possible paracentesis.   Underwent IR paracentesis on 11/30 with 2.2 L removed. Continues to complain of abdominal pain. Seen by Psychiatry, no need for IP Psych for depression. C diff positive from 11/27, started on PO Vanco on 11/30 and Bentyl PO tid. CC:  Abdominal pain    Subjective:  . Patient continues to complain of abdominal pain. For paracentesis today. No CP, SOB, HA or fevers.     Reviewed interval ancillary notes    Current Medications  HYDROmorphone HCl PF (DILAUDID) injection 1 mg, Q3H PRN  QUEtiapine (SEROQUEL) tablet 100 mg, BID  ferrous sulfate (IRON 325) tablet 325 mg, BID WC  hydrocortisone (ANUSOL-HC) suppository 25 mg, BID  spironolactone (ALDACTONE) tablet 100 mg, Daily  furosemide (LASIX) tablet 40 mg, Daily  folic acid (FOLVITE) tablet 1 mg, Daily  multivitamin 1 tablet, Daily  pantoprazole (PROTONIX) tablet 40 mg, QAM AC  sertraline (ZOLOFT) tablet 50 mg, Daily  vitamin B-1 (THIAMINE) tablet 100 mg, Daily  sodium chloride flush 0.9 % injection 10 mL, 2 times per day  sodium chloride flush 0.9 % injection 10 mL, PRN  polyethylene glycol (GLYCOLAX) packet 17 g, Daily PRN  promethazine (PHENERGAN) tablet 12.5 mg, Q6H PRN    Or  ondansetron (ZOFRAN) injection 4 mg, Q6H PRN  albuterol (PROVENTIL) nebulizer solution 2.5 mg, Q6H PRN        Objective:  /73   Pulse 88   Temp 98.4 °F (36.9 °C) (Oral)   Resp 18   Ht 5' 5\" (1.651 m)   Wt 126 lb 8.7 oz (57.4 kg)   SpO2 98%   BMI 21.06 kg/m²     Intake/Output Summary (Last 24 hours) at 11/30/2020 2232  Last data filed at 11/30/2020 1337  Gross per 24 hour   Intake --   Output 550 ml   Net -550 ml      Wt Readings from Last 3 Encounters:   11/27/20 126 lb 8.7 oz (57.4 kg)   08/08/20 115 lb 8 oz (52.4 kg)   07/07/20 116 lb 13.5 oz (53 kg)       General appearance:  Appears comfortable, anxious  Eyes: Sclera clear. Pupils equal.  ENT: Moist oral mucosa. Trachea midline, no adenopathy. Cardiovascular: Regular rhythm, normal S1, S2. No murmur. Trace edema in lower extremities  Respiratory: Not using accessory muscles. Good inspiratory effort. Clear to auscultation bilaterally, no wheeze or crackles. GI: Abdomen soft, generalized tenderness, distended, normal bowel sounds  Musculoskeletal: No cyanosis in digits, neck supple  Neurology: Grossly intact. No speech or motor deficits  Psych: Anxious affect. Alert and oriented in time, place and person  Skin: Suprapubic erythema noted  Extremity exam shows brisk capillary refill. Peripheral pulses are palpable in lower extremities     Labs and Tests:  CBC:   Recent Labs     11/28/20  1113 11/29/20  0656 11/29/20  1141 11/30/20  0625   WBC 9.0 4.6  --  4.5   HGB 9.3* 7.4* 8.0* 7.5*   * 76*  --  75*     BMP:    Recent Labs     11/28/20  1113 11/29/20  0656 11/30/20  0625   * 133* 132*   K 3.2* 3.7 3.4*    103 99   CO2 23 23 23   BUN 17 12 11   CREATININE 1.0 0.8* 0.9   GLUCOSE 73 91 86     Hepatic:   Recent Labs     11/28/20  1113 11/29/20  0656 11/30/20  0625   AST 50* 42* 40*   ALT 21 17 19   BILITOT 0.8 0.6 0.8   ALKPHOS 129 108 111     IR US GUIDED PARACENTESIS   Final Result   Successful ultrasound guided paracentesis. XR ABDOMEN (2 VIEWS)   Final Result   No air-filled dilated loops of bowel. Increased density throughout the abdomen which may represent ascites. XR ABDOMEN (KUB) (SINGLE AP VIEW)   Final Result   1. Nonobstructive bowel gas pattern. 2. Central location of gas-filled small bowel loops likely due to ascites as   seen previously. 3. Splenomegaly. Problem List  Principal Problem:    Alcoholic cirrhosis of liver with ascites (HCC)  Active Problems:    Unspecified mood (affective) disorder (HCC)    Alcohol use disorder, severe, in early remission (HCC)    Anxiety disorder    Abdominal pain    Hepatitis C    Hemorrhoids    Acute on chronic blood loss anemia    Esophageal varices (HCC)  Resolved Problems:    * No resolved hospital problems. *       Assessment & Plan:   1. For paracentesis today for alcoholic and Hep C cirrhosis with ascites  2. No SBP per GI  3. Dilaudid IV ONLY while here PRN pain  4. Counseled to stop smoking. 5. Cont Lasix and Aldactone upon DC  6. Cont Anusol per GI  7. Cont Seroquel  8. PT/OT eval  9. I will not write narcotics for this patient upon DC for chronic abdominal pain; GI recommends pain management as outpatient  10. Vanco PO for C Diff  11. Bentyl PO tid    IV Access: Peripheral  Best: No  Diet: DIET LOW SODIUM 2 GM;  Code:Full Code  DVT PPX SCD  Disposition Home    Discussed with patient, nursing and CM. His abdominal pain is disproportionate to exam.  I have told him I will not write narcotics upon DC.     Misael Orellana MD   11/30/2020 10:32 PM

## 2020-11-30 NOTE — CONSULTS
PSYCHIATRY CONSULT, INITIAL EVALUATION    Referring Provider:  Ying Easley MD     CC/Reason for Consult: worsening depression and anxiety      ASSESSMENT:   51 yo M with chronic depression, anxiety, hx of alcoholism. Established in outpatient clinic and getting benefit from current treatment. Is on a higher quetiapine dose at home per his report, so will adjust the dose. Will need to watch given prolonged QT for changes in his QT interval.     1. Unspecified mood disorder  2. Unspecified anxiety disorder  3. Alcohol use disorder, severe, in early remission  4. Cannabis use  5. Alcoholic liver cirrhosis    RECOMMENDATIONS:   1. Will increase quetiapine back to 100mg BID per his outpatient regimen. This hopefully will help with some of his anxiety/ irritability. 2. Continue sertraline 50mg daily. 3. Recheck EKG in 1-2 days to ensure no change / worsening in QT interval.     Dispo: does not require  Inpatient psychiatric treatment  Safety: RF include male, chronic medical illness, hx of self harm attempt, mood disorder, substance use d/o. Pt is moderate risk of future dangerousness to self or others. At this time he is clinically stable, sober, has reasonable follow up plan in place and is not an imminent safety risk to self or others. Thank you for this consult, please call the psychiatry consult line for further questions. I will sign off at this time. ____________________________________________________________________________    HPI:   context: 51 yo M with alcoholism, depression, cirrhosis, chronic pain presented to the hospital with worsening abdominal pain and distention. Concern for worsening depression. associated symptoms:   Pt reports long standing issues with depression. Recently he has felt somewhat hopeless, depressed, irritable and anxious about his health. He denies suicidal ideation and denies any alcohol use.    He is noted to have poor insight regarding his medical condition per GI notes.     modifying factors: he is struggling with interpersonal relationships with family. He is finding his treatment at [facility] very helpful - feels he would not be alive if it wasn't for them. He typically takes quetiapine 100mg BID, here he is getting 50mg BID. He has found this medication helpful for his anxiety and his irritability. He has been sober now for about 5 months from alcohol. He does smoke MJ periodically an finds that it helps his anxiety and pain without any ill effects. Timing: chronic  duration: long standing through adulthood  severity: severe    ROS:   Gen: no fevers or chills  HEENT: no vision or hearing prob  CV: no cp, no palpitations  Resp: no dyspnea  : no dysuria  GI: +abd pain  Skin: no rashes  Neuro: no tremors    Past Psychiatric History:    Hosp: denies prior. He has had some encounters with psychiatry for suicidal ideation in the setting of alcohol intox/withdrawal    Diagnoses: alcoholism, depression    Med trials: quetiapine, sertraline   Outpt: [facility] - had a psychiatrist and therapist   Suicide Attempts: attempted to choke self back in May/Jun of this year    Substance Use History:   Nicotine: prior smoke, quit earlier this year   Alcohol: reports sobriety the last 5 months.  Hx of chronic alcoholism through most of his adult life   Illicits: occasional MJ use    Past Medical History:   Past Medical History:   Diagnosis Date    Alcohol abuse     Arthritis     hands and back    Chronic bronchitis ([address]     Clostridioides difficile infection 11/27/2020    COPD (chronic obstructive pulmonary disease) (HCC)     bronchitis    ESBL (extended spectrum beta-lactamase) producing bacteria infection 07/06/2020    Hyperlipidemia     Hypertension     MDRO (multiple drug resistant organisms) resistance     MRSA in right arm [id number]    Radiculopathy of lumbosacral region     Spondylisthesis      Past Surgical History:   Procedure found for: CHOL  No results found for: TRIG  No results found for: HDL  No results found for: LDLCALC, LDLCHOLESTEROL  No results found for: LABVLDL, VLDL  No results found for: CHOLHDLRATIO  No results found for: TSH, D0AUVLP, J4LGPBP, THYROIDAB  No results found for: CFCOBEN5H1  Lab Results   Component Value Date    LSVHOOZX65 851 2020     Lab Results   Component Value Date    FOLATE >20.00 2020       Last Drug screen: 2020: +opiates    Imaging: no head imaging on file    EK2020: rate 100 bpm,    Normal sinus rhythmLeft anterior fascicular block, Nonspecific ST abnormality, Prolonged QT, QTc 539ms             Cira Edge MD   Psychiatrist

## 2020-11-30 NOTE — PROGRESS NOTES
Shift assessment complete. Vital signs stable. Afebrile. Abdomen remains with gross ascites & BLE leg swelling noted. Medicated for c/o 10/10 abdominal pain. Scheduled night medications administered. Independent in room. Placed in contact plus precautions for positive cdiff cx. Plan is for paracentesis tomorrow. The care plan and education has been reviewed and mutually agreed upon with the patient. All needs met at this time.

## 2020-11-30 NOTE — PROGRESS NOTES
Physician Progress Note      PATIENT:               Kary Hay  CSN #:                  [id number]  :                       1965  ADMIT DATE:       2020 4:37 PM  [address] [place] DATE:  RESPONDING  PROVIDER #:        Elle Jalloh MD          QUERY TEXT:    Pt admitted with alcoholic and Hep C cirrhosis with ascites and has anemia   documented. If possible, please document in progress notes and discharge   summary further specificity regarding the acuity and type of anemia:      The medical record reflects the following:  Risk Factors: cirrhosis, thrombocytopenia, history of mild esophageal varices   and bleeding hemorrhoids,  Clinical Indicators: Hgb drop from 9.0 to 7.4 and plt count of 99 to 76, 75;   FOBT positive  Treatment: PRBC if hgb drops below 7, plts for active GI bleed, GI consult,   oral iron  Options provided:  -- Anemia due to acute blood loss  -- Anemia due to acute on chronic blood loss  -- Anemia due to iron deficiency  -- Anemia due to chronic disease  -- Other - I will add my own diagnosis  -- Disagree - Not applicable / Not valid  -- Disagree - Clinically unable to determine / Unknown  -- Refer to Clinical Documentation Reviewer    PROVIDER RESPONSE TEXT:    This patient has anemia due to chronic disease.     Query created by: Jaden Drake on 2020 1:36 PM      Electronically signed by:  Elle Jalloh MD 2020 1:42 PM

## 2020-11-30 NOTE — PLAN OF CARE
Nutrition Problem #1: Inadequate oral intake  Intervention: Food and/or Nutrient Delivery: Continue Current Diet, Start Oral Nutrition Supplement  Nutritional Goals: Meal and supplement intake greater than 50%

## 2020-11-30 NOTE — ACP (ADVANCE CARE PLANNING)
Advanced Care Planning Note. Purpose of Encounter: Advanced care planning in light of alcoholic cirrhosis  Parties In Attendance: Patient  Decisional Capacity: Yes  Subjective: Patient with abdominal pain  Objective: Cr 0.9  Goals of Care Determination: Patient wants full support (CPR, vent, surgery, HD, trach, PEG)  Plan:  GI consult. IR paracentesis  Code Status: Full code   Time spent on Advanced care Plannin minutes  Advanced Care Planning Documents: Completed advanced directives on chart, daughter is the POA.     Bee Mendiola MD  2020 7:43 AM

## 2020-11-30 NOTE — PROGRESS NOTES
results for input(s): PTT in the last 72 hours. No results for input(s): OCCULTBLD in the last 72 hours. Radiology Review:    IR US GUIDED PARACENTESIS   Final Result   Successful ultrasound guided paracentesis. XR ABDOMEN (2 VIEWS)   Final Result   No air-filled dilated loops of bowel. Increased density throughout the abdomen which may represent ascites. XR ABDOMEN (KUB) (SINGLE AP VIEW)   Final Result   1. Nonobstructive bowel gas pattern. 2. Central location of gas-filled small bowel loops likely due to ascites as   seen previously. 3. Splenomegaly. Assessment:   The patient is a 52 y. o. old male  with following problems:     Active Problems:    Alcoholic cirrhosis of liver with ascites ([address]  Resolved Problems:    * No resolved hospital problems. *     1. Ascites  2. Liver cirrhosis secondary to alcohol and Hep C (MELD Na 7)  3. Acute and chronic intermittent rectal bleeding and anemia. He had 2 EGDs and colonoscopies the past 2 years. He has trace EV (8/2020) and bleeding was from hemorrhoids (colonoscopy 8/2020). Patient has iron deficiency with iron saturation 7%. Normal vitamin B12 and folate  4. Acute on chronic anemia with rectal bleeding due to hemorrhoids  5. Poor insight regarding his medical condition  6. Depression and anxiety. Appreciate psychiatry input      Recommendations:   Iron sulfate 325 mg twice daily   low sodium diet (2 grams daily), Lasix 40 mg and Aldactone 100 mg daily. He needs to be set up with Dr. Marlen Genao for hemorrhoidal treatment (banding or hemorrhoidectomy)   Follow-up with an outpatient pain clinician  Follow-up in clinic with me in 3 to 4 weeks. Once his active medical issues have been addressed (hemorrhoidal bleeding and abdominal pain issues), we will plan on treating him for hepatitis C   Although his meld score is low, he is interested in being referred for outpatient liver transplant.   We will arrange this for [place] [facility] [place].   Patient may discharge tomorrow  GI will sign off, call if you have any question    Chandan Greenwood MD, MSc  [facility] [address]

## 2020-11-30 NOTE — BRIEF OP NOTE
Brief Postoperative Note    Agapito Martinez  YOB: 1965  [id number]    Pre-operative Diagnosis: ascites    Post-operative Diagnosis: Same    Procedure: US-guided paracentesis    Anesthesia: Local    Surgeons: Rodo Almendarez MD    Estimated Blood Loss: Less than 5 mL    Complications: None    Specimens: Was Obtained: ascitic fluid sent for analysis    Findings: Successful US-guided paracentesis.     Electronically signed by Rodo Almendarez MD on 11/30/2020 at 9:16 AM

## 2020-11-30 NOTE — CARE COORDINATION
Discharge Planning Assessment  Readmission risk score 28%  RN discharge planner met with patient/ (and family member) to discuss reason for admission, current living situation, and potential needs at the time of discharge    Demographics/Insurance verified Yes address and address verified    Current type of dwellin ;level home    Patient from ECF/SW confirmed with: n/a    Living arrangements: with daughter    Level of function/Support: has difficulty ambulating, independent with ADL    PCP: Ronnie    Last Visit to PCP: 2 weeks ago    DME: cane, hospital bed    Active with any community resources/agencies/skilled home care: none    Medication compliance issues: uses [facility] on [employer] issues that could impact healthcare: [employer]    Tentative discharge plan: home    Discussed and provided facilities of choice if transition to a skilled nursing facility is required at the time of discharge      Discussed with patient and/or family that on the day of discharge home tentative time of discharge will be between 10 AM and noon.     Transportation at the time of discharge: daughter    Carri Lopez BSN, CCM, RN  [facility]  [phone]

## 2020-12-01 ENCOUNTER — APPOINTMENT (OUTPATIENT)
Dept: ULTRASOUND IMAGING | Age: 55
DRG: 280 | End: 2020-12-01
Payer: COMMERCIAL

## 2020-12-01 LAB
A/G RATIO: 1 (ref 1.1–2.2)
ALBUMIN SERPL-MCNC: 2.8 G/DL (ref 3.4–5)
ALP BLD-CCNC: 108 U/L (ref 40–129)
ALT SERPL-CCNC: 18 U/L (ref 10–40)
ANION GAP SERPL CALCULATED.3IONS-SCNC: 7 MMOL/L (ref 3–16)
AST SERPL-CCNC: 39 U/L (ref 15–37)
BASOPHILS ABSOLUTE: 0 K/UL (ref 0–0.2)
BASOPHILS RELATIVE PERCENT: 0.2 %
BILIRUB SERPL-MCNC: 0.7 MG/DL (ref 0–1)
BUN BLDV-MCNC: 11 MG/DL (ref 7–20)
CALCIUM SERPL-MCNC: 8.2 MG/DL (ref 8.3–10.6)
CHLORIDE BLD-SCNC: 98 MMOL/L (ref 99–110)
CO2: 26 MMOL/L (ref 21–32)
CREAT SERPL-MCNC: 0.9 MG/DL (ref 0.9–1.3)
EOSINOPHILS ABSOLUTE: 0 K/UL (ref 0–0.6)
EOSINOPHILS RELATIVE PERCENT: 0.9 %
GFR AFRICAN AMERICAN: >60
GFR NON-AFRICAN AMERICAN: >60
GLOBULIN: 2.9 G/DL
GLUCOSE BLD-MCNC: 98 MG/DL (ref 70–99)
HCT VFR BLD CALC: 23.5 % (ref 40.5–52.5)
HEMOGLOBIN: 7.8 G/DL (ref 13.5–17.5)
LYMPHOCYTES ABSOLUTE: 0.8 K/UL (ref 1–5.1)
LYMPHOCYTES RELATIVE PERCENT: 15.2 %
MCH RBC QN AUTO: 28.6 PG (ref 26–34)
MCHC RBC AUTO-ENTMCNC: 33.3 G/DL (ref 31–36)
MCV RBC AUTO: 85.6 FL (ref 80–100)
MONOCYTES ABSOLUTE: 0.4 K/UL (ref 0–1.3)
MONOCYTES RELATIVE PERCENT: 7.6 %
NEUTROPHILS ABSOLUTE: 3.9 K/UL (ref 1.7–7.7)
NEUTROPHILS RELATIVE PERCENT: 76.1 %
PDW BLD-RTO: 16.5 % (ref 12.4–15.4)
PLATELET # BLD: 76 K/UL (ref 135–450)
PMV BLD AUTO: 6.8 FL (ref 5–10.5)
POTASSIUM REFLEX MAGNESIUM: 3.9 MMOL/L (ref 3.5–5.1)
RBC # BLD: 2.75 M/UL (ref 4.2–5.9)
SODIUM BLD-SCNC: 131 MMOL/L (ref 136–145)
TOTAL PROTEIN: 5.7 G/DL (ref 6.4–8.2)
WBC # BLD: 5.1 K/UL (ref 4–11)

## 2020-12-01 PROCEDURE — 6360000002 HC RX W HCPCS: Performed by: NURSE PRACTITIONER

## 2020-12-01 PROCEDURE — 94760 N-INVAS EAR/PLS OXIMETRY 1: CPT

## 2020-12-01 PROCEDURE — 6370000000 HC RX 637 (ALT 250 FOR IP): Performed by: NURSE PRACTITIONER

## 2020-12-01 PROCEDURE — 1200000000 HC SEMI PRIVATE

## 2020-12-01 PROCEDURE — 6370000000 HC RX 637 (ALT 250 FOR IP): Performed by: INTERNAL MEDICINE

## 2020-12-01 PROCEDURE — 6360000002 HC RX W HCPCS: Performed by: INTERNAL MEDICINE

## 2020-12-01 PROCEDURE — 97161 PT EVAL LOW COMPLEX 20 MIN: CPT

## 2020-12-01 PROCEDURE — 6370000000 HC RX 637 (ALT 250 FOR IP): Performed by: PSYCHIATRY & NEUROLOGY

## 2020-12-01 PROCEDURE — 97530 THERAPEUTIC ACTIVITIES: CPT

## 2020-12-01 PROCEDURE — 2580000003 HC RX 258: Performed by: NURSE PRACTITIONER

## 2020-12-01 PROCEDURE — 93975 VASCULAR STUDY: CPT

## 2020-12-01 PROCEDURE — 97165 OT EVAL LOW COMPLEX 30 MIN: CPT

## 2020-12-01 PROCEDURE — 76705 ECHO EXAM OF ABDOMEN: CPT

## 2020-12-01 PROCEDURE — 80053 COMPREHEN METABOLIC PANEL: CPT

## 2020-12-01 PROCEDURE — 94640 AIRWAY INHALATION TREATMENT: CPT

## 2020-12-01 PROCEDURE — 97116 GAIT TRAINING THERAPY: CPT

## 2020-12-01 PROCEDURE — 85025 COMPLETE CBC W/AUTO DIFF WBC: CPT

## 2020-12-01 RX ORDER — HYDROMORPHONE HYDROCHLORIDE 1 MG/ML
0.5 INJECTION, SOLUTION INTRAMUSCULAR; INTRAVENOUS; SUBCUTANEOUS EVERY 4 HOURS PRN
Status: DISCONTINUED | OUTPATIENT
Start: 2020-12-01 | End: 2020-12-02 | Stop reason: HOSPADM

## 2020-12-01 RX ORDER — TRAMADOL HYDROCHLORIDE 50 MG/1
50 TABLET ORAL EVERY 6 HOURS PRN
Status: DISCONTINUED | OUTPATIENT
Start: 2020-12-01 | End: 2020-12-02 | Stop reason: HOSPADM

## 2020-12-01 RX ADMIN — HYDROCORTISONE ACETATE 25 MG: 25 SUPPOSITORY RECTAL at 19:43

## 2020-12-01 RX ADMIN — HYDROMORPHONE HYDROCHLORIDE 1 MG: 1 INJECTION, SOLUTION INTRAMUSCULAR; INTRAVENOUS; SUBCUTANEOUS at 05:52

## 2020-12-01 RX ADMIN — HYDROMORPHONE HYDROCHLORIDE 0.5 MG: 1 INJECTION, SOLUTION INTRAMUSCULAR; INTRAVENOUS; SUBCUTANEOUS at 16:35

## 2020-12-01 RX ADMIN — POTASSIUM BICARBONATE 50 MEQ: 978 TABLET, EFFERVESCENT ORAL at 05:49

## 2020-12-01 RX ADMIN — FERROUS SULFATE TAB 325 MG (65 MG ELEMENTAL FE) 325 MG: 325 (65 FE) TAB at 16:34

## 2020-12-01 RX ADMIN — THERA TABS 1 TABLET: TAB at 08:45

## 2020-12-01 RX ADMIN — TRAMADOL HYDROCHLORIDE 50 MG: 50 TABLET, FILM COATED ORAL at 19:41

## 2020-12-01 RX ADMIN — HYDROMORPHONE HYDROCHLORIDE 1 MG: 1 INJECTION, SOLUTION INTRAMUSCULAR; INTRAVENOUS; SUBCUTANEOUS at 08:45

## 2020-12-01 RX ADMIN — FUROSEMIDE 40 MG: 40 TABLET ORAL at 08:45

## 2020-12-01 RX ADMIN — SERTRALINE HYDROCHLORIDE 50 MG: 50 TABLET ORAL at 08:45

## 2020-12-01 RX ADMIN — SPIRONOLACTONE 100 MG: 25 TABLET ORAL at 08:45

## 2020-12-01 RX ADMIN — QUETIAPINE FUMARATE 100 MG: 25 TABLET ORAL at 19:41

## 2020-12-01 RX ADMIN — QUETIAPINE FUMARATE 100 MG: 25 TABLET ORAL at 08:57

## 2020-12-01 RX ADMIN — DICYCLOMINE HYDROCHLORIDE 10 MG: 10 CAPSULE ORAL at 11:27

## 2020-12-01 RX ADMIN — FERROUS SULFATE TAB 325 MG (65 MG ELEMENTAL FE) 325 MG: 325 (65 FE) TAB at 08:45

## 2020-12-01 RX ADMIN — HYDROMORPHONE HYDROCHLORIDE 1 MG: 1 INJECTION, SOLUTION INTRAMUSCULAR; INTRAVENOUS; SUBCUTANEOUS at 12:00

## 2020-12-01 RX ADMIN — Medication 10 ML: at 19:43

## 2020-12-01 RX ADMIN — FOLIC ACID 1 MG: 1 TABLET ORAL at 08:45

## 2020-12-01 RX ADMIN — DICYCLOMINE HYDROCHLORIDE 10 MG: 10 CAPSULE ORAL at 16:34

## 2020-12-01 RX ADMIN — DICYCLOMINE HYDROCHLORIDE 10 MG: 10 CAPSULE ORAL at 06:00

## 2020-12-01 RX ADMIN — HYDROMORPHONE HYDROCHLORIDE 0.5 MG: 1 INJECTION, SOLUTION INTRAMUSCULAR; INTRAVENOUS; SUBCUTANEOUS at 21:52

## 2020-12-01 RX ADMIN — ALBUTEROL SULFATE 2.5 MG: 2.5 SOLUTION RESPIRATORY (INHALATION) at 12:16

## 2020-12-01 RX ADMIN — VANCOMYCIN HYDROCHLORIDE 125 MG: KIT at 11:27

## 2020-12-01 RX ADMIN — PANTOPRAZOLE SODIUM 40 MG: 40 TABLET, DELAYED RELEASE ORAL at 05:59

## 2020-12-01 RX ADMIN — Medication 100 MG: at 08:45

## 2020-12-01 RX ADMIN — VANCOMYCIN HYDROCHLORIDE 125 MG: KIT at 05:50

## 2020-12-01 RX ADMIN — TRAMADOL HYDROCHLORIDE 50 MG: 50 TABLET, FILM COATED ORAL at 14:58

## 2020-12-01 RX ADMIN — VANCOMYCIN HYDROCHLORIDE 125 MG: KIT at 16:36

## 2020-12-01 ASSESSMENT — PAIN DESCRIPTION - LOCATION
LOCATION: ABDOMEN

## 2020-12-01 ASSESSMENT — PAIN SCALES - GENERAL
PAINLEVEL_OUTOF10: 7
PAINLEVEL_OUTOF10: 10
PAINLEVEL_OUTOF10: 7
PAINLEVEL_OUTOF10: 10
PAINLEVEL_OUTOF10: 7
PAINLEVEL_OUTOF10: 7
PAINLEVEL_OUTOF10: 6
PAINLEVEL_OUTOF10: 0
PAINLEVEL_OUTOF10: 10

## 2020-12-01 ASSESSMENT — PAIN DESCRIPTION - PAIN TYPE
TYPE: ACUTE PAIN
TYPE: ACUTE PAIN

## 2020-12-01 ASSESSMENT — PAIN DESCRIPTION - ORIENTATION
ORIENTATION: RIGHT

## 2020-12-01 ASSESSMENT — PAIN DESCRIPTION - PROGRESSION
CLINICAL_PROGRESSION: NOT CHANGED
CLINICAL_PROGRESSION: NOT CHANGED

## 2020-12-01 ASSESSMENT — PAIN DESCRIPTION - ONSET: ONSET: ON-GOING

## 2020-12-01 ASSESSMENT — PAIN DESCRIPTION - FREQUENCY: FREQUENCY: CONTINUOUS

## 2020-12-01 ASSESSMENT — PAIN DESCRIPTION - DESCRIPTORS: DESCRIPTORS: PRESSURE

## 2020-12-01 NOTE — PROGRESS NOTES
0900 Morning assessment complete. Patient denies any loose stool. Still having pain and taking pain meds frequently. Notified patient of NPO status for testing later today.

## 2020-12-01 NOTE — PROGRESS NOTES
Occupational Therapy   Occupational Therapy Initial Assessment/Discharge Summary    Date: 2020   Patient Name: Emi Means  MRN: [id number]     : 1965    Date of Service: 2020    Discharge Recommendations:  Emi Means scored a  on the -[facility] ADL Inpatient form. At this time, no further OT is recommended upon discharge due to pt is at baseline level of occupational function. .  Recommend patient returns to prior setting with assist as needed. .         OT Equipment Recommendations  Equipment Needed: No    Assessment   Assessment: Pt is at his baseline level of occupational function. He is independent with ADLs and Mod I with functional mobility. No further OT needs at this time. Decision Making: Low Complexity  History: Pt 51 yo, lives w/dtr, independent ADLs, ambulates w/SPC PRN, multiple falls. PMH: HTN, HLD, COPD, spondylisthesis, radiculpathy, ETOH abuse, chronic bronchitis, smoker  Exam: ROM, MMT, 6 clicks, no performance deficits, stable presentation  Assistance / Modification: None  OT Education: OT Role  Patient Education: D/C recommendation. Pt independently verbalized understanding. Barriers to Learning: None  REQUIRES OT FOLLOW UP: No  Activity Tolerance  Activity Tolerance: Patient Tolerated treatment well  Safety Devices  Safety Devices in place: Yes  Type of devices: Left in chair;Nurse notified;Call light within reach;Gait belt(Pt declining chair alarm for high fall risk; RN aware.)           Patient Diagnosis(es): The primary encounter diagnosis was Alcoholic cirrhosis of liver with ascites ([facility] A diagnosis of Leg swelling was also pertinent to this visit.      has a past medical history of Alcohol abuse, Arthritis, Chronic bronchitis ([facility] Clostridioides difficile infection, COPD (chronic obstructive pulmonary disease) ([facility] ESBL (extended spectrum beta-lactamase) producing bacteria infection, Hyperlipidemia, Hypertension, MDRO (multiple drug resistant organisms) resistance, Radiculopathy of lumbosacral region, and Spondylisthesis. has a past surgical history that includes fracture surgery; Lumbar spine surgery (Left, 4/23/2019); Upper gastrointestinal endoscopy (N/A, 12/23/2019); Colonoscopy (N/A, 12/23/2019); and Colonoscopy (12/23/2019). Restrictions  Restrictions/Precautions  Restrictions/Precautions: Fall Risk, Contact Precautions(high fall risk; pt declining bed alarm )  Required Braces or Orthoses?: No  Position Activity Restriction  Other position/activity restrictions: Shannon Godwin is a 50 y.o. male h/o alcohol abuse with cirrhosis, ascites, COPD, hypertension, hyperlipidemia and thrombocytopenia presenting this evening complaining of generalized body swelling with significant pain lower extremities as well as distended abdomen. Patient has a history of liver cirrhosis and has never undergone paracentesis. Patient denies any fevers, chills, nausea, vomiting chest pain or shortness of breath. Subjective   General  Chart Reviewed: Yes  Patient assessed for rehabilitation services?: Yes  Family / Caregiver Present: No  Referring Practitioner: Misael Orellana MD, for d/c planning  Diagnosis: Alcohol cirrhosis of liver w/ascities  Subjective  Subjective: Pt ambulating in curtis on arrival w/SPC. Patient Currently in Pain: Yes  Pain Assessment  Pain Assessment: 0-10  Pain Level: 10  Pain Type: Acute pain  Pain Location: Abdomen  Pain Orientation: Right  Pre Treatment Pain Screening  Intervention List: Patient able to continue with treatment; RN notified.   Vital Signs  Patient Currently in Pain: Yes  Oxygen Therapy  RA  Social/Functional History  Social/Functional History  Lives With: Daughter  Type of Home: House  Home Layout: One level  Home Access: Stairs to enter without rails  Entrance Stairs - Number of Steps: 1 ESAU  Bathroom Shower/Tub: Walk-in shower  Bathroom Toilet: Standard  Bathroom Equipment: Grab bars in shower  Home Equipment: [address], [employer] walker, Reacher, Grab bars, Hospital bed  ADL Assistance: [address]: Independent  Homemaking Responsibilities: Yes  Ambulation Assistance: Independent(uses cane PRN)  Transfer Assistance: Independent  Active : No  Patient's  Info: daughter drives  Leisure & Hobbies: hunt, fish  Additional Comments: pr reports >10 fall d/t LOB       Objective   Vision: Impaired  Vision Exceptions: Wears glasses for reading  Hearing: Within functional limits    Orientation  Overall Orientation Status: Within Normal Limits  Observation/Palpation  Posture: Good  Balance  Standing Balance: Modified independent (used SPC at times; other times, carried it.)  Standing Balance  Time: ~2 min  Activity: functional mobility ~150 ft in curtis  Functional Mobility  Functional - Mobility Device: Cane  Activity: Other  Assist Level: Modified independent   ADL  LE Dressing: Independent(socks)  Toileting: Independent(per pt report)  Tone RUE  RUE Tone: Normotonic  Tone LUE  LUE Tone: Normotonic  Coordination  Movements Are Fluid And Coordinated: Yes(WFL)     Bed mobility  Scooting: Independent  Comment: Pt seated on EOB at beginning of session in chair at end of session. Transfers  Stand Step Transfers: Independent  Sit to stand: Independent  Stand to sit: Independent  Vision - Basic Assessment  Prior Vision: Wears glasses only for reading  Visual History: No significant visual history  Patient Visual Report: No visual complaint reported.   Cognition  Overall Cognitive Status: WFL  Perception  Overall Perceptual Status: WFL     Sensation  Overall Sensation Status: Impaired  Light Touch: Partial deficits in the RLE;Partial deficits in the LLE(pt reports numbess and tingling in B LE's; no deficits for UEs)        LUE AROM (degrees)  LUE AROM : WNL  Left Hand AROM (degrees)  Left Hand AROM: WNL  RUE AROM (degrees)  RUE AROM : WNL  Right Hand AROM (degrees)  Right Hand AROM: WNL  LUE Strength  Gross LUE Strength: WNL(5/5 elbow, shoulder)  L Hand General: 5/5  RUE Strength  Gross RUE Strength:  WNL  R Hand General: 5/5                   Plan   Plan  Plan Comment: D/C acute OT    AM-PAC Score        AM-PAC Inpatient Daily Activity Raw Score: 24 (12/01/20 1400)  AM-PAC Inpatient ADL T-Scale Score : 57.54 (12/01/20 1400)  ADL Inpatient CMS 0-100% Score: 0 (12/01/20 1400)  ADL Inpatient CMS G-Code Modifier : CH (12/01/20 1400)    Goals: None          Therapy Time   Individual Concurrent Group Co-treatment   Time In 1308         Time Out 1340         Minutes 32               Timed Code Treatment Minutes:  17 Minutes    Total Treatment Minutes:  [address], [place] [address], Yuridia Doctor., OTR/L, [id number]

## 2020-12-01 NOTE — PROGRESS NOTES
Shift assessment completed. Pt. In bed, appears anxious and withdrawn. Short with responses. Pt. Reports that he \"feels down\" about his \"situation. \" Reported that he has told his family and they are having a \"hard time with it. \" Provided encouragement and emotional support. Appeared to help a little. VSS, evening meds given. Pt. Also asked for pain medication, but it is not due at this time. Will return with pain medication. Abd in round, distended. Active bowel sounds x4. Small bruise noted below belly button. Dressing to abd CDI. The care plan and education has been reviewed and mutually agreed upon with the patient.

## 2020-12-01 NOTE — PROGRESS NOTES
[facility] GI  Gastroenterology Progress Note    Emi Means is a 50 y.o. male patient. 1. Alcoholic cirrhosis of liver with ascites (HCC)    2. Leg swelling        SUBJECTIVE:    Abdominal pain is better s/p paracentesis. Was dx with C.diff. states no BM yesterday and had a solid stool today. ROS:  Cardiovascular ROS: no chest pain or dyspnea on exertion  Gastrointestinal ROS: see above  Respiratory ROS: no cough, shortness of breath, or wheezing    Physical    VITALS:  /71   Pulse 91   Temp 98.9 °F (37.2 °C) (Oral)   Resp 16   Ht 5' 5\" (1.651 m)   Wt 126 lb 8.7 oz (57.4 kg)   SpO2 99%   BMI 21.06 kg/m²   TEMPERATURE:  Current - Temp: 98.9 °F (37.2 °C); Max - Temp  Av.7 °F (37.1 °C)  Min: 98.4 °F (36.9 °C)  Max: 98.9 °F (37.2 °C)    NAD  RRR, Nl s1s2  Lungs CTA Bilaterally, normal effort  Abdomen soft, mild distention with fluid, slightly tender, bowel sounds normal  AAOx3, No asterixis     Data      CBC:   Recent Labs     20  0656 20  1141 20  0625 20  0616   WBC 4.6  --  4.5 5.1   RBC 2.59*  --  2.65* 2.75*   HGB 7.4* 8.0* 7.5* 7.8*   HCT 22.0* 24.2* 22.7* 23.5*   PLT 76*  --  75* 76*   MCV 85.1  --  85.7 85.6   MCH 28.7  --  28.4 28.6   MCHC 33.8  --  33.2 33.3   RDW 17.0*  --  16.3* 16.5*        BMP:  Recent Labs     20  0656 20  0625 20  0616   * 132* 131*   K 3.7 3.4* 3.9    99 98*   CO2 23 23 26   BUN 12 11 11   CREATININE 0.8* 0.9 0.9   CALCIUM 7.4* 7.7* 8.2*   GLUCOSE 91 86 98        Hepatic Function Panel:   Recent Labs     20  0656 20  0625 20  0616   AST 42* 40* 39*   ALT 17 19 18   BILITOT 0.6 0.8 0.7   ALKPHOS 108 111 108       No results for input(s): LIPASE, AMYLASE in the last 72 hours. No results for input(s): PROTIME, INR in the last 72 hours. No results for input(s): PTT in the last 72 hours. No results for input(s): OCCULTBLD in the last 72 hours.       Radiology Review:    IR US GUIDED PARACENTESIS plan on treating him for hepatitis C   Although his meld score is low, he is interested in being referred for outpatient liver transplant. We will arrange this for [employer]. Discussed with Dr. Manoj Coleman, [address]    I have personally performed a face to face diagnostic evaluation on this patient. I have interviewed and examined the patient and I agree with the findings and recommended plan of care. In summary, my findings and plan are the following:     C. Difficile stool was positive. Patient was started on oral vancomycin. He has chronic intermittent diarrhea and rectal bleeding. He denies diarrhea now. He complains of abdominal pain and points to his liver. He believes his liver goes into a \"flare\" every now and then. He needs to complete 14 days of oral vancomycin. Continue low sodium diet, lasix and aldactone. We will send him to Dr. Chasidy Croft for hemorrhoid management. We had told him repeatedly that we will not send him home on oral narcotics. He will need see a pain clinician for his chronic abdominal pain. Hep C can be treated later on once active medical issues improved. We will also refer him to  liver transplant clinic (although his MELD score is low). He is convinced he has 6 months to live. He needs long term f/up with psychiatry for his anxiety and depression. Follow up with me in 1-2 weeks with repeat labs (CBC, CMP). Anticipate discharge tomorrow.      Peter Ware MD, MSc  [facility] [address]

## 2020-12-01 NOTE — PROGRESS NOTES
[facility]    12/1/2020 1:33 PM        Name: Lily Enamorado . Admitted: 11/27/2020  Primary Care Provider: Anthony Russell DO (Tel: [phone])    Brief Course:  50 y. o. male with history of COPD, hypertension, hyperlipidemia, alcohol abuse, cirrhosis, radiculopathy who presented to ED with worsening abdominal pain and distention, worsening lower extremity edema. Patient reports that he was recently diagnosed of alcoholic cirrhosis. Follows up with Dr. Rahel Mccarthy from GI as outpatient. Reports quitting drinking 6 months ago. Admitted with GI consult for possible paracentesis.   Underwent IR paracentesis on 11/30 with 2.2 L removed. Continues to complain of abdominal pain. Seen by Psychiatry, no need for IP Psych for depression. C diff positive from 11/27, started on PO Vanco on 11/30 and Bentyl PO tid. CC:  Abdominal pain    Subjective:  . Patient likes Dilaudid IV, pain is gone with it he states. Less distended. No diarrhea. No CP, SOB, HA or fevers.     Reviewed interval ancillary notes    Current Medications  HYDROmorphone HCl PF (DILAUDID) injection 0.5 mg, Q4H PRN  traMADol (ULTRAM) tablet 50 mg, Q6H PRN  QUEtiapine (SEROQUEL) tablet 100 mg, BID  ferrous sulfate (IRON 325) tablet 325 mg, BID WC  vancomycin (FIRVANQ) 50 MG/ML oral solution 125 mg, 4 times per day  dicyclomine (BENTYL) capsule 10 mg, TID AC  magnesium sulfate 1 g in dextrose 5% 100 mL IVPB, PRN  hydrocortisone (ANUSOL-HC) suppository 25 mg, BID  spironolactone (ALDACTONE) tablet 100 mg, Daily  furosemide (LASIX) tablet 40 mg, Daily  folic acid (FOLVITE) tablet 1 mg, Daily  multivitamin 1 tablet, Daily  pantoprazole (PROTONIX) tablet 40 mg, QAM AC  sertraline (ZOLOFT) tablet 50 mg, Daily  vitamin B-1 (THIAMINE) tablet 100 mg, Daily  sodium chloride flush 0.9 % injection 10 mL, 2 times per day  sodium chloride flush 0.9 % injection 10 mL, PRN  polyethylene glycol (GLYCOLAX) packet 17 g, Daily PRN  promethazine (PHENERGAN) tablet 12.5 mg, Q6H PRN    Or  ondansetron (ZOFRAN) injection 4 mg, Q6H PRN  albuterol (PROVENTIL) nebulizer solution 2.5 mg, Q6H PRN        Objective:  /71   Pulse 91   Temp 98.9 °F (37.2 °C) (Oral)   Resp 16   Ht 5' 5\" (1.651 m)   Wt 126 lb 8.7 oz (57.4 kg)   SpO2 99%   BMI 21.06 kg/m²     Intake/Output Summary (Last 24 hours) at 12/1/2020 1333  Last data filed at 12/1/2020 0610  Gross per 24 hour   Intake 600 ml   Output 300 ml   Net 300 ml      Wt Readings from Last 3 Encounters:   11/27/20 126 lb 8.7 oz (57.4 kg)   08/08/20 115 lb 8 oz (52.4 kg)   07/07/20 116 lb 13.5 oz (53 kg)       General appearance:  Appears comfortable, anxious  Eyes: Sclera clear. Pupils equal.  ENT: Moist oral mucosa. Trachea midline, no adenopathy. Cardiovascular: Regular rhythm, normal S1, S2. No murmur. Trace edema in lower extremities  Respiratory: Not using accessory muscles. Good inspiratory effort. Clear to auscultation bilaterally, no wheeze or crackles. GI: Abdomen soft, generalized tenderness, less distended, normal bowel sounds  Musculoskeletal: No cyanosis in digits, neck supple  Neurology: Grossly intact. No speech or motor deficits  Psych: Anxious affect. Alert and oriented in time, place and person  Skin: Suprapubic erythema noted  Extremity exam shows brisk capillary refill.   Peripheral pulses are palpable in lower extremities     Labs and Tests:  CBC:   Recent Labs     11/29/20  0656 11/29/20  1141 11/30/20  0625 12/01/20  0616   WBC 4.6  --  4.5 5.1   HGB 7.4* 8.0* 7.5* 7.8*   PLT 76*  --  75* 76*     BMP:    Recent Labs     11/29/20  0656 11/30/20  0625 12/01/20  0616   * 132* 131*   K 3.7 3.4* 3.9    99 98*   CO2 23 23 26   BUN 12 11 11   CREATININE 0.8* 0.9 0.9   GLUCOSE 91 86 98     Hepatic:   Recent Labs     11/29/20  0656 11/30/20  0625 12/01/20  0616   AST 42* 40* 39*   ALT 17 19 18 BILITOT 0.6 0.8 0.7   ALKPHOS 108 111 108     IR US GUIDED PARACENTESIS   Final Result   Successful ultrasound guided paracentesis. XR ABDOMEN (2 VIEWS)   Final Result   No air-filled dilated loops of bowel. Increased density throughout the abdomen which may represent ascites. XR ABDOMEN (KUB) (SINGLE AP VIEW)   Final Result   1. Nonobstructive bowel gas pattern. 2. Central location of gas-filled small bowel loops likely due to ascites as   seen previously. 3. Splenomegaly. US DUP ABD PEL RETRO SCROT COMPLETE    (Results Pending)   US LIVER    (Results Pending)       Problem List  Principal Problem:    Alcoholic cirrhosis of liver with ascites ([facility] [address]  Active Problems:    Unspecified mood (affective) disorder (HCC)    Alcohol use disorder, severe, in early remission (HCC)    Anxiety disorder    Abdominal pain    Hepatitis C    Hemorrhoids    Acute on chronic blood loss anemia    Esophageal varices (HCC)  Resolved Problems:    * No resolved hospital problems. *       Assessment & Plan:   1. Decrease Dilaudid IV PRN  2. Check RUQ US to r/o PVT  3. Start Tramadol PRN pain  4. Counseled to stop smoking. 5. Cont Lasix and Aldactone upon DC  6. Cont Anusol per GI  7. Cont Seroquel  8. PT/OT eval requested  9. I have again told patient that I will not write narcotics for him upon DC for chronic abdominal pain; GI recommends pain management as outpatient  10. Vanco PO for C Diff  11. Bentyl PO tid    IV Access: Peripheral  Best: No  Diet: DIET LOW SODIUM 2 GM;  Code:Full Code  DVT PPX SCD  Disposition Home    Discussed with patient, Debra Claudio (GI PA), nursing and CM.     Anticipate DC to home in Katharine Venegas MD   12/1/2020 1:33 PM

## 2020-12-01 NOTE — PROGRESS NOTES
Physical Therapy    Facility/Department: [address] ORTHO/NEURO NURSING  Initial Assessment/Discharge     NAME: Liliane Song  : 1965  MRN: [id number]    Date of Service: 2020    Discharge Recommendations: Liliane Song scored a  on the AM-PAC short mobility form. At this time, no further PT is recommended upon discharge due to performing functional mobility at his baseline of independence. Recommend patient returns to prior setting with prior services. Home independently   PT Equipment Recommendations  Equipment Needed: No    Assessment   Assessment: pt presents at his baseline function. PT services are not needed at this time pt is safe to return to prior setting once deemed medically acceptable. Prognosis: Good  Decision Making: Low Complexity  History: Arthritis, COPD, HTN, hyperlipidemia, alcohol abuse, MDRO, ESBL, chronic bronchitis  Clinical Presentation: Stable  PT Education: Goals;PT Role;Plan of Care  Barriers to Learning: None  REQUIRES PT FOLLOW UP: No  Activity Tolerance  Activity Tolerance: Patient Tolerated treatment well       Patient Diagnosis(es): The primary encounter diagnosis was Alcoholic cirrhosis of liver with ascites ([facility] [address] A diagnosis of Leg swelling was also pertinent to this visit. has a past medical history of Alcohol abuse, Arthritis, Chronic bronchitis ([address] Clostridioides difficile infection, COPD (chronic obstructive pulmonary disease) (HCC), ESBL (extended spectrum beta-lactamase) producing bacteria infection, Hyperlipidemia, Hypertension, MDRO (multiple drug resistant organisms) resistance, Radiculopathy of lumbosacral region, and Spondylisthesis. has a past surgical history that includes fracture surgery; Lumbar spine surgery (Left, 2019); Upper gastrointestinal endoscopy (N/A, 2019); Colonoscopy (N/A, 2019); and Colonoscopy (2019).     Restrictions  Restrictions/Precautions  Restrictions/Precautions: Fall Risk, Contact drives  Leisure & Hobbies: hunt, fish  Additional Comments: pr reports >10 fall d/t LOB  Cognition   Cognition  Overall Cognitive Status: WFL    Objective     Observation/Palpation  Posture: Good    AROM RLE (degrees)  RLE AROM: WFL  AROM LLE (degrees)  LLE AROM : WFL  Strength RLE  Strength RLE: WNL  Comment: grossly 5/5  Strength LLE  Strength LLE: WNL  Comment: grossly 5/5  Tone RLE  RLE Tone: Normotonic  Tone LLE  LLE Tone: Normotonic  Motor Control  Gross Motor?: WNL  Sensation  Overall Sensation Status: Impaired  Light Touch: Partial deficits in the RLE;Partial deficits in the LLE(pt reports numbess and tingling in B LE's)  Bed mobility  Scooting: Independent  Transfers  Sit to Stand: Independent  Stand to sit: Independent  Ambulation  Ambulation?: Yes  More Ambulation?: No  Ambulation 1  Surface: level tile  Device: Single point cane  Assistance: Modified Independent  Quality of Gait: pt ambulates with normal flako and normal step length. pt demonstrates wide AAYUSH and toe out gait. No LOB.   Distance: ~200 ft  Comments: pt reports this gait is normal for him  Stairs/Curb  Stairs?: No     Balance  Posture: Good  Sitting - Static: Good  Sitting - Dynamic: Good  Standing - Static: Good  Standing - Dynamic: Good        Plan   Plan  Times per week: D/C  Safety Devices  Type of devices: Left in chair, Nurse notified, Call light within reach(pt refused to sit in chair with an alarm, pt impulsive got up without gait belt)  Restraints  Initially in place: No    AM-PAC Score  AM-PAC Inpatient Mobility Raw Score : 24 (12/01/20 [facility])  AM-PAC Inpatient T-Scale Score : 61.14 (12/01/20 [facility])  Mobility Inpatient CMS 0-100% Score: 0 (12/01/20 [facility])  Mobility Inpatient CMS G-Code Modifier : [address] (12/01/20 [facility])        Goals  Short term goals  Time Frame for Short term goals: to acute care goals at this time       Therapy Time   Individual Concurrent Group Co-treatment   Time In 1308         Time Out 1340         Minutes 32 Timed Code Treatment Minutes: [address] SPT  PT providing direct supervision during session and assisting in making skilled judgements throughout session.   Sakina Kennedy PT, DPT, [phone]  Sakina Kennedy, PT

## 2020-12-02 VITALS
RESPIRATION RATE: 20 BRPM | WEIGHT: 128.6 LBS | OXYGEN SATURATION: 98 % | HEIGHT: 65 IN | DIASTOLIC BLOOD PRESSURE: 75 MMHG | SYSTOLIC BLOOD PRESSURE: 118 MMHG | TEMPERATURE: 98.6 F | HEART RATE: 93 BPM | BODY MASS INDEX: 21.43 KG/M2

## 2020-12-02 LAB
A/G RATIO: 0.8 (ref 1.1–2.2)
ALBUMIN SERPL-MCNC: 2.7 G/DL (ref 3.4–5)
ALP BLD-CCNC: 113 U/L (ref 40–129)
ALT SERPL-CCNC: 19 U/L (ref 10–40)
ANION GAP SERPL CALCULATED.3IONS-SCNC: 8 MMOL/L (ref 3–16)
AST SERPL-CCNC: 40 U/L (ref 15–37)
BASOPHILS ABSOLUTE: 0 K/UL (ref 0–0.2)
BASOPHILS RELATIVE PERCENT: 0.2 %
BILIRUB SERPL-MCNC: 0.6 MG/DL (ref 0–1)
BUN BLDV-MCNC: 10 MG/DL (ref 7–20)
CALCIUM SERPL-MCNC: 8 MG/DL (ref 8.3–10.6)
CHLORIDE BLD-SCNC: 98 MMOL/L (ref 99–110)
CO2: 26 MMOL/L (ref 21–32)
CREAT SERPL-MCNC: 1 MG/DL (ref 0.9–1.3)
EKG ATRIAL RATE: 86 BPM
EKG DIAGNOSIS: NORMAL
EKG P AXIS: 27 DEGREES
EKG P-R INTERVAL: 130 MS
EKG Q-T INTERVAL: 418 MS
EKG QRS DURATION: 80 MS
EKG QTC CALCULATION (BAZETT): 500 MS
EKG R AXIS: -50 DEGREES
EKG T AXIS: 39 DEGREES
EKG VENTRICULAR RATE: 86 BPM
EOSINOPHILS ABSOLUTE: 0.1 K/UL (ref 0–0.6)
EOSINOPHILS RELATIVE PERCENT: 1.3 %
GFR AFRICAN AMERICAN: >60
GFR NON-AFRICAN AMERICAN: >60
GLOBULIN: 3.3 G/DL
GLUCOSE BLD-MCNC: 96 MG/DL (ref 70–99)
HCT VFR BLD CALC: 25.1 % (ref 40.5–52.5)
HEMOGLOBIN: 8.4 G/DL (ref 13.5–17.5)
LYMPHOCYTES ABSOLUTE: 0.8 K/UL (ref 1–5.1)
LYMPHOCYTES RELATIVE PERCENT: 14.7 %
MAGNESIUM: 1.6 MG/DL (ref 1.8–2.4)
MCH RBC QN AUTO: 28.4 PG (ref 26–34)
MCHC RBC AUTO-ENTMCNC: 33.5 G/DL (ref 31–36)
MCV RBC AUTO: 84.9 FL (ref 80–100)
MONOCYTES ABSOLUTE: 0.4 K/UL (ref 0–1.3)
MONOCYTES RELATIVE PERCENT: 6.9 %
NEUTROPHILS ABSOLUTE: 4 K/UL (ref 1.7–7.7)
NEUTROPHILS RELATIVE PERCENT: 76.9 %
PDW BLD-RTO: 16.5 % (ref 12.4–15.4)
PLATELET # BLD: 87 K/UL (ref 135–450)
PMV BLD AUTO: 7.1 FL (ref 5–10.5)
POTASSIUM REFLEX MAGNESIUM: 3.5 MMOL/L (ref 3.5–5.1)
RBC # BLD: 2.95 M/UL (ref 4.2–5.9)
SODIUM BLD-SCNC: 132 MMOL/L (ref 136–145)
TOTAL PROTEIN: 6 G/DL (ref 6.4–8.2)
WBC # BLD: 5.2 K/UL (ref 4–11)

## 2020-12-02 PROCEDURE — 6360000002 HC RX W HCPCS: Performed by: NURSE PRACTITIONER

## 2020-12-02 PROCEDURE — 93005 ELECTROCARDIOGRAM TRACING: CPT | Performed by: PSYCHIATRY & NEUROLOGY

## 2020-12-02 PROCEDURE — 83735 ASSAY OF MAGNESIUM: CPT

## 2020-12-02 PROCEDURE — 2580000003 HC RX 258: Performed by: NURSE PRACTITIONER

## 2020-12-02 PROCEDURE — 6370000000 HC RX 637 (ALT 250 FOR IP): Performed by: PSYCHIATRY & NEUROLOGY

## 2020-12-02 PROCEDURE — 94640 AIRWAY INHALATION TREATMENT: CPT

## 2020-12-02 PROCEDURE — 6360000002 HC RX W HCPCS: Performed by: INTERNAL MEDICINE

## 2020-12-02 PROCEDURE — 36415 COLL VENOUS BLD VENIPUNCTURE: CPT

## 2020-12-02 PROCEDURE — 6370000000 HC RX 637 (ALT 250 FOR IP): Performed by: NURSE PRACTITIONER

## 2020-12-02 PROCEDURE — 93010 ELECTROCARDIOGRAM REPORT: CPT | Performed by: INTERNAL MEDICINE

## 2020-12-02 PROCEDURE — 80053 COMPREHEN METABOLIC PANEL: CPT

## 2020-12-02 PROCEDURE — 6370000000 HC RX 637 (ALT 250 FOR IP): Performed by: INTERNAL MEDICINE

## 2020-12-02 PROCEDURE — 85025 COMPLETE CBC W/AUTO DIFF WBC: CPT

## 2020-12-02 PROCEDURE — 94761 N-INVAS EAR/PLS OXIMETRY MLT: CPT

## 2020-12-02 RX ORDER — MAGNESIUM SULFATE IN WATER 40 MG/ML
2 INJECTION, SOLUTION INTRAVENOUS ONCE
Status: COMPLETED | OUTPATIENT
Start: 2020-12-02 | End: 2020-12-02

## 2020-12-02 RX ORDER — HYDROCORTISONE ACETATE 25 MG/1
25 SUPPOSITORY RECTAL 2 TIMES DAILY
Qty: 12 SUPPOSITORY | Refills: 0 | Status: SHIPPED | OUTPATIENT
Start: 2020-12-02

## 2020-12-02 RX ORDER — QUETIAPINE FUMARATE 100 MG/1
100 TABLET, FILM COATED ORAL 2 TIMES DAILY
Qty: 60 TABLET | Refills: 0 | Status: SHIPPED | OUTPATIENT
Start: 2020-12-02

## 2020-12-02 RX ORDER — DICYCLOMINE HYDROCHLORIDE 10 MG/1
10 CAPSULE ORAL
Qty: 90 CAPSULE | Refills: 0 | Status: SHIPPED | OUTPATIENT
Start: 2020-12-02

## 2020-12-02 RX ORDER — TRAMADOL HYDROCHLORIDE 50 MG/1
50 TABLET ORAL EVERY 6 HOURS PRN
Qty: 20 TABLET | Refills: 0 | Status: SHIPPED | OUTPATIENT
Start: 2020-12-02 | End: 2020-12-07

## 2020-12-02 RX ORDER — SPIRONOLACTONE 100 MG/1
100 TABLET, FILM COATED ORAL DAILY
Qty: 30 TABLET | Refills: 0 | Status: ON HOLD | OUTPATIENT
Start: 2020-12-03 | End: 2021-01-28 | Stop reason: HOSPADM

## 2020-12-02 RX ORDER — ALBUTEROL SULFATE 90 UG/1
2 AEROSOL, METERED RESPIRATORY (INHALATION) 4 TIMES DAILY PRN
Qty: 1 INHALER | Refills: 0 | Status: SHIPPED | OUTPATIENT
Start: 2020-12-02

## 2020-12-02 RX ORDER — FUROSEMIDE 40 MG/1
40 TABLET ORAL DAILY
Qty: 30 TABLET | Refills: 0 | Status: ON HOLD | OUTPATIENT
Start: 2020-12-03 | End: 2021-01-28 | Stop reason: HOSPADM

## 2020-12-02 RX ADMIN — HYDROMORPHONE HYDROCHLORIDE 0.5 MG: 1 INJECTION, SOLUTION INTRAMUSCULAR; INTRAVENOUS; SUBCUTANEOUS at 05:21

## 2020-12-02 RX ADMIN — THERA TABS 1 TABLET: TAB at 08:34

## 2020-12-02 RX ADMIN — HYDROMORPHONE HYDROCHLORIDE 0.5 MG: 1 INJECTION, SOLUTION INTRAMUSCULAR; INTRAVENOUS; SUBCUTANEOUS at 01:21

## 2020-12-02 RX ADMIN — VANCOMYCIN HYDROCHLORIDE 125 MG: KIT at 00:21

## 2020-12-02 RX ADMIN — Medication 100 MG: at 08:34

## 2020-12-02 RX ADMIN — FERROUS SULFATE TAB 325 MG (65 MG ELEMENTAL FE) 325 MG: 325 (65 FE) TAB at 08:34

## 2020-12-02 RX ADMIN — HYDROMORPHONE HYDROCHLORIDE 0.5 MG: 1 INJECTION, SOLUTION INTRAMUSCULAR; INTRAVENOUS; SUBCUTANEOUS at 09:28

## 2020-12-02 RX ADMIN — FUROSEMIDE 40 MG: 40 TABLET ORAL at 08:34

## 2020-12-02 RX ADMIN — Medication 10 ML: at 08:38

## 2020-12-02 RX ADMIN — FOLIC ACID 1 MG: 1 TABLET ORAL at 08:34

## 2020-12-02 RX ADMIN — SERTRALINE HYDROCHLORIDE 50 MG: 50 TABLET ORAL at 08:34

## 2020-12-02 RX ADMIN — TRAMADOL HYDROCHLORIDE 50 MG: 50 TABLET, FILM COATED ORAL at 10:47

## 2020-12-02 RX ADMIN — QUETIAPINE FUMARATE 100 MG: 25 TABLET ORAL at 08:34

## 2020-12-02 RX ADMIN — HYDROMORPHONE HYDROCHLORIDE 0.5 MG: 1 INJECTION, SOLUTION INTRAMUSCULAR; INTRAVENOUS; SUBCUTANEOUS at 14:07

## 2020-12-02 RX ADMIN — TRAMADOL HYDROCHLORIDE 50 MG: 50 TABLET, FILM COATED ORAL at 04:24

## 2020-12-02 RX ADMIN — SPIRONOLACTONE 100 MG: 25 TABLET ORAL at 08:34

## 2020-12-02 RX ADMIN — MAGNESIUM SULFATE HEPTAHYDRATE 2 G: 40 INJECTION, SOLUTION INTRAVENOUS at 12:48

## 2020-12-02 RX ADMIN — DICYCLOMINE HYDROCHLORIDE 10 MG: 10 CAPSULE ORAL at 10:47

## 2020-12-02 RX ADMIN — VANCOMYCIN HYDROCHLORIDE 125 MG: KIT at 13:10

## 2020-12-02 RX ADMIN — DICYCLOMINE HYDROCHLORIDE 10 MG: 10 CAPSULE ORAL at 05:21

## 2020-12-02 RX ADMIN — PANTOPRAZOLE SODIUM 40 MG: 40 TABLET, DELAYED RELEASE ORAL at 05:21

## 2020-12-02 RX ADMIN — VANCOMYCIN HYDROCHLORIDE 125 MG: KIT at 05:21

## 2020-12-02 RX ADMIN — ALBUTEROL SULFATE 2.5 MG: 2.5 SOLUTION RESPIRATORY (INHALATION) at 05:59

## 2020-12-02 ASSESSMENT — PAIN DESCRIPTION - PROGRESSION
CLINICAL_PROGRESSION: NOT CHANGED

## 2020-12-02 ASSESSMENT — PAIN SCALES - GENERAL
PAINLEVEL_OUTOF10: 10
PAINLEVEL_OUTOF10: 8

## 2020-12-02 NOTE — PLAN OF CARE
Problem: Falls - Risk of:  Goal: Will remain free from falls  Description: Will remain free from falls  Outcome: Completed  Goal: Absence of physical injury  Description: Absence of physical injury  Outcome: Completed     Problem: Pain:  Goal: Pain level will decrease  Description: Pain level will decrease  Outcome: Completed  Goal: Control of acute pain  Description: Control of acute pain  Outcome: Completed  Goal: Control of chronic pain  Description: Control of chronic pain  Outcome: Completed     Problem:  Activity:  Goal: Ability to tolerate increased activity will improve  Description: Ability to tolerate increased activity will improve  Outcome: Completed     Problem: Cardiac:  Goal: Hemodynamic stability will improve  Description: Hemodynamic stability will improve  Outcome: Completed  Goal: Complications related to the disease process, condition or treatment will be avoided or minimized  Description: Complications related to the disease process, condition or treatment will be avoided or minimized  Outcome: Completed     Problem: Health Behavior:  Goal: Identification of resources available to assist in meeting health care needs will improve  Description: Identification of resources available to assist in meeting health care needs will improve  Outcome: Completed     Problem: Nutrition  Goal: Optimal nutrition therapy  Outcome: Completed     Problem: Skin Integrity:  Goal: Will show no infection signs and symptoms  Description: Will show no infection signs and symptoms  Outcome: Completed  Goal: Absence of new skin breakdown  Description: Absence of new skin breakdown  Outcome: Completed

## 2020-12-02 NOTE — CARE COORDINATION
Prior authorization for Chitra submitted to Cover My Meds with [facility] [address] [facility], approved. Request also submitted for [facility] with no decision returned. Pharmacy updated.     LIO NguyễnN, CCM, RN  [facility]  [phone]

## 2020-12-02 NOTE — PROGRESS NOTES
1930: Head to toe completed at this time. Patient still refusing bed alarms and denies having any loose stools thus far today. Abdomen remains firm and distended, expiratory wheezes present throughout lungs, denies SOB or chest pain. 1+ pitting edema continues to BLE, pedal pulses present, color and circulation appear WNL. Plan of care reviewed and agreed upon with patient at this time. Fall precautions remain in place and effective as tolerated by patient. All needs addressed at this time, walkways free of clutter, VSS, bed in lowest position with call light in reach.

## 2020-12-02 NOTE — CARE COORDINATION
Aware of d/c order today for pt. Pt cleared PT/OT. Will d/c to home today w/no needs.   Electronically signed by ASUNCION Kay on 12/2/2020 at 11:10 AM

## 2020-12-02 NOTE — PROGRESS NOTES
[facility]  Gastroenterology Progress Note    Rylie Harvey is a 50 y.o. male patient. 1. Alcoholic cirrhosis of liver with ascites (HCC)    2. Leg swelling        SUBJECTIVE:    Feels ok. Had one solid stool today. ROS:  Cardiovascular ROS: no chest pain or dyspnea on exertion  Gastrointestinal ROS: see above  Respiratory ROS: no cough, shortness of breath, or wheezing    Physical    VITALS:  /75   Pulse 93   Temp 98.6 °F (37 °C) (Oral)   Resp 20   Ht 5' 5\" (1.651 m)   Wt 128 lb 9.6 oz (58.3 kg)   SpO2 98%   BMI 21.40 kg/m²   TEMPERATURE:  Current - Temp: 98.6 °F (37 °C); Max - Temp  Av.7 °F (37.1 °C)  Min: 98.4 °F (36.9 °C)  Max: 99.1 °F (37.3 °C)    NAD  RRR, Nl s1s2  Lungs CTA Bilaterally, normal effort  Abdomen soft, mild distention with fluid, slightly tender, bowel sounds normal  AAOx3, No asterixis     Data      CBC:   Recent Labs     20  0616 20  0740   WBC 4.5 5.1 5.2   RBC 2.65* 2.75* 2.95*   HGB 7.5* 7.8* 8.4*   HCT 22.7* 23.5* 25.1*   PLT 75* 76* 87*   MCV 85.7 85.6 84.9   MCH 28.4 28.6 28.4   MCHC 33.2 33.3 33.5   RDW 16.3* 16.5* 16.5*        BMP:  Recent Labs     20  0616 20  0740   * 131* 132*   K 3.4* 3.9 3.5   CL 99 98* 98*   CO2 23 26 26   BUN 11 11 10   CREATININE 0.9 0.9 1.0   CALCIUM 7.7* 8.2* 8.0*   GLUCOSE 86 98 96        Hepatic Function Panel:   Recent Labs     20  0616 20  0740   AST 40* 39* 40*   ALT 19 18 19   BILITOT 0.8 0.7 0.6   ALKPHOS 111 108 113       No results for input(s): LIPASE, AMYLASE in the last 72 hours. No results for input(s): PROTIME, INR in the last 72 hours. No results for input(s): PTT in the last 72 hours. No results for input(s): OCCULTBLD in the last 72 hours. Radiology Review:    US DUP ABD PEL RETRO SCROT COMPLETE   Final Result   The portal vein is patent with normal direction of flow.   As a limitation,   the portal splenic confluence is not insonated. Hyperechogenicity of the liver, consistent with fibrosis/cirrhosis. Gallbladder wall thickening, possibly due to elevated venous pressures. Nonshadowing hyperechoic focus, most likely cholesterol polyp. Small amount of ascites. US LIVER   Final Result   The portal vein is patent with normal direction of flow. As a limitation,   the portal splenic confluence is not insonated. Hyperechogenicity of the liver, consistent with fibrosis/cirrhosis. Gallbladder wall thickening, possibly due to elevated venous pressures. Nonshadowing hyperechoic focus, most likely cholesterol polyp. Small amount of ascites. IR US GUIDED PARACENTESIS   Final Result   Successful ultrasound guided paracentesis. XR ABDOMEN (2 VIEWS)   Final Result   No air-filled dilated loops of bowel. Increased density throughout the abdomen which may represent ascites. XR ABDOMEN (KUB) (SINGLE AP VIEW)   Final Result   1. Nonobstructive bowel gas pattern. 2. Central location of gas-filled small bowel loops likely due to ascites as   seen previously. 3. Splenomegaly. Assessment:   The patient is a 52 y. o. old male  with following problems:     Active Problems:    Alcoholic cirrhosis of liver with ascites ([address]  Resolved Problems:    * No resolved hospital problems. *     1. Ascites - due to cirrhosis (elevated SAAG, low protein, negative for neutrocytic ascites). 2. Liver cirrhosis secondary to alcohol and Hep C (MELD Na 7)  3. Acute and chronic intermittent rectal bleeding and anemia. He had 2 EGDs and colonoscopies the past 2 years. He has trace EV (8/2020) and bleeding was from hemorrhoids (colonoscopy 8/2020). Patient has iron deficiency with iron saturation 7%. Normal vitamin B12 and folate  4. Acute on chronic anemia with rectal bleeding due to hemorrhoids  5. Poor insight regarding his medical condition  6. Depression and anxiety.   Appreciate psychiatry input   7. Cdiff positive - has h/o intermittent diarrhea although no diarrhea now. He has been started on Vanco to compete 14 day course. 8. Chronic abdominal pain- for several years. Prior EGD and colonosocpy for this were negative. CT 11/24 negative. May be from combination of chronic pain, C.diff, and ascites. Pain improved s/p para.     Recommendations:   Iron sulfate 325 mg twice daily   low sodium diet (2 grams daily), Lasix 40 mg and Aldactone 100 mg daily. He needs to be set up with Dr. Rock Rivera for hemorrhoidal treatment (banding or hemorrhoidectomy)   Follow-up with an outpatient pain clinician  Follow-up in clinic with Dr Vimal Weir in 1-2 weeks with repeat labs (CBC, CMP). .  Once his active medical issues have been addressed (hemorrhoidal bleeding and abdominal pain issues), we will plan on treating him for hepatitis C   Although his meld score is low, he is interested in being referred for outpatient liver transplant. We will arrange this for [facility]. Will sign off. Please call if questions. Discussed with Dr. Shania Mejia, [address]    I have personally performed a face to face diagnostic evaluation on this patient. I have interviewed and examined the patient and I agree with the findings and recommended plan of care. Patient complains of intermittent abdominal pain. Stools are formed and no recurrent GI bleeding. He can be discharge home today. New prescriptions added include Aldactone, Lasix, iron tablets, Anusol suppository and oral vancomycin. Follow-up with me in 2 weeks we will check his CBC and CMP at that time. We will also send him to Dr. Rock Rivera for hemorrhoidal issues and the pain clinic for chronic pain management.     Claudia Munson MD, MSc  [address]

## 2020-12-02 NOTE — DISCHARGE SUMMARY
Hospital Medicine Discharge Summary    Patient: Nika Garcia     Gender: male  : 1965   Age: 50 y.o. MRN: [id number]    Admitting Physician: Freddy Negrete MD  Discharge Physician: Alex Thornton MD     Code Status: Full Code     Admit Date: 2020   Discharge Date:   20    Disposition:  Home    Discharge Diagnoses: Active Hospital Problems    Diagnosis Date Noted    Abdominal pain [R10.9] 2020    Hepatitis C [B19.20] 2020    Hemorrhoids [K64.9] 2020    Acute on chronic blood loss anemia [D62] 2020    Esophageal varices ([facility] [I85.00] 2020    Anxiety disorder [N35.2]     Alcoholic cirrhosis of liver with ascites ([facility] [K70.31] 2020    Alcohol use disorder, severe, in early remission ([address] [F10.21]     Unspecified mood (affective) disorder ([facility] [address] Sunny Alonso 2020       Follow-up appointments:  one week    Outpatient to do list: F/U with PCP, GI, Colorectal surgery and Psych    Condition at Discharge:  [address] Course:   50 y. o. male with history of COPD, hypertension, hyperlipidemia, alcohol abuse, cirrhosis, radiculopathy who presented to ED with worsening abdominal pain and distention, worsening lower extremity edema.  Patient reports that he was recently diagnosed of alcoholic cirrhosis.  Follows up with Dr. Rebecca Sloan from GI as outpatient.  Reports quitting drinking 6 months ago.  Admitted with GI consult for possible paracentesis.   Underwent IR paracentesis on  with 2.2 L removed. Continues to complain of abdominal pain. Seen by Psychiatry, no need for IP Psych for depression. C diff positive from , started on PO Vanco on  and Bentyl PO tid. He will be on Anusol and need outpatient CRS evaluation for hemorrhoids. Will f/u with PCP, GI and CRS. He has been written for short course of Tramadol PRN abdominal pain. Will finish 14 days total of PO Vanco for C diff.   Started on Lasix and Aldactone for cirrhosis with ascites. F/U with GI in office to set up outpatient serial therapeutic paracentesis.       Discharge Medications:   Current Discharge Medication List      START taking these medications    Details   traMADol (ULTRAM) 50 MG tablet Take 1 tablet by mouth every 6 hours as needed for Pain for up to 5 days. Intended supply: 5 days.  Take lowest dose possible to manage pain  Qty: 20 tablet, Refills: 0    Comments: Reduce doses taken as pain becomes manageable  Associated Diagnoses: Generalized abdominal pain      hydrocortisone (ANUSOL-HC) 25 MG suppository Place 1 suppository rectally 2 times daily  Qty: 12 suppository, Refills: 0      QUEtiapine (SEROQUEL) 100 MG tablet Take 1 tablet by mouth 2 times daily  Qty: 60 tablet, Refills: 0      furosemide (LASIX) 40 MG tablet Take 1 tablet by mouth daily  Qty: 30 tablet, Refills: 0      spironolactone (ALDACTONE) 100 MG tablet Take 1 tablet by mouth daily  Qty: 30 tablet, Refills: 0      vancomycin (FIRVANQ) 50 MG/ML SOLR oral solution Take 2.5 mLs by mouth every 6 hours for 13 days  Qty: 130 mL, Refills: 0      dicyclomine (BENTYL) 10 MG capsule Take 1 capsule by mouth 3 times daily (before meals)  Qty: 90 capsule, Refills: 0      albuterol sulfate HFA (VENTOLIN HFA) 108 (90 Base) MCG/ACT inhaler Inhale 2 puffs into the lungs 4 times daily as needed for Wheezing  Qty: 1 Inhaler, Refills: 0           Current Discharge Medication List        Current Discharge Medication List      CONTINUE these medications which have NOT CHANGED    Details   sertraline (ZOLOFT) 50 MG tablet Take 1 tablet by mouth daily  Qty: 30 tablet, Refills: 0      ferrous sulfate (IRON 325) 325 (65 Fe) MG tablet Take 1 tablet by mouth 2 times daily (with meals)  Qty: 60 tablet, Refills: 0      Multiple Vitamin (MULTIVITAMIN) TABS tablet Take 1 tablet by mouth daily  Qty: 30 tablet, Refills: 0      omeprazole (PRILOSEC) 20 MG delayed release capsule Take 1 capsule by mouth 2 times daily (before meals)  Qty: 60 capsule, Refills: 0      vitamin B-1 (THIAMINE) 100 MG tablet Take 1 tablet by mouth daily  Qty: 30 tablet, Refills: 3      folic acid (FOLVITE) 1 MG tablet Take 1 tablet by mouth daily  Qty: 30 tablet, Refills: 0           Current Discharge Medication List            Discharge Exam:    /75   Pulse 93   Temp 98.6 °F (37 °C) (Oral)   Resp 20   Ht 5' 5\" (1.651 m)   Wt 128 lb 9.6 oz (58.3 kg)   SpO2 98%   BMI 21.40 kg/m²   General appearance:  Appears comfortable, anxious  Eyes: Sclera clear. Pupils equal.  ENT: Moist oral mucosa. Trachea midline, no adenopathy. Cardiovascular: Regular rhythm, normal S1, S2. No murmur. Trace edema in lower extremities  Respiratory: Not using accessory muscles. Good inspiratory effort. Clear to auscultation bilaterally, no wheeze or crackles. GI: Abdomen soft, generalized tenderness, less distended, normal bowel sounds  Musculoskeletal: No cyanosis in digits, neck supple  Neurology: Grossly intact. No speech or motor deficits  Psych: Anxious affect. Alert and oriented in time, place and person  Skin: Suprapubic bruising improved   Extremity exam shows brisk capillary refill. Peripheral pulses are palpable in lower extremities        Labs:  For convenience and continuity at follow-up the following most recent labs are provided:    Lab Results   Component Value Date    WBC 5.2 12/02/2020    HGB 8.4 12/02/2020    HCT 25.1 12/02/2020    MCV 84.9 12/02/2020    PLT 87 12/02/2020     12/02/2020    K 3.5 12/02/2020    CL 98 12/02/2020    CO2 26 12/02/2020    BUN 10 12/02/2020    CREATININE 1.0 12/02/2020    CALCIUM 8.0 12/02/2020    PHOS 2.8 07/07/2020    ALKPHOS 113 12/02/2020    ALT 19 12/02/2020    AST 40 12/02/2020    BILITOT 0.6 12/02/2020    BILIDIR 0.7 07/03/2020    LABALBU 2.7 12/02/2020     Lab Results   Component Value Date    INR 1.08 11/27/2020    INR 1.21 (H) 08/06/2020    INR 1.31 (H) 07/03/2020       Radiology:  Xr Abdomen (kub) (single Ap View)    Result Date: 11/27/2020  EXAMINATION: ONE SUPINE XRAY VIEW(S) OF THE ABDOMEN 11/27/2020 9:55 pm COMPARISON: Abdomen and pelvis CT 08/06/2020 HISTORY: ORDERING SYSTEM PROVIDED HISTORY: abdominal pain, rule out obstruction, ascites present TECHNOLOGIST PROVIDED HISTORY: Reason for exam:->abdominal pain, rule out obstruction,  ascites present Reason for Exam: abdominal pain, rule out obstruction, ascites present Acuity: Unknown Type of Exam: Unknown FINDINGS: Calcified granuloma in the left lung base. Otherwise clear lung bases. Enlarged splenic shadow consistent with previously seen splenomegaly. Nonobstructive small bowel gas pattern. Location of gas-filled small bowel loops within the central abdomen. Minimal stool. No abnormal abdominal calcifications. Vascular calcification along the left pelvic sidewall. No acute fracture. 1. Nonobstructive bowel gas pattern. 2. Central location of gas-filled small bowel loops likely due to ascites as seen previously. 3. Splenomegaly. Us Liver    Result Date: 12/1/2020  EXAMINATION: RIGHT UPPER QUADRANT ULTRASOUND 12/1/2020 5:43 pm COMPARISON: CT abdomen and pelvis, 08/06/2020 HISTORY: ORDERING SYSTEM PROVIDED HISTORY: cirrhosis, new onset ascites, eval for PVT TECHNOLOGIST PROVIDED HISTORY: Reason for exam:->cirrhosis, new onset ascites, eval for PVT FINDINGS: LIVER:  The liver is diffusely hyperechoic. No intrahepatic ductal dilation is appreciated. The main portal vein, as well as the right and left portal veins, are patent with hepatopetal flow. Evaluation of the farhan splenic confluence is nondiagnostic related to bowel gas. Hepatic artery is patent with normal velocity. BILIARY SYSTEM:  The gallbladder wall measures 5 mm. There is a 3 mm hyperechoic, nonshadowing focus along the anterior gallbladder wall. Common bile duct is within normal limits measuring 3.6 mm. RIGHT KIDNEY: The right kidney is grossly unremarkable without evidence of hydronephrosis.  It confluence is nondiagnostic related to bowel gas. Hepatic artery is patent with normal velocity. BILIARY SYSTEM:  The gallbladder wall measures 5 mm. There is a 3 mm hyperechoic, nonshadowing focus along the anterior gallbladder wall. Common bile duct is within normal limits measuring 3.6 mm. RIGHT KIDNEY: The right kidney is grossly unremarkable without evidence of hydronephrosis. It measures 10.3 x 4.6 x 4.2 cm. Cortex is 13 mm. PANCREAS:  Pancreas views are nearly non diagnostic. OTHER: Small amount of ascites is present. The portal vein is patent with normal direction of flow. As a limitation, the portal splenic confluence is not insonated. Hyperechogenicity of the liver, consistent with fibrosis/cirrhosis. Gallbladder wall thickening, possibly due to elevated venous pressures. Nonshadowing hyperechoic focus, most likely cholesterol polyp. Small amount of ascites. Xr Abdomen (2 Views)    Result Date: 11/29/2020  EXAMINATION: TWO XRAY VIEWS OF THE ABDOMEN 11/29/2020 1:19 pm COMPARISON: November 27, 2020 HISTORY: ORDERING SYSTEM PROVIDED HISTORY: abdominal pain TECHNOLOGIST PROVIDED HISTORY: Reason for exam:->abdominal pain Reason for Exam: Leg Swelling (Pt to ER with c/o billateral leg swelling and swelling in abdo x1 week, hx of gallstones and liver probs. was seen at [facility] on monday for same Acuity: Acute Type of Exam: Initial FINDINGS: Lung bases are clear. No air-filled dilated loops of bowel. Diffuse increased density throughout the abdomen which may represent ascites. No air-filled dilated loops of bowel. Increased density throughout the abdomen which may represent ascites. The patient was seen and examined on day of discharge and this discharge summary is in conjunction with any daily progress note from day of discharge. Time Spent on discharge is 45 minutes  in the examination, evaluation, counseling and review of medications and discharge plan.       Note that more than 30 minutes was spent in preparing discharge papers, discussing discharge with patient, medication review, etc.       Signed:    Bee Mendiola MD   12/2/2020      Thank you Nina Loco DO for the opportunity to be involved in this patient's care.  If you have any questions or concerns please feel free to contact me at [address] [place] [address]

## 2020-12-02 NOTE — PROGRESS NOTES
CLINICAL PHARMACY NOTE: MEDS TO [address] [place] [address] Drive Select Patient?: No  Total # of Prescriptions Filled: 1   The following medications were delivered to the patient:  · Levofloxacin 750mg  Total # of Interventions Completed: 0  Time Spent (min): 15    Additional Documentation:  Delivered to Patient  Carlos Trevizo CPhT

## 2020-12-02 NOTE — PROGRESS NOTES
Patient discharged to home, discharge instructions given to patient, patient showed understanding. Patient has home prescriptions from the pharmacy filled and in his room. Iv magnesium completed, removed iv saline lock, catheter intact. Wheeled patient out to meet nephew.

## 2020-12-02 NOTE — PROGRESS NOTES
CLINICAL PHARMACY NOTE: MEDS TO [address] Select Patient?: No  Total # of Prescriptions Filled: 6   The following medications were delivered to the patient:  · Firvanq 50mg/ml  · Tramadol 50mg  · Dicyclomine 10mg  · Quetiapine 100mg  · Lasix 40mg  · Spironolactone 100mg  Total # of Interventions Completed: 0  Time Spent (min): 45    Additional Documentation:    Delivered to Patient  Jonathan Cabral CPhT

## 2020-12-02 NOTE — PLAN OF CARE
Problem: Falls - Risk of:  Goal: Will remain free from falls  Description: Will remain free from falls  Outcome: Ongoing  Goal: Absence of physical injury  Description: Absence of physical injury  Outcome: Ongoing     Problem: Pain:  Goal: Pain level will decrease  Description: Pain level will decrease  Outcome: Ongoing  Goal: Control of acute pain  Description: Control of acute pain  Outcome: Ongoing  Goal: Control of chronic pain  Description: Control of chronic pain  Outcome: Ongoing     Problem:  Activity:  Goal: Ability to tolerate increased activity will improve  Description: Ability to tolerate increased activity will improve  Outcome: Ongoing     Problem: Cardiac:  Goal: Hemodynamic stability will improve  Description: Hemodynamic stability will improve  Outcome: Ongoing  Goal: Complications related to the disease process, condition or treatment will be avoided or minimized  Description: Complications related to the disease process, condition or treatment will be avoided or minimized  Outcome: Ongoing     Problem: Health Behavior:  Goal: Identification of resources available to assist in meeting health care needs will improve  Description: Identification of resources available to assist in meeting health care needs will improve  Outcome: Ongoing     Problem: Nutrition  Goal: Optimal nutrition therapy  Outcome: Ongoing     Problem: Skin Integrity:  Goal: Will show no infection signs and symptoms  Description: Will show no infection signs and symptoms  Outcome: Ongoing  Goal: Absence of new skin breakdown  Description: Absence of new skin breakdown  Outcome: Ongoing

## 2020-12-03 LAB
BODY FLUID CULTURE, STERILE: NORMAL
GRAM STAIN RESULT: NORMAL

## 2020-12-15 ENCOUNTER — TELEPHONE (OUTPATIENT)
Dept: SURGERY | Age: 55
End: 2020-12-15

## 2020-12-21 ENCOUNTER — TELEPHONE (OUTPATIENT)
Dept: SURGERY | Age: 55
End: 2020-12-21

## 2021-01-26 ENCOUNTER — APPOINTMENT (OUTPATIENT)
Dept: GENERAL RADIOLOGY | Age: 56
DRG: 280 | End: 2021-01-26
Payer: COMMERCIAL

## 2021-01-26 ENCOUNTER — HOSPITAL ENCOUNTER (INPATIENT)
Age: 56
LOS: 2 days | Discharge: HOME | DRG: 280 | End: 2021-01-28
Attending: HOSPITALIST | Admitting: HOSPITALIST
Payer: COMMERCIAL

## 2021-01-26 DIAGNOSIS — K70.31 ASCITES DUE TO ALCOHOLIC CIRRHOSIS (HCC): Primary | ICD-10-CM

## 2021-01-26 DIAGNOSIS — R11.2 NON-INTRACTABLE VOMITING WITH NAUSEA, UNSPECIFIED VOMITING TYPE: ICD-10-CM

## 2021-01-26 DIAGNOSIS — R10.84 GENERALIZED ABDOMINAL PAIN: ICD-10-CM

## 2021-01-26 LAB
A/G RATIO: 0.9 (ref 1.1–2.2)
ALBUMIN SERPL-MCNC: 3.1 G/DL (ref 3.4–5)
ALP BLD-CCNC: 174 U/L (ref 40–129)
ALT SERPL-CCNC: 17 U/L (ref 10–40)
AMMONIA: 19 UMOL/L (ref 16–60)
ANION GAP SERPL CALCULATED.3IONS-SCNC: 9 MMOL/L (ref 3–16)
AST SERPL-CCNC: 42 U/L (ref 15–37)
BASOPHILS ABSOLUTE: 0 K/UL (ref 0–0.2)
BASOPHILS RELATIVE PERCENT: 0.3 %
BILIRUB SERPL-MCNC: 0.8 MG/DL (ref 0–1)
BUN BLDV-MCNC: 9 MG/DL (ref 7–20)
CALCIUM SERPL-MCNC: 8.8 MG/DL (ref 8.3–10.6)
CHLORIDE BLD-SCNC: 99 MMOL/L (ref 99–110)
CO2: 24 MMOL/L (ref 21–32)
CREAT SERPL-MCNC: 0.7 MG/DL (ref 0.9–1.3)
EOSINOPHILS ABSOLUTE: 0.1 K/UL (ref 0–0.6)
EOSINOPHILS RELATIVE PERCENT: 2.2 %
GFR AFRICAN AMERICAN: >60
GFR NON-AFRICAN AMERICAN: >60
GLOBULIN: 3.3 G/DL
GLUCOSE BLD-MCNC: 108 MG/DL (ref 70–99)
HCT VFR BLD CALC: 25.7 % (ref 40.5–52.5)
HEMOGLOBIN: 8.4 G/DL (ref 13.5–17.5)
LIPASE: 34 U/L (ref 13–60)
LYMPHOCYTES ABSOLUTE: 0.8 K/UL (ref 1–5.1)
LYMPHOCYTES RELATIVE PERCENT: 19.3 %
MCH RBC QN AUTO: 24.9 PG (ref 26–34)
MCHC RBC AUTO-ENTMCNC: 32.6 G/DL (ref 31–36)
MCV RBC AUTO: 76.5 FL (ref 80–100)
MONOCYTES ABSOLUTE: 0.6 K/UL (ref 0–1.3)
MONOCYTES RELATIVE PERCENT: 14.3 %
NEUTROPHILS ABSOLUTE: 2.6 K/UL (ref 1.7–7.7)
NEUTROPHILS RELATIVE PERCENT: 63.9 %
PDW BLD-RTO: 19.7 % (ref 12.4–15.4)
PLATELET # BLD: 106 K/UL (ref 135–450)
PMV BLD AUTO: 6.2 FL (ref 5–10.5)
POTASSIUM SERPL-SCNC: 3.6 MMOL/L (ref 3.5–5.1)
RBC # BLD: 3.36 M/UL (ref 4.2–5.9)
SODIUM BLD-SCNC: 132 MMOL/L (ref 136–145)
TOTAL PROTEIN: 6.4 G/DL (ref 6.4–8.2)
TROPONIN: <0.01 NG/ML
WBC # BLD: 4.1 K/UL (ref 4–11)

## 2021-01-26 PROCEDURE — 99283 EMERGENCY DEPT VISIT LOW MDM: CPT

## 2021-01-26 PROCEDURE — 83690 ASSAY OF LIPASE: CPT

## 2021-01-26 PROCEDURE — 93005 ELECTROCARDIOGRAM TRACING: CPT | Performed by: HOSPITALIST

## 2021-01-26 PROCEDURE — 6360000002 HC RX W HCPCS: Performed by: NURSE PRACTITIONER

## 2021-01-26 PROCEDURE — 1200000000 HC SEMI PRIVATE

## 2021-01-26 PROCEDURE — 82140 ASSAY OF AMMONIA: CPT

## 2021-01-26 PROCEDURE — 74019 RADEX ABDOMEN 2 VIEWS: CPT

## 2021-01-26 PROCEDURE — 85025 COMPLETE CBC W/AUTO DIFF WBC: CPT

## 2021-01-26 PROCEDURE — 84484 ASSAY OF TROPONIN QUANT: CPT

## 2021-01-26 PROCEDURE — 96374 THER/PROPH/DIAG INJ IV PUSH: CPT

## 2021-01-26 PROCEDURE — 80053 COMPREHEN METABOLIC PANEL: CPT

## 2021-01-26 RX ORDER — MORPHINE SULFATE 4 MG/ML
4 INJECTION, SOLUTION INTRAMUSCULAR; INTRAVENOUS ONCE
Status: COMPLETED | OUTPATIENT
Start: 2021-01-26 | End: 2021-01-26

## 2021-01-26 RX ADMIN — MORPHINE SULFATE 4 MG: 4 INJECTION, SOLUTION INTRAMUSCULAR; INTRAVENOUS at 21:41

## 2021-01-26 ASSESSMENT — ENCOUNTER SYMPTOMS
DIARRHEA: 0
SHORTNESS OF BREATH: 1
BACK PAIN: 1
NAUSEA: 1
ABDOMINAL DISTENTION: 1
VOMITING: 1
CHEST TIGHTNESS: 0
ABDOMINAL PAIN: 1

## 2021-01-26 ASSESSMENT — PAIN DESCRIPTION - LOCATION: LOCATION: ABDOMEN

## 2021-01-26 ASSESSMENT — PAIN SCALES - GENERAL: PAINLEVEL_OUTOF10: 10

## 2021-01-26 ASSESSMENT — PAIN DESCRIPTION - PAIN TYPE: TYPE: CHRONIC PAIN

## 2021-01-27 ENCOUNTER — APPOINTMENT (OUTPATIENT)
Dept: INTERVENTIONAL RADIOLOGY/VASCULAR | Age: 56
DRG: 280 | End: 2021-01-27
Payer: COMMERCIAL

## 2021-01-27 LAB
A/G RATIO: 1 (ref 1.1–2.2)
ALBUMIN FLUID: 0.6 G/DL
ALBUMIN SERPL-MCNC: 2.8 G/DL (ref 3.4–5)
ALP BLD-CCNC: 149 U/L (ref 40–129)
ALT SERPL-CCNC: 14 U/L (ref 10–40)
ANION GAP SERPL CALCULATED.3IONS-SCNC: 8 MMOL/L (ref 3–16)
APPEARANCE FLUID: CLEAR
AST SERPL-CCNC: 36 U/L (ref 15–37)
BILIRUB SERPL-MCNC: 0.8 MG/DL (ref 0–1)
BUN BLDV-MCNC: 9 MG/DL (ref 7–20)
CALCIUM SERPL-MCNC: 8.3 MG/DL (ref 8.3–10.6)
CELL COUNT FLUID TYPE: NORMAL
CHLORIDE BLD-SCNC: 102 MMOL/L (ref 99–110)
CLOT EVALUATION: NORMAL
CO2: 22 MMOL/L (ref 21–32)
COLOR FLUID: YELLOW
CREAT SERPL-MCNC: 0.7 MG/DL (ref 0.9–1.3)
EKG ATRIAL RATE: 107 BPM
EKG DIAGNOSIS: NORMAL
EKG P AXIS: 59 DEGREES
EKG P-R INTERVAL: 132 MS
EKG Q-T INTERVAL: 398 MS
EKG QRS DURATION: 76 MS
EKG QTC CALCULATION (BAZETT): 531 MS
EKG R AXIS: -59 DEGREES
EKG T AXIS: 23 DEGREES
EKG VENTRICULAR RATE: 107 BPM
FERRITIN: 57.3 NG/ML (ref 30–400)
FLUID PATH CONSULT: NO
FLUID TYPE: NORMAL
FOLATE: >20 NG/ML (ref 4.78–24.2)
GFR AFRICAN AMERICAN: >60
GFR NON-AFRICAN AMERICAN: >60
GLOBULIN: 2.7 G/DL
GLUCOSE BLD-MCNC: 97 MG/DL (ref 70–99)
HCT VFR BLD CALC: 22.4 % (ref 40.5–52.5)
HEMOGLOBIN: 7.4 G/DL (ref 13.5–17.5)
INR BLD: 1.1 (ref 0.86–1.14)
IRON SATURATION: 14 % (ref 20–50)
IRON: 45 UG/DL (ref 59–158)
LYMPHOCYTES, BODY FLUID: 61 %
MACROPHAGE FLUID: 3 %
MAGNESIUM: 1.6 MG/DL (ref 1.8–2.4)
MCH RBC QN AUTO: 25 PG (ref 26–34)
MCHC RBC AUTO-ENTMCNC: 32.9 G/DL (ref 31–36)
MCV RBC AUTO: 76.1 FL (ref 80–100)
MESOTHELIAL FLUID: 1 %
MONOCYTE, FLUID: 6 %
NEUTROPHIL, FLUID: 29 %
NUCLEATED CELLS FLUID: 95 /CUMM
NUMBER OF CELLS COUNTED FLUID: 100
PDW BLD-RTO: 19.8 % (ref 12.4–15.4)
PLATELET # BLD: 87 K/UL (ref 135–450)
PLATELET SLIDE REVIEW: ABNORMAL
PMV BLD AUTO: 6.5 FL (ref 5–10.5)
POTASSIUM REFLEX MAGNESIUM: 3.4 MMOL/L (ref 3.5–5.1)
PROTEIN FLUID: 1.3 G/DL
PROTHROMBIN TIME: 12.8 SEC (ref 10–13.2)
RBC # BLD: 2.95 M/UL (ref 4.2–5.9)
RBC FLUID: <1000 /CUMM
SLIDE REVIEW: ABNORMAL
SODIUM BLD-SCNC: 132 MMOL/L (ref 136–145)
TOTAL IRON BINDING CAPACITY: 329 UG/DL (ref 260–445)
TOTAL PROTEIN: 5.5 G/DL (ref 6.4–8.2)
VITAMIN B-12: 723 PG/ML (ref 211–911)
VOLUME: 2 ML
WBC # BLD: 3.1 K/UL (ref 4–11)

## 2021-01-27 PROCEDURE — 84157 ASSAY OF PROTEIN OTHER: CPT

## 2021-01-27 PROCEDURE — 87015 SPECIMEN INFECT AGNT CONCNTJ: CPT

## 2021-01-27 PROCEDURE — 93010 ELECTROCARDIOGRAM REPORT: CPT | Performed by: INTERNAL MEDICINE

## 2021-01-27 PROCEDURE — 82042 OTHER SOURCE ALBUMIN QUAN EA: CPT

## 2021-01-27 PROCEDURE — 49083 ABD PARACENTESIS W/IMAGING: CPT

## 2021-01-27 PROCEDURE — 82607 VITAMIN B-12: CPT

## 2021-01-27 PROCEDURE — 0W9G3ZZ DRAINAGE OF PERITONEAL CAVITY, PERCUTANEOUS APPROACH: ICD-10-PCS | Performed by: INTERNAL MEDICINE

## 2021-01-27 PROCEDURE — 2580000003 HC RX 258: Performed by: PHYSICIAN ASSISTANT

## 2021-01-27 PROCEDURE — 83735 ASSAY OF MAGNESIUM: CPT

## 2021-01-27 PROCEDURE — C1729 CATH, DRAINAGE: HCPCS

## 2021-01-27 PROCEDURE — 85027 COMPLETE CBC AUTOMATED: CPT

## 2021-01-27 PROCEDURE — 6360000002 HC RX W HCPCS: Performed by: FAMILY MEDICINE

## 2021-01-27 PROCEDURE — 89051 BODY FLUID CELL COUNT: CPT

## 2021-01-27 PROCEDURE — 6360000002 HC RX W HCPCS: Performed by: PHYSICIAN ASSISTANT

## 2021-01-27 PROCEDURE — 80053 COMPREHEN METABOLIC PANEL: CPT

## 2021-01-27 PROCEDURE — 36415 COLL VENOUS BLD VENIPUNCTURE: CPT

## 2021-01-27 PROCEDURE — 83550 IRON BINDING TEST: CPT

## 2021-01-27 PROCEDURE — 6370000000 HC RX 637 (ALT 250 FOR IP): Performed by: PHYSICIAN ASSISTANT

## 2021-01-27 PROCEDURE — 83540 ASSAY OF IRON: CPT

## 2021-01-27 PROCEDURE — 6370000000 HC RX 637 (ALT 250 FOR IP): Performed by: FAMILY MEDICINE

## 2021-01-27 PROCEDURE — 87070 CULTURE OTHR SPECIMN AEROBIC: CPT

## 2021-01-27 PROCEDURE — 87205 SMEAR GRAM STAIN: CPT

## 2021-01-27 PROCEDURE — 2500000003 HC RX 250 WO HCPCS: Performed by: FAMILY MEDICINE

## 2021-01-27 PROCEDURE — 85610 PROTHROMBIN TIME: CPT

## 2021-01-27 PROCEDURE — P9047 ALBUMIN (HUMAN), 25%, 50ML: HCPCS | Performed by: PHYSICIAN ASSISTANT

## 2021-01-27 PROCEDURE — 82746 ASSAY OF FOLIC ACID SERUM: CPT

## 2021-01-27 PROCEDURE — 1200000000 HC SEMI PRIVATE

## 2021-01-27 PROCEDURE — 82728 ASSAY OF FERRITIN: CPT

## 2021-01-27 RX ORDER — ALBUMIN (HUMAN) 12.5 G/50ML
50 SOLUTION INTRAVENOUS ONCE
Status: COMPLETED | OUTPATIENT
Start: 2021-01-27 | End: 2021-01-27

## 2021-01-27 RX ORDER — LIDOCAINE HYDROCHLORIDE 10 MG/ML
20 INJECTION, SOLUTION EPIDURAL; INFILTRATION; INTRACAUDAL; PERINEURAL ONCE
Status: COMPLETED | OUTPATIENT
Start: 2021-01-27 | End: 2021-01-27

## 2021-01-27 RX ORDER — SODIUM CHLORIDE 0.9 % (FLUSH) 0.9 %
10 SYRINGE (ML) INJECTION PRN
Status: DISCONTINUED | OUTPATIENT
Start: 2021-01-27 | End: 2021-01-28 | Stop reason: HOSPADM

## 2021-01-27 RX ORDER — FERROUS SULFATE 325(65) MG
325 TABLET ORAL 2 TIMES DAILY WITH MEALS
Status: DISCONTINUED | OUTPATIENT
Start: 2021-01-27 | End: 2021-01-28 | Stop reason: HOSPADM

## 2021-01-27 RX ORDER — SODIUM CHLORIDE 0.9 % (FLUSH) 0.9 %
10 SYRINGE (ML) INJECTION EVERY 12 HOURS SCHEDULED
Status: DISCONTINUED | OUTPATIENT
Start: 2021-01-27 | End: 2021-01-28 | Stop reason: HOSPADM

## 2021-01-27 RX ORDER — PANTOPRAZOLE SODIUM 40 MG/1
40 TABLET, DELAYED RELEASE ORAL
Status: DISCONTINUED | OUTPATIENT
Start: 2021-01-27 | End: 2021-01-28 | Stop reason: HOSPADM

## 2021-01-27 RX ORDER — MAGNESIUM SULFATE IN WATER 40 MG/ML
2000 INJECTION, SOLUTION INTRAVENOUS ONCE
Status: COMPLETED | OUTPATIENT
Start: 2021-01-27 | End: 2021-01-27

## 2021-01-27 RX ORDER — SPIRONOLACTONE 25 MG/1
100 TABLET ORAL DAILY
Status: DISCONTINUED | OUTPATIENT
Start: 2021-01-27 | End: 2021-01-27

## 2021-01-27 RX ORDER — DICYCLOMINE HYDROCHLORIDE 10 MG/1
10 CAPSULE ORAL
Status: DISCONTINUED | OUTPATIENT
Start: 2021-01-27 | End: 2021-01-28 | Stop reason: HOSPADM

## 2021-01-27 RX ORDER — NICOTINE 21 MG/24HR
1 PATCH, TRANSDERMAL 24 HOURS TRANSDERMAL DAILY
Status: DISCONTINUED | OUTPATIENT
Start: 2021-01-27 | End: 2021-01-28 | Stop reason: HOSPADM

## 2021-01-27 RX ORDER — ONDANSETRON 2 MG/ML
4 INJECTION INTRAMUSCULAR; INTRAVENOUS EVERY 6 HOURS PRN
Status: DISCONTINUED | OUTPATIENT
Start: 2021-01-27 | End: 2021-01-28 | Stop reason: HOSPADM

## 2021-01-27 RX ORDER — SPIRONOLACTONE 25 MG/1
100 TABLET ORAL ONCE
Status: COMPLETED | OUTPATIENT
Start: 2021-01-27 | End: 2021-01-27

## 2021-01-27 RX ORDER — FUROSEMIDE 40 MG/1
40 TABLET ORAL 2 TIMES DAILY
Status: DISCONTINUED | OUTPATIENT
Start: 2021-01-27 | End: 2021-01-28 | Stop reason: HOSPADM

## 2021-01-27 RX ORDER — LANOLIN ALCOHOL/MO/W.PET/CERES
400 CREAM (GRAM) TOPICAL 2 TIMES DAILY
Status: DISCONTINUED | OUTPATIENT
Start: 2021-01-27 | End: 2021-01-28 | Stop reason: HOSPADM

## 2021-01-27 RX ORDER — GAUZE BANDAGE 2" X 2"
100 BANDAGE TOPICAL DAILY
Status: DISCONTINUED | OUTPATIENT
Start: 2021-01-27 | End: 2021-01-28 | Stop reason: HOSPADM

## 2021-01-27 RX ORDER — MULTIVITAMIN WITH IRON
1 TABLET ORAL DAILY
Status: DISCONTINUED | OUTPATIENT
Start: 2021-01-27 | End: 2021-01-28 | Stop reason: HOSPADM

## 2021-01-27 RX ORDER — DIPHENHYDRAMINE HCL 25 MG
25 TABLET ORAL EVERY 6 HOURS PRN
Status: DISCONTINUED | OUTPATIENT
Start: 2021-01-27 | End: 2021-01-28 | Stop reason: HOSPADM

## 2021-01-27 RX ORDER — SPIRONOLACTONE 25 MG/1
200 TABLET ORAL DAILY
Status: DISCONTINUED | OUTPATIENT
Start: 2021-01-28 | End: 2021-01-28 | Stop reason: HOSPADM

## 2021-01-27 RX ORDER — ALBUTEROL SULFATE 90 UG/1
2 AEROSOL, METERED RESPIRATORY (INHALATION) 4 TIMES DAILY PRN
Status: DISCONTINUED | OUTPATIENT
Start: 2021-01-27 | End: 2021-01-28 | Stop reason: HOSPADM

## 2021-01-27 RX ORDER — FOLIC ACID 1 MG/1
1 TABLET ORAL DAILY
Status: DISCONTINUED | OUTPATIENT
Start: 2021-01-27 | End: 2021-01-28 | Stop reason: HOSPADM

## 2021-01-27 RX ORDER — FUROSEMIDE 40 MG/1
40 TABLET ORAL DAILY
Status: DISCONTINUED | OUTPATIENT
Start: 2021-01-27 | End: 2021-01-27

## 2021-01-27 RX ORDER — MORPHINE SULFATE 4 MG/ML
4 INJECTION, SOLUTION INTRAMUSCULAR; INTRAVENOUS EVERY 4 HOURS PRN
Status: DISCONTINUED | OUTPATIENT
Start: 2021-01-27 | End: 2021-01-28 | Stop reason: HOSPADM

## 2021-01-27 RX ORDER — POTASSIUM CHLORIDE 20 MEQ/1
40 TABLET, EXTENDED RELEASE ORAL ONCE
Status: COMPLETED | OUTPATIENT
Start: 2021-01-27 | End: 2021-01-27

## 2021-01-27 RX ORDER — QUETIAPINE FUMARATE 100 MG/1
100 TABLET, FILM COATED ORAL 2 TIMES DAILY
Status: DISCONTINUED | OUTPATIENT
Start: 2021-01-27 | End: 2021-01-28 | Stop reason: HOSPADM

## 2021-01-27 RX ADMIN — SPIRONOLACTONE 100 MG: 25 TABLET ORAL at 07:46

## 2021-01-27 RX ADMIN — Medication 400 MG: at 20:00

## 2021-01-27 RX ADMIN — DICYCLOMINE HYDROCHLORIDE 10 MG: 10 CAPSULE ORAL at 16:54

## 2021-01-27 RX ADMIN — SPIRONOLACTONE 100 MG: 25 TABLET ORAL at 09:30

## 2021-01-27 RX ADMIN — FERROUS SULFATE TAB 325 MG (65 MG ELEMENTAL FE) 325 MG: 325 (65 FE) TAB at 16:54

## 2021-01-27 RX ADMIN — PANTOPRAZOLE SODIUM 40 MG: 40 TABLET, DELAYED RELEASE ORAL at 16:54

## 2021-01-27 RX ADMIN — QUETIAPINE FUMARATE 100 MG: 100 TABLET ORAL at 07:45

## 2021-01-27 RX ADMIN — THERA TABS 1 TABLET: TAB at 07:46

## 2021-01-27 RX ADMIN — DIPHENHYDRAMINE HYDROCHLORIDE 25 MG: 25 TABLET ORAL at 14:50

## 2021-01-27 RX ADMIN — DIPHENHYDRAMINE HYDROCHLORIDE 25 MG: 25 TABLET ORAL at 00:39

## 2021-01-27 RX ADMIN — FUROSEMIDE 40 MG: 40 TABLET ORAL at 07:45

## 2021-01-27 RX ADMIN — FUROSEMIDE 40 MG: 40 TABLET ORAL at 16:54

## 2021-01-27 RX ADMIN — PANTOPRAZOLE SODIUM 40 MG: 40 TABLET, DELAYED RELEASE ORAL at 05:59

## 2021-01-27 RX ADMIN — DIPHENHYDRAMINE HYDROCHLORIDE 25 MG: 25 TABLET ORAL at 21:09

## 2021-01-27 RX ADMIN — POTASSIUM CHLORIDE 40 MEQ: 1500 TABLET, EXTENDED RELEASE ORAL at 11:24

## 2021-01-27 RX ADMIN — SERTRALINE 50 MG: 50 TABLET, FILM COATED ORAL at 07:46

## 2021-01-27 RX ADMIN — Medication 100 MG: at 07:51

## 2021-01-27 RX ADMIN — MAGNESIUM SULFATE HEPTAHYDRATE 2000 MG: 40 INJECTION, SOLUTION INTRAVENOUS at 11:24

## 2021-01-27 RX ADMIN — FOLIC ACID 1 MG: 1 TABLET ORAL at 07:45

## 2021-01-27 RX ADMIN — FERROUS SULFATE TAB 325 MG (65 MG ELEMENTAL FE) 325 MG: 325 (65 FE) TAB at 07:46

## 2021-01-27 RX ADMIN — MORPHINE SULFATE 4 MG: 4 INJECTION, SOLUTION INTRAMUSCULAR; INTRAVENOUS at 05:59

## 2021-01-27 RX ADMIN — Medication 10 ML: at 07:48

## 2021-01-27 RX ADMIN — QUETIAPINE FUMARATE 100 MG: 100 TABLET ORAL at 00:43

## 2021-01-27 RX ADMIN — MORPHINE SULFATE 4 MG: 4 INJECTION, SOLUTION INTRAMUSCULAR; INTRAVENOUS at 11:24

## 2021-01-27 RX ADMIN — MORPHINE SULFATE 4 MG: 4 INJECTION, SOLUTION INTRAMUSCULAR; INTRAVENOUS at 00:39

## 2021-01-27 RX ADMIN — MORPHINE SULFATE 4 MG: 4 INJECTION, SOLUTION INTRAMUSCULAR; INTRAVENOUS at 20:00

## 2021-01-27 RX ADMIN — ALBUMIN (HUMAN) 50 G: 0.25 INJECTION, SOLUTION INTRAVENOUS at 15:32

## 2021-01-27 RX ADMIN — DIPHENHYDRAMINE HYDROCHLORIDE 25 MG: 25 TABLET ORAL at 07:45

## 2021-01-27 RX ADMIN — Medication 10 ML: at 20:01

## 2021-01-27 RX ADMIN — QUETIAPINE FUMARATE 100 MG: 100 TABLET ORAL at 19:59

## 2021-01-27 RX ADMIN — MORPHINE SULFATE 4 MG: 4 INJECTION, SOLUTION INTRAMUSCULAR; INTRAVENOUS at 15:31

## 2021-01-27 RX ADMIN — DICYCLOMINE HYDROCHLORIDE 10 MG: 10 CAPSULE ORAL at 09:30

## 2021-01-27 RX ADMIN — LIDOCAINE HYDROCHLORIDE 8 ML: 10 INJECTION, SOLUTION EPIDURAL; INFILTRATION; INTRACAUDAL; PERINEURAL at 10:20

## 2021-01-27 ASSESSMENT — PAIN SCALES - GENERAL
PAINLEVEL_OUTOF10: 10
PAINLEVEL_OUTOF10: 9

## 2021-01-27 ASSESSMENT — PAIN DESCRIPTION - PAIN TYPE
TYPE: CHRONIC PAIN

## 2021-01-27 ASSESSMENT — PAIN DESCRIPTION - LOCATION: LOCATION: ABDOMEN

## 2021-01-27 ASSESSMENT — PAIN DESCRIPTION - ORIENTATION: ORIENTATION: MID

## 2021-01-27 ASSESSMENT — PAIN DESCRIPTION - DESCRIPTORS: DESCRIPTORS: SHARP

## 2021-01-27 NOTE — BRIEF OP NOTE
Brief Postoperative Note    Angelita Lyle  YOB: 1965  [id number]    Pre-operative Diagnosis: Ascites    Post-operative Diagnosis: Same    Procedure: US Guided Paracentesis    Anesthesia: Local    Surgeons/Assistants: Zan Orellana    Estimated Blood Loss: less than 5    Complications: None    Specimens: Was Obtained: RLQ    Findings: Clear Yellow Ascitic Fluid    Electronically signed by Melissa Wheat PA-C on 1/27/2021 at 10:27 AM

## 2021-01-27 NOTE — ED NOTES
Bed: 12  Expected date:   Expected time:   Means of arrival: Walk In  Comments:     Maria L Patel RN  01/26/21 2027

## 2021-01-27 NOTE — PLAN OF CARE
Problem: Falls - Risk of:  Goal: Will remain free from falls  Description: Will remain free from falls  Outcome: Ongoing  Goal: Absence of physical injury  Description: Absence of physical injury  Outcome: Ongoing     Problem: Pain:  Description: Pain management should include both nonpharmacologic and pharmacologic interventions.   Goal: Pain level will decrease  Description: Pain level will decrease  Outcome: Ongoing  Goal: Control of acute pain  Description: Control of acute pain  Outcome: Ongoing  Goal: Control of chronic pain  Description: Control of chronic pain  Outcome: Ongoing     Problem: Daily Care:  Goal: Daily care needs are met  Description: Daily care needs are met  Outcome: Ongoing     Problem: Discharge Planning:  Goal: Patients continuum of care needs are met  Description: Patients continuum of care needs are met  Outcome: Ongoing

## 2021-01-27 NOTE — ED PROVIDER NOTES
[address] [facility]        Pt Name: Angelita Lyle  MRN: [id number]  [place] 1965  Date of evaluation: 1/26/2021  Provider: SHEILA Diaz CNP  PCP: Darlene Lyons, DO     I have seen and evaluated this patient with my supervising physician No att. providers found. CHIEF COMPLAINT       Chief Complaint   Patient presents with    Cirrhosis     pt states has had increase swelling to abd and difficulty breathing. Pt states he had last paracentesis approx 2 wks ago       HISTORY OF PRESENT ILLNESS   (Location, Timing/Onset, Context/Setting, Quality, Duration, Modifying Factors, Severity, Associated Signs and Symptoms)  Note limiting factors. Angelita Lyle is a 50 y.o. male presents to the ER with complaint of abdominal swelling with pain. History alcoholic cirrhosis, last drink a \"few months ago\", states not since diagnosis of cirrhosis. He does follow with [facility] outpatient. He has had 2 paracentesis since 11/30/2020, also [facility] following discharge from this hospital.     He is complaining of generalized body swelling with significant swelling to abdomen and extremities. Abdomen is distended. States that pain does radiate around to his back and he has had multiple episodes of vomiting. Also complaining of decreased urination. Denies any headache, fever, lightheadedness, dizziness, visual disturbances. No chest pain or pressure. No neck pain. No cough, or congestion. No diarrhea, constipation, or dysuria. No rash. Nursing Notes were all reviewed and agreed with or any disagreements were addressed in the HPI. REVIEW OF SYSTEMS    (2-9 systems for level 4, 10 or more for level 5)     Review of Systems   Constitutional: Negative for activity change, chills and fever. Respiratory: Positive for shortness of breath. Negative for chest tightness.     Cardiovascular: Negative for chest pain.   Gastrointestinal: Positive for abdominal distention, abdominal pain, nausea and vomiting. Negative for diarrhea. Genitourinary: Positive for decreased urine volume. Negative for dysuria. Musculoskeletal: Positive for back pain. All other systems reviewed and are negative. Positives and Pertinent negatives as per HPI. Except as noted above in the ROS, all other systems were reviewed and negative.        PAST MEDICAL HISTORY     Past Medical History:   Diagnosis Date    Alcohol abuse     Arthritis     hands and back    Chronic bronchitis ([address]     Clostridioides difficile infection 11/27/2020    COPD (chronic obstructive pulmonary disease) (HCC)     bronchitis    ESBL (extended spectrum beta-lactamase) producing bacteria infection 07/06/2020    Hyperlipidemia     Hypertension     MDRO (multiple drug resistant organisms) resistance     MRSA in right arm [id number]    Radiculopathy of lumbosacral region     Spondylisthesis          SURGICAL HISTORY     Past Surgical History:   Procedure Laterality Date    COLONOSCOPY N/A 12/23/2019    COLONOSCOPY WITH BIOPSY performed by Maday Worthington MD at [address] [facility]  12/23/2019    COLONOSCOPY POLYPECTOMY SNARE/COLD BIOPSY performed by Maday Worthington MD at [address]      right lower arm in 30's    LUMBAR SPINE SURGERY Left 4/23/2019    LEFT LUMBAR4-LUMBAR5  MICRODISCECTOMY performed by Sha Flores MD at [address] 12/23/2019    EGD DIAGNOSTIC ONLY performed by Maday Worthington MD at [address]       Previous Medications    ALBUTEROL SULFATE HFA (VENTOLIN HFA) 108 (90 BASE) MCG/ACT INHALER    Inhale 2 puffs into the lungs 4 times daily as needed for Wheezing    DICYCLOMINE (BENTYL) 10 MG CAPSULE    Take 1 capsule by mouth 3 times daily (before meals)    FERROUS SULFATE (IRON 325) 325 (65 FE) MG TABLET    Take 1 tablet by mouth 2 times daily (with meals)    FOLIC ACID (FOLVITE) 1 MG TABLET    Take 1 tablet by mouth daily    FUROSEMIDE (LASIX) 40 MG TABLET    Take 1 tablet by mouth daily    HYDROCORTISONE (ANUSOL-HC) 25 MG SUPPOSITORY    Place 1 suppository rectally 2 times daily    MULTIPLE VITAMIN (MULTIVITAMIN) TABS TABLET    Take 1 tablet by mouth daily    OMEPRAZOLE (PRILOSEC) 20 MG DELAYED RELEASE CAPSULE    Take 1 capsule by mouth 2 times daily (before meals)    QUETIAPINE (SEROQUEL) 100 MG TABLET    Take 1 tablet by mouth 2 times daily    SERTRALINE (ZOLOFT) 50 MG TABLET    Take 1 tablet by mouth daily    SPIRONOLACTONE (ALDACTONE) 100 MG TABLET    Take 1 tablet by mouth daily    VITAMIN B-1 (THIAMINE) 100 MG TABLET    Take 1 tablet by mouth daily         ALLERGIES     Patient has no known allergies. FAMILYHISTORY     History reviewed. No pertinent family history. SOCIAL HISTORY       Social History     Tobacco Use    Smoking status: Current Every Day Smoker     Packs/day: 1.00     Types: Cigarettes    Smokeless tobacco: Never Used   Substance Use Topics    Alcohol use: Not Currently     Comment: 5th of whisky a day for 6 weeks    Drug use: No       SCREENINGS             PHYSICAL EXAM    (up to 7 for level 4, 8 or more for level 5)     ED Triage Vitals [01/26/21 1642]   BP Temp Temp Source Pulse Resp SpO2 Height Weight   (!) 159/87 98.7 °F (37.1 °C) Infrared 108 22 99 % 5' 5\" (1.651 m) 170 lb (77.1 kg)       Physical Exam  Vitals signs and nursing note reviewed. Constitutional:       Appearance: He is well-developed. He is not diaphoretic. HENT:      Head: Normocephalic and atraumatic. Right Ear: External ear normal.      Left Ear: External ear normal.   Eyes:      General:         Right eye: No discharge. Left eye: No discharge. Neck:      Musculoskeletal: Normal range of motion and neck supple. Vascular: No JVD. Cardiovascular:      Rate and Rhythm: Tachycardia present.       Pulses: Normal pulses. Heart sounds: Normal heart sounds. Pulmonary:      Effort: Pulmonary effort is normal. No respiratory distress. Abdominal:      General: There is distension. Tenderness: There is abdominal tenderness. Comments: + fluid wave   Musculoskeletal: Normal range of motion. Skin:     General: Skin is warm and dry. Coloration: Skin is not pale. Neurological:      Mental Status: He is alert and oriented to person, place, and time.    Psychiatric:         Behavior: Behavior normal.         DIAGNOSTIC RESULTS   LABS:    Labs Reviewed   CBC WITH AUTO DIFFERENTIAL - Abnormal; Notable for the following components:       Result Value    RBC 3.36 (*)     Hemoglobin 8.4 (*)     Hematocrit 25.7 (*)     MCV 76.5 (*)     MCH 24.9 (*)     RDW 19.7 (*)     Platelets 468 (*)     Lymphocytes Absolute 0.8 (*)     All other components within normal limits    Narrative:     Performed at:  [facility] [address], [place], [place]   Phone [phone]   COMPREHENSIVE METABOLIC PANEL - Abnormal; Notable for the following components:    Sodium 132 (*)     Glucose 108 (*)     CREATININE 0.7 (*)     Albumin 3.1 (*)     Albumin/Globulin Ratio 0.9 (*)     Alkaline Phosphatase 174 (*)     AST 42 (*)     All other components within normal limits    Narrative:     Performed at:  [facility] [address],  [place]   Phone [phone]   LIPASE    Narrative:     Performed at:  [facility] [address], [place], [place]   Phone [phone]   TROPONIN    Narrative:     Performed at:  [facility] [address],  [place]   Phone [phone]   AMMONIA    Narrative:     Performed at:  [facility] [address],  [place]   Phone [phone]   URINE RT REFLEX TO CULTURE       All other labs were within normal range or not returned as of this dictation. EKG: All EKG's are interpreted by the Emergency Department Physician in the absence of a cardiologist.  Please see their note for interpretation of EKG. RADIOLOGY:   Non-plain film images such as CT, Ultrasound and MRI are read by the radiologist. Plain radiographic images are visualized and preliminarily interpreted by the ED Provider with the below findings:        Interpretation per the Radiologist below, if available at the time of this note:    XR ABDOMEN (2 VIEWS)   Final Result   Questionable persistent abdominal ascites with a non-specific gas pattern. No results found. PROCEDURES   Unless otherwise noted below, none     Procedures    CRITICAL CARE TIME   The total critical care time spent while evaluating and treating this patient was at least 38 minutes. This excludes time spent doing separately billable procedures. This includes time at the bedside, data interpretation, medication management, obtaining critical history from collateral sources if the patient is unable to provide it directly, and physician consultation. Specifics of interventions taken and potentially life-threatening diagnostic considerations are listed above in the medical decision making. CONSULTS:  None      EMERGENCY DEPARTMENT COURSE and DIFFERENTIAL DIAGNOSIS/MDM:   Vitals:    Vitals:    01/26/21 1642   BP: (!) 159/87   Pulse: 108   Resp: 22   Temp: 98.7 °F (37.1 °C)   TempSrc: Infrared   SpO2: 99%   Weight: 170 lb (77.1 kg)   Height: 5' 5\" (1.651 m)       Patient was given the following medications:  Medications   morphine injection 4 mg (4 mg Intravenous Given 1/26/21 2141)           Briefly, this is a 50year old male that presents with significant abdominal distention and extremity swelling. He does have history of alcoholic cirrhosis, states that he has not drank since diagnosis. He does have significant ascites with fluid wave. Extremity swelling as well. CBC shows hemoglobin of 8.4, otherwise unremarkable. CMP is unremarkable. Lipase normal.  Troponin unremarkable. Ammonia 19. No fevers, WBC normal, no suspicion for SBP, will send fluid for culture following paracentesis     Patient was given morphine for complaint of pain. XR ABDOMEN (2 VIEWS) (Final result)  Result time 01/26/21 21:47:33  Final result by Shireen Ayala MD (01/26/21 21:47:33)                Impression:    Questionable persistent abdominal ascites with a non-specific gas pattern. Patient will be admitted for ascites without concern of SBP, likely needing a paracentesis that can be completed by interventional radiology tomorrow. He will also need to likely see GI if determined necessary by hospitalist services. Patient be admitted via hospitalist services. All questions were answered. FINAL IMPRESSION      1. Ascites due to alcoholic cirrhosis ([address]    2. Non-intractable vomiting with nausea, unspecified vomiting type          DISPOSITION/PLAN   DISPOSITION Decision To Admit 01/26/2021 10:04:45 PM      PATIENT REFERREDTO:  No follow-up provider specified.     DISCHARGE MEDICATIONS:  New Prescriptions    No medications on file       DISCONTINUED MEDICATIONS:  Discontinued Medications    No medications on file              (Please note that portions of this note were completed with a voice recognition program.  Efforts were made to edit the dictations but occasionally words are mis-transcribed.)    SHEILA Kwan CNP (electronically signed)           SHEILA Kwan CNP  01/26/21 1533

## 2021-01-27 NOTE — CONSULTS
Gastroenterology Consult Note      Patient: Paco Antonio  : 1965  Acct#:      Date:  2021    Subjective:       History of Present Illness  Patient is a 50 y.o.  male admitted with Ascites due to alcoholic cirrhosis ([facility] [F38.56] who is seen in consult for cirrhosis, ascites. H/o cirrhosis from alcohol abuse and Hepatitis C followed by Dr Corbin Jasmine. No alcohol use for 5 months. H/o ascites requiring intermittent paracentesis (last 21). He is on lasix 40 mg daily and aldactone 100 mg daily. No h/o HE. Trace esophageal varices on EGD 2019. He has h/o chronic abdominal pain. H/o iron deficiency anemia and rectal bleeding d/t hemorrhoids and was referred to Dr Boris Moore in Dec 2020 for this. He came to the ED last night for progressive BLE swelling and abdominal distention and discomfort. The abdominal discomfort has improved with paracentesis in the past. Has had nonbloody emesis.     Past Medical History:   Diagnosis Date    Alcohol abuse     Arthritis     hands and back    Chronic bronchitis ([facility]     Clostridioides difficile infection 2020    COPD (chronic obstructive pulmonary disease) (HCC)     bronchitis    ESBL (extended spectrum beta-lactamase) producing bacteria infection 2020    Hyperlipidemia     Hypertension     MDRO (multiple drug resistant organisms) resistance     MRSA in right arm [id number]    Radiculopathy of lumbosacral region     Spondylisthesis       Past Surgical History:   Procedure Laterality Date    COLONOSCOPY N/A 2019    COLONOSCOPY WITH BIOPSY performed by Siria Henry MD at [address] [facility]  2019    COLONOSCOPY POLYPECTOMY SNARE/COLD BIOPSY performed by Siria Henry MD at [address]      right lower arm in 30's    LUMBAR SPINE SURGERY Left 2019    LEFT LUMBAR4-LUMBAR5  MICRODISCECTOMY performed by Lazarus Rosin, MD at [facility] ENDOSCOPY N/A 12/23/2019    EGD DIAGNOSTIC ONLY performed by Maday Worthington MD at [facility]      Past Endoscopic History  EGD Dr. Heather Ware (12/2019)  1) Trace esophageal varices  2) Mild portal hypertensive gastropathy     Colonoscopy Dr. Painting (12/2019)   1) 4 mm transverse colon polyp removed with cold snare. 2) 3 mm descending colon polyp removed with cold snare. 3) Otherwise normal colonic mucosa throughout.  Random biopsies obtained throughout the colon to r/o microscopic colitis.     A. Colon, random, biopsy:        - Colonic mucosa with no significant pathologic change.        - Negative for acute or chronic inflammatory injury, increased          intraepithelial lymphocytes or thickened subepithelial collagen          layer.        B. Colon, transverse, polyp, biopsy:        - Portions of tubular adenoma.        C. Colon, descending, polyp, biopsy:        - Tubular adenoma. Admission Meds  No current facility-administered medications on file prior to encounter.       Current Outpatient Medications on File Prior to Encounter   Medication Sig Dispense Refill    QUEtiapine (SEROQUEL) 100 MG tablet Take 1 tablet by mouth 2 times daily 60 tablet 0    furosemide (LASIX) 40 MG tablet Take 1 tablet by mouth daily 30 tablet 0    spironolactone (ALDACTONE) 100 MG tablet Take 1 tablet by mouth daily 30 tablet 0    albuterol sulfate HFA (VENTOLIN HFA) 108 (90 Base) MCG/ACT inhaler Inhale 2 puffs into the lungs 4 times daily as needed for Wheezing 1 Inhaler 0    sertraline (ZOLOFT) 50 MG tablet Take 1 tablet by mouth daily 30 tablet 0    ferrous sulfate (IRON 325) 325 (65 Fe) MG tablet Take 1 tablet by mouth 2 times daily (with meals) 60 tablet 0    Multiple Vitamin (MULTIVITAMIN) TABS tablet Take 1 tablet by mouth daily 30 tablet 0    omeprazole (PRILOSEC) 20 MG delayed release capsule Take 1 capsule by mouth 2 times daily (before meals) 60 capsule 0    vitamin B-1 (THIAMINE) 100 MG tablet Take 1 tablet by mouth daily 30 tablet 3    folic acid (FOLVITE) 1 MG tablet Take 1 tablet by mouth daily 30 tablet 0    hydrocortisone (ANUSOL-HC) 25 MG suppository Place 1 suppository rectally 2 times daily 12 suppository 0    dicyclomine (BENTYL) 10 MG capsule Take 1 capsule by mouth 3 times daily (before meals) 90 capsule 0            Allergies  No Known Allergies   Social   Social History     Tobacco Use    Smoking status: Current Every Day Smoker     Packs/day: 1.00     Types: Cigarettes    Smokeless tobacco: Never Used   Substance Use Topics    Alcohol use: Not Currently     Comment: 5th of whisky a day for 6 weeks        History reviewed. No pertinent family history. Review of Systems  Constitutional: negative for fevers, chills, sweats    Ears, nose, mouth, throat, and face: negative for nasal congestion and sore throat   Respiratory: negative for cough and shortness of breath   Cardiovascular: negative for chest pain and dyspnea   Gastrointestinal: see hpi   Genitourinary:negative for dysuria and frequency   Integument/breast: negative for pruritus and rash   Hematologic/lymphatic: negative for bleeding and easy bruising   Musculoskeletal:negative for arthralgias and myalgias   Neurological: negative for dizziness and weakness         Physical Exam  Blood pressure 121/79, pulse 105, temperature 98.3 °F (36.8 °C), temperature source Oral, resp. rate 18, height 5' 5\" (1.651 m), weight 124 lb 11.2 oz (56.6 kg), SpO2 96 %. General appearance: alert, cooperative, no distress, appears stated age  Eyes: Anicteric  Head: Normocephalic, without obvious abnormality  Lungs: clear to auscultation bilaterally, Normal Effort  Heart: regular rate and rhythm, normal S1 and S2, no murmurs or rubs  Abdomen: distended, mild tenderness diffusely. Bowel sounds normal. No masses,  no organomegaly.    Extremities: atraumatic, no cyanosis, 2+ BLE edema  Skin: warm and dry, no jaundice  Neuro: Grossly intact, A&OX3  Musculoskeletal: 5/5  strength BUE      Data Review:    Recent Labs     01/26/21  1703 01/27/21  0635   WBC 4.1 3.1*   HGB 8.4* 7.4*   HCT 25.7* 22.4*   MCV 76.5* 76.1*   *  --      Recent Labs     01/26/21  1703 01/27/21  0635   * 132*   K 3.6 3.4*   CL 99 102   CO2 24 22   BUN 9 9   CREATININE 0.7* 0.7*     Recent Labs     01/26/21  1703 01/27/21  0635   AST 42* 36   ALT 17 14   BILITOT 0.8 0.8   ALKPHOS 174* 149*     Recent Labs     01/26/21  1703   LIPASE 34.0     Recent Labs     01/27/21  0635   PROTIME 12.8   INR 1.10     No results for input(s): PTT in the last 72 hours. No results for input(s): OCCULTBLD in the last 72 hours. Imaging Studies:                            Assessment:     Active Problems:    Ascites due to alcoholic cirrhosis (HCC)  Resolved Problems:    * No resolved hospital problems. *    Ascites - due to cirrhosis. Was on lasix 40 mg daily and aldactone 100 mg daily at home. Will order paracentesis. Cirrhosis - due to alcohol abuse (none in 5 months) and chronic Hep C. Followed by Dr Tahmina Garza. Trace esophageal varices on EGD 12/2019. No GI bleeding. No HE. Recommendations:   - paracentesis with fluid for cell count, cx, albumin, protein  - increase diuretics to lasix 40 mg BID and aldactone 200 mg daily  - 2 gram low sodium diet  - will need BMP on Monday  - f/u with Dr Tahmina Garza    Discussed with Dr. Alverto Truong, [facility]      I have personally performed a face to face diagnostic evaluation on this patient. I have interviewed and examined the patient and I agree with the findings and recommended plan of care. In summary, my findings and plan are the following:  Pt of Dr. Newberry Prior. With alcoholic cirrhosis and ascites. Exam.  aaox 3 no asterixis. + significantly distended tight abd with ascites. + bilat edema to knees. Will do paracentesis for comfort, r/o sbp. Will increase aldactone to 200mg daily and lasix 40mg bid.   D/w pt needs labs on Monday morning sent to Dr. Chanel Park.   If ascites neg can dc home after para.         Regina Forrest MD  [address] and [address]

## 2021-01-27 NOTE — ED PROVIDER NOTES
I independently performed a history and physical on Sal Courtney. All diagnostic, treatment, and disposition decisions were made by myself in conjunction with the advanced practice provider. Briefly, this is a 50 y.o. male here for abdominal distention and diffuse pain across the abdomen. His abdominal swelling is resulting in shortness of breath. He has required 2 paracentesis for fluid removal total.  Discharge summary from 12/2/20 reviewed and he underwent IR paracentesis on 11/30 with 2. 2 L of fluids removed. Started on lasix and aldactone for cirrhosis with ascites. Was to see GI to set up serial therapeutic paracenteses but has not done so. On exam pt is resting comfortably  Cardiac RRR, no murmur  Lungs clear bilaterally, no increased work of breathing  Abdomen distended  and protuberant with significant fluid wave, tender to palpation in all quadrants      EKG  The Ekg interpreted by me in the absence of a cardiologist shows. sinus tachycardia, [id number]   Axis is   Normal  QTc is  normal  Intervals and Durations are unremarkable. No specific ST-T wave changes appreciated. No evidence of acute ischemia. No significant change from prior EKG dated 12/2/20      XR ABDOMEN (2 VIEWS)   Final Result   Questionable persistent abdominal ascites with a non-specific gas pattern.            Labs Reviewed   CBC WITH AUTO DIFFERENTIAL - Abnormal; Notable for the following components:       Result Value    RBC 3.36 (*)     Hemoglobin 8.4 (*)     Hematocrit 25.7 (*)     MCV 76.5 (*)     MCH 24.9 (*)     RDW 19.7 (*)     Platelets 556 (*)     Lymphocytes Absolute 0.8 (*)     All other components within normal limits    Narrative:     Performed at:  [facility]  [address],  [facility], [address]   Phone [phone]   COMPREHENSIVE METABOLIC PANEL - Abnormal; Notable for the following components:    Sodium 132 (*)     Glucose 108 (*)     CREATININE 0.7 (*) Albumin 3.1 (*)     Albumin/Globulin Ratio 0.9 (*)     Alkaline Phosphatase 174 (*)     AST 42 (*)     All other components within normal limits    Narrative:     Performed at:  [facility]  [address],  [place], [address]   Phone [phone]   LIPASE    Narrative:     Performed at:  [facility]  [address],  [place], [address]   Phone [phone]   TROPONIN    Narrative:     Performed at:  [facility]  [address],  [place], [address]   Phone [phone]   AMMONIA    Narrative:     Performed at:  [facility]  [address],  [place], [address]   Phone [phone]   URINE RT REFLEX TO CULTURE     Course and MDM:  Patient is 70-year-old male with history of cirrhosis and significant ascites presenting to the emergency room for worsening abdominal distention and pain. He is mildly tachycardic with stable blood pressure no fever here. He has diffuse abdominal tenderness with significant fluid wave. Per prior discharge summary he was to set up routine outpatient therapeutic paracenteses which he has not done. It appears that his last paracenteses might have been in December. With lack of fever or white count today suspicion for SBP is low. We will admit for symptom control and likely IR guided paracentesis tomorrow. He is agreeable to plan. Patient Referrals:  No follow-up provider specified. Discharge Medications:  New Prescriptions    No medications on file       FINAL IMPRESSION  1. Ascites due to alcoholic cirrhosis ([facility]    2. Non-intractable vomiting with nausea, unspecified vomiting type        Blood pressure (!) 159/87, pulse 108, temperature 98.7 °F (37.1 °C), temperature source Infrared, resp. rate 22, height 5' 5\" (1.651 m), weight 170 lb (77.1 kg), SpO2 99 %.      For further details of [facility] emergency department encounter, please see documentation by advanced practice provider     Lizbeth Mathias MD  01/26/21 5199

## 2021-01-27 NOTE — H&P
[facility] Medicine History & Physical      Patient Name: Isidro Martinez    : 1965    PCP: Adelaida Banegas DO    Date of Service:  Patient seen and examined on 2021     Chief Complaint: Abdominal swelling and pain    History Of Present Illness:    Isidro Martinez is a 50 y.o. male who presented to ED with complaint of abdominal swelling and pain. Patient has a history of alcoholic cirrhosis. He states his last drink was a few months ago and has not drank since his diagnosis of cirrhosis. He is followed by Dr. Branden Ayon outpatient. Patient reports generalized body swelling with significant swelling to abdomen extremities. He reports the pain radiates to his back and has had multiple episodes of vomiting. He reports associated shortness of breath. He is also complaining of decreased urination. He denies fever, dizziness, headache, chest pain, diarrhea, constipation, pain with urination. Last paracentesis was on 21 with 3.75 L removed. Past Medical History:    Patient  has a past medical history of Alcohol abuse, Arthritis, Chronic bronchitis ([address] Clostridioides difficile infection, COPD (chronic obstructive pulmonary disease) ([address] ESBL (extended spectrum beta-lactamase) producing bacteria infection, Hyperlipidemia, Hypertension, MDRO (multiple drug resistant organisms) resistance, Radiculopathy of lumbosacral region, and Spondylisthesis. Past Surgical History:    Patient  has a past surgical history that includes fracture surgery; Lumbar spine surgery (Left, 2019); Upper gastrointestinal endoscopy (N/A, 2019); Colonoscopy (N/A, 2019); and Colonoscopy (2019). Medications Prior to Admission:      Prior to Admission medications    Medication Sig Start Date End Date Taking?  Authorizing Provider   hydrocortisone (ANUSOL-HC) 25 MG suppository Place 1 suppository rectally 2 times daily 20   Ken Bustos MD   QUEtiapine (SEROQUEL) 100 MG tablet Take 1 obvious deformity. PERRL. EOM intact. Conjunctivae/corneas clear. Neck: Supple, with full range of motion. No JVD. Trachea midline. Respiratory:  Clear to auscultation bilaterally without rales, wheezes, or rhonchi. Normal respiratory effort. Cardiovascular: +Tachycardia. Regular rhythm without murmurs, rubs or gallops. Abdomen: +Distension. + Tenderness. + Fluid wave. No rebound or guarding. Extremities:  No clubbing, cyanosis, or edema bilaterally. Full range of motion without deformity. +2 palpable pulses, equal bilaterally. Capillary refill brisk,< 3 seconds   Skin: No rashes or lesions. Warm/dry. Neurologic:  Neurovascularly intact without any focal sensory/motor deficits. Cranial nerves: II-XII intact, grossly non-focal. Alert and oriented x 3. Normal speech. Psychiatric:  Thought content appropriate, normal insight. Labs:   CBC   Recent Labs     01/26/21  1703   WBC 4.1   HGB 8.4*   HCT 25.7*   *        RENAL  Recent Labs     01/26/21  1703   *   K 3.6   CL 99   CO2 24   BUN 9   CREATININE 0.7*       LFTS  Recent Labs     01/26/21  1703   AST 42*   ALT 17   BILITOT 0.8   ALKPHOS 174*       COAG  No results for input(s): INR in the last 72 hours. CARDIAC ENZYMES  Recent Labs     01/26/21  1703   TROPONINI <0.01       LIPIDS  No results found for: CHOL, TRIG, HDL, LDLCALC      Radiology:     XR ABDOMEN (2 VIEWS)   Final Result   Questionable persistent abdominal ascites with a non-specific gas pattern. ASSESSMENT/PLAN:    Alcoholic cirrhosis with ascites  Stable anemia and thrombocytopenia  Continue home lasix and spironolactone  Continue home thiamine, folic acid, MVI  Check INR  Will likely need paracentesis. NPO after midnight  GI consulted    Abdominal pain with vomiting  Secondary to above  Normal WBC and no fevers. No concern for SBP.  Would send fluid for culture following paracentesis  Pain control  GI consulted    Hyponatremia  Chronic and stable  Monitor Na      DVT prophylaxis: Lovenox  Probiotic if on abx: N/A    Diet: DIET LOW SODIUM 2 GM;  Diet NPO, After Midnight Exceptions are: Sips of Water with Meds  Code Status: Full Code    Consults:  IP CONSULT TO GI    Disposition: Admit to Inpatient   ELOS: Greater than two midnights due to medical therapy     No Acevedo PA-C    Thank you Pedro Luis Hinkle DO for the opportunity to be involved in this patient's care. If you have any questions or concerns please feel free to contact me at [phone].

## 2021-01-28 VITALS
WEIGHT: 124.7 LBS | HEART RATE: 100 BPM | OXYGEN SATURATION: 98 % | RESPIRATION RATE: 16 BRPM | DIASTOLIC BLOOD PRESSURE: 62 MMHG | HEIGHT: 65 IN | SYSTOLIC BLOOD PRESSURE: 94 MMHG | BODY MASS INDEX: 20.78 KG/M2 | TEMPERATURE: 98.3 F

## 2021-01-28 LAB
A/G RATIO: 1.2 (ref 1.1–2.2)
ALBUMIN SERPL-MCNC: 3.2 G/DL (ref 3.4–5)
ALP BLD-CCNC: 114 U/L (ref 40–129)
ALT SERPL-CCNC: 13 U/L (ref 10–40)
ANION GAP SERPL CALCULATED.3IONS-SCNC: 10 MMOL/L (ref 3–16)
AST SERPL-CCNC: 34 U/L (ref 15–37)
BILIRUB SERPL-MCNC: 1.1 MG/DL (ref 0–1)
BUN BLDV-MCNC: 11 MG/DL (ref 7–20)
CALCIUM SERPL-MCNC: 8.3 MG/DL (ref 8.3–10.6)
CHLORIDE BLD-SCNC: 99 MMOL/L (ref 99–110)
CO2: 24 MMOL/L (ref 21–32)
CREAT SERPL-MCNC: 0.9 MG/DL (ref 0.9–1.3)
GFR AFRICAN AMERICAN: >60
GFR NON-AFRICAN AMERICAN: >60
GLOBULIN: 2.6 G/DL
GLUCOSE BLD-MCNC: 88 MG/DL (ref 70–99)
HCT VFR BLD CALC: 23.2 % (ref 40.5–52.5)
HEMOGLOBIN: 7.6 G/DL (ref 13.5–17.5)
INR BLD: 1.24 (ref 0.86–1.14)
MCH RBC QN AUTO: 25.2 PG (ref 26–34)
MCHC RBC AUTO-ENTMCNC: 32.7 G/DL (ref 31–36)
MCV RBC AUTO: 77 FL (ref 80–100)
PDW BLD-RTO: 19.7 % (ref 12.4–15.4)
PLATELET # BLD: 84 K/UL (ref 135–450)
PMV BLD AUTO: 6.8 FL (ref 5–10.5)
POTASSIUM REFLEX MAGNESIUM: 3.8 MMOL/L (ref 3.5–5.1)
PROTHROMBIN TIME: 14.4 SEC (ref 10–13.2)
RBC # BLD: 3.01 M/UL (ref 4.2–5.9)
SODIUM BLD-SCNC: 133 MMOL/L (ref 136–145)
TOTAL PROTEIN: 5.8 G/DL (ref 6.4–8.2)
WBC # BLD: 4.1 K/UL (ref 4–11)

## 2021-01-28 PROCEDURE — 36415 COLL VENOUS BLD VENIPUNCTURE: CPT

## 2021-01-28 PROCEDURE — 6370000000 HC RX 637 (ALT 250 FOR IP): Performed by: PHYSICIAN ASSISTANT

## 2021-01-28 PROCEDURE — 6370000000 HC RX 637 (ALT 250 FOR IP): Performed by: FAMILY MEDICINE

## 2021-01-28 PROCEDURE — 80053 COMPREHEN METABOLIC PANEL: CPT

## 2021-01-28 PROCEDURE — 85027 COMPLETE CBC AUTOMATED: CPT

## 2021-01-28 PROCEDURE — 2580000003 HC RX 258: Performed by: PHYSICIAN ASSISTANT

## 2021-01-28 PROCEDURE — 6360000002 HC RX W HCPCS: Performed by: PHYSICIAN ASSISTANT

## 2021-01-28 PROCEDURE — 94760 N-INVAS EAR/PLS OXIMETRY 1: CPT

## 2021-01-28 PROCEDURE — 85610 PROTHROMBIN TIME: CPT

## 2021-01-28 RX ORDER — LANOLIN ALCOHOL/MO/W.PET/CERES
400 CREAM (GRAM) TOPICAL 2 TIMES DAILY
Qty: 30 TABLET | Refills: 0 | Status: SHIPPED | OUTPATIENT
Start: 2021-01-28

## 2021-01-28 RX ORDER — HYDROCODONE BITARTRATE AND ACETAMINOPHEN 5; 325 MG/1; MG/1
1 TABLET ORAL EVERY 6 HOURS PRN
Qty: 12 TABLET | Refills: 0 | Status: SHIPPED | OUTPATIENT
Start: 2021-01-28 | End: 2021-01-31

## 2021-01-28 RX ORDER — SPIRONOLACTONE 100 MG/1
200 TABLET, FILM COATED ORAL DAILY
Qty: 60 TABLET | Refills: 0 | Status: SHIPPED | OUTPATIENT
Start: 2021-01-29

## 2021-01-28 RX ORDER — FUROSEMIDE 40 MG/1
40 TABLET ORAL 2 TIMES DAILY
Qty: 60 TABLET | Refills: 0 | Status: SHIPPED | OUTPATIENT
Start: 2021-01-28

## 2021-01-28 RX ORDER — NICOTINE 21 MG/24HR
1 PATCH, TRANSDERMAL 24 HOURS TRANSDERMAL DAILY
Qty: 30 PATCH | Refills: 0 | Status: SHIPPED | OUTPATIENT
Start: 2021-01-29

## 2021-01-28 RX ADMIN — Medication 100 MG: at 08:11

## 2021-01-28 RX ADMIN — Medication 400 MG: at 08:10

## 2021-01-28 RX ADMIN — DICYCLOMINE HYDROCHLORIDE 10 MG: 10 CAPSULE ORAL at 11:45

## 2021-01-28 RX ADMIN — DICYCLOMINE HYDROCHLORIDE 10 MG: 10 CAPSULE ORAL at 06:07

## 2021-01-28 RX ADMIN — FUROSEMIDE 40 MG: 40 TABLET ORAL at 08:10

## 2021-01-28 RX ADMIN — QUETIAPINE FUMARATE 100 MG: 100 TABLET ORAL at 08:11

## 2021-01-28 RX ADMIN — FOLIC ACID 1 MG: 1 TABLET ORAL at 08:10

## 2021-01-28 RX ADMIN — MORPHINE SULFATE 4 MG: 4 INJECTION, SOLUTION INTRAMUSCULAR; INTRAVENOUS at 06:07

## 2021-01-28 RX ADMIN — SPIRONOLACTONE 200 MG: 25 TABLET ORAL at 08:10

## 2021-01-28 RX ADMIN — SERTRALINE 50 MG: 50 TABLET, FILM COATED ORAL at 08:10

## 2021-01-28 RX ADMIN — DIPHENHYDRAMINE HYDROCHLORIDE 25 MG: 25 TABLET ORAL at 06:07

## 2021-01-28 RX ADMIN — FERROUS SULFATE TAB 325 MG (65 MG ELEMENTAL FE) 325 MG: 325 (65 FE) TAB at 08:09

## 2021-01-28 RX ADMIN — PANTOPRAZOLE SODIUM 40 MG: 40 TABLET, DELAYED RELEASE ORAL at 06:07

## 2021-01-28 RX ADMIN — THERA TABS 1 TABLET: TAB at 08:10

## 2021-01-28 RX ADMIN — Medication 10 ML: at 08:12

## 2021-01-28 ASSESSMENT — PAIN DESCRIPTION - DESCRIPTORS: DESCRIPTORS: SHARP

## 2021-01-28 ASSESSMENT — PAIN DESCRIPTION - PAIN TYPE: TYPE: CHRONIC PAIN

## 2021-01-28 ASSESSMENT — PAIN SCALES - GENERAL: PAINLEVEL_OUTOF10: 9

## 2021-01-28 ASSESSMENT — PAIN DESCRIPTION - ORIENTATION: ORIENTATION: MID

## 2021-01-28 NOTE — PLAN OF CARE
Problem: Falls - Risk of:  Goal: Will remain free from falls  Description: Will remain free from falls  1/28/2021 1357 by Aislinn Mchugh RN  Outcome: Completed  1/28/2021 0148 by Mago Ramos RN  Outcome: Ongoing  Goal: Absence of physical injury  Description: Absence of physical injury  1/28/2021 1357 by Aislinn Mchugh RN  Outcome: Completed  1/28/2021 0148 by Mago Ramos RN  Outcome: Ongoing     Problem: Pain:  Goal: Pain level will decrease  Description: Pain level will decrease  1/28/2021 1357 by Aislinn Mchugh RN  Outcome: Completed  1/28/2021 0148 by Mago Ramos RN  Outcome: Ongoing  Goal: Control of acute pain  Description: Control of acute pain  1/28/2021 1357 by Aislinn Mchugh RN  Outcome: Completed  1/28/2021 0148 by Mago Ramos RN  Outcome: Ongoing  Goal: Control of chronic pain  Description: Control of chronic pain  1/28/2021 1357 by Aislinn Mchugh RN  Outcome: Completed  1/28/2021 0148 by Mago Ramos RN  Outcome: Ongoing     Problem: Daily Care:  Goal: Daily care needs are met  Description: Daily care needs are met  1/28/2021 1357 by Aislinn Mchugh RN  Outcome: Completed  1/28/2021 0148 by Mago Ramos RN  Outcome: Ongoing     Problem: Discharge Planning:  Goal: Patients continuum of care needs are met  Description: Patients continuum of care needs are met  1/28/2021 1357 by Aislinn Mchugh RN  Outcome: Completed  1/28/2021 0148 by Mago Ramos RN  Outcome: Ongoing     Problem: Nutrition  Goal: Optimal nutrition therapy  Outcome: Completed

## 2021-01-28 NOTE — PROGRESS NOTES
7: 62 AM  AM assessment complete. VSS. Breath sounds clear in upper lobes, diminished in lower lobes. Bowel sounds hypoactive x4. Abdomen distended and taut. Trace edema of BLE. Rates pain 10/10, pain meds not available at this time, pt verbalized understanding. Pt reports itching, prn benadryl given. Call light within reach. Pt demonstrated how to use properly. Will continue to monitor. Denies further needs. The care plan and education has been reviewed and mutually agreed upon with the patient.      9:33 AM  Consent for paracentesis signed and placed on chart
CLINICAL PHARMACY NOTE: MEDS TO [address] Select Patient?: No  Total # of Prescriptions Filled: 1   The following medications were delivered to the patient:  · Hydrocodone/APAP  Total # of Interventions Completed: 0  Time Spent (min): 60    Additional Documentation:    Delivered to patient    Zeina Khan
[facility] GI  Gastroenterology Progress Note  Kimberly Hicks is a 50 y.o. male patient. 1. Ascites due to alcoholic cirrhosis ([address]    2. Non-intractable vomiting with nausea, unspecified vomiting type        SUBJECTIVE:   Ab pain improved some after paracentesis. Physical    VITALS:  BP 94/62   Pulse 100   Temp 98.3 °F (36.8 °C) (Oral)   Resp 16   Ht 5' 5\" (1.651 m)   Wt 124 lb 11.2 oz (56.6 kg)   SpO2 99%   BMI 20.75 kg/m²   TEMPERATURE:  Current - Temp: 98.3 °F (36.8 °C); Max - Temp  Av.6 °F (37 °C)  Min: 98.3 °F (36.8 °C)  Max: 99 °F (37.2 °C)    Abdomen soft, mild diffuse ttp.  + distended but less tight, no HSM, Bowel sounds normal   + LE edema  aaox 3 no asterixis. Data      Recent Labs     21  1703 21  0635 21  0638   WBC 4.1 3.1* 4.1   HGB 8.4* 7.4* 7.6*   HCT 25.7* 22.4* 23.2*   MCV 76.5* 76.1* 77.0*   * 87* 84*     Recent Labs     21  1703 21  0635 21  0638   * 132* 133*   K 3.6 3.4* 3.8   CL 99 102 99   CO2 24 22 24   BUN 9 9 11   CREATININE 0.7* 0.7* 0.9     Recent Labs     21  1703 21  0635 21  0638   AST 42* 36 34   ALT 17 14 13   BILITOT 0.8 0.8 1.1*   ALKPHOS 174* 149* 114     Recent Labs     21  1703   LIPASE 34.0             ASSESSMENT   1. Ascites- s/p paracentesis neg for SBP  2. Cirrhosis. PLAN    1. Aldactone and lasix increased  2. Labs on Monday and sent to Dr. Maryuri Veras  3. F/u outpt with Dr. Maryuri Veras.   4. Will sign off. [id number] Vicki Davis for Lala.      Margo Rinne, MD  [address] and [address] [facility]
Patients chart was reviewed post Paracentesis procedure. No complications were noted post procedure.
Pt discharged via wheel chair with his belongings to a waiting car outside main entrance. Assist with transfers without incident.
Shift assessment and AM vitals complete, VSS. AM meds given. Pt will go home today, will educate him on his post appointments and when to scheduled them. Abd still distended, but better than yesterday. GI signed off on pt and he will need to follow up. The care plan and education has been reviewed and mutually agreed upon with the patient.
Shift assessment complete see flow sheets, meds per orders see eMAR. Patient alert and oriented x4, independent in the room. Abdomen very distended at assessment, per patient abdomen was flatter after paracentesis. Patient reporting nausea, small amount of emesis early in the shift, but patient declined need for antiemetic stating it \"needs to take it's course. \"  PRN pain medication and benedryl given for itching, effective. Patient resting in bed with eyes closed at this time. The care plan and education has been reviewed and mutually agreed upon with the patient.      Electronically signed by Saad Winters RN on 1/28/2021 at 1:48 AM
Talked to Dr. Patty Cutler about scheduling pt appointment. They will call pt to schedule his appointment once Dr. Lugene Snellen puts in when he wants to see pt and how often.
Went over d/c instructions with pt and he understands them. He will follow up with Dr. Dawit Burleson on feb 3rd at 1 PM that was scheduled for him.
intake related to inadequate protein-energy intake as evidenced by poor intake prior to admission      Nutrition Interventions:   Food and/or Nutrient Delivery:  Continue Current Diet, Start Oral Nutrition Supplement  Nutrition Education/Counseling:  Education completed   Coordination of Nutrition Care:  Continue to monitor while inpatient    Goals:  po intake at least 50% of meals & supplements       Nutrition Monitoring and Evaluation:   Behavioral-Environmental Outcomes:  Readiness for Change(ETOH abuse)   Food/Nutrient Intake Outcomes:  Food and Nutrient Intake, Supplement Intake  Physical Signs/Symptoms Outcomes:  Weight     Discharge Planning:    Continue current diet, Continue Oral Nutrition Supplement     Electronically signed by Crissy Mcconnell RD, LD on 1/28/21 at 10:43 AM EST    Contact: [phone]

## 2021-01-29 NOTE — DISCHARGE SUMMARY
Hospital Discharge Summary    Patient's PCP: Nela Mccallum DO  Admit Date: 1/26/2021   Discharge Date: 1/28/2021    Admitting Physician: Dr. Rojelio Toth MD  Discharge Physician: Dr. Carmelita Campos:   IP CONSULT TO GI    Brief HPI:     Cole Shell is a 50 y.o. male who presented to ED with complaint of abdominal swelling and pain. Patient has a history of alcoholic cirrhosis. He states his last drink was a few months ago and has not drank since his diagnosis of cirrhosis. He is followed by Dr. Chanel Park outpatient. Patient reports generalized body swelling with significant swelling to abdomen extremities. He reports the pain radiates to his back and has had multiple episodes of vomiting. He reports associated shortness of breath. He is also complaining of decreased urination. He denies fever, dizziness, headache, chest pain, diarrhea, constipation, pain with urination. Last paracentesis was on 1/8/21 with 3.75 L removed. Brief hospital course:   Alcoholic liver cirrhosis with ascites  -Appreciate GI recommendations  -Improved, status post paracentesis with 7 L removed on 1/27/2021 by IR  - cont lasix 40mg QD, aldactone 200mg QD     Abdominal pain  - improving, 2/2 to above        Unspecified mood disorder  -Stable, CPM        Tobacco dependence  -NicoDerm, advised to quit        History of alcohol abuse  -Encouraged continued cessation     Patient medically stable for discharge, follow-up with PCP in 1 week and with GI per their recommendations. Discharge Diagnoses: Active Problems:    Hypokalemia    Alcoholic cirrhosis of liver with ascites (HCC)    Ascites due to alcoholic cirrhosis ([facility]  Resolved Problems:    * No resolved hospital problems.  *      Physical Exam: BP 94/62   Pulse 100   Temp 98.3 °F (36.8 °C) (Oral)   Resp 16   Ht 5' 5\" (1.651 m)   Wt 124 lb 11.2 oz (56.6 kg)   SpO2 98%   BMI 20.75 kg/m²   Gen/overall appearance: Not in acute distress. Appears chronically ill  Head: Normocephalic, atraumatic  Eyes: EOMI, good acuity  ENT:- Oral mucosa moist  Neck: No JVD, thyromegaly  CVS: Nml S1S2, no MRG, RRR  Pulm: Clear bilaterally. No crackles/wheezes  Gastrointestinal: Soft, distended, mild generalized tenderness to palpation, + BS, + ascites  Musculoskeletal: ++ edema. Warm  Neuro: No focal deficit. Moves extremity spontaneously. Psychiatry: Appropriate affect. Not agitated. Skin: Warm, dry with normal turgor. No rash        Significant diagnostic studies that may require follow up:  Ir Us Guided Paracentesis    Result Date: 1/27/2021  PROCEDURE: ULTRASOUND GUIDED PARACENTESIS 1/27/2021 HISTORY: ORDERING SYSTEM PROVIDED HISTORY: cirrhosis and ascites. Dx and Tx, please send fluid for cell count, cx, albumin, protein TECHNOLOGIST PROVIDED HISTORY: Reason for exam:->cirrhosis and ascites. Dx and Tx, please send fluid for cell count, cx, albumin, protein TECHNIQUE: Informed consent was obtained after a detailed explanation of the procedure including risks, benefits, and alternatives. Universal protocol was followed. The right abdomen was prepped and draped in sterile fashion and local anesthesia was achieved with lidocaine. A 5 Italian needle sheath was advanced under ultrasound guidance into ascites and paracentesis was performed. The patient tolerated the procedure well. FINDINGS: A total of 7 L clear yellow fluid was removed. Specimens were sent for labs per request of the ordering clinician. Successful ultrasound guided paracentesis.      Xr Abdomen (2 Views)    Result Date: 1/26/2021  EXAMINATION: TWO XRAY VIEWS OF THE ABDOMEN 1/26/2021 9:26 pm COMPARISON: 11/29/2000 HISTORY: ORDERING SYSTEM PROVIDED HISTORY: obstruction TECHNOLOGIST PROVIDED HISTORY: Reason for exam:->obstruction Reason for Exam: obstruction Acuity: Chronic Type of Exam: Subsequent/Follow-up FINDINGS: There is persistent increased density throughout the abdomen suggestive of ascites. The gas pattern is unremarkable. No renal stones are seen. There is no free air. No abnormal calcifications are seen. The bones are intact. Questionable persistent abdominal ascites with a non-specific gas pattern. Treatments: As above. Discharge Medications:     Medication List      START taking these medications    HYDROcodone-acetaminophen 5-325 MG per tablet  Commonly known as: NORCO  Take 1 tablet by mouth every 6 hours as needed for Pain for up to 3 days.   Notes to patient: Use: pain  Side effects: dizziness, constipation      magnesium oxide 400 (240 Mg) MG tablet  Commonly known as: MAG-OX  Take 1 tablet by mouth 2 times daily     nicotine 21 MG/24HR  Commonly known as: NICODERM CQ  Place 1 patch onto the skin daily        CHANGE how you take these medications    furosemide 40 MG tablet  Commonly known as: LASIX  Take 1 tablet by mouth 2 times daily  What changed: when to take this     spironolactone 100 MG tablet  Commonly known as: ALDACTONE  Take 2 tablets by mouth daily  What changed: how much to take        CONTINUE taking these medications    albuterol sulfate  (90 Base) MCG/ACT inhaler  Commonly known as: Ventolin HFA  Inhale 2 puffs into the lungs 4 times daily as needed for Wheezing     dicyclomine 10 MG capsule  Commonly known as: BENTYL  Take 1 capsule by mouth 3 times daily (before meals)     ferrous sulfate 325 (65 Fe) MG tablet  Commonly known as: IRON 325  Take 1 tablet by mouth 2 times daily (with meals)     folic acid 1 MG tablet  Commonly known as: FOLVITE  Take 1 tablet by mouth daily     hydrocortisone 25 MG suppository  Commonly known as: ANUSOL-HC  Place 1 suppository rectally 2 times daily     multivitamin Tabs tablet  Take 1 tablet by mouth daily     omeprazole 20 MG delayed release capsule  Commonly known as: PRILOSEC  Take 1 capsule by mouth 2 times daily (before meals)     QUEtiapine 100 MG tablet  Commonly known as: SEROQUEL  Take 1 tablet by mouth 2 times daily     sertraline 50 MG tablet  Commonly known as: ZOLOFT  Take 1 tablet by mouth daily     vitamin B-1 100 MG tablet  Commonly known as: THIAMINE  Take 1 tablet by mouth daily           Where to Get Your Medications      These medications were sent to [address], [address] [place] [address] [phone]  [place] [address], [place] [address]    Phone: [phone]   · furosemide 40 MG tablet  · magnesium oxide 400 (240 Mg) MG tablet  · nicotine 21 MG/24HR  · spironolactone 100 MG tablet     You can get these medications from any pharmacy    Bring a paper prescription for each of these medications  · HYDROcodone-acetaminophen 5-325 MG per tablet         Activity: activity as tolerated  Diet: No diet orders on file      Disposition: home  Discharged Condition: Stable  Follow Up:   Zulema Márquez MD  [address]  [phone]    Go to  Follow up        Code status:  Prior         Total time spent on discharge, finalizing medications, referrals and arranging outpatient follow up was more than 30 minutes      Thank you Dr. Kim Koo DO for the opportunity to be involved in this patients care.

## 2021-01-30 LAB
BODY FLUID CULTURE, STERILE: NORMAL
GRAM STAIN RESULT: NORMAL

## 2022-03-08 NOTE — PLAN OF CARE
Problem: Falls - Risk of:  Goal: Will remain free from falls  Description: Will remain free from falls  Outcome: Ongoing  Goal: Absence of physical injury  Description: Absence of physical injury  Outcome: Ongoing     Problem: Pain:  Description: Pain management should include both nonpharmacologic and pharmacologic interventions.   Goal: Pain level will decrease  Description: Pain level will decrease  Outcome: Ongoing  Goal: Control of acute pain  Description: Control of acute pain  Outcome: Ongoing  Goal: Control of chronic pain  Description: Control of chronic pain  Outcome: Ongoing     Problem: Daily Care:  Goal: Daily care needs are met  Description: Daily care needs are met  Outcome: Ongoing     Problem: Discharge Planning:  Goal: Patients continuum of care needs are met  Description: Patients continuum of care needs are met  Outcome: Ongoing . normal...

## 2022-10-26 NOTE — ED NOTES
\"I fell on at my sisters  3 weeks ago. \"     Letha Vizcarra, RN  [id number] O-T Advancement Flap Text: The defect edges were debeveled with a #15 scalpel blade.  Given the location of the defect, shape of the defect and the proximity to free margins an O-T advancement flap was deemed most appropriate.  Using a sterile surgical marker, an appropriate advancement flap was drawn incorporating the defect and placing the expected incisions within the relaxed skin tension lines where possible.    The area thus outlined was incised deep to adipose tissue with a #15 scalpel blade.  The skin margins were undermined to an appropriate distance in all directions utilizing iris scissors.

## (undated) DEVICE — MOUTHPIECE ENDOSCP L CTRL OPN AND SIDE PORTS DISP

## (undated) DEVICE — KIT OR ROOM TURNOVER W/STRAP

## (undated) DEVICE — ELECTRODE PT RET AD L9FT HI MOIST COND ADH HYDRGEL CORDED

## (undated) DEVICE — STERILE LATEX POWDER-FREE SURGICAL GLOVESWITH NITRILE AND EMOLLIENT COATINGS: Brand: PROTEXIS

## (undated) DEVICE — PROCEDURE KIT ENDOSCP CUST

## (undated) DEVICE — DRAPE MICSCP W132XL406CM LENS DIA68MM W VARI LENS2 FOR LEICA

## (undated) DEVICE — SNARE ENDOSCP L240CM SHTH DIA24MM LOOP W10MM POLYP RND REINF

## (undated) DEVICE — BW-412T DISP COMBO CLEANING BRUSH: Brand: SINGLE USE COMBINATION CLEANING BRUSH

## (undated) DEVICE — LOTION PREP REMV 5OZ IODO CLR TINC OF BENZ DURAPREP

## (undated) DEVICE — SURE SET SINGLE BASIN-LF: Brand: MEDLINE INDUSTRIES, INC.

## (undated) DEVICE — ARM CRADLE: Brand: DEVON

## (undated) DEVICE — SUTURE VCRL SZ 2-0 L18IN ABSRB UD CT-1 L36MM 1/2 CIR J839D

## (undated) DEVICE — NEEDLE SPNL L3.5IN PNK HUB S STL REG WALL FIT STYL W/ QNCKE

## (undated) DEVICE — DRAPE C ARM UNIV W41XL74IN CLR PLAS XR VELC CLSR POLY STRP

## (undated) DEVICE — SPONGES GAUZE X-RAY 4X4 16PLY

## (undated) DEVICE — 3M™ STERI-STRIP™ REINFORCED ADHESIVE SKIN CLOSURES, R1547, 1/2 IN X 4 IN (12 MM X 100 MM), 6 STRIPS/ENVELOPE: Brand: 3M™ STERI-STRIP™

## (undated) DEVICE — 3M™ IOBAN™ 2 ANTIMICROBIAL INCISE DRAPE 6650EZ: Brand: IOBAN™ 2

## (undated) DEVICE — APPLICATOR PREP 26ML 0.7% IOD POVACRYLEX 74% ISO ALC ST

## (undated) DEVICE — TOWEL,OR,DSP,ST,BLUE,STD,4/PK,20PK/CS: Brand: MEDLINE

## (undated) DEVICE — MASTISOL ADHESIVE LIQ 2/3ML

## (undated) DEVICE — GAUZE,SPONGE,4"X4",8PLY,STRL,LF,10/TRAY: Brand: MEDLINE

## (undated) DEVICE — DRAPE,LAP,CHOLE,W/TROUGHS,STERILE: Brand: MEDLINE

## (undated) DEVICE — SURGICAL PROCEDURE PACK NEURO DRP CUST

## (undated) DEVICE — POSITIONER HD SFT TCH BERRY FOAM DEVON

## (undated) DEVICE — 3M™ WARMING BLANKET, UPPER BODY, 10 PER CASE, 42268: Brand: BAIR HUGGER™

## (undated) DEVICE — TRAP SPEC RETRV CLR PLAS POLYP IN LN SUCT QUIK CTCH

## (undated) DEVICE — SOLUTION IV IRRIG POUR BRL 0.9% SODIUM CHL 2F7124

## (undated) DEVICE — SUTURE MCRYL SZ 4-0 L27IN ABSRB UD L19MM PS-2 1/2 CIR PRIM Y426H

## (undated) DEVICE — PROTECTOR EYE PT SELF ADH NS OPT GRD LF

## (undated) DEVICE — SUTURE VCRL SZ 0 L18IN ABSRB UD L36MM CT-1 1/2 CIR J840D

## (undated) DEVICE — 3.0MM NEURO (MATCH HEAD)

## (undated) DEVICE — HYPODERMIC SAFETY NEEDLE: Brand: MAGELLAN

## (undated) DEVICE — SOLUTION IV IRRIG WATER 500ML POUR BRL ST 2F7113

## (undated) DEVICE — SET VLV 3 PC AWS DISPOSABLE GRDIAN SCOPEVALET

## (undated) DEVICE — SOLUTION IV IRRIG WATER 1000ML POUR BRL 2F7114

## (undated) DEVICE — 3M™ TEGADERM™ TRANSPARENT FILM DRESSING FRAME STYLE, 1628, 6 IN X 8 IN (15 CM X 20 CM), 10/CT 8CT/CASE: Brand: 3M™ TEGADERM™

## (undated) DEVICE — 60 ML SYRINGE,REGULAR TIP: Brand: MONOJECT

## (undated) DEVICE — FORCEPS BX L240CM WRK CHN 2.8MM STD CAP W/ NDL MIC MESH